# Patient Record
Sex: FEMALE | Race: WHITE | HISPANIC OR LATINO | Employment: PART TIME | URBAN - METROPOLITAN AREA
[De-identification: names, ages, dates, MRNs, and addresses within clinical notes are randomized per-mention and may not be internally consistent; named-entity substitution may affect disease eponyms.]

---

## 2017-01-13 ENCOUNTER — TRANSCRIBE ORDERS (OUTPATIENT)
Dept: ADMINISTRATIVE | Facility: HOSPITAL | Age: 62
End: 2017-01-13

## 2017-01-13 DIAGNOSIS — R52 PAIN: ICD-10-CM

## 2017-01-13 DIAGNOSIS — R60.9 EDEMA, UNSPECIFIED TYPE: Primary | ICD-10-CM

## 2017-01-19 ENCOUNTER — HOSPITAL ENCOUNTER (OUTPATIENT)
Dept: CARDIOLOGY | Facility: HOSPITAL | Age: 62
Discharge: HOME | End: 2017-01-19
Attending: INTERNAL MEDICINE | Admitting: INTERNAL MEDICINE

## 2017-01-19 DIAGNOSIS — R60.9 EDEMA, UNSPECIFIED TYPE: ICD-10-CM

## 2017-01-19 DIAGNOSIS — R52 PAIN: ICD-10-CM

## 2017-01-19 LAB
BH CV LOW VAS RIGHT VARICOSITY BK VESSEL: 1
BH CV LOWER VASCULAR LEFT COMMON FEMORAL AUGMENT: NORMAL
BH CV LOWER VASCULAR LEFT COMMON FEMORAL COMPETENT: NORMAL
BH CV LOWER VASCULAR LEFT COMMON FEMORAL COMPRESS: NORMAL
BH CV LOWER VASCULAR LEFT COMMON FEMORAL PHASIC: NORMAL
BH CV LOWER VASCULAR LEFT COMMON FEMORAL SPONT: NORMAL
BH CV LOWER VASCULAR RIGHT COMMON FEMORAL AUGMENT: NORMAL
BH CV LOWER VASCULAR RIGHT COMMON FEMORAL COMPETENT: NORMAL
BH CV LOWER VASCULAR RIGHT COMMON FEMORAL COMPRESS: NORMAL
BH CV LOWER VASCULAR RIGHT COMMON FEMORAL PHASIC: NORMAL
BH CV LOWER VASCULAR RIGHT COMMON FEMORAL SPONT: NORMAL
BH CV LOWER VASCULAR RIGHT DISTAL FEMORAL COMPRESS: NORMAL
BH CV LOWER VASCULAR RIGHT GASTRONEMIUS COMPRESS: NORMAL
BH CV LOWER VASCULAR RIGHT GREATER SAPH AK COMPRESS: NORMAL
BH CV LOWER VASCULAR RIGHT GREATER SAPH BK COMPRESS: NORMAL
BH CV LOWER VASCULAR RIGHT LESSER SAPH COMPRESS: NORMAL
BH CV LOWER VASCULAR RIGHT MID FEMORAL AUGMENT: NORMAL
BH CV LOWER VASCULAR RIGHT MID FEMORAL COMPETENT: NORMAL
BH CV LOWER VASCULAR RIGHT MID FEMORAL COMPRESS: NORMAL
BH CV LOWER VASCULAR RIGHT MID FEMORAL PHASIC: NORMAL
BH CV LOWER VASCULAR RIGHT MID FEMORAL SPONT: NORMAL
BH CV LOWER VASCULAR RIGHT PERONEAL COMPRESS: NORMAL
BH CV LOWER VASCULAR RIGHT POPLITEAL AUGMENT: NORMAL
BH CV LOWER VASCULAR RIGHT POPLITEAL COMPETENT: NORMAL
BH CV LOWER VASCULAR RIGHT POPLITEAL COMPRESS: NORMAL
BH CV LOWER VASCULAR RIGHT POPLITEAL PHASIC: NORMAL
BH CV LOWER VASCULAR RIGHT POPLITEAL SPONT: NORMAL
BH CV LOWER VASCULAR RIGHT POSTERIOR TIBIAL COMPRESS: NORMAL
BH CV LOWER VASCULAR RIGHT PROXIMAL FEMORAL COMPRESS: NORMAL
BH CV LOWER VASCULAR RIGHT SAPHENOFEMORAL JUNCTION AUGMENT: NORMAL
BH CV LOWER VASCULAR RIGHT SAPHENOFEMORAL JUNCTION COMPETENT: NORMAL
BH CV LOWER VASCULAR RIGHT SAPHENOFEMORAL JUNCTION COMPRESS: NORMAL
BH CV LOWER VASCULAR RIGHT SAPHENOFEMORAL JUNCTION PHASIC: NORMAL
BH CV LOWER VASCULAR RIGHT SAPHENOFEMORAL JUNCTION SPONT: NORMAL
BH CV LOWER VASCULAR RIGHT VARICOSITY BK COMPRESS: NORMAL
BH CV LOWER VASCULAR RIGHT VARICOSITY BK THROMBUS: NORMAL

## 2017-01-19 PROCEDURE — 93971 EXTREMITY STUDY: CPT

## 2017-02-08 ENCOUNTER — OFFICE VISIT (OUTPATIENT)
Dept: CARDIOLOGY | Facility: CLINIC | Age: 62
End: 2017-02-08

## 2017-02-08 VITALS
WEIGHT: 166.8 LBS | HEIGHT: 66 IN | HEART RATE: 67 BPM | SYSTOLIC BLOOD PRESSURE: 132 MMHG | DIASTOLIC BLOOD PRESSURE: 80 MMHG | BODY MASS INDEX: 26.81 KG/M2

## 2017-02-08 DIAGNOSIS — G47.33 OSA ON CPAP: ICD-10-CM

## 2017-02-08 DIAGNOSIS — Z99.89 OSA ON CPAP: ICD-10-CM

## 2017-02-08 DIAGNOSIS — E78.5 HYPERLIPIDEMIA, UNSPECIFIED HYPERLIPIDEMIA TYPE: Primary | ICD-10-CM

## 2017-02-08 DIAGNOSIS — I10 ESSENTIAL HYPERTENSION: ICD-10-CM

## 2017-02-08 PROCEDURE — 93000 ELECTROCARDIOGRAM COMPLETE: CPT | Performed by: INTERNAL MEDICINE

## 2017-02-08 PROCEDURE — 99214 OFFICE O/P EST MOD 30 MIN: CPT | Performed by: INTERNAL MEDICINE

## 2017-02-08 RX ORDER — VALSARTAN 160 MG/1
TABLET ORAL
COMMUNITY
Start: 2017-01-19 | End: 2017-02-13 | Stop reason: ALTCHOICE

## 2017-02-08 NOTE — PROGRESS NOTES
Date of Office Visit: 2017  Encounter Provider: Susan Paula MD  Place of Service: [facility] CARDIOLOGY  Patient Name: Vesta Sutton  :1955      Patient ID:  Vesta Sutton is a 61 y.o. female is here for  followup for HTN.         History of Present Illness    The patient has a history of having intermittent chest pain for which she has never had a  myocardial infarction. She has had echocardiography done. The last one was in  and  it was normal. She had carotid ultrasound done at the same time showed no stenosis but  did show mild intimal thickening. She had an abnormal stress study done in  showing  an apical ischemia but had a cardiac catheterization showing very mild left anterior  descending artery stenosis. She was admitted in  with chest pain. Her stress study  there showed no ischemia.       She was seen in the chest pain unit in 2012, and she ruled out for myocardial  infarction. Her stress study at that time was normal as well.       She has a history of retinal detachment of the left eye. She has a lot of anxiety and  stress. She is treated for hypertension and hyperlipidemia.       She also has some lumbar degenerative disc disease and so does not exercise regularly and  has had some sciatic pain with that as well.       She has a history of palpitations in the past but has had a normal Holter recording.       Her echocardiogram on 09/10/2009 was normal.         She had 2D echocardiogram with Doppler which was done 10/2014. It was normal. Ejection  fraction 66%. She also had a carotid duplex done 10/2014 which was also normal.     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   The rest of the right leg looked okay. It looks like it was just only done on her right leg.      She did see Milly Vessels in 2016.  At  that time her blood pressure was stable.  She was having chest pain because of the Zoloft.  She cut the Zoloft down to ¼ of a tablet, and started feeling better.   She was advised to stop the Zoloft.   She did end up stopping the Zoloft.  Her last echocardiogram was done 2014, which showed ejection fraction 66%, and normal segmental wall motion.  No significant valvular disease.        At this time she is a little agitated, but she is not exercising and she does not sleep well.   She is taking her medications as directed, and her blood pressure is well controlled.   She says that she feels poorly if her heart rate gets down into the 60s, but I do not think her medication is doing this.   She has had no dizziness or syncope.  She has minimal lower extremity edema.   Overall, I think she is doing pretty well.   She remains on Crestor at this time for her hyperlipidemia, and she has seen Dr. Morocho, who checks her blood work.          Past Medical History   Diagnosis Date   • Anemia    • Anxiety and depression    • Appendiceal carcinoid tumor      history of    • Arthritis    • Asthma    • Breast nodule    • Chest pain    • Coronary artery disease    • Diabetes mellitus      a1c only at 5, diet controlled right now   • Environmental allergies    • Fibromuscular hyperplasia of artery      in the carotid artery with no stenosis   • GERD (gastroesophageal reflux disease)    • Heart disease    • Hyperlipidemia    • Hypertension    • Lumbar pain    • LEATHA (obstructive sleep apnea)    • Palpitations    • RBBB (right bundle branch block)    • Retinal detachment      left  //  when blood pressure gets high she notices flashing lights in her vision   • Sleep apnea      wears machine   • Vertigo          Past Surgical History   Procedure Laterality Date   •  section     • Endoscopy and colonoscopy N/A 2014     EGD with biopsy and colonoscopy-Dr. Damien Aleman   • Endoscopy and colonoscopy N/A 2008     EGD  with biopsy and colonoscopy-Dr. Damien Aleman   • Appendectomy  2001   • Ureterocele repair     • Endoscopy N/A 9/26/2016     Procedure: ESOPHAGOGASTRODUODENOSCOPYwith biopsy (cold);  Surgeon: Elida Guzman MD;  Location: [facility] ENDOSCOPY;  Service:        Current Outpatient Prescriptions on File Prior to Visit   Medication Sig Dispense Refill   • albuterol (PROAIR RESPICLICK) 108 (90 BASE) MCG/ACT inhaler Inhale 2 puffs every 4 (four) hours as needed for wheezing. 1 inhaler 0   • ALPRAZolam (XANAX) 0.5 MG tablet Take 0.5 mg by mouth 2 (Two) Times a Day.     • Cholecalciferol 4000 UNITS capsule Take 4,000 Units by mouth daily.     • clonazePAM (KlonoPIN) 1 MG tablet Take 1 mg by mouth at night as needed for seizures.     • cycloSPORINE (RESTASIS) 0.05 % ophthalmic emulsion Administer 1 drop to both eyes 2 (two) times a day.     • famotidine (PEPCID) 10 MG tablet Take 10 mg by mouth as needed.     • Omega-3 Fatty Acids (OMEGA 3 PO) Take 1 tablet by mouth daily.     • rosuvastatin (CRESTOR) 5 MG tablet Take 5 mg by mouth daily.     • traZODone (DESYREL) 100 MG tablet Take 150 mg by mouth every night.     • vitamin B-12 (CYANOCOBALAMIN) 2500 MCG sublingual tablet tablet Place 2,500 mcg under the tongue daily.     • [DISCONTINUED] Diclofenac Sodium 1 % cream Place 1 application on the skin.     • [DISCONTINUED] olmesartan (BENICAR) 40 MG tablet Take 0.5 tablets by mouth Daily. 15 tablet 11   • [DISCONTINUED] sulfamethoxazole-trimethoprim (BACTRIM DS,SEPTRA DS) 800-160 MG per tablet Take 1 tablet by mouth 2 (Two) Times a Day. 10 tablet 0     No current facility-administered medications on file prior to visit.        Social History     Social History   • Marital status:      Spouse name: N/A   • Number of children: N/A   • Years of education: N/A     Occupational History   • Not on file.     Social History Main Topics   • Smoking status: Never Smoker   • Smokeless tobacco: Never Used      Comment: CAFFINE USE  "  • Alcohol use Yes      Comment: occasional use   • Drug use: No   • Sexual activity: Defer     Other Topics Concern   • Not on file     Social History Narrative           Review of Systems   Constitution: Positive for malaise/fatigue.   HENT: Positive for headaches. Negative for congestion.    Eyes: Negative for vision loss in left eye and vision loss in right eye.   Respiratory: Negative.  Negative for cough, hemoptysis, shortness of breath, sleep disturbances due to breathing, snoring, sputum production and wheezing.    Endocrine: Negative.    Hematologic/Lymphatic: Negative.    Skin: Negative for poor wound healing and rash.   Musculoskeletal: Positive for myalgias. Negative for falls, gout and muscle cramps.   Gastrointestinal: Positive for abdominal pain. Negative for diarrhea, dysphagia, hematemesis, melena, nausea and vomiting.   Neurological: Negative for excessive daytime sleepiness, dizziness, light-headedness, loss of balance, seizures and vertigo.   Psychiatric/Behavioral: Negative for depression and substance abuse. The patient is nervous/anxious.        Procedures    ECG 12 Lead  Date/Time: 2/8/2017 10:30 AM  Performed by: MILLICENT FITCH  Authorized by: MILLICENT FITCH   Comparison: compared with previous ECG   Similar to previous ECG  Rhythm: sinus rhythm  Conduction: right bundle branch block and LAFB  Clinical impression: abnormal ECG               Objective:      Vitals:    02/08/17 1021   BP: 132/80   BP Location: Right arm   Patient Position: Sitting   Pulse: 67   Weight: 166 lb 12.8 oz (75.7 kg)   Height: 66\" (167.6 cm)     Body mass index is 26.92 kg/(m^2).    Physical Exam   Constitutional: She is oriented to person, place, and time. She appears well-developed and well-nourished. No distress.   HENT:   Head: Normocephalic and atraumatic.   Eyes: Conjunctivae are normal. No scleral icterus.   Neck: Neck supple. No JVD present. Carotid bruit is not present. No thyromegaly present. "   Cardiovascular: Normal rate, regular rhythm, S1 normal, S2 normal, normal heart sounds and intact distal pulses.   No extrasystoles are present. PMI is not displaced.  Exam reveals no gallop.    No murmur heard.  Pulses:       Carotid pulses are 2+ on the right side, and 2+ on the left side.       Radial pulses are 2+ on the right side, and 2+ on the left side.        Dorsalis pedis pulses are 2+ on the right side, and 2+ on the left side.        Posterior tibial pulses are 2+ on the right side, and 2+ on the left side.   Pulmonary/Chest: Effort normal and breath sounds normal. No respiratory distress. She has no wheezes. She has no rhonchi. She has no rales. She exhibits no tenderness.   Abdominal: Soft. Bowel sounds are normal. She exhibits no distension, no abdominal bruit and no mass. There is no tenderness.   Musculoskeletal: She exhibits no edema or deformity.   Lymphadenopathy:     She has no cervical adenopathy.   Neurological: She is alert and oriented to person, place, and time. No cranial nerve deficit.   Skin: Skin is warm and dry. No rash noted. She is not diaphoretic. No cyanosis. No pallor. Nails show no clubbing.   Psychiatric: She has a normal mood and affect. Judgment normal.   Vitals reviewed.      Lab Review:       Assessment:      Diagnosis Plan   1. Hyperlipidemia, unspecified hyperlipidemia type     2. Essential hypertension     3. LEATHA on CPAP +7       1. Hypertension under good control. I told her to watch her salt and stay off Arthrotec.  2. Chronically abnormal electrocardiogram showing a left anterior fascicular block and  right bundle branch block.   3. Mild left anterior descending stenosis treated medically. Nonischemic stress study  done on 03/2012.   4. Palpitations stable and made worse with the Arthrotec. Stay away from Arthrotec.   5. Salt intake. I advised her to stay away from salt.   6. Hyperlipidemia on Crestor.   7. Obstructive sleep. Use CPAP.   8. History of appendiceal  carcinoma.   9. History of asthma, stable.   10. Anxiety.   11. Gastroesophageal reflux disease.   12. Lumbar degenerative disc disease with sciatic pain.      Plan:       See back in 1 year with Milly borrego. Advised exercise to improve sleep and energy.

## 2017-02-13 ENCOUNTER — TELEPHONE (OUTPATIENT)
Dept: CARDIOLOGY | Facility: CLINIC | Age: 62
End: 2017-02-13

## 2017-02-13 RX ORDER — OLMESARTAN MEDOXOMIL 40 MG/1
40 TABLET ORAL DAILY
COMMUNITY
End: 2017-02-13 | Stop reason: SDUPTHER

## 2017-02-13 RX ORDER — OLMESARTAN MEDOXOMIL 40 MG/1
20 TABLET ORAL DAILY
Qty: 30 TABLET | Refills: 3 | Status: SHIPPED | OUTPATIENT
Start: 2017-02-13 | End: 2017-10-26 | Stop reason: SDUPTHER

## 2017-02-13 NOTE — TELEPHONE ENCOUNTER
Pt states that she has attempted to take the Valsartan but does not feel well on this medication and would like to go back to using Benicar 40 mg, Pt states that she takes this once daily. In previous notes it appears she takes a half tablet once daily. Please advise if you want her to return to this medication, she states that she runs out of this tomorrow.

## 2017-05-01 ENCOUNTER — APPOINTMENT (OUTPATIENT)
Dept: SLEEP MEDICINE | Facility: HOSPITAL | Age: 62
End: 2017-05-01

## 2017-05-01 ENCOUNTER — APPOINTMENT (OUTPATIENT)
Dept: WOMENS IMAGING | Facility: HOSPITAL | Age: 62
End: 2017-05-01

## 2017-05-01 PROCEDURE — 76641 ULTRASOUND BREAST COMPLETE: CPT | Performed by: RADIOLOGY

## 2017-05-01 PROCEDURE — 77062 BREAST TOMOSYNTHESIS BI: CPT | Performed by: RADIOLOGY

## 2017-05-01 PROCEDURE — 77066 DX MAMMO INCL CAD BI: CPT | Performed by: RADIOLOGY

## 2017-05-01 PROCEDURE — MDREVSPNEW: Performed by: RADIOLOGY

## 2017-05-01 PROCEDURE — G0204 DX MAMMO INCL CAD BI: HCPCS | Performed by: RADIOLOGY

## 2017-05-01 PROCEDURE — G0279 TOMOSYNTHESIS, MAMMO: HCPCS | Performed by: RADIOLOGY

## 2017-05-04 ENCOUNTER — OFFICE VISIT (OUTPATIENT)
Dept: SURGERY | Facility: CLINIC | Age: 62
End: 2017-05-04

## 2017-05-04 VITALS — HEIGHT: 67 IN | OXYGEN SATURATION: 97 % | WEIGHT: 167.2 LBS | HEART RATE: 73 BPM | BODY MASS INDEX: 26.24 KG/M2

## 2017-05-04 DIAGNOSIS — R92.8 ABNORMAL ULTRASOUND OF BREAST: Primary | ICD-10-CM

## 2017-05-04 PROCEDURE — 99214 OFFICE O/P EST MOD 30 MIN: CPT | Performed by: SURGERY

## 2017-05-04 RX ORDER — MULTIVITAMIN WITH IRON
2 TABLET ORAL DAILY
COMMUNITY
End: 2020-09-21

## 2017-05-04 RX ORDER — CARBOXYMETHYLCELLULOSE SODIUM 5 MG/ML
1 SOLUTION/ DROPS OPHTHALMIC 3 TIMES DAILY PRN
COMMUNITY
End: 2018-05-10

## 2017-05-08 ENCOUNTER — HOSPITAL ENCOUNTER (OUTPATIENT)
Dept: CARDIOLOGY | Facility: HOSPITAL | Age: 62
Discharge: HOME | End: 2017-05-08
Admitting: INTERNAL MEDICINE

## 2017-05-08 ENCOUNTER — TELEPHONE (OUTPATIENT)
Dept: CARDIOLOGY | Facility: CLINIC | Age: 62
End: 2017-05-08

## 2017-05-08 ENCOUNTER — HOSPITAL ENCOUNTER (OUTPATIENT)
Dept: CARDIOLOGY | Facility: HOSPITAL | Age: 62
Discharge: HOME | End: 2017-05-08

## 2017-05-08 VITALS
SYSTOLIC BLOOD PRESSURE: 120 MMHG | HEART RATE: 68 BPM | RESPIRATION RATE: 16 BRPM | DIASTOLIC BLOOD PRESSURE: 74 MMHG | OXYGEN SATURATION: 98 %

## 2017-05-08 VITALS
HEIGHT: 67 IN | WEIGHT: 157 LBS | HEART RATE: 69 BPM | SYSTOLIC BLOOD PRESSURE: 128 MMHG | DIASTOLIC BLOOD PRESSURE: 78 MMHG | OXYGEN SATURATION: 97 % | BODY MASS INDEX: 24.64 KG/M2

## 2017-05-08 DIAGNOSIS — R94.31 ABNORMAL EKG: ICD-10-CM

## 2017-05-08 DIAGNOSIS — R07.9 CHEST PAIN, UNSPECIFIED TYPE: Primary | ICD-10-CM

## 2017-05-08 DIAGNOSIS — R00.2 PALPITATIONS: ICD-10-CM

## 2017-05-08 DIAGNOSIS — R07.2 PRECORDIAL PAIN: ICD-10-CM

## 2017-05-08 DIAGNOSIS — I10 ESSENTIAL HYPERTENSION: ICD-10-CM

## 2017-05-08 DIAGNOSIS — I45.2 RIGHT BUNDLE BRANCH BLOCK (RBBB) WITH LEFT ANTERIOR FASCICULAR BLOCK: ICD-10-CM

## 2017-05-08 LAB
ALBUMIN SERPL-MCNC: 4.1 G/DL (ref 3.5–5.2)
ALBUMIN/GLOB SERPL: 1.3 G/DL
ALP SERPL-CCNC: 56 U/L (ref 39–117)
ALT SERPL W P-5'-P-CCNC: 20 U/L (ref 1–33)
ANION GAP SERPL CALCULATED.3IONS-SCNC: 13.7 MMOL/L
AST SERPL-CCNC: 15 U/L (ref 1–32)
BASOPHILS # BLD AUTO: 0.06 10*3/MM3 (ref 0–0.2)
BASOPHILS NFR BLD AUTO: 0.9 % (ref 0–1.5)
BILIRUB SERPL-MCNC: 0.3 MG/DL (ref 0.1–1.2)
BNP BLD-MCNC: 17.8 PG/ML
BUN BLD-MCNC: 11 MG/DL (ref 6–20)
BUN/CREAT SERPL: 14.3 (ref 7–25)
CALCIUM SPEC-SCNC: 9.6 MG/DL (ref 8.6–10.5)
CHLORIDE SERPL-SCNC: 99 MMOL/L (ref 98–107)
CK MB BLD-MCNC: 1 NG/ML
CO2 SERPL-SCNC: 25.3 MMOL/L (ref 22–29)
CREAT BLD-MCNC: 0.77 MG/DL (ref 0.57–1)
DEPRECATED RDW RBC AUTO: 45.1 FL (ref 37–54)
EOSINOPHIL # BLD AUTO: 0.05 10*3/MM3 (ref 0–0.7)
EOSINOPHIL NFR BLD AUTO: 0.8 % (ref 0.3–6.2)
ERYTHROCYTE [DISTWIDTH] IN BLOOD BY AUTOMATED COUNT: 12.8 % (ref 11.7–13)
GFR SERPL CREATININE-BSD FRML MDRD: 77 ML/MIN/1.73
GLOBULIN UR ELPH-MCNC: 3.2 GM/DL
GLUCOSE BLD-MCNC: 106 MG/DL (ref 65–99)
HCT VFR BLD AUTO: 38.2 % (ref 35.6–45.5)
HGB BLD-MCNC: 12.4 G/DL (ref 11.9–15.5)
IMM GRANULOCYTES # BLD: 0 10*3/MM3 (ref 0–0.03)
IMM GRANULOCYTES NFR BLD: 0 % (ref 0–0.5)
LYMPHOCYTES # BLD AUTO: 1.18 10*3/MM3 (ref 0.9–4.8)
LYMPHOCYTES NFR BLD AUTO: 18.5 % (ref 19.6–45.3)
MCH RBC QN AUTO: 31.5 PG (ref 26.9–32)
MCHC RBC AUTO-ENTMCNC: 32.5 G/DL (ref 32.4–36.3)
MCV RBC AUTO: 97 FL (ref 80.5–98.2)
MONOCYTES # BLD AUTO: 0.28 10*3/MM3 (ref 0.2–1.2)
MONOCYTES NFR BLD AUTO: 4.4 % (ref 5–12)
NEUTROPHILS # BLD AUTO: 4.8 10*3/MM3 (ref 1.9–8.1)
NEUTROPHILS NFR BLD AUTO: 75.4 % (ref 42.7–76)
NT-PROBNP SERPL-MCNC: 43 PG/ML (ref 0–900)
PLATELET # BLD AUTO: 222 10*3/MM3 (ref 140–500)
PMV BLD AUTO: 10.1 FL (ref 6–12)
POC MYOGLOBIN: 51.7 NG/ML
POTASSIUM BLD-SCNC: 4 MMOL/L (ref 3.5–5.2)
PROT SERPL-MCNC: 7.3 G/DL (ref 6–8.5)
RBC # BLD AUTO: 3.94 10*6/MM3 (ref 3.9–5.2)
SODIUM BLD-SCNC: 138 MMOL/L (ref 136–145)
TROPONIN I SERPL-MCNC: 0.05 NG/ML (ref 0–0.6)
WBC NRBC COR # BLD: 6.37 10*3/MM3 (ref 4.5–10.7)

## 2017-05-08 PROCEDURE — 82553 CREATINE MB FRACTION: CPT

## 2017-05-08 PROCEDURE — 85025 COMPLETE CBC W/AUTO DIFF WBC: CPT | Performed by: NURSE PRACTITIONER

## 2017-05-08 PROCEDURE — 83880 ASSAY OF NATRIURETIC PEPTIDE: CPT

## 2017-05-08 PROCEDURE — 93320 DOPPLER ECHO COMPLETE: CPT

## 2017-05-08 PROCEDURE — 93325 DOPPLER ECHO COLOR FLOW MAPG: CPT | Performed by: INTERNAL MEDICINE

## 2017-05-08 PROCEDURE — 93017 CV STRESS TEST TRACING ONLY: CPT

## 2017-05-08 PROCEDURE — 93350 STRESS TTE ONLY: CPT | Performed by: INTERNAL MEDICINE

## 2017-05-08 PROCEDURE — 83880 ASSAY OF NATRIURETIC PEPTIDE: CPT | Performed by: NURSE PRACTITIONER

## 2017-05-08 PROCEDURE — 93325 DOPPLER ECHO COLOR FLOW MAPG: CPT

## 2017-05-08 PROCEDURE — 93016 CV STRESS TEST SUPVJ ONLY: CPT | Performed by: INTERNAL MEDICINE

## 2017-05-08 PROCEDURE — 93352 ADMIN ECG CONTRAST AGENT: CPT | Performed by: INTERNAL MEDICINE

## 2017-05-08 PROCEDURE — 93010 ELECTROCARDIOGRAM REPORT: CPT | Performed by: NURSE PRACTITIONER

## 2017-05-08 PROCEDURE — 84484 ASSAY OF TROPONIN QUANT: CPT

## 2017-05-08 PROCEDURE — 99215 OFFICE O/P EST HI 40 MIN: CPT | Performed by: NURSE PRACTITIONER

## 2017-05-08 PROCEDURE — 94760 N-INVAS EAR/PLS OXIMETRY 1: CPT

## 2017-05-08 PROCEDURE — 80053 COMPREHEN METABOLIC PANEL: CPT | Performed by: NURSE PRACTITIONER

## 2017-05-08 PROCEDURE — 83874 ASSAY OF MYOGLOBIN: CPT

## 2017-05-08 PROCEDURE — 25010000002 PERFLUTREN (DEFINITY) 8.476 MG IN SODIUM CHLORIDE 10 ML INJECTION: Performed by: NURSE PRACTITIONER

## 2017-05-08 PROCEDURE — 93320 DOPPLER ECHO COMPLETE: CPT | Performed by: INTERNAL MEDICINE

## 2017-05-08 PROCEDURE — 93005 ELECTROCARDIOGRAM TRACING: CPT | Performed by: NURSE PRACTITIONER

## 2017-05-08 PROCEDURE — C8928 TTE W OR W/O FOL W/CON,STRES: HCPCS

## 2017-05-08 PROCEDURE — 93005 ELECTROCARDIOGRAM TRACING: CPT

## 2017-05-08 PROCEDURE — 36415 COLL VENOUS BLD VENIPUNCTURE: CPT

## 2017-05-08 PROCEDURE — 93018 CV STRESS TEST I&R ONLY: CPT | Performed by: INTERNAL MEDICINE

## 2017-05-08 RX ORDER — SODIUM CHLORIDE 0.9 % (FLUSH) 0.9 %
10 SYRINGE (ML) INJECTION AS NEEDED
Status: DISCONTINUED | OUTPATIENT
Start: 2017-05-08 | End: 2019-07-29

## 2017-05-08 RX ORDER — NITROGLYCERIN 0.4 MG/1
0.4 TABLET SUBLINGUAL
Status: DISCONTINUED | OUTPATIENT
Start: 2017-05-08 | End: 2018-05-21 | Stop reason: HOSPADM

## 2017-05-08 RX ADMIN — PERFLUTREN 3 ML: 6.52 INJECTION, SUSPENSION INTRAVENOUS at 16:51

## 2017-05-09 LAB
BH CV ECHO MEAS - ACS: 1.9 CM
BH CV ECHO MEAS - AO MAX PG: 5.6 MMHG
BH CV ECHO MEAS - AO ROOT AREA (BSA CORRECTED): 1.7
BH CV ECHO MEAS - AO ROOT AREA: 7.8 CM^2
BH CV ECHO MEAS - AO ROOT DIAM: 3.2 CM
BH CV ECHO MEAS - AO V2 MAX: 118.1 CM/SEC
BH CV ECHO MEAS - BSA(HAYCOCK): 1.8 M^2
BH CV ECHO MEAS - BSA: 1.8 M^2
BH CV ECHO MEAS - BZI_BMI: 24.6 KILOGRAMS/M^2
BH CV ECHO MEAS - BZI_METRIC_HEIGHT: 170.2 CM
BH CV ECHO MEAS - BZI_METRIC_WEIGHT: 71.2 KG
BH CV ECHO MEAS - CONTRAST EF 4CH: 82.1 ML/M^2
BH CV ECHO MEAS - EDV(MOD-SP4): 84 ML
BH CV ECHO MEAS - EDV(TEICH): 87.4 ML
BH CV ECHO MEAS - EF(CUBED): 76.1 %
BH CV ECHO MEAS - EF(MOD-SP4): 82.1 %
BH CV ECHO MEAS - EF(TEICH): 68.4 %
BH CV ECHO MEAS - ESV(MOD-SP4): 15 ML
BH CV ECHO MEAS - ESV(TEICH): 27.7 ML
BH CV ECHO MEAS - FS: 38 %
BH CV ECHO MEAS - IVS/LVPW: 0.92
BH CV ECHO MEAS - IVSD: 0.9 CM
BH CV ECHO MEAS - LAT PEAK E' VEL: 6 CM/SEC
BH CV ECHO MEAS - LV DIASTOLIC VOL/BSA (35-75): 46 ML/M^2
BH CV ECHO MEAS - LV MASS(C)D: 134.6 GRAMS
BH CV ECHO MEAS - LV MASS(C)DI: 73.8 GRAMS/M^2
BH CV ECHO MEAS - LV SYSTOLIC VOL/BSA (12-30): 8.2 ML/M^2
BH CV ECHO MEAS - LVIDD: 4.4 CM
BH CV ECHO MEAS - LVIDS: 2.7 CM
BH CV ECHO MEAS - LVLD AP4: 7.6 CM
BH CV ECHO MEAS - LVLS AP4: 5.5 CM
BH CV ECHO MEAS - LVPWD: 0.98 CM
BH CV ECHO MEAS - MED PEAK E' VEL: 7 CM/SEC
BH CV ECHO MEAS - MV A DUR: 0.1 SEC
BH CV ECHO MEAS - MV A MAX VEL: 55.8 CM/SEC
BH CV ECHO MEAS - MV DEC SLOPE: 326.5 CM/SEC^2
BH CV ECHO MEAS - MV DEC TIME: 0.18 SEC
BH CV ECHO MEAS - MV E MAX VEL: 68.4 CM/SEC
BH CV ECHO MEAS - MV E/A: 1.2
BH CV ECHO MEAS - MV P1/2T MAX VEL: 67.9 CM/SEC
BH CV ECHO MEAS - MV P1/2T: 60.9 MSEC
BH CV ECHO MEAS - MVA P1/2T LCG: 3.2 CM^2
BH CV ECHO MEAS - MVA(P1/2T): 3.6 CM^2
BH CV ECHO MEAS - PULM A REVS DUR: 0.09 SEC
BH CV ECHO MEAS - PULM A REVS VEL: 26.7 CM/SEC
BH CV ECHO MEAS - PULM DIAS VEL: 32.5 CM/SEC
BH CV ECHO MEAS - PULM S/D: 1.2
BH CV ECHO MEAS - PULM SYS VEL: 39.3 CM/SEC
BH CV ECHO MEAS - SI(CUBED): 35.4 ML/M^2
BH CV ECHO MEAS - SI(MOD-SP4): 37.8 ML/M^2
BH CV ECHO MEAS - SI(TEICH): 32.7 ML/M^2
BH CV ECHO MEAS - SV(CUBED): 64.6 ML
BH CV ECHO MEAS - SV(MOD-SP4): 69 ML
BH CV ECHO MEAS - SV(TEICH): 59.7 ML
BH CV ECHO MEAS - TAPSE (>1.6): 2.4 CM2
BH CV STRESS BP STAGE 1: NORMAL
BH CV STRESS BP STAGE 2: NORMAL
BH CV STRESS DURATION MIN STAGE 1: 3
BH CV STRESS DURATION MIN STAGE 2: 3
BH CV STRESS DURATION MIN STAGE 3: 1
BH CV STRESS DURATION SEC STAGE 1: 0
BH CV STRESS DURATION SEC STAGE 2: 0
BH CV STRESS DURATION SEC STAGE 3: 0
BH CV STRESS ECHO POST STRESS EJECTION FRACTION EF: 82 %
BH CV STRESS GRADE STAGE 1: 10
BH CV STRESS GRADE STAGE 2: 12
BH CV STRESS GRADE STAGE 3: 14
BH CV STRESS HR STAGE 1: 103
BH CV STRESS HR STAGE 2: 132
BH CV STRESS HR STAGE 3: 149
BH CV STRESS METS STAGE 1: 5
BH CV STRESS METS STAGE 2: 7.5
BH CV STRESS METS STAGE 3: 10
BH CV STRESS PROTOCOL 1: NORMAL
BH CV STRESS SPEED STAGE 1: 1.7
BH CV STRESS SPEED STAGE 2: 2.5
BH CV STRESS SPEED STAGE 3: 3.4
BH CV STRESS STAGE 1: 1
BH CV STRESS STAGE 2: 2
BH CV STRESS STAGE 3: 3
BH CV XLRA - RV BASE: 2.6 CM
BH CV XLRA - TDI S': 11 CM/SEC
E/E' RATIO: 6.5
LEFT ATRIUM VOLUME INDEX: 10 ML/M2
LV EF 2D ECHO EST: 60 %
MAXIMAL PREDICTED HEART RATE: 161 BPM
PERCENT MAX PREDICTED HR: 92.55 %
STRESS BASELINE BP: NORMAL MMHG
STRESS BASELINE HR: 69 BPM
STRESS O2 SAT REST: 99 %
STRESS PERCENT HR: 109 %
STRESS POST ESTIMATED WORKLOAD: 8 METS
STRESS POST EXERCISE DUR MIN: 7 MIN
STRESS POST EXERCISE DUR SEC: 0 SEC
STRESS POST PEAK BP: NORMAL MMHG
STRESS POST PEAK HR: 149 BPM
STRESS TARGET HR: 137 BPM

## 2017-05-25 ENCOUNTER — APPOINTMENT (OUTPATIENT)
Dept: WOMENS IMAGING | Facility: HOSPITAL | Age: 62
End: 2017-05-25

## 2017-05-25 PROCEDURE — 76641 ULTRASOUND BREAST COMPLETE: CPT

## 2017-05-25 PROCEDURE — 76942 ECHO GUIDE FOR BIOPSY: CPT | Performed by: RADIOLOGY

## 2017-05-25 PROCEDURE — 19000 PUNCTURE ASPIR CYST BREAST: CPT | Performed by: RADIOLOGY

## 2017-05-25 PROCEDURE — 19083 BX BREAST 1ST LESION US IMAG: CPT | Performed by: RADIOLOGY

## 2017-05-25 PROCEDURE — G0206 DX MAMMO INCL CAD UNI: HCPCS

## 2017-05-25 PROCEDURE — G0279 TOMOSYNTHESIS, MAMMO: HCPCS

## 2017-06-05 ENCOUNTER — HOSPITAL ENCOUNTER (OUTPATIENT)
Dept: SLEEP MEDICINE | Facility: HOSPITAL | Age: 62
Discharge: HOME | End: 2017-06-05
Admitting: INTERNAL MEDICINE

## 2017-06-05 PROCEDURE — G0463 HOSPITAL OUTPT CLINIC VISIT: HCPCS

## 2017-06-06 NOTE — PROGRESS NOTES
DATE OF VISIT: 06/05/2017    PRIMARY CARE PHYSICIAN: Benjamín Morocho MD    REFERRING PHYSICIAN: Benjamín Morocho MD    REASON FOR CONSULTATION: History of obstructive sleep apnea and recent difficulty and waking up several times at night.     HISTORY OF PRESENT ILLNESS: This patient is a 61-year-old female who has a history of obstructive sleep apnea, on CPAP. She was last seen by Dr. Batista on 11/03/2016 and is new to our practice. The patient also has a history of psychophysiologic insomnia and is instructed to use trazodone 150 mg at night and denies any side effects. Patient is also on Xanax 0.5 mg b.i.d. for anxiety and has a script for clonazepam but has not filled since 02/2017. She reports she has occasional nights when she has to use Benadryl in order to sleep. She is not on any antidepressant because of side effects that she has had in the past. She denies any complaints of the CPAP, except that she needs a new machine since hers is making weird noises. She currently has anxiety and is suppose to see a psychiatrist soon. She does complain of excessive daytime sleepiness when she does not use her machine, but otherwise, she feels better when she is using her machine. [place] Sleepiness Scale is 1. She uses a full facemask with a chinstrap and reports that it fits well. She has excessive dry mouth in the morning. Her equipment supplier is [employer]. The patient denies any history of seizure disorder or recent head trauma. She denies any parasomnias, cataplexy, sleep paralysis, or other symptoms to suggest narcolepsy.     REVIEW OF SYSTEMS: Positive for nasal congestion, acid reflux, anxiety, and depression. A 12 system review is conducted and was otherwise negative.     PAST MEDICAL HISTORY:  1. Anemia.   2. Anxiety.   3. Depression.   4. Appendiceal carcinoid tumor.   5. Arthritis.   6. Asthma.   7. Breast nodule.   8. Coronary artery disease.   9. Diabetes.   10. Fibromuscular hyperplasia of carotid artery  with no stenosis.   11. GERD.   12. Hyperlipidemia.   13. Hypertension.   14. Lumbar back pain.   15. Obstructive sleep apnea, on CPAP.   16. Palpitations.   17. Right bundle-branch block.   18. Retinal detachment on the left.   19. Vertigo.     PAST SURGICAL HISTORY:  1. Appendectomy.   2. .  3. Endoscopy and colonoscopy with biopsy that showed mild gastritis.  4. Uterocele repair.     HOME MEDICATIONS:  1. Albuterol 108 mcg /hydrochlorothiazide inhaler, inhaled 2 puffs q.4. h. p.r.n. wheezing.   2. Alprazolam 0.5 mg b.i.d.  3. Refresh plus eyedrops, 1 drop to both eyes t.i.d. p.r.n.  4. Vitamin D3, take 5000 units daily.   5. Klonopin 1 mg tablet at night p.r.n. The patient reports that she has not taken and has not filled since 2017.  6. Famotidine 10 mg daily p.r.n.  7. Flector 1.3% patch on skin p.r.n.  8. Magnesium 250 mg, take 2 tablets p.o. daily.   9. Olmesartan 40 mg, takes 1/2 tab daily.   10. Omega-3 fatty acids 1 tablet daily.   11. Rosuvastatin 5 mg daily.  12. Trazodone 150 mg at night.   13. Vitamin B12 at 2500 mcg sublingual tablets daily.  14. Chromium picolinate 200 mg daily.   15. Vitamin E 400 international units.  16. Nitroglycerin 0.4 mg every 5 minutes p.r.n. for chest pain.     ALLERGIES:   1. BETA BLOCKER (wheezing).  2. SULFA ANTIBIOTICS (hives).    SOCIAL HISTORY: Patient is . She denies any history of smoking or drug use. She does occasionally drink alcohol. She reports that she drinks 1 cup of caffeine per day.     FAMILY HISTORY: Positive for diabetes, heart disease, heart failure, and colon polyps.     PHYSICAL EXAMINATION: Done and documented per sleep disorder physical examination sheet.   VITAL SIGNS: Height 66 inches, weight 169 pounds, BMI 27, blood pressure 123/82, heart rate 70.   GENERAL: Awake, alert, and oriented. Normal mood and normal affect.   HEENT: Normal conjunctivae. PERRLA. Moist mucous membranes. Septum midline. Normal size tongue with enlarged  uvula and redundant lateral pharyngeal tissue. Normal dentition. Mallampati III airway.   NECK: Supple. Trachea midline. Normal thyroid. No lymphadenopathy.   LUNGS: Nonlabored. Clear to auscultation bilaterally. No wheezing.   HEART: Regular rate and rhythm. No murmurs.   EXTREMITIES: 1+ bilateral lower extremity edema. No clubbing or cyanosis. Normal gait.     DIAGNOSTIC DATA: Compliance download for the past 30 days shows 100% compliance with average use of 6 hours and 53 minutes with a residual AHI of 7.0 with a central apnea index of 0.2, obstructive apnea index 3.8, hypoxemia index of 3.0 and RERA index of 1.9. The patient is on a CPAP at a set pressure of 7 cmH2O. Average time for large air leak per day is 1 hour and 7 minutes.     ASSESSMENT:  1. Obstructive sleep apnea with mild residual apnea on CPAP of 7 cmH2O. There is concern about causing more of an air leak if CPAP pressure is increased.   2. Anxiety and depression with plans to talk to a psychiatrist soon.   3. Psychophysiologic insomnia.     RECOMMENDATIONS:  1. Will order the patient a new CPAP to replace her old one and set it up auto CPAP of 7-10 cmH2O. The patient was educated about proper sleep hygiene measures including having a consistent bedtime and wake time.   2. Will refill trazodone prescription.   3. Encouraged to use Xanax for anxiety per PCP instructions.   4. Will start a trial of 10 mg of amitriptyline.   5. Patient will follow up in 2 months for further recommendations.     DESI Stover, dictating for Dimitrios Quiñonez MD.          DESI Stover  WF:blanka  D:   06/05/2017 15:42:34  T:   06/05/2017 22:51:30  Job ID:   [id number]  Document ID:   [id number]  cc:

## 2017-08-07 ENCOUNTER — HOSPITAL ENCOUNTER (OUTPATIENT)
Dept: SLEEP MEDICINE | Facility: HOSPITAL | Age: 62
Discharge: HOME | End: 2017-08-07
Admitting: INTERNAL MEDICINE

## 2017-08-07 PROCEDURE — G0463 HOSPITAL OUTPT CLINIC VISIT: HCPCS

## 2017-08-08 NOTE — PROGRESS NOTES
Sleep Disorder Follow Up    Patient Name: Vesta Sutton  Age/Sex: 61 y.o. female  : 1955  MRN: [id number]    Date of Encounter Visit: 17  Referring Provider: Benjamín Morocho MD  Place of Service: [facility]  Patient Care Team:  Benjamín Morocho MD as PCP - General (Internal Medicine)  Danelle Carnes MD as Consulting Physician (Obstetrics and Gynecology)  Damien Aleman MD as Surgeon (General Surgery)    PROBLEM LIST:  1. Obstructive sleep apnea  2. Psychophysiological insomnia  3. Anxiety and depression    History of Present Illness:  Vesta Sutton is a 61 y.o. female who was last seen in the office on 17 and has a history of obstructive sleep apnea on CPAP at 7 cmH20. At last visit she needed a new machine and was ordered as auto CPAP 7-10 cmH20 and is here for follow up.   Patient is on CPAP and uses it every night with no complaints from the mask or the pressure. She uses a full face mask an reports that it fits well with no air leak.   Patient sleeps better and has a deeper sleep with the CPAP and feels more energy during the day time.  She is on medication for insomnia with Trazadone 150 mg and amitriptyline 10 mg was added recently. She is also on alprazolam 0.5 mg BID for anxiety.   ESS is 0.   Compliance download was reviewed and documented below  Weight is stable since last visit   Other comorbidities include anxiety and depression  Patient denies any changes in past medical surgical social or family history.     Review of Systems:   Positive for nasal congestion, fever, chills, acid reflux, anxiety and depression  A ten-system review was conducted and was otherwise negative.   Please refer to the follow up sleep questionnaire page one for details.    Past Medical History:  Past medical, surgical, social, and family history, except as mentioned above, was unchanged from the last visit.     Past Medical History:   Diagnosis Date   • Anemia    • Anxiety  and depression    • Appendiceal carcinoid tumor    • Arthritis    • Asthma    • Breast nodule    • Chest pain    • Coronary artery disease    • Diabetes mellitus     a1c only at 5, diet controlled right now   • Environmental allergies    • Fibromuscular hyperplasia of artery     in the carotid artery with no stenosis   • GERD (gastroesophageal reflux disease)    • Heart disease    • History of anesthesia complications    • Hyperlipidemia    • Hypertension    • Lumbar pain    • LEATHA (obstructive sleep apnea)    • Palpitations    • RBBB (right bundle branch block)    • Retinal detachment     left  //  when blood pressure gets high she notices flashing lights in her vision   • Sleep apnea     wears machine   • Vertigo        Past Surgical History:   Procedure Laterality Date   • APPENDECTOMY     •  SECTION     • ENDOSCOPY N/A 2016    Procedure: ESOPHAGOGASTRODUODENOSCOPYwith biopsy (cold);  Surgeon: Elida Guzman MD;  Location: [facility] ENDOSCOPY;  Service:    • ENDOSCOPY AND COLONOSCOPY N/A 2014    EGD with biopsy and colonoscopy, Mild gastritis, Normal colon, Repeat Colonoscopy in 5 years, Dr. Damien Aleman   • ENDOSCOPY AND COLONOSCOPY N/A 2008    EGD with biopsy and colonoscopy-Dr. Damien Aleman   • URETEROCELE REPAIR         Home Medications:     Current Outpatient Prescriptions:   •  albuterol (PROAIR RESPICLICK) 108 (90 BASE) MCG/ACT inhaler, Inhale 2 puffs every 4 (four) hours as needed for wheezing., Disp: 1 inhaler, Rfl: 0  •  ALPRAZolam (XANAX) 0.5 MG tablet, Take 0.5 mg by mouth 2 (Two) Times a Day., Disp: , Rfl:   •  carboxymethylcellulose (REFRESH PLUS) 0.5 % solution, Administer 1 drop to both eyes 3 (Three) Times a Day As Needed for Dry Eyes., Disp: , Rfl:   •  Cholecalciferol (VITAMIN D3) 5000 UNITS capsule capsule, Take 5,000 Units by mouth Daily., Disp: , Rfl:   •  clonazePAM (KlonoPIN) 1 MG tablet, Take 1 mg by mouth at night as needed for seizures., Disp: , Rfl:   •   diclofenac (VOLTAREN) 1 % gel gel, Apply 4 g topically 4 (Four) Times a Day As Needed., Disp: , Rfl:   •  famotidine (PEPCID) 10 MG tablet, Take 10 mg by mouth as needed., Disp: , Rfl:   •  FLECTOR 1.3 % patch patch, Place 1 patch on the skin As Needed., Disp: , Rfl:   •  Magnesium 250 MG tablet, Take 2 tablets by mouth Daily., Disp: , Rfl:   •  olmesartan (BENICAR) 40 MG tablet, Take 0.5 tablets by mouth Daily., Disp: 30 tablet, Rfl: 3  •  Omega-3 Fatty Acids (OMEGA 3 PO), Take 1 tablet by mouth daily., Disp: , Rfl:   •  rosuvastatin (CRESTOR) 5 MG tablet, Take 5 mg by mouth daily., Disp: , Rfl:   •  traZODone (DESYREL) 100 MG tablet, Take 150 mg by mouth every night., Disp: , Rfl:   •  vitamin B-12 (CYANOCOBALAMIN) 2500 MCG sublingual tablet tablet, Place 2,500 mcg under the tongue daily., Disp: , Rfl:     Current Facility-Administered Medications:   •  nitroglycerin (NITROSTAT) SL tablet 0.4 mg, 0.4 mg, Sublingual, Q5 Min PRN, Lisa Dumont, APRN  •  sodium chloride 0.9 % flush 10 mL, 10 mL, Intravenous, PRN, Lisaluciana Dumont, APRN    Allergies:  Allergies   Allergen Reactions   • Beta Adrenergic Blockers Other (See Comments)     Asthma   • Sulfa Antibiotics Hives       Past Social History:  Social History     Social History   • Marital status:      Spouse name: N/A   • Number of children: N/A   • Years of education: N/A     Social History Main Topics   • Smoking status: Never Smoker   • Smokeless tobacco: Never Used      Comment: CAFFINE USE   • Alcohol use Yes      Comment: occasional use   • Drug use: No   • Sexual activity: Defer     Other Topics Concern   • Not on file     Social History Narrative       Past Family History:  Family History   Problem Relation Age of Onset   • Heart failure Father    • Heart disease Father    • Diabetes Sister    • Heart disease Mother    • Heart disease Brother    • Colon polyps Brother          Objective:   Done and documented on sleep disorders center physical  examination sheet   VS: Ht: 66 inches, Wt: 169 lbs, BMI 27, \86, HR 76    Physical Exam:   General: AAOx3. Normal mood and affect.   HEENT:  Conjunctiva normal.  PERRLA.  Moist mucous membranes.  Septum midline Mallampati 3 airways. Normal tongue and uvula. Normal tonsils.  Neck supple trachea midline.  Normal thyroid.  LUNGS: Non-labored breathing. CTAB.  No wheezing  HEART: regular rate and rhythm. No murmur. Normal s1/s2  EXTREMITIES: no edema. No cyanosis or clubbing. Normal gait    Diagnostic Data:  Compliance download showed 96.7% compliance with 6 hours and 33 minutes per night. Residual AHI of 7.2 with central apnea index of 0.6, obstructive apnea index of 3.8 , hypopnea index of 2.8 and RERA index of 0.7 on auto CPAP 7-10 cmH20 with 17 minutes and 33 seconds of average air leak. 90% of the time at or below 9.6 cmH20    Assessment and Plan:   1. Obstructive sleep apnea  2. Psychophysiological insomnia  3. Anxiety and depression    Recommendations:     Continue auto CPAP and change pressure to 8-12 cmH20  Continue Trazodone and increase Amitriptyline to 25 mg nightly  Continue PRN xanax and try to minimize use     Adherence to the PAP therapy is a key factor in successful treatment of LEATHA and the patient was encouraged to contact us in case of problem with the CPAP or the mask that can limit the tolerance of the compliance with the therapy.    Follow up in 2 months.     As dictated per DESI Farley  [facility]   08/07/17  10:45 PM    EMR Dragon/Transcription disclaimer:   Much of this encounter note is an electronic transcription/translation of spoken language to printed text. The electronic translation of spoken language may permit erroneous, or at times, nonsensical words or phrases to be inadvertently transcribed; Although I have reviewed the note for such errors, some may still exist.

## 2017-08-09 ENCOUNTER — TELEPHONE (OUTPATIENT)
Dept: SLEEP MEDICINE | Facility: HOSPITAL | Age: 62
End: 2017-08-09

## 2017-08-09 NOTE — TELEPHONE ENCOUNTER
Pt called [facility] and stated she's having issue getting her msk to fit since her start pressure was increased to 8cmh2o from 7cmh2o.    Pt requesting pressure to be decreased to 7cmh2o.

## 2017-08-14 ENCOUNTER — TELEPHONE (OUTPATIENT)
Dept: SLEEP MEDICINE | Facility: HOSPITAL | Age: 62
End: 2017-08-14

## 2017-08-14 NOTE — TELEPHONE ENCOUNTER
Pt to bring data card in for pressure change today or Wednesday.  Lower min pressure to 7cmh2o pre Tiffany

## 2017-08-14 NOTE — TELEPHONE ENCOUNTER
Ok to change pt's pap start pressure permitted by orion.  Unable to change device settings via modem.  Pt will need to be called to bring her card to the [facility]dc for pressure change.

## 2017-08-14 NOTE — TELEPHONE ENCOUNTER
Pt to return call to let sdc know if she will bring data card to us or naps for pressure change.  If naps, sdc will need to have order signed and faxed

## 2017-09-14 ENCOUNTER — TELEPHONE (OUTPATIENT)
Dept: CARDIOLOGY | Facility: CLINIC | Age: 62
End: 2017-09-14

## 2017-09-14 NOTE — TELEPHONE ENCOUNTER
Pt called and states that she is been having near syncope episode, fatigue , palpitations.  if her HR goes to 69 she felt all that symptoms.Pt wants to know if she can come in see you. Called the pt back for more info but no answer l/ m for pt to call back.....please advise    Pt called back,    BP reading last night 143/85 HR:69     138/84  HR: 69  BP reading this morning  145/79 HR :75    Med list  benicar 40 mg daily    Thanks  Bita CARTWRIGHT

## 2017-09-18 ENCOUNTER — OFFICE VISIT (OUTPATIENT)
Dept: CARDIOLOGY | Facility: CLINIC | Age: 62
End: 2017-09-18

## 2017-09-18 VITALS
BODY MASS INDEX: 26.53 KG/M2 | HEIGHT: 67 IN | DIASTOLIC BLOOD PRESSURE: 78 MMHG | HEART RATE: 76 BPM | SYSTOLIC BLOOD PRESSURE: 128 MMHG | WEIGHT: 169 LBS

## 2017-09-18 DIAGNOSIS — R00.2 PALPITATIONS: Primary | ICD-10-CM

## 2017-09-18 DIAGNOSIS — I10 ESSENTIAL HYPERTENSION: ICD-10-CM

## 2017-09-18 PROCEDURE — 99213 OFFICE O/P EST LOW 20 MIN: CPT | Performed by: NURSE PRACTITIONER

## 2017-09-18 PROCEDURE — 93000 ELECTROCARDIOGRAM COMPLETE: CPT | Performed by: NURSE PRACTITIONER

## 2017-09-18 RX ORDER — AMLODIPINE BESYLATE 2.5 MG/1
2.5 TABLET ORAL DAILY
Qty: 30 TABLET | Refills: 11 | Status: SHIPPED | OUTPATIENT
Start: 2017-09-18 | End: 2017-10-26 | Stop reason: ALTCHOICE

## 2017-09-18 RX ORDER — THYROID 60 MG
TABLET ORAL
COMMUNITY
Start: 2017-09-13 | End: 2019-11-18

## 2017-09-18 NOTE — PROGRESS NOTES
"Date of Office Visit: 2017  Encounter Provider: DESI Banks  Place of Service: [facility] CARDIOLOGY  Patient Name: Vesta Sutton  :1955    Chief Complaint   Patient presents with   • Fatigue   :     HPI: Vesta Sutton is a 61 y.o. female comes in today for complaints of syncope.   She has a history of hypertension and hyperlipidemia.    She has a history of having intermittent chest pain for which she has never had a myocardial infarction.Abnormal stress test in  with cardiac cath showing very mild LAD stenosis.  She was seen in the chest pain unit in 2012, and she ruled out for myocardial infarction. Her stress study at that time was normal as well.     2016, she came in with chest pain.  She was also having some palpitations after taking Zoloft.    May/2017, she was having intermittent chest pain.  She was set up for a stress echocardiogram which was normal.      On , she called in complaining of near syncopal episode, fatigue, palpitations.      Today, she comes in for follow-up and reports that recently her primary care physician increased her Benicar to 40 mg.  She's been checking her blood pressure in the evenings.  Sometimes she just feels like she is \"draggy\" in the evenings.  It is especially worse when her heart rate is below 70.  She says when her heart rate is at 69 it makes her feel bad.  She said a few episodes of dizziness and palpitations.  When she gets anxious, she has palpitations.  She denies edema, orthopnea or PND.  Past Medical History:   Diagnosis Date   • Anemia    • Anxiety and depression    • Appendiceal carcinoid tumor    • Arthritis    • Asthma    • Breast nodule    • Chest pain    • Coronary artery disease    • Diabetes mellitus     a1c only at 5, diet controlled right now   • Environmental allergies    • Fibromuscular hyperplasia of artery     in the carotid artery with no stenosis   • GERD " (gastroesophageal reflux disease)    • Heart disease    • History of anesthesia complications    • Hyperlipidemia    • Hypertension    • Lumbar pain    • LEATHA (obstructive sleep apnea)    • Palpitations    • RBBB (right bundle branch block)    • Retinal detachment     left  //  when blood pressure gets high she notices flashing lights in her vision   • Sleep apnea     wears machine   • Vertigo        Past Surgical History:   Procedure Laterality Date   • APPENDECTOMY     •  SECTION     • ENDOSCOPY N/A 2016    Procedure: ESOPHAGOGASTRODUODENOSCOPYwith biopsy (cold);  Surgeon: Elida Guzman MD;  Location: [facility] ENDOSCOPY;  Service:    • ENDOSCOPY AND COLONOSCOPY N/A 2014    EGD with biopsy and colonoscopy, Mild gastritis, Normal colon, Repeat Colonoscopy in 5 years, Dr. Damien Aleman   • ENDOSCOPY AND COLONOSCOPY N/A 2008    EGD with biopsy and colonoscopy-Dr. Damien Aleman   • URETEROCELE REPAIR             Review of Systems   Constitution: Positive for malaise/fatigue.   HENT: Positive for headaches. Negative for congestion.    Eyes: Negative for vision loss in left eye and vision loss in right eye.   Respiratory: Negative.  Negative for cough, hemoptysis, shortness of breath, sleep disturbances due to breathing, snoring, sputum production and wheezing.    Endocrine: Negative.    Hematologic/Lymphatic: Negative.    Skin: Negative for poor wound healing and rash.   Musculoskeletal: Positive for myalgias. Negative for falls, gout and muscle cramps.   Gastrointestinal: Positive for abdominal pain. Negative for diarrhea, dysphagia, hematemesis, melena, nausea and vomiting.   Neurological: Negative for excessive daytime sleepiness, dizziness, light-headedness, loss of balance, seizures and vertigo.   Psychiatric/Behavioral: Negative for depression and substance abuse. The patient is nervous/anxious.      All other systems reviewed and are negative    Allergies   Allergen Reactions   •  "Beta Adrenergic Blockers Other (See Comments)     Asthma   • Sulfa Antibiotics Hives       All aspects of family and social history reviewed.          Objective:     Vitals:    09/18/17 1306   BP: 128/78   BP Location: Left arm   Pulse: 76   Weight: 169 lb (76.7 kg)   Height: 67\" (170.2 cm)     Body mass index is 26.47 kg/(m^2).    PHYSICAL EXAM:  Physical Exam   Constitutional: She is oriented to person, place, and time. She appears well-developed and well-nourished. No distress.   HENT:   Head: Normocephalic and atraumatic.   Eyes: Conjunctivae are normal. No scleral icterus.   Neck: Neck supple. No JVD present. Carotid bruit is not present. No thyromegaly present.   Cardiovascular: Normal rate, regular rhythm, S1 normal, S2 normal, normal heart sounds and intact distal pulses.   No extrasystoles are present. PMI is not displaced.  Exam reveals no gallop.    No murmur heard.  Pulses:       Carotid pulses are 2+ on the right side, and 2+ on the left side.       Radial pulses are 2+ on the right side, and 2+ on the left side.        Dorsalis pedis pulses are 2+ on the right side, and 2+ on the left side.        Posterior tibial pulses are 2+ on the right side, and 2+ on the left side.   Pulmonary/Chest: Effort normal and breath sounds normal. No respiratory distress. She has no wheezes. She has no rhonchi. She has no rales. She exhibits no tenderness.   Abdominal: Soft. Bowel sounds are normal. She exhibits no distension, no abdominal bruit and no mass. There is no tenderness.   Musculoskeletal: She exhibits no edema or deformity.   Lymphadenopathy:     She has no cervical adenopathy.   Neurological: She is alert and oriented to person, place, and time. No cranial nerve deficit.   Skin: Skin is warm and dry. No rash noted. She is not diaphoretic. No cyanosis. No pallor. Nails show no clubbing.   Psychiatric: She has a normal mood and affect. Judgment normal.   Vitals reviewed.        ECG 12 Lead  Date/Time: " 9/18/2017 1:46 PM  Performed by: KESHAV LIZARRAGA  Authorized by: KESHAV LIZARRAGA   Comparison: compared with previous ECG from 2/8/2017  Similar to previous ECG  Rhythm: sinus rhythm  Rate: normal  BPM: 76  Conduction: right bundle branch block and LAFB  ST Segments: ST segments normal  T Waves: T waves normal  QRS axis: normal  Clinical impression: abnormal ECG  Comments: Indication: fatigue, dizziness                Assessment:       Diagnosis Plan   1. Palpitations  Holter / Event ZIO Patch   2. Essential hypertension          Orders Placed This Encounter   Procedures   • Holter / Event ZIO Patch     Standing Status:   Future     Number of Occurrences:   1     Standing Expiration Date:   9/18/2018     Order Specific Question:   Reason for exam?     Answer:   Palpitations   • ECG 12 Lead     This order was created via procedure documentation       Current Outpatient Prescriptions   Medication Sig Dispense Refill   • albuterol (PROAIR RESPICLICK) 108 (90 BASE) MCG/ACT inhaler Inhale 2 puffs every 4 (four) hours as needed for wheezing. 1 inhaler 0   • ALPRAZolam (XANAX) 0.5 MG tablet Take 0.5 mg by mouth 2 (Two) Times a Day.     • carboxymethylcellulose (REFRESH PLUS) 0.5 % solution Administer 1 drop to both eyes 3 (Three) Times a Day As Needed for Dry Eyes.     • Cholecalciferol (VITAMIN D3) 5000 UNITS capsule capsule Take 5,000 Units by mouth Daily.     • clonazePAM (KlonoPIN) 1 MG tablet Take 1 mg by mouth at night as needed for seizures.     • diclofenac (VOLTAREN) 1 % gel gel Apply 4 g topically 4 (Four) Times a Day As Needed.     • famotidine (PEPCID) 10 MG tablet Take 10 mg by mouth as needed.     • FLECTOR 1.3 % patch patch Place 1 patch on the skin As Needed.     • Magnesium 250 MG tablet Take 2 tablets by mouth Daily.     • olmesartan (BENICAR) 40 MG tablet Take 0.5 tablets by mouth Daily. 30 tablet 3   • Omega-3 Fatty Acids (OMEGA 3 PO) Take 1 tablet by mouth daily.     • rosuvastatin (CRESTOR) 5 MG  tablet Take 5 mg by mouth daily.     • traZODone (DESYREL) 100 MG tablet Take 150 mg by mouth every night.     • amLODIPine (NORVASC) 2.5 MG tablet Take 1 tablet by mouth Daily. 30 tablet 11     Current Facility-Administered Medications   Medication Dose Route Frequency Provider Last Rate Last Dose   • nitroglycerin (NITROSTAT) SL tablet 0.4 mg  0.4 mg Sublingual Q5 Min PRN DESI Crenshaw       • sodium chloride 0.9 % flush 10 mL  10 mL Intravenous PRN DESI Crenshaw                Plan:       1. Palpitations-patient having episodes is where she feels her heart rate is slow and then other times she will have palpitations.  I've advised her that her heart rate below 70 is not anything to be concerned about his long as she is not having syncope.  We will place a Zio patch for evaluation.  I think some of this is related to anxiety. She has also recently been started on armour thyroid.     2. HTN-has issues with hypertension.  Says she feels worse since Benicar increased to 40 mg daily.  Will decrease back to 20 mg daily and start 2.5 mg of amlodipine.    Follow up in office in one month    As always, it has been a pleasure to participate in this patient's care.      Sincerely,      DESI Banks

## 2017-09-25 ENCOUNTER — TELEPHONE (OUTPATIENT)
Dept: CARDIOLOGY | Facility: CLINIC | Age: 62
End: 2017-09-25

## 2017-09-25 NOTE — TELEPHONE ENCOUNTER
Thank you.  I will discuss with Dr. Paula tomorrow. She was previously on max dosing of Diovan and this was not treating her blood pressure. Benicar should not be causing her heart rate to drop and I cannot do anything with her heart rate just being below 70.  She has a Zio patch on to ensure she is not truly bradycardic.

## 2017-09-25 NOTE — TELEPHONE ENCOUNTER
Pt called stating she stopped taking the Amlodipine 2.5mg QD due to fatigue, palp and SOA.  She states she took it this past Friday and Saturday but discontinued it on Sunday.  She is feeling better today.  BP today 137/83,  Friday evening 140/100.  Pt states she is still taking the Benicar 20mg QD.  Pt is still wearing the Zio patch.    Please advise/jlf    # [phone]

## 2017-09-25 NOTE — TELEPHONE ENCOUNTER
09/25/17  2:26 PM  Vesta Sutton  1955    Home Phone [phone]   Mobile [phone]     I called Ms. Sutton and instructed her to continue to monitor BP and to call if >140/90. She states she is worred that the benicar is making her HR drop too low. She states she feels exhausted with HR below 70. She states that she previously took diovan and tolerated it better. I explained that benicar and diovan are the same class of medication. She states she is aware of this.     For now, she will continue the 20mg benicar daily.    Bita MUNIZ RN

## 2017-10-02 ENCOUNTER — TELEPHONE (OUTPATIENT)
Dept: CARDIOLOGY | Facility: HOSPITAL | Age: 62
End: 2017-10-02

## 2017-10-02 ENCOUNTER — HOSPITAL ENCOUNTER (OUTPATIENT)
Dept: SLEEP MEDICINE | Facility: HOSPITAL | Age: 62
Discharge: HOME | End: 2017-10-02
Admitting: INTERNAL MEDICINE

## 2017-10-02 PROCEDURE — G0463 HOSPITAL OUTPT CLINIC VISIT: HCPCS

## 2017-10-03 NOTE — PROGRESS NOTES
Sleep Disorder Follow Up    Patient Name: Vesta Sutton  Age/Sex: 61 y.o. female  : 1955  MRN: [id number]    Date of Encounter Visit: 10/03/17  Referring Provider: Dimitrios Quiñonez MD  Place of Service: [facility] SLEEP [facility]  Patient Care Team:  Benjamín Morocho MD as PCP - General (Internal Medicine)  Danelle Carnes MD as Consulting Physician (Obstetrics and Gynecology)  Damien Aleman MD as Surgeon (General Surgery)    PROBLEM LIST:  1. Insomnia likely psychophysiological   2. obstructive sleep apnea on CPAP  3. Obesity  4. Anxiety    History of Present Illness:  Vesta Sutton is a 61 y.o. female who was last seen in the office on 17 for evaluation of obstructive sleep apnea.  She was previously seen by Dr. Batista in 2016 and has a history of psychophysiological insomnia and was taking trazodone 150 mg at night without any side effects.  Patient also has significant anxiety and is on Xanax 0.5 mg twice a day.  She reports that occasionally she has used an anterolateral to help her sleep.  She is not on any antidepressant because of the side effects that she has had in the past.  She had a new machine ordered on 17 at a pressure of 7-10 cm water.  On her follow-up on 17 her pressure was adjusted to 8-12 cm H2O due to a residual AHI of 7.2.  However, she did not tolerate the increased pressure with her new mask and requests that it be changed back to a starting pressure of 7.   Since last visit she complains of dry mouth, but does not use a chin strap.  She was given a prescription for amitriptyline 10 mg, but it did not cause sustaining sleep and she was waking up between 2 and 4 AM. She did try 25 mg with only partial success. She ended up going back to her prior regimen of trazodone 150 mg and Xanax.    She denied any napping during the day and goes to bed between 10 PM and midnight and usually wakes up around 6 AM, but on the weekend she doesn't wake up until 9  AM.  Patient is on CPAP and uses it every night with no complaints from the mask or the pressure.  Patient uses a fullface mask and reports that it fits well. Patient denies any air leak or excessive dry mouth.   Patient's equipment supplier is [employer].  Patient sleeps better and has a deeper sleep with the CPAP and feels more energy during the day time.  Current CPAP setting 7-12 cm H20.  [place] Sleepiness Scale (ESS) is 1.  Compliance download was reviewed and documented below.  Weight is stable since last visit.  Other comorbidities include anxiety, depression, insomnia and overweight.  She does report having a cornea surgery due to dry eyes since her last visit.    Review of Systems:   Positive for acid reflux/heartburn, nasal congestion, abdominal bloating and anxiety/depression.  A ten-system review was conducted and was otherwise negative.   Please refer to the follow up sleep questionnaire page one for details.    Past Medical History:  Past medical, surgical, social, and family history, except as mentioned above, was unchanged from the last visit.     Past Medical History:   Diagnosis Date   • Anemia    • Anxiety and depression    • Appendiceal carcinoid tumor    • Arthritis    • Asthma    • Breast nodule    • Chest pain    • Coronary artery disease    • Diabetes mellitus     a1c only at 5, diet controlled right now   • Environmental allergies    • Fibromuscular hyperplasia of artery     in the carotid artery with no stenosis   • GERD (gastroesophageal reflux disease)    • Heart disease    • History of anesthesia complications    • Hyperlipidemia    • Hypertension    • Lumbar pain    • LEATHA (obstructive sleep apnea)    • Palpitations    • RBBB (right bundle branch block)    • Retinal detachment     left  //  when blood pressure gets high she notices flashing lights in her vision   • Sleep apnea     wears machine   • Vertigo        Past Surgical History:   Procedure Laterality Date   • APPENDECTOMY  2001   •   SECTION     • ENDOSCOPY N/A 2016    Procedure: ESOPHAGOGASTRODUODENOSCOPYwith biopsy (cold);  Surgeon: Elida Guzman MD;  Location: [facility] ENDOSCOPY;  Service:    • ENDOSCOPY AND COLONOSCOPY N/A 2014    EGD with biopsy and colonoscopy, Mild gastritis, Normal colon, Repeat Colonoscopy in 5 years, Dr. Damien Aleman   • ENDOSCOPY AND COLONOSCOPY N/A 2008    EGD with biopsy and colonoscopy-Dr. Damien Aleman   • URETEROCELE REPAIR         Home Medications:     Current Outpatient Prescriptions:   •  albuterol (PROAIR RESPICLICK) 108 (90 BASE) MCG/ACT inhaler, Inhale 2 puffs every 4 (four) hours as needed for wheezing., Disp: 1 inhaler, Rfl: 0  •  ALPRAZolam (XANAX) 0.5 MG tablet, Take 0.5 mg by mouth 2 (Two) Times a Day., Disp: , Rfl:   •  amLODIPine (NORVASC) 2.5 MG tablet, Take 1 tablet by mouth Daily., Disp: 30 tablet, Rfl: 11  •  ARMOUR THYROID 60 MG tablet, Take once daily, Disp: , Rfl:   •  carboxymethylcellulose (REFRESH PLUS) 0.5 % solution, Administer 1 drop to both eyes 3 (Three) Times a Day As Needed for Dry Eyes., Disp: , Rfl:   •  Cholecalciferol (VITAMIN D3) 5000 UNITS capsule capsule, Take 5,000 Units by mouth Daily., Disp: , Rfl:   •  clonazePAM (KlonoPIN) 1 MG tablet, Take 1 mg by mouth at night as needed for seizures., Disp: , Rfl:   •  diclofenac (VOLTAREN) 1 % gel gel, Apply 4 g topically 4 (Four) Times a Day As Needed., Disp: , Rfl:   •  famotidine (PEPCID) 10 MG tablet, Take 10 mg by mouth as needed., Disp: , Rfl:   •  FLECTOR 1.3 % patch patch, Place 1 patch on the skin As Needed., Disp: , Rfl:   •  Magnesium 250 MG tablet, Take 2 tablets by mouth Daily., Disp: , Rfl:   •  olmesartan (BENICAR) 40 MG tablet, Take 0.5 tablets by mouth Daily., Disp: 30 tablet, Rfl: 3  •  Omega-3 Fatty Acids (OMEGA 3 PO), Take 1 tablet by mouth daily., Disp: , Rfl:   •  rosuvastatin (CRESTOR) 5 MG tablet, Take 5 mg by mouth daily., Disp: , Rfl:   •  traZODone (DESYREL) 100 MG tablet, Take  150 mg by mouth every night., Disp: , Rfl:     Current Facility-Administered Medications:   •  nitroglycerin (NITROSTAT) SL tablet 0.4 mg, 0.4 mg, Sublingual, Q5 Min PRN, DESI Crenshaw  •  sodium chloride 0.9 % flush 10 mL, 10 mL, Intravenous, PRN, DESI Crenshaw    She reports that she is currently taking prednisolone eyedrops due to recent eye surgery  She is TAKING over-the-counter Zyrtec    Allergies:  Allergies   Allergen Reactions   • Beta Adrenergic Blockers Other (See Comments)     Asthma   • Sulfa Antibiotics Hives       Past Social History:  Social History     Social History   • Marital status:      Spouse name: N/A   • Number of children: N/A   • Years of education: N/A     Social History Main Topics   • Smoking status: Never Smoker   • Smokeless tobacco: Never Used      Comment: CAFFINE USE   • Alcohol use Yes      Comment: occasional use   • Drug use: No   • Sexual activity: Defer     Other Topics Concern   • Not on file     Social History Narrative     She also reports drinking one cup of caffeine per day.    Past Family History:  Family History   Problem Relation Age of Onset   • Heart failure Father    • Heart disease Father    • Diabetes Sister    • Heart disease Mother    • Heart disease Brother    • Colon polyps Brother          Objective:   Done and documented on sleep disorders center physical examination sheet   VS: Ht: 66 inches, Wt: 170 lbs, BMI 27, /68, HR 68    Physical Exam:   General: AAOx3. Normal mood and affect.   HEENT:  Moist mucous membranes.  Septum midline. Mallampati 3 airways. Normal tongue and uvula. Normal tonsils.  LUNGS: Non-labored breathing. CTAB.  No wheezing  HEART: regular rate and rhythm. No murmur. Normal s1/s2  EXTREMITIES: No edema. No cyanosis or clubbing. Normal gait    Diagnostic Data:  NPSG on 4/4/2008 at [facility] sleep [place] at a weight of 166 pounds showed mild to moderate obstructive sleep apnea with overall AHI of 7.5, but  increase to 15.1 in the supine position and 55.3% of time snoring.  Patient did have sleep related hypoxemia with 6% of total sleep time in the hypoxemic range.    CPAP titration study on 4/21/2008.  Patient was titrated from 4 up to 8 cm of water with optimal control at 7 cm water with residual AHI 0.2.    She was ordered 6/5/17 and she was placed on auto CPAP 7-10 cm H2O.    On 8/8/17 pressure was increased to 8-12 cm H2O due to residual AHI of 7.1 with mostly obstructive apnea.    Pressure was decreased on 8/12/17 to 7-12 cm H2O due to   difficulty tolerating higher pressure even with new mask.      Compliance download from the last 30 days showed 86.7% compliance with 6 ours and 14 minutes per night. Residual AHI of 9.1 (CA 0.6, OA 2.9, HI 5.6 and RERA 0.8) on CPAP at 7-12 cm H20 with mean pressure of 7.8 cm H2O 90% of time at or below 9.2 cm H2O with 1 hour 49 minutes and 10 seconds of average air leak.     Assessment and Plan:     1. Insomnia (psychophysiological)  2. Mild-to-moderate obstructive sleep apnea on CPAP and intolerant to high pressures  3. Obesity  4. Anxiety    Recommendations:     At this point patient is spending too much time in bed and was recommended to decrease time in bed to 6 hours.  She was also educated on proper sleep hygiene measures and instructed to follow these directions.    Since she did get some benefit with the amitriptyline will resume at 25 mg nightly.    Will work on sleep hygiene measures and improve the large air leak before considering any other adjustments.  Patient was intolerable to higher pressures, but continues to have an elevated residual AHI, which may be due to large air leak at this time.    Follow up in 2 months or sooner if has any issues.     DESI Stover dictating for Dr. Dimitrios Quiñonez  [facility]   10/03/17  6:52 PM    EMR Dragon/Transcription disclaimer:   Much of this encounter note is an electronic transcription/translation of  spoken language to printed text. The electronic translation of spoken language may permit erroneous, or at times, nonsensical words or phrases to be inadvertently transcribed; Although I have reviewed the note for such errors, some may still exist.

## 2017-10-10 ENCOUNTER — TELEPHONE (OUTPATIENT)
Dept: CARDIOLOGY | Facility: CLINIC | Age: 62
End: 2017-10-10

## 2017-10-12 ENCOUNTER — TELEPHONE (OUTPATIENT)
Dept: CARDIOLOGY | Facility: CLINIC | Age: 62
End: 2017-10-12

## 2017-10-26 ENCOUNTER — OFFICE VISIT (OUTPATIENT)
Dept: CARDIOLOGY | Facility: CLINIC | Age: 62
End: 2017-10-26

## 2017-10-26 VITALS
BODY MASS INDEX: 26.84 KG/M2 | SYSTOLIC BLOOD PRESSURE: 136 MMHG | RESPIRATION RATE: 18 BRPM | DIASTOLIC BLOOD PRESSURE: 88 MMHG | WEIGHT: 171 LBS | HEIGHT: 67 IN | HEART RATE: 64 BPM

## 2017-10-26 DIAGNOSIS — R00.2 PALPITATIONS: ICD-10-CM

## 2017-10-26 DIAGNOSIS — E78.5 HYPERLIPIDEMIA, UNSPECIFIED HYPERLIPIDEMIA TYPE: ICD-10-CM

## 2017-10-26 DIAGNOSIS — I10 ESSENTIAL HYPERTENSION: Primary | ICD-10-CM

## 2017-10-26 PROCEDURE — 93000 ELECTROCARDIOGRAM COMPLETE: CPT | Performed by: INTERNAL MEDICINE

## 2017-10-26 PROCEDURE — 99214 OFFICE O/P EST MOD 30 MIN: CPT | Performed by: INTERNAL MEDICINE

## 2017-10-26 RX ORDER — TRAZODONE HYDROCHLORIDE 150 MG/1
150 TABLET ORAL NIGHTLY
COMMUNITY
End: 2022-02-28

## 2017-10-26 RX ORDER — OLMESARTAN MEDOXOMIL 40 MG/1
20 TABLET ORAL 2 TIMES DAILY
Qty: 30 TABLET | Refills: 3 | Status: SHIPPED | OUTPATIENT
Start: 2017-10-26 | End: 2023-01-19

## 2017-10-26 NOTE — PROGRESS NOTES
Date of Office Visit: 10/26/2017  Encounter Provider: Susan Paula MD  Place of Service: [facility] CARDIOLOGY  Patient Name: Vesta Sutton  :1955      Patient ID:  Vesta Sutton is a 62 y.o. female is here for  followup for hypertension and palpitations.         History of Present Illness    The patient has a history of having intermittent chest pain for which she has never had a  myocardial infarction. She has had echocardiography done. The last one was in  and  it was normal. She had carotid ultrasound done at the same time showed no stenosis but  did show mild intimal thickening. She had an abnormal stress study done in  showing  an apical ischemia but had a cardiac catheterization showing very mild left anterior  descending artery stenosis. She was admitted in  with chest pain. Her stress study  there showed no ischemia.       She was seen in the chest pain unit in 2012, and she ruled out for myocardial  infarction. Her stress study at that time was normal as well.       She has a history of retinal detachment of the left eye. She has a lot of anxiety and  stress. She is treated for hypertension and hyperlipidemia.       She also has some lumbar degenerative disc disease and so does not exercise regularly and  has had some sciatic pain with that as well.       She has a history of palpitations in the past but has had a normal Holter recording.       Her echocardiogram on 09/10/2009 was normal.          She had 2D echocardiogram with Doppler which was done 10/2014. It was normal. Ejection  fraction 66%. She also had a carotid duplex done 10/2014 which was also normal.      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   The rest of the right leg looked okay. It looks like it was just only done on her right leg.       She did see  Milly Morrison in 11/2016.  At that time her blood pressure was stable.  She was having chest pain because of the Zoloft.  She cut the Zoloft down to ¼ of a tablet, and started feeling better.   She was advised to stop the Zoloft.   She did end up stopping the Zoloft.  Her last echocardiogram was done 03/2014, which showed ejection fraction 66%, and normal segmental wall motion.  No significant valvular disease.       She remains on Crestor at this time for her hyperlipidemia, and she has seen Dr. Morocho, who checks her blood work.         Milly morrison for dizziness which they felt was related to Benicar.  It was cut in half.  Now she seen her blood pressures in the morning running 150s her 70s.  She takes her Benicar 20 mg in the morning.  She does have sleep apnea.  She has been told that her sleep apnea is not well-controlled even with her mask because she requires a higher pressure which she can tolerate.  I think this is probably with striking her blood pressure up when she gets up in the morning.  She's had no further tachycardia, palpitations or syncope.  She has no chest pain or pressure.       Past Medical History:   Diagnosis Date   • Anemia    • Anxiety and depression    • Appendiceal carcinoid tumor    • Arthritis    • Asthma    • Breast nodule    • Chest pain    • Coronary artery disease    • Diabetes mellitus     a1c only at 5, diet controlled right now   • Dizziness    • Environmental allergies    • Fatigue     in the evening   • Fibromuscular hyperplasia of artery     in the carotid artery with no stenosis   • GERD (gastroesophageal reflux disease)    • Headache    • Heart disease    • History of anesthesia complications    • Hyperlipidemia    • Hypertension    • Lumbar pain    • Malaise and fatigue    • Myalgia    • LEATHA (obstructive sleep apnea)    • Palpitations    • RBBB (right bundle branch block)    • Retinal detachment     left  //  when blood pressure gets high she notices flashing lights in her  vision   • Sleep apnea     wears machine   • Vertigo          Past Surgical History:   Procedure Laterality Date   • APPENDECTOMY     •  SECTION     • CORNEA LACERATION REPAIR     • ENDOSCOPY N/A 2016    Procedure: ESOPHAGOGASTRODUODENOSCOPYwith biopsy (cold);  Surgeon: Elida Guzman MD;  Location: [facility] ENDOSCOPY;  Service:    • ENDOSCOPY AND COLONOSCOPY N/A 2014    EGD with biopsy and colonoscopy, Mild gastritis, Normal colon, Repeat Colonoscopy in 5 years, Dr. Damien Aleman   • ENDOSCOPY AND COLONOSCOPY N/A 2008    EGD with biopsy and colonoscopy-Dr. Damein Aleman   • URETEROCELE REPAIR         Current Outpatient Prescriptions on File Prior to Visit   Medication Sig Dispense Refill   • albuterol (PROAIR RESPICLICK) 108 (90 BASE) MCG/ACT inhaler Inhale 2 puffs every 4 (four) hours as needed for wheezing. 1 inhaler 0   • ALPRAZolam (XANAX) 0.5 MG tablet Take 0.5 mg by mouth 2 (Two) Times a Day.     • ARMOUR THYROID 60 MG tablet Take once daily     • carboxymethylcellulose (REFRESH PLUS) 0.5 % solution Administer 1 drop to both eyes 3 (Three) Times a Day As Needed for Dry Eyes.     • Cholecalciferol (VITAMIN D3) 5000 UNITS capsule capsule Take 5,000 Units by mouth Daily.     • diclofenac (VOLTAREN) 1 % gel gel Apply 4 g topically 4 (Four) Times a Day As Needed.     • famotidine (PEPCID) 10 MG tablet Take 10 mg by mouth as needed.     • FLECTOR 1.3 % patch patch Place 1 patch on the skin As Needed.     • Magnesium 250 MG tablet Take 2 tablets by mouth Daily.     • olmesartan (BENICAR) 40 MG tablet Take 0.5 tablets by mouth Daily. 30 tablet 3   • Omega-3 Fatty Acids (OMEGA 3 PO) Take 1 tablet by mouth daily.     • rosuvastatin (CRESTOR) 5 MG tablet Take 5 mg by mouth daily.     • [DISCONTINUED] amLODIPine (NORVASC) 2.5 MG tablet Take 1 tablet by mouth Daily. 30 tablet 11   • [DISCONTINUED] clonazePAM (KlonoPIN) 1 MG tablet Take 1 mg by mouth at night as needed for seizures.     •  [DISCONTINUED] traZODone (DESYREL) 100 MG tablet Take 150 mg by mouth every night.       Current Facility-Administered Medications on File Prior to Visit   Medication Dose Route Frequency Provider Last Rate Last Dose   • nitroglycerin (NITROSTAT) SL tablet 0.4 mg  0.4 mg Sublingual Q5 Min PRN DESI Crenshaw       • sodium chloride 0.9 % flush 10 mL  10 mL Intravenous PRN DESI Crenshaw           Social History     Social History   • Marital status:      Spouse name: N/A   • Number of children: N/A   • Years of education: N/A     Occupational History   • Not on file.     Social History Main Topics   • Smoking status: Never Smoker   • Smokeless tobacco: Never Used      Comment: CAFFINE USE   • Alcohol use Yes      Comment: occasional use   • Drug use: No   • Sexual activity: Defer     Other Topics Concern   • Not on file     Social History Narrative           Review of Systems   Constitution: Negative.   HENT: Negative for congestion.    Eyes: Negative for vision loss in left eye and vision loss in right eye.   Respiratory: Negative.  Negative for cough, hemoptysis, shortness of breath, sleep disturbances due to breathing, snoring, sputum production and wheezing.    Endocrine: Negative.    Hematologic/Lymphatic: Negative.    Skin: Negative for poor wound healing and rash.   Musculoskeletal: Negative for falls, gout, muscle cramps and myalgias.   Gastrointestinal: Negative for abdominal pain, diarrhea, dysphagia, hematemesis, melena, nausea and vomiting.   Neurological: Negative for excessive daytime sleepiness, dizziness, headaches, light-headedness, loss of balance, seizures and vertigo.   Psychiatric/Behavioral: Negative for depression and substance abuse. The patient is not nervous/anxious.        Procedures    ECG 12 Lead  Date/Time: 10/26/2017 2:05 PM  Performed by: MILLICENT FITCH  Authorized by: MILLICENT FITCH   Comparison: compared with previous ECG   Similar to previous  "ECG  Rhythm: sinus rhythm  Conduction: right bundle branch block and LAFB  Clinical impression: abnormal ECG               Objective:      Vitals:    10/26/17 1314   BP: 136/88   Pulse: 64   Resp: 18   Weight: 171 lb (77.6 kg)   Height: 67\" (170.2 cm)     Body mass index is 26.78 kg/(m^2).    Physical Exam   Constitutional: She is oriented to person, place, and time. She appears well-developed and well-nourished. No distress.   HENT:   Head: Normocephalic and atraumatic.   Eyes: Conjunctivae are normal. No scleral icterus.   Neck: Neck supple. No JVD present. Carotid bruit is not present. No thyromegaly present.   Cardiovascular: Normal rate, regular rhythm, S1 normal, S2 normal, normal heart sounds and intact distal pulses.   No extrasystoles are present. PMI is not displaced.  Exam reveals no gallop.    No murmur heard.  Pulses:       Carotid pulses are 2+ on the right side, and 2+ on the left side.       Radial pulses are 2+ on the right side, and 2+ on the left side.        Dorsalis pedis pulses are 2+ on the right side, and 2+ on the left side.        Posterior tibial pulses are 2+ on the right side, and 2+ on the left side.   Pulmonary/Chest: Effort normal and breath sounds normal. No respiratory distress. She has no wheezes. She has no rhonchi. She has no rales. She exhibits no tenderness.   Abdominal: Soft. Bowel sounds are normal. She exhibits no distension, no abdominal bruit and no mass. There is no tenderness.   Musculoskeletal: She exhibits no edema or deformity.   Lymphadenopathy:     She has no cervical adenopathy.   Neurological: She is alert and oriented to person, place, and time. No cranial nerve deficit.   Skin: Skin is warm and dry. No rash noted. She is not diaphoretic. No cyanosis. No pallor. Nails show no clubbing.   Psychiatric: She has a normal mood and affect. Judgment normal.   Vitals reviewed.      Lab Review:       Assessment:      Diagnosis Plan   1. Essential hypertension     2. " Palpitations     3. Hyperlipidemia, unspecified hyperlipidemia type       1. Hypertension, BP high in am.   2. Chronically abnormal electrocardiogram showing a left anterior fascicular block and  right bundle branch block.   3. Mild left anterior descending stenosis treated medically. Nonischemic stress study  done on 03/2012.   4. Palpitations stable and made worse with the Arthrotec. Stay away from Arthrotec.   5. Salt intake. I advised her to stay away from salt.   6. Hyperlipidemia on Crestor.   7. Obstructive sleep. Use CPAP.   8. History of appendiceal carcinoma.   9. History of asthma, stable.   10. Anxiety.   11. Gastroesophageal reflux disease.   12. Lumbar degenerative disc disease with sciatic pain.      Plan:       See howard in 4 weeks.  Take benicar 20mg BID. BP is high in am.

## 2017-12-06 ENCOUNTER — APPOINTMENT (OUTPATIENT)
Dept: WOMENS IMAGING | Facility: HOSPITAL | Age: 62
End: 2017-12-06

## 2017-12-06 ENCOUNTER — TRANSCRIBE ORDERS (OUTPATIENT)
Dept: ADMINISTRATIVE | Facility: HOSPITAL | Age: 62
End: 2017-12-06

## 2017-12-06 DIAGNOSIS — R10.11 RUQ PAIN: Primary | ICD-10-CM

## 2017-12-06 PROCEDURE — 76641 ULTRASOUND BREAST COMPLETE: CPT | Performed by: RADIOLOGY

## 2017-12-10 ENCOUNTER — HOSPITAL ENCOUNTER (OUTPATIENT)
Dept: ULTRASOUND IMAGING | Facility: HOSPITAL | Age: 62
Discharge: HOME | End: 2017-12-10
Attending: INTERNAL MEDICINE | Admitting: INTERNAL MEDICINE

## 2017-12-10 DIAGNOSIS — R10.11 RUQ PAIN: ICD-10-CM

## 2017-12-10 PROCEDURE — 76705 ECHO EXAM OF ABDOMEN: CPT

## 2017-12-14 ENCOUNTER — OFFICE VISIT (OUTPATIENT)
Dept: CARDIOLOGY | Facility: CLINIC | Age: 62
End: 2017-12-14

## 2017-12-14 VITALS
DIASTOLIC BLOOD PRESSURE: 94 MMHG | BODY MASS INDEX: 26.68 KG/M2 | HEIGHT: 67 IN | WEIGHT: 170 LBS | HEART RATE: 91 BPM | SYSTOLIC BLOOD PRESSURE: 132 MMHG

## 2017-12-14 DIAGNOSIS — I10 ESSENTIAL HYPERTENSION: Primary | ICD-10-CM

## 2017-12-14 DIAGNOSIS — R00.2 PALPITATIONS: ICD-10-CM

## 2017-12-14 PROCEDURE — 99213 OFFICE O/P EST LOW 20 MIN: CPT | Performed by: NURSE PRACTITIONER

## 2017-12-14 PROCEDURE — 93000 ELECTROCARDIOGRAM COMPLETE: CPT | Performed by: NURSE PRACTITIONER

## 2017-12-14 NOTE — PROGRESS NOTES
Date of Office Visit: 2017  Encounter Provider: DESI Banks  Place of Service: [facility] CARDIOLOGY  Patient Name: Vesta Sutton  :1955    Chief Complaint   Patient presents with   • Hypertension   :     HPI: Vesta Sutton is a 62 y.o. female comes in today for follow up for her hypertension.     She has a history of hypertension and hyperlipidemia.     She has a history of having intermittent chest pain for which she has never had a myocardial infarction.Abnormal stress test in  with cardiac cath showing very mild LAD stenosis.  She was seen in the chest pain unit in 2012, and she ruled out for myocardial infarction. Her stress study at that time was normal as well.      2016, she came in with chest pain.  She was also having some palpitations after taking Zoloft.     May/2017, she was having intermittent chest pain.  She was set up for a stress echocardiogram which was normal.       On , she called in complaining of near syncopal episode, fatigue, palpitations.      Last office visit, she was having dizziness.  Her blood pressure was running 150s over 70s and the morning.  She thought her Benicar was causing dizziness.  Dr. Paula recommended taken Benicar 20 mg twice a day.    She was sick and had bronchitis. Had to take albuterol and steroid so bp was up. Now bp is better 130/80. She seems to be tolerating the benicar. Denies  shortness of breath, edema, dizziness, chest pain, fatigue, orthopnea or PND. Still has a few palpitations      Past Medical History:   Diagnosis Date   • Anemia    • Anxiety and depression    • Appendiceal carcinoid tumor    • Arthritis    • Asthma    • Breast nodule    • Chest pain    • Coronary artery disease    • Diabetes mellitus     a1c only at 5, diet controlled right now   • Dizziness    • Environmental allergies    • Fatigue     in the evening   • Fibromuscular hyperplasia of artery     in  the carotid artery with no stenosis   • GERD (gastroesophageal reflux disease)    • Headache    • Heart disease    • History of anesthesia complications    • Hyperlipidemia    • Hypertension    • Lumbar pain    • Malaise and fatigue    • Myalgia    • LEATHA (obstructive sleep apnea)    • Palpitations    • RBBB (right bundle branch block)    • Retinal detachment     left  //  when blood pressure gets high she notices flashing lights in her vision   • Sleep apnea     wears machine   • Vertigo        Past Surgical History:   Procedure Laterality Date   • APPENDECTOMY     •  SECTION     • CORNEA LACERATION REPAIR     • ENDOSCOPY N/A 2016    Procedure: ESOPHAGOGASTRODUODENOSCOPYwith biopsy (cold);  Surgeon: Elida Guzman MD;  Location: [facility] ENDOSCOPY;  Service:    • ENDOSCOPY AND COLONOSCOPY N/A 2014    EGD with biopsy and colonoscopy, Mild gastritis, Normal colon, Repeat Colonoscopy in 5 years, Dr. Damien Aleman   • ENDOSCOPY AND COLONOSCOPY N/A 2008    EGD with biopsy and colonoscopy-Dr. Damien Aleman   • URETEROCELE REPAIR             Review of Systems   Constitution: Negative.   HENT: Negative for congestion.    Eyes: Negative for vision loss in left eye and vision loss in right eye.   Respiratory: Negative.  Negative for cough, hemoptysis, shortness of breath, sleep disturbances due to breathing, snoring, sputum production and wheezing.    Endocrine: Negative.    Hematologic/Lymphatic: Negative.    Skin: Negative for poor wound healing and rash.   Musculoskeletal: Negative for falls, gout, muscle cramps and myalgias.   Gastrointestinal: Negative for abdominal pain, diarrhea, dysphagia, hematemesis, melena, nausea and vomiting.   Neurological: Negative for excessive daytime sleepiness, dizziness, headaches, light-headedness, loss of balance, seizures and vertigo.   Psychiatric/Behavioral: Negative for depression and substance abuse. The patient is not nervous/anxious.      All other  "systems reviewed and are negative    Allergies   Allergen Reactions   • Beta Adrenergic Blockers Other (See Comments)     Asthma   • Sulfa Antibiotics Hives       All aspects of family and social history reviewed.          Objective:     Vitals:    12/14/17 1415   BP: 132/94   BP Location: Left arm   Pulse: 91   Weight: 77.1 kg (170 lb)   Height: 170.2 cm (67\")     Body mass index is 26.63 kg/(m^2).    PHYSICAL EXAM:  Physical Exam   Constitutional: She is oriented to person, place, and time. She appears well-developed and well-nourished. No distress.   HENT:   Head: Normocephalic and atraumatic.   Eyes: Conjunctivae are normal. No scleral icterus.   Neck: Neck supple. No JVD present. Carotid bruit is not present. No thyromegaly present.   Cardiovascular: Normal rate, regular rhythm, S1 normal, S2 normal, normal heart sounds and intact distal pulses.   No extrasystoles are present. PMI is not displaced.  Exam reveals no gallop.    No murmur heard.  Pulses:       Carotid pulses are 2+ on the right side, and 2+ on the left side.       Radial pulses are 2+ on the right side, and 2+ on the left side.        Dorsalis pedis pulses are 2+ on the right side, and 2+ on the left side.        Posterior tibial pulses are 2+ on the right side, and 2+ on the left side.   Pulmonary/Chest: Effort normal and breath sounds normal. No respiratory distress. She has no wheezes. She has no rhonchi. She has no rales. She exhibits no tenderness.   Abdominal: Soft. Bowel sounds are normal. She exhibits no distension, no abdominal bruit and no mass. There is no tenderness.   Musculoskeletal: She exhibits no edema or deformity.   Lymphadenopathy:     She has no cervical adenopathy.   Neurological: She is alert and oriented to person, place, and time. No cranial nerve deficit.   Skin: Skin is warm and dry. No rash noted. She is not diaphoretic. No cyanosis. No pallor. Nails show no clubbing.   Psychiatric: She has a normal mood and affect. " Judgment normal.   Vitals reviewed.        ECG 12 Lead  Date/Time: 12/14/2017 2:46 PM  Performed by: KESHAV LIZARRAGA  Authorized by: KESHAV LIZARRAGA   Comparison: compared with previous ECG from 10/26/2017  Similar to previous ECG  Rhythm: sinus rhythm  BPM: 91  Conduction: right bundle branch block and LAFB  Clinical impression: abnormal ECG  Comments: Indication: htn                Assessment:       Diagnosis Plan   1. Essential hypertension     2. Palpitations          Orders Placed This Encounter   Procedures   • ECG 12 Lead     This order was created via procedure documentation       Current Outpatient Prescriptions   Medication Sig Dispense Refill   • albuterol (PROAIR RESPICLICK) 108 (90 BASE) MCG/ACT inhaler Inhale 2 puffs every 4 (four) hours as needed for wheezing. 1 inhaler 0   • ALPRAZolam (XANAX) 0.5 MG tablet Take 0.5 mg by mouth 2 (Two) Times a Day.     • ARMOUR THYROID 60 MG tablet Take once daily     • carboxymethylcellulose (REFRESH PLUS) 0.5 % solution Administer 1 drop to both eyes 3 (Three) Times a Day As Needed for Dry Eyes.     • Cholecalciferol (VITAMIN D3) 5000 UNITS capsule capsule Take 5,000 Units by mouth Daily.     • diclofenac (VOLTAREN) 1 % gel gel Apply 4 g topically 4 (Four) Times a Day As Needed.     • famotidine (PEPCID) 10 MG tablet Take 10 mg by mouth as needed.     • FLECTOR 1.3 % patch patch Place 1 patch on the skin As Needed.     • Magnesium 250 MG tablet Take 2 tablets by mouth Daily.     • olmesartan (BENICAR) 40 MG tablet Take 0.5 tablets by mouth 2 (Two) Times a Day. 30 tablet 3   • Omega-3 Fatty Acids (OMEGA 3 PO) Take 1 tablet by mouth daily.     • rosuvastatin (CRESTOR) 5 MG tablet Take 5 mg by mouth daily.     • traZODone (DESYREL) 150 MG tablet Take 150 mg by mouth Every Night.       Current Facility-Administered Medications   Medication Dose Route Frequency Provider Last Rate Last Dose   • nitroglycerin (NITROSTAT) SL tablet 0.4 mg  0.4 mg Sublingual Q5 Min PRN  DESI Crenshaw       • sodium chloride 0.9 % flush 10 mL  10 mL Intravenous PRN DESI Crenshaw                Plan:       HTN-improved on benicar bid. She is not experiencing dizziness like she was. continue benicar bid    Palpitations-still has when stressed. Does not feel well when her heart rate is below 80. No beta blocker at this time    Follow up in office as scheduled in February    As always, it has been a pleasure to participate in this patient's care.      Sincerely,      DESI Banks

## 2018-04-05 ENCOUNTER — OFFICE VISIT (OUTPATIENT)
Dept: SLEEP MEDICINE | Facility: HOSPITAL | Age: 63
End: 2018-04-05
Attending: INTERNAL MEDICINE

## 2018-04-05 DIAGNOSIS — G47.33 OSA ON CPAP: ICD-10-CM

## 2018-04-05 DIAGNOSIS — F51.04 PSYCHOPHYSIOLOGICAL INSOMNIA: Primary | ICD-10-CM

## 2018-04-05 DIAGNOSIS — Z99.89 OSA ON CPAP: ICD-10-CM

## 2018-04-05 PROCEDURE — 99213 OFFICE O/P EST LOW 20 MIN: CPT | Performed by: INTERNAL MEDICINE

## 2018-04-05 PROCEDURE — G0463 HOSPITAL OUTPT CLINIC VISIT: HCPCS

## 2018-04-05 NOTE — PROGRESS NOTES
Follow Up [facility] Disorders Center Note       Patient Care Team:  Benjamín Morocho MD as PCP - General (Internal Medicine)  Danelle Carnes MD as Consulting Physician (Obstetrics and Gynecology)  Damien Aleman MD as Surgeon (General Surgery)  Jass Batista MD as Consulting Physician (Sleep Medicine)    Chief Complaint:  LEATHA     Interval History:   The patient was last seen in the sleep Center note October 2017.  I last saw the patient in November 2016.  The patient states she is improved but she still wakes up several times during the nighttime.  She goes to bed between 10 PM and midnight and awakens between 6 and 10 AM.  She wakes up 2-3 times during the nighttime.  Sometimes, her mask bothers her.  [place] Sleepiness Scale is normal at 2.    Review of Systems:  Recorded on the Sleep Questionnaire.  Unremarkable except for nasal congestion and postnasal drip, STANLEY, and anxiety and depression.    Social History:  She will have 2 caffeinated beverages a day.  Social History     Social History   • Marital status:      Social History Main Topics   • Smoking status: Never Smoker   • Smokeless tobacco: Never Used      Comment: CAFFINE USE   • Alcohol use Yes      Comment: occasional use   • Drug use: No   • Sexual activity: Defer     Other Topics Concern   • Not on file       Allergies:  Beta adrenergic blockers and Sulfa antibiotics     Medication Review:  Her list was reviewed.      Vital Signs:  Height 66 inches and weight 175 and she is overweight with a body mass index of 28-29    Physical Exam:    Constitutional:  Well developed white female and appears in no apparent distress.  Awake & oriented times 3.  Normal mood with normal recent and remote memory and normal judgement.  Eyes:  Conjunctivae normal.  Oropharynx:  moist mucous membranes without exudate and a large tongue and normal uvula and patent posterior pharyngeal opening.      Results Review:  DME is [employer] and she uses a fullface mask.   Downloads between October 7, 2017 and April 4, 2018 compliances 96%.  Average usage is 6 hours and 32 minutes.  Average AHI is mildly abnormal at 7.4 and she does have a large leak of 1 hour and 58 minutes.  Average AutoCPAP pressure is 9.3 and her auto CPAP is 7-12.       Impression:   Obstructive sleep apnea nearly adequately treated with auto titrating CPAP.  However, she does have a large leak.  She is compliant and demonstrates adequate usage.  No complaints of hypersomnolence.  She does describe insomnia which is most likely psychophysiologic.      Plan:  Good sleep hygiene measures should be maintained.  Some weight loss would be beneficial in this patient who is overweight by BMI.  The patient is benefiting from the treatment being provided.     The patient will continue auto CPAP.  The sleep disorder staff did show her a new small Darline View fullface type mask.  Orders will be sent to her DME.    The patient will call for any problems and will follow up in 4 months.      Jass Batista MD  04/05/18  11:41 AM

## 2018-05-10 ENCOUNTER — OFFICE VISIT (OUTPATIENT)
Dept: GASTROENTEROLOGY | Facility: CLINIC | Age: 63
End: 2018-05-10

## 2018-05-10 VITALS
DIASTOLIC BLOOD PRESSURE: 70 MMHG | BODY MASS INDEX: 27.97 KG/M2 | WEIGHT: 174 LBS | HEIGHT: 66 IN | SYSTOLIC BLOOD PRESSURE: 110 MMHG | TEMPERATURE: 98.5 F

## 2018-05-10 DIAGNOSIS — R10.11 RUQ ABDOMINAL PAIN: Primary | ICD-10-CM

## 2018-05-10 DIAGNOSIS — R11.2 NAUSEA AND VOMITING, INTRACTABILITY OF VOMITING NOT SPECIFIED, UNSPECIFIED VOMITING TYPE: ICD-10-CM

## 2018-05-10 PROCEDURE — 99204 OFFICE O/P NEW MOD 45 MIN: CPT | Performed by: INTERNAL MEDICINE

## 2018-05-10 RX ORDER — CETIRIZINE HYDROCHLORIDE 5 MG/1
10 TABLET ORAL DAILY PRN
COMMUNITY
End: 2022-02-28 | Stop reason: ALTCHOICE

## 2018-05-10 RX ORDER — OMEPRAZOLE 20 MG/1
20 CAPSULE, DELAYED RELEASE ORAL DAILY
Qty: 30 CAPSULE | Refills: 11 | Status: SHIPPED | OUTPATIENT
Start: 2018-05-10 | End: 2020-03-19

## 2018-05-10 RX ORDER — CYCLOBENZAPRINE HCL 10 MG
TABLET ORAL NIGHTLY
COMMUNITY
End: 2018-06-14

## 2018-05-10 NOTE — PROGRESS NOTES
Chief Complaint   Patient presents with   • Abdominal Pain   • Vomiting     next day after eating (several times), regurgitation   • Nausea     Vesta Sutton is a 62 y.o. female who presents with a History of postprandial abdominal pain and nausea and vomiting   HPI     Patient 62-year-old female with history of arthritis, GERD, anxiety as well as obstructive sleep apnea presenting with right-sided abdominal pain with nausea vomiting.  Patient ports episode on April 28 having had a large dinner the night before most steak and salad woke up in the morning with right upper quadrant pain with nausea and vomiting of food she is eaten the night before.  Patient had a milder but similar episode in December at which time she underwent right upper quadrant ultrasound which showed normal gallbladder with no stones.  Patient reports no fever may be some chills but no Reiger is noted.  Patient only slowly improved over the following several days now feeling better.  Patient still has ongoing reflux taking when necessary Pepcid.  Patient does report that this when necessary actually means her reflux wakes her up at 4 in the morning and she'll then take a Pepcid but wake up with an acidic taste in her mouth and the morning.  Patient has used omeprazole and the past but is not been consistent with that.  Patient now for further recommendations.    Past Medical History:   Diagnosis Date   • Anemia    • Anxiety and depression    • Appendiceal carcinoid tumor    • Arthritis    • Asthma    • Breast nodule    • Chest pain    • Coronary artery disease    • Diabetes mellitus     a1c only at 5, diet controlled right now   • Dizziness    • Environmental allergies    • Fatigue     in the evening   • Fibromuscular hyperplasia of artery     in the carotid artery with no stenosis   • GERD (gastroesophageal reflux disease)    • Headache    • Heart disease    • History of anesthesia complications    • Hyperlipidemia    • Hypertension    •  Lumbar pain    • Malaise and fatigue    • Myalgia    • LEATHA (obstructive sleep apnea)    • Palpitations    • RBBB (right bundle branch block)    • Retinal detachment     left  //  when blood pressure gets high she notices flashing lights in her vision   • Sleep apnea     wears machine   • Vertigo        Current Outpatient Prescriptions:   •  albuterol (PROAIR RESPICLICK) 108 (90 BASE) MCG/ACT inhaler, Inhale 2 puffs every 4 (four) hours as needed for wheezing., Disp: 1 inhaler, Rfl: 0  •  ALPRAZolam (XANAX) 0.5 MG tablet, Take 0.5 mg by mouth 2 (Two) Times a Day., Disp: , Rfl:   •  ARMOUR THYROID 60 MG tablet, Take once daily, Disp: , Rfl:   •  cetirizine (zyrTEC) 5 MG tablet, Take 5 mg by mouth., Disp: , Rfl:   •  Cholecalciferol (VITAMIN D3) 5000 UNITS capsule capsule, Take 5,000 Units by mouth Daily., Disp: , Rfl:   •  cyclobenzaprine (FLEXERIL) 10 MG tablet, Take  by mouth Every Night., Disp: , Rfl:   •  famotidine (PEPCID) 10 MG tablet, Take 10 mg by mouth as needed., Disp: , Rfl:   •  Magnesium 250 MG tablet, Take 2 tablets by mouth Daily., Disp: , Rfl:   •  olmesartan (BENICAR) 40 MG tablet, Take 0.5 tablets by mouth 2 (Two) Times a Day., Disp: 30 tablet, Rfl: 3  •  Omega-3 Fatty Acids (OMEGA 3 PO), Take 1 tablet by mouth daily., Disp: , Rfl:   •  rosuvastatin (CRESTOR) 5 MG tablet, Take 5 mg by mouth daily., Disp: , Rfl:   •  traZODone (DESYREL) 150 MG tablet, Take 150 mg by mouth Every Night., Disp: , Rfl:   •  diclofenac (VOLTAREN) 1 % gel gel, Apply 4 g topically 4 (Four) Times a Day As Needed., Disp: , Rfl:   •  FLECTOR 1.3 % patch patch, Place 1 patch on the skin As Needed., Disp: , Rfl:   •  omeprazole (priLOSEC) 20 MG capsule, Take 1 capsule by mouth Daily., Disp: 30 capsule, Rfl: 11    Current Facility-Administered Medications:   •  nitroglycerin (NITROSTAT) SL tablet 0.4 mg, 0.4 mg, Sublingual, Q5 Min PRN, DESI Crenshaw  •  sodium chloride 0.9 % flush 10 mL, 10 mL, Intravenous, PRN, Lisa LEIGH  DESI Dumont  Allergies   Allergen Reactions   • Beta Adrenergic Blockers Other (See Comments)     Asthma   • Sulfa Antibiotics Hives     Social History     Social History   • Marital status:      Spouse name: N/A   • Number of children: N/A   • Years of education: N/A     Occupational History   • Not on file.     Social History Main Topics   • Smoking status: Never Smoker   • Smokeless tobacco: Never Used      Comment: CAFFINE USE   • Alcohol use Yes      Comment: occasional use   • Drug use: No   • Sexual activity: Defer     Other Topics Concern   • Not on file     Social History Narrative   • No narrative on file     Family History   Problem Relation Age of Onset   • Heart failure Father    • Heart disease Father    • Diabetes Sister    • Heart disease Mother    • Heart disease Brother    • Colon polyps Brother      Review of Systems   Constitutional: Negative.    HENT: Negative.    Eyes: Negative.    Respiratory: Negative.    Cardiovascular: Negative.    Gastrointestinal: Positive for abdominal pain, nausea and vomiting. Negative for abdominal distention, blood in stool, constipation, diarrhea and rectal pain.   Endocrine: Negative.    Musculoskeletal: Negative.    Skin: Negative.    Allergic/Immunologic: Negative.    Hematological: Negative.      Vitals:    05/10/18 1435   BP: 110/70   Temp: 98.5 °F (36.9 °C)     Physical Exam   Constitutional: She is oriented to person, place, and time. She appears well-developed and well-nourished.   HENT:   Head: Normocephalic and atraumatic.   Eyes: Pupils are equal, round, and reactive to light. No scleral icterus.   Cardiovascular: Normal rate, regular rhythm and normal heart sounds.  Exam reveals no gallop and no friction rub.    No murmur heard.  Pulmonary/Chest: Effort normal and breath sounds normal. She has no wheezes. She has no rales.   Abdominal: Soft. Bowel sounds are normal. She exhibits no shifting dullness, no distension, no pulsatile liver, no fluid  wave, no abdominal bruit, no ascites, no pulsatile midline mass and no mass. There is no hepatosplenomegaly. There is no tenderness. There is no rigidity and no guarding. No hernia.   Musculoskeletal: Normal range of motion. She exhibits no edema.   Neurological: She is alert and oriented to person, place, and time. No cranial nerve deficit.   Skin: Skin is warm and dry. No rash noted.   Psychiatric: She has a normal mood and affect. Her behavior is normal. Thought content normal.   Nursing note and vitals reviewed.    Diagnoses and all orders for this visit:    RUQ abdominal pain  -     Case Request; Standing  -     Case Request    Nausea and vomiting, intractability of vomiting not specified, unspecified vomiting type  -     Case Request; Standing  -     Case Request    Other orders  -     cyclobenzaprine (FLEXERIL) 10 MG tablet; Take  by mouth Every Night.  -     cetirizine (zyrTEC) 5 MG tablet; Take 5 mg by mouth.  -     Implement Anesthesia orders day of procedure.; Standing  -     Obtain informed consent; Standing  -     omeprazole (priLOSEC) 20 MG capsule; Take 1 capsule by mouth Daily.    Patient 62-year-old female with history of anxiety GERD, hyperlipidemia, osteoarthritis, diabetes, obstructive sleep apnea presenting with episode of right-sided abdominal pain with nausea vomiting.  Patient was similar episode nausea vomiting in December when she underwent ultrasound of the gallbladder.  Ultrasound was negative for stones normal looking gallbladder.  Patient presents with episode having had a steak dinner the night before on the 28th developed nausea and vomiting on awakening.  Patient reports is had similar episodes nothing so severe however.  Patient denies any fevers felt somewhat chilled but no right greater's noted.  Patient's symptoms weren't limited was short-lived but bright upper quadrant discomfort persisted for several more days.  Patient with ongoing reflux and heartburn taking when necessary  Pepcid.  Patient also now wakes up in [place] the night with reflux episodes to take her Pepcid.  Patient had been on omeprazole in the past but is off that for now.

## 2018-05-21 ENCOUNTER — ANESTHESIA EVENT (OUTPATIENT)
Dept: GASTROENTEROLOGY | Facility: HOSPITAL | Age: 63
End: 2018-05-21

## 2018-05-21 ENCOUNTER — ANESTHESIA (OUTPATIENT)
Dept: GASTROENTEROLOGY | Facility: HOSPITAL | Age: 63
End: 2018-05-21

## 2018-05-21 ENCOUNTER — HOSPITAL ENCOUNTER (OUTPATIENT)
Facility: HOSPITAL | Age: 63
Setting detail: HOSPITAL OUTPATIENT SURGERY
Discharge: HOME | End: 2018-05-21
Attending: INTERNAL MEDICINE | Admitting: INTERNAL MEDICINE

## 2018-05-21 ENCOUNTER — TELEPHONE (OUTPATIENT)
Dept: GASTROENTEROLOGY | Facility: CLINIC | Age: 63
End: 2018-05-21

## 2018-05-21 VITALS
HEIGHT: 67 IN | OXYGEN SATURATION: 97 % | HEART RATE: 60 BPM | WEIGHT: 176.31 LBS | BODY MASS INDEX: 27.67 KG/M2 | RESPIRATION RATE: 14 BRPM | TEMPERATURE: 98.3 F | DIASTOLIC BLOOD PRESSURE: 70 MMHG | SYSTOLIC BLOOD PRESSURE: 113 MMHG

## 2018-05-21 DIAGNOSIS — R10.11 RUQ ABDOMINAL PAIN: ICD-10-CM

## 2018-05-21 DIAGNOSIS — R07.9 CHEST PAIN, UNSPECIFIED TYPE: ICD-10-CM

## 2018-05-21 DIAGNOSIS — R11.2 NAUSEA AND VOMITING, INTRACTABILITY OF VOMITING NOT SPECIFIED, UNSPECIFIED VOMITING TYPE: ICD-10-CM

## 2018-05-21 PROCEDURE — 25010000002 PROPOFOL 10 MG/ML EMULSION: Performed by: ANESTHESIOLOGY

## 2018-05-21 PROCEDURE — 25010000002 PROPOFOL 1000 MG/ML EMULSION: Performed by: ANESTHESIOLOGY

## 2018-05-21 PROCEDURE — 88312 SPECIAL STAINS GROUP 1: CPT | Performed by: INTERNAL MEDICINE

## 2018-05-21 PROCEDURE — 88305 TISSUE EXAM BY PATHOLOGIST: CPT | Performed by: INTERNAL MEDICINE

## 2018-05-21 PROCEDURE — 43239 EGD BIOPSY SINGLE/MULTIPLE: CPT | Performed by: INTERNAL MEDICINE

## 2018-05-21 RX ORDER — SODIUM CHLORIDE, SODIUM LACTATE, POTASSIUM CHLORIDE, CALCIUM CHLORIDE 600; 310; 30; 20 MG/100ML; MG/100ML; MG/100ML; MG/100ML
1000 INJECTION, SOLUTION INTRAVENOUS CONTINUOUS
Status: DISCONTINUED | OUTPATIENT
Start: 2018-05-21 | End: 2018-05-21 | Stop reason: HOSPADM

## 2018-05-21 RX ORDER — SODIUM CHLORIDE 0.9 % (FLUSH) 0.9 %
3 SYRINGE (ML) INJECTION AS NEEDED
Status: DISCONTINUED | OUTPATIENT
Start: 2018-05-21 | End: 2018-05-21 | Stop reason: HOSPADM

## 2018-05-21 RX ORDER — ONDANSETRON 2 MG/ML
4 INJECTION INTRAMUSCULAR; INTRAVENOUS ONCE AS NEEDED
Status: DISCONTINUED | OUTPATIENT
Start: 2018-05-21 | End: 2018-05-21 | Stop reason: HOSPADM

## 2018-05-21 RX ORDER — HYDRALAZINE HYDROCHLORIDE 20 MG/ML
5 INJECTION INTRAMUSCULAR; INTRAVENOUS
Status: DISCONTINUED | OUTPATIENT
Start: 2018-05-21 | End: 2018-05-21 | Stop reason: HOSPADM

## 2018-05-21 RX ORDER — EPHEDRINE SULFATE 50 MG/ML
5 INJECTION, SOLUTION INTRAVENOUS ONCE AS NEEDED
Status: DISCONTINUED | OUTPATIENT
Start: 2018-05-21 | End: 2018-05-21 | Stop reason: HOSPADM

## 2018-05-21 RX ORDER — FENTANYL CITRATE 50 UG/ML
25 INJECTION, SOLUTION INTRAMUSCULAR; INTRAVENOUS
Status: DISCONTINUED | OUTPATIENT
Start: 2018-05-21 | End: 2018-05-21 | Stop reason: HOSPADM

## 2018-05-21 RX ORDER — DIPHENHYDRAMINE HYDROCHLORIDE 50 MG/ML
6.25 INJECTION INTRAMUSCULAR; INTRAVENOUS
Status: DISCONTINUED | OUTPATIENT
Start: 2018-05-21 | End: 2018-05-21 | Stop reason: HOSPADM

## 2018-05-21 RX ORDER — NALOXONE HCL 0.4 MG/ML
0.4 VIAL (ML) INJECTION AS NEEDED
Status: DISCONTINUED | OUTPATIENT
Start: 2018-05-21 | End: 2018-05-21 | Stop reason: HOSPADM

## 2018-05-21 RX ORDER — PROPOFOL 10 MG/ML
VIAL (ML) INTRAVENOUS AS NEEDED
Status: DISCONTINUED | OUTPATIENT
Start: 2018-05-21 | End: 2018-05-21 | Stop reason: SURG

## 2018-05-21 RX ADMIN — PROPOFOL 200 MCG/KG/MIN: 10 INJECTION, EMULSION INTRAVENOUS at 09:47

## 2018-05-21 RX ADMIN — SODIUM CHLORIDE, POTASSIUM CHLORIDE, SODIUM LACTATE AND CALCIUM CHLORIDE 1000 ML: 600; 310; 30; 20 INJECTION, SOLUTION INTRAVENOUS at 09:22

## 2018-05-21 RX ADMIN — PROPOFOL 150 MG: 10 INJECTION, EMULSION INTRAVENOUS at 09:46

## 2018-05-21 NOTE — TELEPHONE ENCOUNTER
----- Message from Karen Angulo sent at 5/21/2018  2:14 PM EDT -----  Regarding: PT CALLED   Contact: [phone]  PT CALLED TO SPEAK WITH A NURSE REGARDING HER SCOPE SHE HAD DONE THIS AM

## 2018-05-21 NOTE — BRIEF OP NOTE
ESOPHAGOGASTRODUODENOSCOPY  Progress Note    Vesta Sutton  5/21/2018    Pre-op Diagnosis:   RUQ abdominal pain [R10.11]  Nausea and vomiting, intractability of vomiting not specified, unspecified vomiting type [R11.2]       Post-Op Diagnosis Codes:     * RUQ abdominal pain [R10.11]     * Nausea and vomiting, intractability of vomiting not specified, unspecified vomiting type [R11.2]     * Gastritis [K29.70]    Procedure/CPT® Codes:      Procedure(s):  ESOPHAGOGASTRODUODENOSCOPY with biopsy    Surgeon(s):  Devyn Stahl MD    Anesthesia: Monitor Anesthesia Care    Staff:   Endo Technician: Jillian Andrew  Endo Nurse: Shirley Sow RN    Estimated Blood Loss: minimal    Urine Voided: * No values recorded between 5/21/2018  9:42 AM and 5/21/2018  9:55 AM *    Specimens:                ID Type Source Tests Collected by Time   A :  Tissue Small Intestine, Duodenum TISSUE PATHOLOGY EXAM Devyn Stalh MD 5/21/2018 0952   B : antrum Tissue Stomach TISSUE PATHOLOGY EXAM Devyn Stahl MD 5/21/2018 0953         Drains:      Findings: Mild gastritis noted biopsies of the duodenum taken for celiac antrum for H. pylori, will send gastric emptying scan.    Complications: None      Devyn Stahl MD     Date: 5/21/2018  Time: 9:56 AM

## 2018-05-21 NOTE — ANESTHESIA POSTPROCEDURE EVALUATION
"Patient: Vesta Sutton    Procedure Summary     Date:  05/21/18 Room / Location:   [facility]    Anesthesia Start:  0943 Anesthesia Stop:  1000    Procedure:  ESOPHAGOGASTRODUODENOSCOPY with biopsy (N/A Esophagus) Diagnosis:       RUQ abdominal pain      Nausea and vomiting, intractability of vomiting not specified, unspecified vomiting type      Gastritis      (RUQ abdominal pain [R10.11])      (Nausea and vomiting, intractability of vomiting not specified, unspecified vomiting type [R11.2])    Surgeon:  Devyn Stahl MD Provider:  Pascual Crocker MD    Anesthesia Type:  MAC ASA Status:  3          Anesthesia Type: MAC  Last vitals  BP   96/55 (05/21/18 1000)   Temp   36.9 °C (98.4 °F) (05/21/18 0854)   Pulse   60 (05/21/18 1000)   Resp   14 (05/21/18 1000)     SpO2   95 % (05/21/18 1000)     Post Anesthesia Care and Evaluation    Patient location during evaluation: bedside  Patient participation: complete - patient participated  Level of consciousness: awake  Pain management: adequate  Airway patency: patent  Anesthetic complications: No anesthetic complications    Cardiovascular status: acceptable  Respiratory status: acceptable  Hydration status: acceptable    Comments: *BP 96/55 (Patient Position: Lying)   Pulse 60   Temp 36.9 °C (98.4 °F) (Oral)   Resp 14   Ht 168.9 cm (66.5\")   Wt 80 kg (176 lb 5 oz)   SpO2 95%   BMI 28.03 kg/m²         "

## 2018-05-21 NOTE — TELEPHONE ENCOUNTER
Called pt back. Pt states she had a EGD with Dr Stahl this AM. She states the RN mentioned that MD wanted to have a gastric emptying scan. Advised that the order ai already in her chart and she may call [phone] to schedule the scan. Pt asked if she should make an appt with Dr Stahl to follow up after that. Advised we can make an appt for a few weeks from now if she'd like to review scope and GES results. Pt verb understanding. Appt made for 6/14 at 3:30 PM. Pt states she left after the EGD and stopped for breakfast: toast, eggs and sausage. She states she has been having issues with heartburn and abdominal pain after she eats, especially after she east something greasy and today was no exception. She took Zantac and feels slightly better. She wanted to know if there was anything else she should do. Advised to avoid greasy foods the rest of the day and schedule the GES for ASAP. Advised if she has not taken her omeprazole yet to take that as well. Pt verb understanding.

## 2018-05-21 NOTE — ANESTHESIA PREPROCEDURE EVALUATION
Anesthesia Evaluation     no history of anesthetic complications:               Airway   Mallampati: II  no difficulty expected  Dental - normal exam     Pulmonary - normal exam   (+) asthma, sleep apnea,   (-) COPD, not a smoker    PE comment: nonlabored  Cardiovascular - normal exam    Rhythm: regular  Rate: normal    (+) hypertension, CAD, dysrhythmias (R BBB), hyperlipidemia,   (-) valvular problems/murmurs, past MI, angina      Neuro/Psych  (+) headaches, dizziness/light headedness, psychiatric history Anxiety and Depression,     (-) seizures, TIA, CVA  GI/Hepatic/Renal/Endo    (+)  GERD,  diabetes mellitus (diet controlled) type 2,   (-) liver disease, hypothyroidism, hyperthyroidism    Musculoskeletal     (+) arthralgias, myalgias,   Abdominal    Substance History      OB/GYN          Other   (+) blood dyscrasia (Anemia), arthritis   history of cancer (Appendiceal Carcinoid)      Other Comment: H/o retinal detachment                  Anesthesia Plan    ASA 3     MAC     Anesthetic plan and risks discussed with patient.

## 2018-05-22 LAB
CYTO UR: NORMAL
LAB AP CASE REPORT: NORMAL
Lab: NORMAL
PATH REPORT.FINAL DX SPEC: NORMAL
PATH REPORT.GROSS SPEC: NORMAL

## 2018-05-31 ENCOUNTER — HOSPITAL ENCOUNTER (OUTPATIENT)
Dept: NUCLEAR MEDICINE | Facility: HOSPITAL | Age: 63
Discharge: HOME | End: 2018-05-31
Attending: INTERNAL MEDICINE

## 2018-05-31 ENCOUNTER — TELEPHONE (OUTPATIENT)
Dept: GASTROENTEROLOGY | Facility: CLINIC | Age: 63
End: 2018-05-31

## 2018-05-31 DIAGNOSIS — R11.2 NAUSEA AND VOMITING, INTRACTABILITY OF VOMITING NOT SPECIFIED, UNSPECIFIED VOMITING TYPE: ICD-10-CM

## 2018-05-31 DIAGNOSIS — R10.11 RUQ ABDOMINAL PAIN: ICD-10-CM

## 2018-05-31 PROCEDURE — A9541 TC99M SULFUR COLLOID: HCPCS | Performed by: INTERNAL MEDICINE

## 2018-05-31 PROCEDURE — 0 TECHNETIUM SULFUR COLLOID: Performed by: INTERNAL MEDICINE

## 2018-05-31 PROCEDURE — 78264 GASTRIC EMPTYING IMG STUDY: CPT

## 2018-05-31 RX ADMIN — TECHNETIUM TC 99M SULFUR COLLOID 1 DOSE: KIT at 07:00

## 2018-05-31 NOTE — TELEPHONE ENCOUNTER
----- Message from Karen Angulo sent at 5/31/2018 11:04 AM EDT -----  Regarding: PT CALLED FOR HER UGI RESULTS   Contact: [phone]  ..

## 2018-06-14 ENCOUNTER — OFFICE VISIT (OUTPATIENT)
Dept: GASTROENTEROLOGY | Facility: CLINIC | Age: 63
End: 2018-06-14

## 2018-06-14 VITALS
SYSTOLIC BLOOD PRESSURE: 116 MMHG | WEIGHT: 173.8 LBS | TEMPERATURE: 98.3 F | BODY MASS INDEX: 27.28 KG/M2 | HEIGHT: 67 IN | DIASTOLIC BLOOD PRESSURE: 72 MMHG

## 2018-06-14 DIAGNOSIS — K29.50 CHRONIC GASTRITIS WITHOUT BLEEDING, UNSPECIFIED GASTRITIS TYPE: Primary | ICD-10-CM

## 2018-06-14 PROCEDURE — 99213 OFFICE O/P EST LOW 20 MIN: CPT | Performed by: INTERNAL MEDICINE

## 2018-06-14 NOTE — PROGRESS NOTES
Chief Complaint   Patient presents with   • Follow-up     post scopes   • Abdominal Pain     RLQ pain       Vesta Sutton is a  62 y.o. female here for a follow up visit for Right upper quadrant pain.    HPI    Patient 62-year-old female with history of anxiety, asthma, hyperlipidemia and hypertension found with acute and chronic gastritis.  Patient with right upper quadrant pain particularly with fatty foods noted was complete resolution of her symptoms on omeprazole.  Patient self DC'd the omeprazole several weeks ago but noticed his symptoms started to return.  Patient reports no fever or chills no jaundice noted.  Patient now for further recommendations.    Past Medical History:   Diagnosis Date   • Anemia    • Anxiety and depression    • Appendiceal carcinoid tumor    • Arthritis    • Asthma    • Breast nodule    • Chest pain    • Coronary artery disease    • Diabetes mellitus     a1c only at 5, diet controlled right now   • Dizziness    • Elevated cholesterol    • Environmental allergies    • Fatigue     in the evening   • Fibromuscular hyperplasia of artery     in the carotid artery with no stenosis   • GERD (gastroesophageal reflux disease)    • Headache    • Heart disease    • History of anesthesia complications 2017    lidocaine caused reaction during dental procedure per patient   • Hyperlipidemia    • Hypertension    • Lumbar pain    • Malaise and fatigue    • Myalgia    • LEATHA (obstructive sleep apnea)    • Palpitations    • RBBB (right bundle branch block)    • Retinal detachment     left  //  when blood pressure gets high she notices flashing lights in her vision   • Sleep apnea     wears machine   • Vertigo          Current Outpatient Prescriptions:   •  ALPRAZolam (XANAX) 0.5 MG tablet, Take 0.5 mg by mouth 2 (Two) Times a Day., Disp: , Rfl:   •  ARMOUR THYROID 60 MG tablet, Take once daily, Disp: , Rfl:   •  BIOTIN PO, Take  by mouth., Disp: , Rfl:   •  cetirizine (zyrTEC) 5 MG tablet, Take 5 mg  by mouth., Disp: , Rfl:   •  Cholecalciferol (VITAMIN D3) 5000 UNITS capsule capsule, Take 5,000 Units by mouth Daily., Disp: , Rfl:   •  famotidine (PEPCID) 10 MG tablet, Take 10 mg by mouth as needed., Disp: , Rfl:   •  Magnesium 250 MG tablet, Take 2 tablets by mouth Daily., Disp: , Rfl:   •  Multiple Vitamins-Minerals (MULTIVITAMIN ADULT PO), Take 1 tablet by mouth Daily., Disp: , Rfl:   •  olmesartan (BENICAR) 40 MG tablet, Take 0.5 tablets by mouth 2 (Two) Times a Day. (Patient taking differently: Take 20 mg by mouth Daily.), Disp: 30 tablet, Rfl: 3  •  Omega-3 Fatty Acids (OMEGA 3 PO), Take 1 tablet by mouth daily., Disp: , Rfl:   •  omeprazole (priLOSEC) 20 MG capsule, Take 1 capsule by mouth Daily., Disp: 30 capsule, Rfl: 11  •  rosuvastatin (CRESTOR) 5 MG tablet, Take 5 mg by mouth daily., Disp: , Rfl:   •  traZODone (DESYREL) 150 MG tablet, Take 150 mg by mouth Every Night., Disp: , Rfl:     Current Facility-Administered Medications:   •  sodium chloride 0.9 % flush 10 mL, 10 mL, Intravenous, PRN, DESI Crenshaw    Allergies   Allergen Reactions   • Beta Adrenergic Blockers Other (See Comments)     Asthma   • Sulfa Antibiotics Hives       Social History     Social History   • Marital status:      Spouse name: N/A   • Number of children: N/A   • Years of education: N/A     Occupational History   • Not on file.     Social History Main Topics   • Smoking status: Never Smoker   • Smokeless tobacco: Never Used      Comment: CAFFINE USE   • Alcohol use Yes      Comment: occasional use   • Drug use: No   • Sexual activity: Defer     Other Topics Concern   • Not on file     Social History Narrative   • No narrative on file       Family History   Problem Relation Age of Onset   • Heart failure Father    • Heart disease Father    • Diabetes Sister    • Heart disease Mother    • Heart disease Brother    • Colon polyps Brother        Review of Systems   Constitutional: Negative.    Respiratory:  Negative.    Cardiovascular: Negative.    Gastrointestinal: Positive for abdominal pain. Negative for abdominal distention, blood in stool, constipation, diarrhea, nausea and vomiting.   Musculoskeletal: Negative.    Skin: Negative.    Hematological: Negative.        Vitals:    06/14/18 1544   BP: 116/72   Temp: 98.3 °F (36.8 °C)       Physical Exam   Constitutional: She is oriented to person, place, and time. She appears well-developed and well-nourished.   HENT:   Head: Normocephalic and atraumatic.   Eyes: Pupils are equal, round, and reactive to light. No scleral icterus.   Abdominal: Soft. Bowel sounds are normal. She exhibits no distension and no mass. There is no tenderness. No hernia.   Neurological: She is alert and oriented to person, place, and time.   Skin: Skin is warm and dry.   Psychiatric: She has a normal mood and affect. Her behavior is normal.   Vitals reviewed.      Admission on 05/21/2018, Discharged on 05/21/2018   Component Date Value Ref Range Status   • Case Report 05/21/2018    Final                    Value:Surgical Pathology Report                         Case: [id number]                                  Authorizing Provider:  Devyn Stahl MD     Collected:           05/21/2018 09:52 AM          Ordering Location:     [facility]  Received:            05/21/2018 11:51 AM                                 [facility]                                                                  Pathologist:           Major Martinez MD                                                                           Specimens:   1) - Small Intestine, Duodenum                                                                      2) - Stomach, antrum                                                                      • Final Diagnosis 05/21/2018    Final                    Value:This result contains rich text  formatting which cannot be displayed here.   • Gross Description 05/21/2018    Final                    Value:This result contains rich text formatting which cannot be displayed here.   • Microscopic Description 05/21/2018    Final                    Value:This result contains rich text formatting which cannot be displayed here.       Vesta was seen today for follow-up and abdominal pain.    Diagnoses and all orders for this visit:    Chronic gastritis without bleeding, unspecified gastritis type      Patient 62-year-old female status post EGD for right upper quadrant pain.  Patient noted with diffuse chronic gastritis.  Biopsies were H. pylori negative.  Patient reports did well on omeprazole but stopped her medication 2 weeks ago and after 2 weeks started to have recurrent symptoms.  Patient concerned about the biliary tree but expect that if the biliary tree was involved this would not have resolved taking the omeprazole.  All symptoms seem consistent with the chronic gastritis.  At this point would recommend patient restart the omeprazole and maintain on daily therapy and follow-up for symptom recurrence.

## 2018-07-25 ENCOUNTER — OFFICE VISIT (OUTPATIENT)
Dept: CARDIOLOGY | Facility: CLINIC | Age: 63
End: 2018-07-25

## 2018-07-25 VITALS
SYSTOLIC BLOOD PRESSURE: 130 MMHG | DIASTOLIC BLOOD PRESSURE: 80 MMHG | BODY MASS INDEX: 28.48 KG/M2 | HEIGHT: 66 IN | HEART RATE: 70 BPM | WEIGHT: 177.2 LBS

## 2018-07-25 DIAGNOSIS — I10 ESSENTIAL HYPERTENSION: Primary | ICD-10-CM

## 2018-07-25 DIAGNOSIS — G47.33 OSA ON CPAP: ICD-10-CM

## 2018-07-25 DIAGNOSIS — E78.5 HYPERLIPIDEMIA, UNSPECIFIED HYPERLIPIDEMIA TYPE: ICD-10-CM

## 2018-07-25 DIAGNOSIS — R00.2 PALPITATIONS: ICD-10-CM

## 2018-07-25 DIAGNOSIS — Z99.89 OSA ON CPAP: ICD-10-CM

## 2018-07-25 PROCEDURE — 93000 ELECTROCARDIOGRAM COMPLETE: CPT | Performed by: INTERNAL MEDICINE

## 2018-07-25 PROCEDURE — 99214 OFFICE O/P EST MOD 30 MIN: CPT | Performed by: INTERNAL MEDICINE

## 2018-07-25 RX ORDER — ROSUVASTATIN CALCIUM 5 MG/1
5 TABLET, COATED ORAL DAILY
Qty: 90 TABLET | Refills: 3 | COMMUNITY
Start: 2018-07-25 | End: 2018-07-25 | Stop reason: SDUPTHER

## 2018-07-25 RX ORDER — ROSUVASTATIN CALCIUM 5 MG/1
5 TABLET, COATED ORAL DAILY
Qty: 90 TABLET | Refills: 1 | Status: SHIPPED | OUTPATIENT
Start: 2018-07-25 | End: 2020-07-30 | Stop reason: DRUGHIGH

## 2018-07-25 NOTE — PROGRESS NOTES
Date of Office Visit: 2018  Encounter Provider: Susan Paula MD  Place of Service: [facility]  Patient Name: Vesta Sutton  :1955      Patient ID:  Vesta Sutton is a 62 y.o. female is here for  followup for HTN.         History of Present Illness    The patient has a history of having intermittent chest pain for which she has never had a  myocardial infarction. She has had echocardiography done. The last one was in  and  it was normal. She had carotid ultrasound done at the same time showed no stenosis but  did show mild intimal thickening. She had an abnormal stress study done in  showing  an apical ischemia but had a cardiac catheterization showing very mild left anterior  descending artery stenosis. She was admitted in  with chest pain. Her stress study  there showed no ischemia.         She has a history of retinal detachment of the left eye. She has a lot of anxiety and  stress. She is treated for hypertension and hyperlipidemia.       She also has some lumbar degenerative disc disease and so does not exercise regularly and  has had some sciatic pain with that as well.       She has a history of palpitations in the past but has had a normal Holter recording.       Her echocardiogram on 09/10/2009 was normal.          She had 2D echocardiogram with Doppler which was done 10/2014. It was normal. Ejection  fraction 66%. She also had a carotid duplex done 10/2014 which was also normal.      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   The rest of the right leg looked okay. It looks like it was just only done on her right leg.      She was having chest pain because of the Zoloft.  She did end up stopping the Zoloft.  Her last echocardiogram was done 2014, which showed ejection fraction 66%, and normal segmental  wall motion.  No significant valvular disease.        She remains on Crestor at this time for her hyperlipidemia, and she has seen Dr. Morocho, who checks her blood work.       She is on Benicar half a tablet twice a day.  She is tolerating it well.  She has palpitations in the evening when she is particularly tired.  She does feel stressed a lot.  She's had no dizziness or syncope.  She has no chest pain or pressure.  She is working part-time as a pharmacist.  She is not exercising as much as she would like to.  He doesn't drink very much water.       Labs done 2018 shows total cholesterol 196, , HDL 77.    Past Medical History:   Diagnosis Date   • Anemia    • Anxiety and depression    • Appendiceal carcinoid tumor    • Arthritis    • Asthma    • Breast nodule    • Chest pain    • Coronary artery disease    • Diabetes mellitus (CMS/HCC)     a1c only at 5, diet controlled right now   • Dizziness    • Elevated cholesterol    • Environmental allergies    • Fatigue     in the evening   • Fibromuscular hyperplasia of artery (CMS/HCC)     in the carotid artery with no stenosis   • GERD (gastroesophageal reflux disease)    • Headache    • Heart disease    • History of anesthesia complications 2017    lidocaine caused reaction during dental procedure per patient   • Hyperlipidemia    • Hypertension    • Lumbar pain    • Malaise and fatigue    • Myalgia    • LEATHA (obstructive sleep apnea)    • Palpitations    • RBBB (right bundle branch block)    • Retinal detachment     left  //  when blood pressure gets high she notices flashing lights in her vision   • Sleep apnea     wears machine   • Vertigo          Past Surgical History:   Procedure Laterality Date   • APPENDECTOMY     •  SECTION     • CORNEA LACERATION REPAIR     • ENDOSCOPY N/A 2016    Z-line regular, 40 cm from the incisors, gastritis, erythematous duodenopathy   • ENDOSCOPY N/A 2018    chronic gastritis   • ENDOSCOPY AND  COLONOSCOPY N/A 09/03/2014    EGD with biopsy and colonoscopy, Mild gastritis, Normal colon, Repeat Colonoscopy in 5 years, Dr. Damien Aleman   • ENDOSCOPY AND COLONOSCOPY N/A 07/24/2008    EGD with biopsy and colonoscopy-Dr. Damien Aleman   • URETEROCELE REPAIR         Current Outpatient Prescriptions on File Prior to Visit   Medication Sig Dispense Refill   • ALPRAZolam (XANAX) 0.5 MG tablet Take 0.5 mg by mouth 2 (Two) Times a Day.     • ARMOUR THYROID 60 MG tablet Take once daily     • BIOTIN PO Take  by mouth.     • cetirizine (zyrTEC) 5 MG tablet Take 5 mg by mouth.     • Cholecalciferol (VITAMIN D3) 5000 UNITS capsule capsule Take 5,000 Units by mouth Daily.     • famotidine (PEPCID) 10 MG tablet Take 10 mg by mouth as needed.     • Magnesium 250 MG tablet Take 2 tablets by mouth Daily.     • Multiple Vitamins-Minerals (MULTIVITAMIN ADULT PO) Take 1 tablet by mouth Daily.     • olmesartan (BENICAR) 40 MG tablet Take 0.5 tablets by mouth 2 (Two) Times a Day. (Patient taking differently: Take 20 mg by mouth 2 (Two) Times a Day.) 30 tablet 3   • Omega-3 Fatty Acids (OMEGA 3 PO) Take 1 tablet by mouth daily.     • omeprazole (priLOSEC) 20 MG capsule Take 1 capsule by mouth Daily. 30 capsule 11   • rosuvastatin (CRESTOR) 5 MG tablet Take 5 mg by mouth daily.     • traZODone (DESYREL) 150 MG tablet Take 150 mg by mouth Every Night.       Current Facility-Administered Medications on File Prior to Visit   Medication Dose Route Frequency Provider Last Rate Last Dose   • sodium chloride 0.9 % flush 10 mL  10 mL Intravenous PRN DESI Crenshaw           Social History     Social History   • Marital status:      Spouse name: N/A   • Number of children: N/A   • Years of education: N/A     Occupational History   • Not on file.     Social History Main Topics   • Smoking status: Never Smoker   • Smokeless tobacco: Never Used      Comment: CAFFINE USE   • Alcohol use Yes      Comment: occasional use   • Drug  "use: No   • Sexual activity: Defer     Other Topics Concern   • Not on file     Social History Narrative   • No narrative on file           Review of Systems   Constitution: Negative.   HENT: Negative for congestion.    Eyes: Negative for vision loss in left eye and vision loss in right eye.   Respiratory: Negative.  Negative for cough, hemoptysis, shortness of breath, sleep disturbances due to breathing, snoring, sputum production and wheezing.    Endocrine: Negative.    Hematologic/Lymphatic: Negative.    Skin: Negative for poor wound healing and rash.   Musculoskeletal: Positive for joint pain. Negative for falls, gout, muscle cramps and myalgias.   Gastrointestinal: Positive for abdominal pain. Negative for diarrhea, dysphagia, hematemesis, melena, nausea and vomiting.   Neurological: Negative for excessive daytime sleepiness, dizziness, headaches, light-headedness, loss of balance, seizures and vertigo.   Psychiatric/Behavioral: Negative for depression and substance abuse. The patient is not nervous/anxious.        Procedures    ECG 12 Lead  Date/Time: 7/25/2018 10:18 AM  Performed by: MILLICENT FITCH  Authorized by: MILLICENT FITCH   Comparison: compared with previous ECG   Similar to previous ECG  Rhythm: sinus rhythm  Ectopy: atrial premature contractions  Conduction: right bundle branch block and LAFB  Clinical impression: abnormal ECG                Objective:      Vitals:    07/25/18 1008   BP: 130/80   BP Location: Right arm   Patient Position: Sitting   Pulse: 70   Weight: 80.4 kg (177 lb 3.2 oz)   Height: 167.6 cm (66\")     Body mass index is 28.6 kg/m².    Physical Exam   Constitutional: She is oriented to person, place, and time. She appears well-developed and well-nourished. No distress.   HENT:   Head: Normocephalic and atraumatic.   Eyes: Conjunctivae are normal. No scleral icterus.   Neck: Neck supple. No JVD present. Carotid bruit is not present. No thyromegaly present. "   Cardiovascular: Normal rate, regular rhythm, S1 normal, S2 normal, normal heart sounds and intact distal pulses.   No extrasystoles are present. PMI is not displaced.  Exam reveals no gallop.    No murmur heard.  Pulses:       Carotid pulses are 2+ on the right side, and 2+ on the left side.       Radial pulses are 2+ on the right side, and 2+ on the left side.        Dorsalis pedis pulses are 2+ on the right side, and 2+ on the left side.        Posterior tibial pulses are 2+ on the right side, and 2+ on the left side.   Pulmonary/Chest: Effort normal and breath sounds normal. No respiratory distress. She has no wheezes. She has no rhonchi. She has no rales. She exhibits no tenderness.   Abdominal: Soft. Bowel sounds are normal. She exhibits no distension, no abdominal bruit and no mass. There is no tenderness.   Musculoskeletal: She exhibits no edema or deformity.   Lymphadenopathy:     She has no cervical adenopathy.   Neurological: She is alert and oriented to person, place, and time. No cranial nerve deficit.   Skin: Skin is warm and dry. No rash noted. She is not diaphoretic. No cyanosis. No pallor. Nails show no clubbing.   Psychiatric: She has a normal mood and affect. Judgment normal.   Vitals reviewed.      Lab Review:       Assessment:      Diagnosis Plan   1. Essential hypertension     2. Hyperlipidemia, unspecified hyperlipidemia type     3. Palpitations     4. LEATHA on auto CPAP       1. Hypertension, BP stable.   2. Chronically abnormal electrocardiogram showing a left anterior fascicular block and  right bundle branch block.   3. Mild left anterior descending stenosis treated medically. Nonischemic stress study  done on 03/2012.   4. Palpitations stable and made worse with the Arthrotec. Stay away from Arthrotec.   5. Salt intake. I advised her to stay away from salt.   6. Hyperlipidemia on Crestor. Well controlled.   7. Obstructive sleep. Use CPAP.   8. History of appendiceal carcinoma.   9. History  of asthma, stable.   10. Anxiety.   11. Gastroesophageal reflux disease.   12. Lumbar degenerative disc disease with sciatic pain.      Plan:       No changes, see troy in 6 months.  Needs to drink more water.

## 2018-08-02 ENCOUNTER — OFFICE VISIT (OUTPATIENT)
Dept: SLEEP MEDICINE | Facility: HOSPITAL | Age: 63
End: 2018-08-02
Attending: INTERNAL MEDICINE

## 2018-08-02 DIAGNOSIS — G47.21 CIRCADIAN RHYTHM SLEEP DISORDER, DELAYED SLEEP PHASE TYPE: ICD-10-CM

## 2018-08-02 DIAGNOSIS — G47.33 OSA ON CPAP: Primary | ICD-10-CM

## 2018-08-02 DIAGNOSIS — Z99.89 OSA ON CPAP: Primary | ICD-10-CM

## 2018-08-02 DIAGNOSIS — F51.04 PSYCHOPHYSIOLOGICAL INSOMNIA: ICD-10-CM

## 2018-08-02 PROCEDURE — G0463 HOSPITAL OUTPT CLINIC VISIT: HCPCS

## 2018-08-02 PROCEDURE — 99213 OFFICE O/P EST LOW 20 MIN: CPT | Performed by: INTERNAL MEDICINE

## 2018-08-02 NOTE — PROGRESS NOTES
[facility] Note     Chief Complaint:  LEATHA     Primary Care Physician: Benjamín Morocho MD    Interval History:   The patient was last seen by me in April.  Her interface was changed.  She likes the new mask but is giving her some problems recently.  She goes to bed between 10 PM and midnight and awakens between 6 and 9:30 AM.  [place] Sleepiness Scale is normal at 2.    Review of Systems:  Recorded on the Sleep Questionnaire.  Unremarkable except for nasal congestion, STANLEY, and anxiety and depression    Social History:  1-3 caffeinated beverages  Social History     Social History   • Marital status:      Social History Main Topics   • Smoking status: Never Smoker   • Smokeless tobacco: Never Used      Comment: CAFFINE USE   • Alcohol use Yes      Comment: occasional use   • Drug use: No   • Sexual activity: Defer     Other Topics Concern   • Not on file       Allergies:  Beta adrenergic blockers and Sulfa antibiotics     Medication Review:  Reviewed.      Vital Signs:  Height 66 inches, weight 167 pounds and BMI overweight at 27.    Physical Exam:    Constitutional:  Well developed white female and appears in no apparent distress.  Awake & oriented times 3.  Normal mood with normal recent and remote memory and normal judgement.  Eyes:  Conjunctivae normal.  Oropharynx:  moist mucous membranes without exudate and a large tongue and normal uvula and patent posterior pharyngeal opening      Results Review:  DME is [employer] and she uses a fullface mask.  Downloads between February 3 and August 1, 2018 compliances 96%.  Average usage is 6 hours and 35 minutes.  Average AHI is mildly abnormal at 9 with a leak of 40 minutes.  Average AutoCPAP pressure is 10.5 and her auto CPAP is 7-12.       Impression:   Obstructive sleep apnea nearly adequately treated with auto titrating CPAP.  She does have a leak.  She is compliant and demonstrates adequate usage.  No complaints of  hypersomnolence.  She also complains of probable psychophysiologic insomnia that is exacerbated by circadian rhythm sleep disorder, delayed sleep phase type.      Plan:  Good sleep hygiene measures should be maintained.  Some weight loss would be beneficial in this patient who is overweight by BMI.  The patient is benefiting from the treatment being provided.     The patient should continue auto CPAP.  Sleep hygiene measures reviewed.  Bedtime and wake time should be approximately the same.    She does complain about dry mouth and Biotene reviewed with her.    The patient will call for any problems and will follow up in 9 months.      Jass Batista MD  Sleep Medicine  08/02/18  10:42 AM

## 2018-08-04 PROBLEM — G47.21 CIRCADIAN RHYTHM SLEEP DISORDER, DELAYED SLEEP PHASE TYPE: Status: ACTIVE | Noted: 2018-08-04

## 2018-08-09 ENCOUNTER — APPOINTMENT (OUTPATIENT)
Dept: WOMENS IMAGING | Facility: HOSPITAL | Age: 63
End: 2018-08-09

## 2018-08-09 PROCEDURE — 76641 ULTRASOUND BREAST COMPLETE: CPT | Performed by: RADIOLOGY

## 2018-08-09 PROCEDURE — 77066 DX MAMMO INCL CAD BI: CPT | Performed by: RADIOLOGY

## 2019-04-25 ENCOUNTER — TRANSCRIBE ORDERS (OUTPATIENT)
Dept: ADMINISTRATIVE | Facility: HOSPITAL | Age: 64
End: 2019-04-25

## 2019-04-25 ENCOUNTER — TRANSCRIBE ORDERS (OUTPATIENT)
Dept: PHYSICAL THERAPY | Facility: HOSPITAL | Age: 64
End: 2019-04-25

## 2019-04-25 DIAGNOSIS — E04.1 THYROID NODULE: Primary | ICD-10-CM

## 2019-04-25 DIAGNOSIS — M54.16 LUMBAR RADICULOPATHY: Primary | ICD-10-CM

## 2019-04-27 ENCOUNTER — HOSPITAL ENCOUNTER (OUTPATIENT)
Dept: ULTRASOUND IMAGING | Facility: HOSPITAL | Age: 64
Discharge: HOME | End: 2019-04-27
Admitting: INTERNAL MEDICINE

## 2019-04-27 DIAGNOSIS — E04.1 THYROID NODULE: ICD-10-CM

## 2019-04-27 PROCEDURE — 76536 US EXAM OF HEAD AND NECK: CPT

## 2019-05-06 ENCOUNTER — HOSPITAL ENCOUNTER (OUTPATIENT)
Dept: PHYSICAL THERAPY | Facility: HOSPITAL | Age: 64
Setting detail: THERAPIES SERIES
Discharge: HOME | End: 2019-05-06

## 2019-05-06 DIAGNOSIS — M54.41 CHRONIC BILATERAL LOW BACK PAIN WITH BILATERAL SCIATICA: Primary | ICD-10-CM

## 2019-05-06 DIAGNOSIS — M54.42 CHRONIC BILATERAL LOW BACK PAIN WITH BILATERAL SCIATICA: Primary | ICD-10-CM

## 2019-05-06 DIAGNOSIS — M54.16 LUMBAR RADICULOPATHY: ICD-10-CM

## 2019-05-06 DIAGNOSIS — G89.29 CHRONIC BILATERAL LOW BACK PAIN WITH BILATERAL SCIATICA: Primary | ICD-10-CM

## 2019-05-06 PROCEDURE — 97162 PT EVAL MOD COMPLEX 30 MIN: CPT | Performed by: PHYSICAL THERAPIST

## 2019-05-06 PROCEDURE — 97110 THERAPEUTIC EXERCISES: CPT | Performed by: PHYSICAL THERAPIST

## 2019-05-06 NOTE — THERAPY EVALUATION
Outpatient Physical Therapy Ortho Initial Evaluation  [facility]     Patient Name: Vesta Sutton  : 1955  MRN: [id number]  Today's Date: 2019      Visit Date: 2019    Patient Active Problem List   Diagnosis   • Hyperlipidemia   • Hypertension   • LEATHA on auto CPAP   • Insomnia due to medical condition - LEATHA   • Psychophysiological insomnia   • Precordial pain   • Palpitations   • RUQ abdominal pain   • Nausea and vomiting   • Circadian rhythm sleep disorder, delayed sleep phase type        Past Medical History:   Diagnosis Date   • Anemia    • Anxiety and depression    • Appendiceal carcinoid tumor    • Arthritis    • Asthma    • Breast nodule    • Chest pain    • Coronary artery disease    • Diabetes mellitus (CMS/HCC)     a1c only at 5, diet controlled right now   • Dizziness    • Elevated cholesterol    • Environmental allergies    • Fatigue     in the evening   • Fibromuscular hyperplasia of artery (CMS/HCC)     in the carotid artery with no stenosis   • GERD (gastroesophageal reflux disease)    • Headache    • Heart disease    • History of anesthesia complications 2017    lidocaine caused reaction during dental procedure per patient   • Hyperlipidemia    • Hypertension    • Lumbar pain    • Malaise and fatigue    • Myalgia    • LEATHA (obstructive sleep apnea)    • Palpitations    • RBBB (right bundle branch block)    • Retinal detachment     left  //  when blood pressure gets high she notices flashing lights in her vision   • Sleep apnea     wears machine   • Vertigo         Past Surgical History:   Procedure Laterality Date   • APPENDECTOMY     •  SECTION     • CORNEA LACERATION REPAIR     • ENDOSCOPY N/A 2016    Z-line regular, 40 cm from the incisors, gastritis, erythematous duodenopathy   • ENDOSCOPY N/A 2018    chronic gastritis   • ENDOSCOPY AND COLONOSCOPY N/A 2014    EGD with biopsy and colonoscopy, Mild gastritis, Normal colon, Repeat Colonoscopy in  5 years, Dr. Damien Aleman   • ENDOSCOPY AND COLONOSCOPY N/A 07/24/2008    EGD with biopsy and colonoscopy-Dr. Damien Aleman   • URETEROCELE REPAIR         Visit Dx:     ICD-10-CM ICD-9-CM   1. Chronic bilateral low back pain with bilateral sciatica M54.42 724.2    M54.41 724.3    G89.29 338.29   2. Lumbar radiculopathy M54.16 724.4         Patient History     Row Name 05/06/19 1500             History    Chief Complaint  Difficulty with daily activities;Pain;Tightness;Numbness  -      Date Current Problem(s) Began  08/06/18  -      Brief Description of Current Complaint  Pt reports that she started with sciatic nerve issues with her first child 38 years ago and with all 4 pregnancies. Then about 2007 or 10 and had an epidural at that time. She didn't do well with it (had difficulty breathing).  Eventually it got better. She had another episode in Aug 2018, she was driving more since her daughter moved in with her. Did Chiro for a bit and it got better, but pain never left (last saw in Nov). 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  driving, carrying grandchild (9 mo old), stress, sitting too long  -      What Performance Factors Make the Current Problem(s) BETTER?  diclofenac gel, naproxen (prn), heat pad  -      Is your sleep disturbed?  Yes sometimes  -         Fall Risk Assessment    Any falls in the past year:  Yes  -      Number of falls reported in the last 12 months  1  -      Factors that contributed to the  fall:  Slippery surface slipped in bathoom  -         Services    Prior Rehab/Home Health Experiences  Yes  -      When was the prior experience with Rehab/Home Health  in 2008 after a foot fracture  -         Daily Activities    Primary Language  Russian fluent in English  -      How does patient learn best?  Reading  -      Teaching needs identified  Home Exercise Program;Management of Condition  -      Patient is concerned about/has problems with  Difficulty with self care (i.e. bathing, dressing, toileting:;Performing home management (household chores, shopping, care of dependents);Repetitive movements of the hand, arm, shoulder  -      Does patient have problems with the following?  Depression;Anxiety  -      Barriers to learning  None  -      Pt Participated in POC and Goals  Yes  -         Safety    Are you being hurt, hit, or frightened by anyone at home or in your life?  No  -LH      Are you being neglected by a caregiver  No  -        User Key  (r) = Recorded By, (t) = Taken By, (c) = Cosigned By    Initials Name Provider Type     Yojana Cardoso, PT Physical Therapist          PT Ortho     Row Name 05/06/19 1500       Posture/Observations    Iliac crests  Right:;Elevated  -    Posture/Observations Comments  LLD in standing, but equal in supine, guarding and hip hiking of the R QL  -       Myotomal Screen- Lower Quarter Clearing    Hip flexion (L2)  4 (Good)  -    Knee extension (L3)  4+ (Good +)  -    Ankle DF (L4)  4+ (Good +)  -    Great toe extension (L5)  4 (Good)  -    Ankle PF (S1)  4 (Good)  -LH    Knee flexion (S2)  4 (Good)  -       Lumbar ROM Screen- Lower Quarter Clearing    Lumbar Flexion  Impaired  -    Lumbar Extension  Impaired  -    Lumbar Lateral Flexion  Impaired  -    Lumbar Rotation  Impaired  -LH    Lumbar Quadrant   Normal no inc pain  -LH       Lumbosacral Accessory Motions    Lumbosacral Accessory Motions Tested?  -- no pain with PA or  rotational springing  -LH       Lumbar/SI Special Tests    Standing Flexion Test (SI Dysfunction)  Negative  -LH    Stork Test (SI Dysfunction)  Negative  -LH    SLR (Neural Tension)  Negative  -LH    SI Compression Test (SI Dysfunction)  Negative  -LH    SI Distraction Test (SI Dysfunction)  Negative  -LH    HARMAN (hip vs. SI Dysfunction)  Right:;Positive  -LH    Sacral Spring Test (SI Dysfunction)  Right:;Positive  -LH       Lumbosacral Palpation    SI  Right:;Tender  -LH    Piriformis  Right:;Tender;Guarded/taut  -LH    Gluteus Nash  Right:;Tender;Guarded/taut  -LH    Quadratus Lumborum  Right:;Guarded/taut  -LH       General ROM    GENERAL ROM COMMENTS  pain rising from flexion, R lateral flexion on R side and buttock  -LH       MMT (Manual Muscle Testing)    General MMT Comments  core strength 4-/5, 1-2 finger diastasis recti  -LH       MMT Right Lower Ext    Rt Hip ABduction MMT, Gross Movement  (4-/5) good minus  -LH    Rt Hip External (Lateral) Rotation MMT, Gross Movement  (4-/5) good minus  -LH       MMT Left Lower Ext    Lt Hip ABduction MMT, Gross Movement  (4/5) good  -LH    Lt Hip External (Lateral) Rotation MMT, Gross Movement  (4/5) good  -LH       Sensation    Sensation WNL?  WNL  -LH       Lower Extremity Flexibility    Hamstrings  Bilateral:;Mildly limited;Moderately limited  -LH    Hip External Rotators  Bilateral:;Mildly limited  -LH    Hip Internal Rotators  Bilateral:;Mildly limited  -LH       Gait/Stairs Assessment/Training    Comment (Gait/Stairs)  mild antalgia with glute med shift over the R LE  -LH      User Key  (r) = Recorded By, (t) = Taken By, (c) = Cosigned By    Initials Name Provider Type     Yojana Cardoso, PT Physical Therapist                      Therapy Education  Education Details: given red Wolf book  Given: HEP, Symptoms/condition management, Pain management, Posture/body mechanics  Program: New  How Provided: Verbal, Demonstration, Written  Provided to:  Patient  Level of Understanding: Teach back education performed, Verbalized     PT OP Goals     Row Name 05/06/19 1600          PT Short Term Goals    STG 1  Patient will be independent with education for symptom management, joint protection and strategies to minimize stress on affected tissues  -     STG 1 Progress  New  -     STG 2  Pt to improve pain complaints to no more than 3/10 with ADLs  -     STG 2 Progress  [facility]        Long Term Goals    LTG 1  Pt to improve pain complaints to no more than 1/10 with ADLs  -     LTG 1 Progress  New  -     LTG 2  Pt to improve trunk and LE strength by 1/2 to 1 MMT grade  -     LTG 2 Progress  New  [facility]     LTG 3  Pt to improve Oswestry Back index score from 14% to 8% for overall functional improvement  -     LTG 3 Progress  New  -     LTG 4  Pt to be independent with HEP for ROM, flexibility and core strength  -     LTG 4 Progress  New  -LH        Time Calculation    PT Goal Re-Cert Due Date  08/06/19  -       User Key  (r) = Recorded By, (t) = Taken By, (c) = Cosigned By    Initials Name Provider Type     Yojana Cardoso, PT Physical Therapist          PT Assessment/Plan     Row Name 05/06/19 1600          PT Assessment    Functional Limitations  Limitation in home management;Limitations in community activities;Limitations in functional capacity and performance;Performance in leisure activities;Performance in self-care ADL  -     Impairments  Impaired flexibility;Joint mobility;Muscle strength;Pain;Poor body mechanics;Range of motion;Posture  -     Assessment Comments  Vesta Sutton is a 63 y.o. year-old female referred to physical therapy for back pain. She presents with a evolving clinical presentation.  She has no comorbidities and personal factors of her job (standing, some lifting) and helping care for her grandchild that may affect her progress in the plan of care. She stands shifted with R hip elevated, decreased lumbar AROM and pain  with rising from flexion and on the R with R lateral flexion. She has decreased core and R>L LE strength, decreased flexibility of the hip rotators and HS, and pain over the R SI with springing. She has tightness and tenderness over the R piriformis and gluteals. Pt would benefit from therapy to help improve her ability to sleep, drive, and perform her job duties as well as caring for her grandchild with less pain.  -     Please refer to paper survey for additional self-reported information  Yes  -LH     Rehab Potential  Good  -     Patient/caregiver participated in establishment of treatment plan and goals  Yes  -     Patient would benefit from skilled therapy intervention  Yes  -LH        PT Plan    PT Frequency  2x/week  -     Predicted Duration of Therapy Intervention (Therapy Eval)  4-6 weeks  -     Planned CPT's?  PT EVAL MOD COMPLELITY: 46845;PT THER PROC EA 15 MIN: 91408;PT MANUAL THERAPY EA 15 MIN: 58149;PT ELECTRICAL STIM UNATTEND: ;PT ULTRASOUND EA 15 MIN: 59844;PT HOT/COLD PACK WC NONMCARE: 77884  -     Physical Therapy Interventions (Optional Details)  dry needling;home exercise program;joint mobilization;lumbar stabilization;manual therapy techniques;modalities;patient/family education;ROM (Range of Motion);strengthening;stretching  -     PT Plan Comments  plan to see pt 2x week for skilled therapy  -       User Key  (r) = Recorded By, (t) = Taken By, (c) = Cosigned By    Initials Name Provider Type     Yojana Cardoso, PT Physical Therapist            Exercises     Row Name 05/06/19 1600             Total Minutes    27644 - PT Therapeutic Exercise Minutes  12  -         Exercise 1    Exercise Name 1  PPT  -      Sets 1  1  -      Reps 1  10  -      Time 1  5 sec  -         Exercise 2    Exercise Name 2  hip add iso with PPT  -      Sets 2  1  -      Reps 2  10  -      Time 2  5 sec  -         Exercise 3    Exercise Name 3  SKTC and piriformis stretching  -       Sets 3  1  -      Reps 3  3  -      Time 3  20 sec  -         Exercise 4    Exercise Name 4  discussion of body mechanics  -        User Key  (r) = Recorded By, (t) = Taken By, (c) = Cosigned By    Initials Name Provider Type     Yojana Cardoso, PT Physical Therapist                                  Time Calculation:     Start Time: 1530  Stop Time: 1618  Time Calculation (min): 48 min  Total Timed Code Minutes- PT: 45 minute(s)     Therapy Charges for Today     Code Description Service Date Service Provider Modifiers Qty    32689433749  PT THER PROC EA 15 MIN 5/6/2019 Yojana Cardoso, PT GP 1    [id number]  PT EVAL MOD COMPLEXITY 2 5/6/2019 Yojana Cardoso, PT GP 1                    Yojana Cardoso, PT  5/6/2019

## 2019-05-08 ENCOUNTER — HOSPITAL ENCOUNTER (OUTPATIENT)
Dept: MAMMOGRAPHY | Facility: HOSPITAL | Age: 64
Discharge: HOME | End: 2019-05-08
Admitting: SPECIALIST

## 2019-05-08 ENCOUNTER — HOSPITAL ENCOUNTER (OUTPATIENT)
Dept: ULTRASOUND IMAGING | Facility: HOSPITAL | Age: 64
End: 2019-05-08

## 2019-05-08 DIAGNOSIS — N63.20 LEFT BREAST MASS: ICD-10-CM

## 2019-05-08 PROCEDURE — 77065 DX MAMMO INCL CAD UNI: CPT

## 2019-05-09 ENCOUNTER — OFFICE VISIT (OUTPATIENT)
Dept: SLEEP MEDICINE | Facility: HOSPITAL | Age: 64
End: 2019-05-09
Attending: INTERNAL MEDICINE

## 2019-05-09 VITALS — WEIGHT: 174 LBS | HEIGHT: 67 IN | BODY MASS INDEX: 27.31 KG/M2

## 2019-05-09 DIAGNOSIS — G47.33 OSA ON CPAP: ICD-10-CM

## 2019-05-09 DIAGNOSIS — Z99.89 OSA ON CPAP: ICD-10-CM

## 2019-05-09 DIAGNOSIS — G47.21 CIRCADIAN RHYTHM SLEEP DISORDER, DELAYED SLEEP PHASE TYPE: Primary | ICD-10-CM

## 2019-05-09 DIAGNOSIS — F51.04 PSYCHOPHYSIOLOGICAL INSOMNIA: ICD-10-CM

## 2019-05-09 PROCEDURE — G0463 HOSPITAL OUTPT CLINIC VISIT: HCPCS

## 2019-05-09 PROCEDURE — 99213 OFFICE O/P EST LOW 20 MIN: CPT | Performed by: INTERNAL MEDICINE

## 2019-05-09 NOTE — PROGRESS NOTES
Pt's pressures changed in clinic to 8-15cm H2O APAP via SD care returned to pt.. Tech showed pt [employer] FFM / Air Fit N30.  Pt stated she didn't like because she can hear herself breathing in the masks.  Tech will ask for mask fit at [facility] as well so pt can see other masks available to her. jesus

## 2019-05-09 NOTE — PROGRESS NOTES
"Follow Up Sleep Disorders Center Note     Chief Complaint:  LEATHA     Primary Care Physician: Benjamín Morocho MD    Interval History:   I last saw the patient in August.  She states she finds herself snoring with the mask.  She also states the mask is uncomfortable and wakes her up.  She will go to bed between 10 PM and midnight and when working wakes up at 6 AM and when not working will wake up between 9 and 9:30 AM.  She is having some back discomfort.  She denies hypersomnolence and her [place] Sleepiness Scale is normal at 0    The patient is taking Xanax 0.5 mg several hours prior to sleep onset.  She takes trazodone 150 mg at bedtime.  And recently she has been adding Benadryl to try to fall asleep.    Review of Systems:    A complete review of systems was done and all were negative with the exception of nasal congestion, STANLEY, and anxiety and depression.    Social History:    Social History     Socioeconomic History   • Marital status:      Spouse name: Not on file   • Number of children: Not on file   • Years of education: Not on file   • Highest education level: Not on file   Tobacco Use   • Smoking status: Never Smoker   • Smokeless tobacco: Never Used   • Tobacco comment: CAFFINE USE   Substance and Sexual Activity   • Alcohol use: Yes     Comment: occasional use   • Drug use: No   • Sexual activity: Defer       Allergies:  Beta adrenergic blockers and Sulfa antibiotics     Medication Review:  Reviewed.      Vital Signs:    Vitals:    05/09/19 1127   Weight: 78.9 kg (174 lb)   Height: 168.9 cm (66.5\")     Body mass index is 27.66 kg/m².    Physical Exam:    Constitutional:  Well developed 63 y.o. female that appears in no apparent distress.  Awake & oriented times 3.  Normal mood with normal recent and remote memory and normal judgement.  Eyes:  Conjunctivae normal.  Oropharynx:  moist mucous membranes without exudate and a large tongue and normal uvula and patent posterior pharyngeal " opening     Results Review:  DME is [employer] and she uses a fullface mask.  Downloads between 2/8 and 5/8/2019 compliance 99%.  Average usage is 8 hours and 16 minutes.  Average AHI is mildly abnormal at 8.8 without a significant leak.  Average AutoCPAP pressure is 10.9 and her auto CPAP is 7-12.       Impression:   Obstructive sleep apnea nearly adequately treated with auto CPAP with good compliance and usage and no complaints of hypersomnolence.  She also complains of probable psychophysiologic insomnia that is exacerbated by circadian rhythm sleep disorder, delayed sleep phase type    Plan:  Good sleep hygiene measures should be maintained.  Some weight loss would be beneficial in this patient who is overweight by BMI.  The patient is benefiting from the treatment being provided.     The patient will continue auto CPAP.  A new order will be sent to her DME so that different interfaces could be evaluated.    Due to her elevated AHI, CPAP pressures will be changed to 8-15.      We also discussed circadian rhythm sleep disorder, delayed sleep phase type.  Knowing that she has to work tomorrow, if she does not go to bed earlier all the time, this will always be a problem.  I discussed using melatonin, 5- 20 mg approximately 4 hours prior to desired bedtime.  However, she needs to have a more narrowed bedtime than 10 PM and midnight.    The patient will call for any problems and will follow up in 6-8 months.      Jass Batista MD  Sleep Medicine  05/09/19  12:11 PM

## 2019-05-13 ENCOUNTER — APPOINTMENT (OUTPATIENT)
Dept: PHYSICAL THERAPY | Facility: HOSPITAL | Age: 64
End: 2019-05-13

## 2019-05-14 ENCOUNTER — TRANSCRIBE ORDERS (OUTPATIENT)
Dept: ADMINISTRATIVE | Facility: HOSPITAL | Age: 64
End: 2019-05-14

## 2019-05-14 DIAGNOSIS — E04.1 THYROID NODULE: Primary | ICD-10-CM

## 2019-05-14 RX ORDER — CYCLOBENZAPRINE HCL 10 MG
TABLET ORAL AS NEEDED
Refills: 2 | COMMUNITY
Start: 2019-04-25 | End: 2019-05-29 | Stop reason: SDUPTHER

## 2019-05-14 RX ORDER — OLMESARTAN MEDOXOMIL 20 MG/1
TABLET ORAL
COMMUNITY
Start: 2019-04-12 | End: 2019-05-14

## 2019-05-20 ENCOUNTER — HOSPITAL ENCOUNTER (OUTPATIENT)
Dept: ULTRASOUND IMAGING | Facility: HOSPITAL | Age: 64
Discharge: HOME | End: 2019-05-20

## 2019-05-29 ENCOUNTER — HOSPITAL ENCOUNTER (OUTPATIENT)
Dept: ULTRASOUND IMAGING | Facility: HOSPITAL | Age: 64
Discharge: HOME | End: 2019-05-29
Admitting: RADIOLOGY

## 2019-05-29 VITALS
HEART RATE: 56 BPM | BODY MASS INDEX: 28.61 KG/M2 | TEMPERATURE: 98 F | WEIGHT: 178 LBS | HEIGHT: 66 IN | DIASTOLIC BLOOD PRESSURE: 79 MMHG | RESPIRATION RATE: 16 BRPM | SYSTOLIC BLOOD PRESSURE: 149 MMHG | OXYGEN SATURATION: 100 %

## 2019-05-29 DIAGNOSIS — E04.1 THYROID NODULE: ICD-10-CM

## 2019-05-29 LAB
INR PPP: 1 (ref 0.8–1.2)
PROTHROMBIN TIME: 11.8 SECONDS (ref 12.8–15.2)

## 2019-05-29 PROCEDURE — 88305 TISSUE EXAM BY PATHOLOGIST: CPT | Performed by: INTERNAL MEDICINE

## 2019-05-29 PROCEDURE — 85610 PROTHROMBIN TIME: CPT

## 2019-05-29 PROCEDURE — 88173 CYTOPATH EVAL FNA REPORT: CPT | Performed by: INTERNAL MEDICINE

## 2019-05-29 RX ORDER — LIDOCAINE HYDROCHLORIDE 10 MG/ML
10 INJECTION, SOLUTION INFILTRATION; PERINEURAL ONCE
Status: COMPLETED | OUTPATIENT
Start: 2019-05-29 | End: 2019-05-29

## 2019-05-29 RX ORDER — CYCLOBENZAPRINE HCL 10 MG
TABLET ORAL
COMMUNITY
End: 2020-12-05

## 2019-05-29 RX ADMIN — LIDOCAINE HYDROCHLORIDE 2 ML: 10 INJECTION, SOLUTION INFILTRATION; PERINEURAL at 08:45

## 2019-05-30 ENCOUNTER — TELEPHONE (OUTPATIENT)
Dept: INTERVENTIONAL RADIOLOGY/VASCULAR | Facility: HOSPITAL | Age: 64
End: 2019-05-30

## 2019-05-30 LAB
CYTO UR: NORMAL
LAB AP CASE REPORT: NORMAL
LAB AP DIAGNOSIS COMMENT: NORMAL
LAB AP NON-GYN SPECIMEN ADEQUACY: NORMAL
PATH REPORT.FINAL DX SPEC: NORMAL
PATH REPORT.GROSS SPEC: NORMAL

## 2019-06-14 ENCOUNTER — DOCUMENTATION (OUTPATIENT)
Dept: PHYSICAL THERAPY | Facility: HOSPITAL | Age: 64
End: 2019-06-14

## 2019-06-14 DIAGNOSIS — M54.16 LUMBAR RADICULOPATHY: ICD-10-CM

## 2019-06-14 DIAGNOSIS — G89.29 CHRONIC BILATERAL LOW BACK PAIN WITH BILATERAL SCIATICA: Primary | ICD-10-CM

## 2019-06-14 DIAGNOSIS — M54.41 CHRONIC BILATERAL LOW BACK PAIN WITH BILATERAL SCIATICA: Primary | ICD-10-CM

## 2019-06-14 DIAGNOSIS — M54.42 CHRONIC BILATERAL LOW BACK PAIN WITH BILATERAL SCIATICA: Primary | ICD-10-CM

## 2019-06-14 NOTE — THERAPY DISCHARGE NOTE
Outpatient Physical Therapy Discharge Summary         Patient Name: Vesta Sutton  : 1955  MRN: [id number]    Today's Date: 2019    Visit Dx:    ICD-10-CM ICD-9-CM   1. Chronic bilateral low back pain with bilateral sciatica M54.42 724.2    M54.41 724.3    G89.29 338.29   2. Lumbar radiculopathy M54.16 724.4       PT OP Goals     Row Name 19 0800          PT Short Term Goals    STG 1  Patient will be independent with education for symptom management, joint protection and strategies to minimize stress on affected tissues  -     STG 1 Progress  Not Met  -     STG 2  Pt to improve pain complaints to no more than 3/10 with ADLs  -     STG 2 Progress  Not Met  -        Long Term Goals    LTG 1  Pt to improve pain complaints to no more than 1/10 with ADLs  -     LTG 1 Progress  Not Met  -     LTG 2  Pt to improve trunk and LE strength by 1/2 to 1 MMT grade  -     LTG 2 Progress  Not Met  -     LTG 3  Pt to improve Oswestry Back index score from 14% to 8% for overall functional improvement  -     LTG 3 Progress  Not Met  -     LTG 4  Pt to be independent with HEP for ROM, flexibility and core strength  -     LTG 4 Progress  Not Met  -       User Key  (r) = Recorded By, (t) = Taken By, (c) = Cosigned By    Initials Name Provider Type     Yojana Cardoso, PT Physical Therapist          OP PT Discharge Summary  Date of Discharge: 19  Reason for Discharge: Patient/Caregiver request  Outcomes Achieved: Refer to plan of care for updates on goals achieved  Discharge Destination: Unknown  Discharge Instructions/Additional Comments: Pt was treated for eval only. She was issued a HEP at that time for core stabilization and flexibility. Pt called to cancel her remaining appt.       Time Calculation:                    Yojana Cardoso PT  2019

## 2019-07-29 ENCOUNTER — OFFICE VISIT (OUTPATIENT)
Dept: CARDIOLOGY | Facility: CLINIC | Age: 64
End: 2019-07-29

## 2019-07-29 VITALS
OXYGEN SATURATION: 98 % | SYSTOLIC BLOOD PRESSURE: 118 MMHG | HEART RATE: 75 BPM | DIASTOLIC BLOOD PRESSURE: 82 MMHG | WEIGHT: 176.8 LBS | HEIGHT: 65 IN | BODY MASS INDEX: 29.46 KG/M2

## 2019-07-29 DIAGNOSIS — R60.0 LOWER EXTREMITY EDEMA: ICD-10-CM

## 2019-07-29 DIAGNOSIS — E78.5 HYPERLIPIDEMIA, UNSPECIFIED HYPERLIPIDEMIA TYPE: ICD-10-CM

## 2019-07-29 DIAGNOSIS — I45.2 RBBB (RIGHT BUNDLE BRANCH BLOCK WITH LEFT ANTERIOR FASCICULAR BLOCK): ICD-10-CM

## 2019-07-29 DIAGNOSIS — Z99.89 OSA ON CPAP: ICD-10-CM

## 2019-07-29 DIAGNOSIS — G47.33 OSA ON CPAP: ICD-10-CM

## 2019-07-29 DIAGNOSIS — I10 ESSENTIAL HYPERTENSION: Primary | ICD-10-CM

## 2019-07-29 DIAGNOSIS — R94.31 ABNORMAL EKG: ICD-10-CM

## 2019-07-29 PROBLEM — M54.16 LUMBAR RADICULOPATHY: Status: ACTIVE | Noted: 2019-04-24

## 2019-07-29 PROBLEM — E04.1 THYROID NODULE: Status: ACTIVE | Noted: 2019-04-24

## 2019-07-29 PROBLEM — R00.2 PALPITATIONS: Status: RESOLVED | Noted: 2017-09-18 | Resolved: 2019-07-29

## 2019-07-29 PROBLEM — E03.9 HYPOTHYROIDISM: Status: ACTIVE | Noted: 2018-12-13

## 2019-07-29 PROBLEM — R11.2 NAUSEA AND VOMITING: Status: RESOLVED | Noted: 2018-05-10 | Resolved: 2019-07-29

## 2019-07-29 PROBLEM — F32.9 MAJOR DEPRESSIVE DISORDER: Status: ACTIVE | Noted: 2018-12-13

## 2019-07-29 PROBLEM — R10.11 RUQ ABDOMINAL PAIN: Status: RESOLVED | Noted: 2018-05-10 | Resolved: 2019-07-29

## 2019-07-29 PROBLEM — F41.1 GENERALIZED ANXIETY DISORDER: Status: ACTIVE | Noted: 2018-12-13

## 2019-07-29 PROCEDURE — 93000 ELECTROCARDIOGRAM COMPLETE: CPT | Performed by: NURSE PRACTITIONER

## 2019-07-29 PROCEDURE — 99214 OFFICE O/P EST MOD 30 MIN: CPT | Performed by: NURSE PRACTITIONER

## 2019-07-29 NOTE — PROGRESS NOTES
"  Date of Office Visit: 2019  Encounter Provider: DESI Newby  Place of Service: [facility] CARDIOLOGY  Patient Name: Vesta Sutton  :1955      Chief Complaint   Patient presents with   • Follow-up   :     Dear Dr. Morocho,     HPI: Vesta Sutton is a pleasant 63 y.o. female who presents today for cardiac follow up. She is a new patient to me and her previous records have been reviewed.      She has a known history of hypertension, hyperlipidemia, abnormal EKG (right bundle branch block & left anterior fascicular block), and nonobstructive coronary artery disease.  In , she had a cardiac catheterization which showed very mild left anterior descending artery stenosis. Her last stress echocardiogram was completed 2017 with the following results: EF 82%, and normal stress portion.  She is an established patient of Dr. Susan Paula and was last seen in the office in 2018.    She presents today for annual cardiac follow-up.  Her blood pressure machine today was double checked and was 132/79 according to her machine and we got 122/78.  At home her blood pressure is averaging about 145/85.  She is concerned about the elevated blood pressure readings.  She is tried amlodipine and hydrochlorothiazide in the past and was intolerant.  She reports occasional bilateral lower extremity swelling if she is on her feet all day.  She had an episode of dizziness this morning which is resolved.  On occasion she will feel a \"strange or weird\" heartbeat.  She denies chest pain, shortness of breath, palpitations, or syncope.    Past Medical History:   Diagnosis Date   • Anemia    • Anxiety and depression    • Appendiceal carcinoid tumor    • Arthritis    • Asthma    • Breast nodule    • Chest pain    • Coronary artery disease    • Diabetes mellitus (CMS/HCC)     a1c only at 5, diet controlled right now   • Disease of thyroid gland    • Dizziness    • Elevated " cholesterol    • Environmental allergies    • Fatigue     in the evening   • Fibromuscular hyperplasia of artery (CMS/HCC)     in the carotid artery with no stenosis   • GERD (gastroesophageal reflux disease)    • Headache    • Heart disease    • History of anesthesia complications 2017    lidocaine caused reaction during dental procedure per patient   • Hyperlipidemia    • Hypertension    • Lumbar pain    • Malaise and fatigue    • Myalgia    • LEATHA (obstructive sleep apnea)    • Palpitations    • RBBB (right bundle branch block)    • Retinal detachment     left  //  when blood pressure gets high she notices flashing lights in her vision   • Sleep apnea     wears machine   • Vertigo        Past Surgical History:   Procedure Laterality Date   • APPENDECTOMY     •  SECTION     • CORNEA LACERATION REPAIR     • ENDOSCOPY N/A 2016    Z-line regular, 40 cm from the incisors, gastritis, erythematous duodenopathy   • ENDOSCOPY N/A 2018    Normal exophagus, Erythematous mucosa in the antrum, Normal examined duodenum-Dr. Devyn Stahl   • ENDOSCOPY AND COLONOSCOPY N/A 2014    EGD with biopsy and colonoscopy, Mild gastritis, Normal colon, Repeat Colonoscopy in 5 years, Dr. Damien Aleman   • ENDOSCOPY AND COLONOSCOPY N/A 2008    EGD with biopsy and colonoscopy-Dr. Damien Aleman   • URETEROCELE REPAIR     • US GUIDED FINE NEEDLE ASPIRATION  2019       Social History     Socioeconomic History   • Marital status:      Spouse name: Not on file   • Number of children: Not on file   • Years of education: Not on file   • Highest education level: Not on file   Tobacco Use   • Smoking status: Never Smoker   • Smokeless tobacco: Never Used   • Tobacco comment: CAFFINE USE   Substance and Sexual Activity   • Alcohol use: Yes     Comment: occasional use   • Drug use: No   • Sexual activity: Defer       Family History   Problem Relation Age of Onset   • Heart failure Father    • Heart  disease Father    • Diabetes Sister    • Heart disease Mother    • Heart disease Brother    • Colon polyps Brother        The following portion of the patient's history were reviewed and updated as appropriate: past medical history, past surgical history, past social history, past family history, allergies, current medications, and problem list.    Review of Systems   Constitution: Positive for weight loss (9 pounds). Negative for chills, diaphoresis, fever, malaise/fatigue, night sweats and weight gain.   HENT: Negative for hearing loss, nosebleeds, sore throat and tinnitus.    Eyes: Positive for pain. Negative for blurred vision, double vision and visual disturbance.   Cardiovascular: Positive for leg swelling. Negative for chest pain, claudication, cyanosis, dyspnea on exertion, irregular heartbeat, near-syncope, orthopnea, palpitations, paroxysmal nocturnal dyspnea and syncope.   Respiratory: Positive for snoring. Negative for cough, hemoptysis, shortness of breath and wheezing.    Endocrine: Positive for cold intolerance. Negative for heat intolerance and polyuria.   Hematologic/Lymphatic: Negative for bleeding problem. Does not bruise/bleed easily.   Skin: Negative for color change, dry skin, flushing and itching.   Musculoskeletal: Positive for joint pain. Negative for falls, joint swelling, muscle cramps, muscle weakness and myalgias.   Gastrointestinal: Positive for abdominal pain. Negative for constipation, heartburn, melena, nausea and vomiting.   Genitourinary: Negative for dysuria and hematuria.   Neurological: Positive for numbness and paresthesias. Negative for excessive daytime sleepiness, dizziness, light-headedness, loss of balance, seizures and vertigo.   Psychiatric/Behavioral: Positive for depression. Negative for altered mental status, memory loss and substance abuse. The patient is nervous/anxious. The patient does not have insomnia.    Allergic/Immunologic: Negative for environmental  allergies.       Allergies   Allergen Reactions   • Beta Adrenergic Blockers Other (See Comments)     Asthma   • Other Dizziness     Septocaine   • Sulfa Antibiotics Hives   • Amlodipine Other (See Comments)     Hypotension    • Hctz [Hydrochlorothiazide] Other (See Comments)     dehydration, stone in salivary gland, hypokalemia         Current Outpatient Medications:   •  ALPRAZolam (XANAX) 0.5 MG tablet, Take 0.5 mg by mouth 3 (Three) Times a Day., Disp: , Rfl:   •  ARMOUR THYROID 60 MG tablet, Take once daily, Disp: , Rfl:   •  BIOTIN PO, Take  by mouth Daily., Disp: , Rfl:   •  cetirizine (zyrTEC) 5 MG tablet, Take 10 mg by mouth Daily As Needed., Disp: , Rfl:   •  Cholecalciferol (VITAMIN D3) 5000 UNITS capsule capsule, Take 5,000 Units by mouth Daily., Disp: , Rfl:   •  cyclobenzaprine (FLEXERIL) 10 MG tablet, cyclobenzaprine 10 mg tablet, Disp: , Rfl:   •  famotidine (PEPCID) 10 MG tablet, Take 10 mg by mouth as needed., Disp: , Rfl:   •  Magnesium 250 MG tablet, Take 2 tablets by mouth Daily., Disp: , Rfl:   •  Multiple Vitamins-Minerals (MULTIVITAMIN ADULT PO), Take 1 tablet by mouth Daily., Disp: , Rfl:   •  olmesartan (BENICAR) 40 MG tablet, Take 0.5 tablets by mouth 2 (Two) Times a Day. (Patient taking differently: Take 20 mg by mouth 2 (Two) Times a Day.), Disp: 30 tablet, Rfl: 3  •  Omega-3 Fatty Acids (OMEGA 3 PO), Take 1 tablet by mouth daily., Disp: , Rfl:   •  omeprazole (priLOSEC) 20 MG capsule, Take 1 capsule by mouth Daily., Disp: 30 capsule, Rfl: 11  •  rosuvastatin (CRESTOR) 5 MG tablet, Take 1 tablet by mouth Daily., Disp: 90 tablet, Rfl: 1  •  traZODone (DESYREL) 150 MG tablet, Take 150 mg by mouth Every Night., Disp: , Rfl:   No current facility-administered medications for this visit.         Objective:     Vitals:    07/29/19 1332 07/29/19 1359   BP: 122/78 118/82   Patient Position:  Lying   Cuff Size:  Adult   Pulse: 75    SpO2: 98%    Weight: 80.2 kg (176 lb 12.8 oz)    Height: 165.1  "cm (65\")      Body mass index is 29.42 kg/m².    PHYSICAL EXAM:    Vitals Reviewed.   General Appearance: No acute distress, well developed and well nourished.   Eyes: Conjunctiva and lids: No erythema, swelling, or discharge. Sclera non-icteric.   HENT: Atraumatic, normocephalic. External eyes, ears, and nose normal. No hearing loss noted. Mucous membranes normal. Lips not cyanotic. Neck supple with no tenderness.  Respiratory: No signs of respiratory distress. Respiration rhythm and depth normal.   Clear to auscultation. No rales, crackles, rhonchi, or wheezing auscultated.   Cardiovascular:  Jugular Venous Pressure: Normal  Heart Rate and Rhythm: Normal, Heart Sounds: Normal S1 and S2. No S3 or S4 noted.  Murmurs: No murmurs noted. No rubs, thrills, or gallops.   Arterial Pulses: Carotid pulses normal. No carotid bruit noted.  Lower Extremities: Bilateral trace lower extremity edema noted.  Gastrointestinal:  Abdomen soft, non-distended, non-tender. Normal bowel sounds. No hepatomegaly.   Musculoskeletal: Normal movement of extremities  Skin and Nails: General appearance normal. No pallor, cyanosis, diaphoresis. Skin temperature normal. No clubbing of fingernails.   Psychiatric: Patient alert and oriented to person, place, and time. Speech and behavior appropriate. Normal mood and affect.       ECG 12 Lead  Date/Time: 7/29/2019 1:42 PM  Performed by: Lisa Dumont APRN  Authorized by: Lisa Dumont APRN   Comparison: compared with previous ECG from 7/25/2018  Comparison to previous ECG: Normal sinus rhythm, atrial premature contraction, RBBB and left anterior fascicular block  Rhythm: sinus rhythm  Rate: normal  BPM: 68  Conduction: right bundle branch block and left anterior fascicular block  QRS axis: normal  Other findings: non-specific ST-T wave changes    Clinical impression: abnormal EKG              Assessment:       Diagnosis Plan   1. Essential hypertension     2. Lower extremity edema     3. " Abnormal EKG     4. RBBB (right bundle branch block with left anterior fascicular block)     5. Hyperlipidemia, unspecified hyperlipidemia type     6. LEATHA on auto CPAP            Plan:       1.  Hypertension: Blood pressure is well controlled today.  At home is been averaging about 145/85.  I suggested adding chlorthalidone since she has had elevated blood pressures and lower extremity edema.  At this time she wants to hold off and continue to monitor her blood pressure.  I recommended a low-sodium diet and call me with an update in 1 month.    2.  Lower Extremity Edema: I recommended compression stockings, elevation of legs, and low-sodium diet.    3/4.  Abnormal EKG: She has a history of a right bundle branch block and left anterior fascicular block, unchanged.    5.  Hyperlipidemia: She remains on rosuvastatin.    6.  Obstructive Sleep Apnea: Compliant with CPAP machine.    7.  I have asked her to call me in 1 month with an update about her blood pressures.  I have scheduled her for follow-up with Dr. Susan Paula in 1 year, unless otherwise needed sooner.    As always, it has been a pleasure to participate in your patient's care. Thank you.       Sincerely,         DESI Cyr        **Dragon Disclaimer:**  Much of this encounter note is an electronic transcription/translation of spoken language to printed text. The electronic translation of spoken language may permit erroneous, or at times, nonsensical words or phrases to be inadvertently transcribed. Although I have reviewed the note for such errors, some may still exist.

## 2019-08-20 ENCOUNTER — PREP FOR SURGERY (OUTPATIENT)
Dept: OTHER | Facility: HOSPITAL | Age: 64
End: 2019-08-20

## 2019-08-20 DIAGNOSIS — Z12.11 SCREEN FOR COLON CANCER: ICD-10-CM

## 2019-08-20 DIAGNOSIS — D3A.020 CARCINOID, OF APPENDIX: Primary | ICD-10-CM

## 2019-08-22 ENCOUNTER — OFFICE VISIT (OUTPATIENT)
Dept: ENDOCRINOLOGY | Age: 64
End: 2019-08-22

## 2019-08-22 VITALS
SYSTOLIC BLOOD PRESSURE: 110 MMHG | HEART RATE: 61 BPM | WEIGHT: 176 LBS | BODY MASS INDEX: 28.28 KG/M2 | DIASTOLIC BLOOD PRESSURE: 72 MMHG | HEIGHT: 66 IN

## 2019-08-22 DIAGNOSIS — R53.82 CHRONIC FATIGUE: ICD-10-CM

## 2019-08-22 DIAGNOSIS — F41.1 GENERALIZED ANXIETY DISORDER: ICD-10-CM

## 2019-08-22 DIAGNOSIS — E03.9 HYPOTHYROIDISM, UNSPECIFIED TYPE: Primary | ICD-10-CM

## 2019-08-22 DIAGNOSIS — E04.1 THYROID NODULE: ICD-10-CM

## 2019-08-22 PROCEDURE — 99205 OFFICE O/P NEW HI 60 MIN: CPT | Performed by: INTERNAL MEDICINE

## 2019-08-23 LAB
T3 SERPL-MCNC: 131 NG/DL (ref 80–200)
T4 FREE SERPL-MCNC: 1.11 NG/DL (ref 0.93–1.7)
THYROPEROXIDASE AB SERPL-ACNC: 11 IU/ML (ref 0–34)
TSH SERPL DL<=0.005 MIU/L-ACNC: 0.27 MIU/ML (ref 0.27–4.2)

## 2019-08-24 RX ORDER — LEVOTHYROXINE SODIUM 88 UG/1
88 TABLET ORAL DAILY
Qty: 30 TABLET | Refills: 11 | Status: SHIPPED | OUTPATIENT
Start: 2019-08-24 | End: 2020-03-19 | Stop reason: SDUPTHER

## 2019-08-27 ENCOUNTER — TELEPHONE (OUTPATIENT)
Dept: SLEEP MEDICINE | Facility: HOSPITAL | Age: 64
End: 2019-08-27

## 2019-08-27 NOTE — TELEPHONE ENCOUNTER
Pt had questions about pressures being higher. Tech checked chart and spoke to pt about pressures that were changed back in May. Pt had concerns with recent mask change at [facility]. Contacted [facility] and spoke to therapist about another mask fitting & pts concerns. Pt was called back and advised to call [facility] to set up a an appointment.

## 2019-08-28 PROBLEM — D3A.020 CARCINOID, OF APPENDIX: Status: ACTIVE | Noted: 2019-08-28

## 2019-08-28 PROBLEM — Z12.11 SCREEN FOR COLON CANCER: Status: ACTIVE | Noted: 2019-08-28

## 2019-09-05 ENCOUNTER — TRANSCRIBE ORDERS (OUTPATIENT)
Dept: ADMINISTRATIVE | Facility: HOSPITAL | Age: 64
End: 2019-09-05

## 2019-09-05 DIAGNOSIS — Z12.31 SCREENING MAMMOGRAM, ENCOUNTER FOR: Primary | ICD-10-CM

## 2019-09-05 DIAGNOSIS — Z78.0 MENOPAUSE: Primary | ICD-10-CM

## 2019-09-09 ENCOUNTER — TRANSCRIBE ORDERS (OUTPATIENT)
Dept: ADMINISTRATIVE | Facility: HOSPITAL | Age: 64
End: 2019-09-09

## 2019-09-12 ENCOUNTER — TELEPHONE (OUTPATIENT)
Dept: ENDOCRINOLOGY | Age: 64
End: 2019-09-12

## 2019-09-15 PROBLEM — R53.82 CHRONIC FATIGUE: Status: ACTIVE | Noted: 2019-09-15

## 2019-09-23 ENCOUNTER — HOSPITAL ENCOUNTER (OUTPATIENT)
Dept: BONE DENSITY | Facility: HOSPITAL | Age: 64
Discharge: HOME | End: 2019-09-23
Admitting: SPECIALIST

## 2019-09-23 ENCOUNTER — HOSPITAL ENCOUNTER (OUTPATIENT)
Dept: MAMMOGRAPHY | Facility: HOSPITAL | Age: 64
Discharge: HOME | End: 2019-09-23

## 2019-09-23 DIAGNOSIS — Z12.31 SCREENING MAMMOGRAM, ENCOUNTER FOR: ICD-10-CM

## 2019-09-23 DIAGNOSIS — Z78.0 MENOPAUSE: ICD-10-CM

## 2019-09-23 PROCEDURE — 77067 SCR MAMMO BI INCL CAD: CPT

## 2019-09-23 PROCEDURE — 77080 DXA BONE DENSITY AXIAL: CPT

## 2019-09-23 PROCEDURE — 77063 BREAST TOMOSYNTHESIS BI: CPT

## 2019-10-02 NOTE — TELEPHONE ENCOUNTER
Spoke with patient and she is feeling well with the thyroid medication. She has upcoming appointment and labs.

## 2019-10-24 ENCOUNTER — OFFICE VISIT (OUTPATIENT)
Dept: SLEEP MEDICINE | Facility: HOSPITAL | Age: 64
End: 2019-10-24

## 2019-10-24 VITALS
HEART RATE: 74 BPM | SYSTOLIC BLOOD PRESSURE: 137 MMHG | OXYGEN SATURATION: 97 % | HEIGHT: 66 IN | WEIGHT: 178 LBS | BODY MASS INDEX: 28.61 KG/M2 | DIASTOLIC BLOOD PRESSURE: 71 MMHG

## 2019-10-24 DIAGNOSIS — Z99.89 OSA ON CPAP: Primary | ICD-10-CM

## 2019-10-24 DIAGNOSIS — F51.04 PSYCHOPHYSIOLOGICAL INSOMNIA: ICD-10-CM

## 2019-10-24 DIAGNOSIS — G47.21 CIRCADIAN RHYTHM SLEEP DISORDER, DELAYED SLEEP PHASE TYPE: ICD-10-CM

## 2019-10-24 DIAGNOSIS — G47.33 OSA ON CPAP: Primary | ICD-10-CM

## 2019-10-24 PROCEDURE — 99213 OFFICE O/P EST LOW 20 MIN: CPT | Performed by: INTERNAL MEDICINE

## 2019-10-24 PROCEDURE — G0463 HOSPITAL OUTPT CLINIC VISIT: HCPCS

## 2019-10-28 ENCOUNTER — TELEPHONE (OUTPATIENT)
Dept: SURGERY | Facility: CLINIC | Age: 64
End: 2019-10-28

## 2019-10-28 NOTE — TELEPHONE ENCOUNTER
** SURG GINGER HAS SPOKEN WITH PT - SCHED FOR AN APPT WITH RG SPAULDING FOR SYMPTOMS.    Pt having c-scope on 11/20/19.  She is also experiencing reflux and some difficulty swallowing pills.      Wondering if you would want to do an EGD the same day?

## 2019-10-29 ENCOUNTER — PREP FOR SURGERY (OUTPATIENT)
Dept: OTHER | Facility: HOSPITAL | Age: 64
End: 2019-10-29

## 2019-11-07 ENCOUNTER — OFFICE VISIT (OUTPATIENT)
Dept: SURGERY | Facility: CLINIC | Age: 64
End: 2019-11-07

## 2019-11-07 VITALS — HEIGHT: 66 IN | WEIGHT: 179.2 LBS | BODY MASS INDEX: 28.8 KG/M2 | HEART RATE: 69 BPM | OXYGEN SATURATION: 99 %

## 2019-11-07 DIAGNOSIS — K21.9 GASTROESOPHAGEAL REFLUX DISEASE, ESOPHAGITIS PRESENCE NOT SPECIFIED: ICD-10-CM

## 2019-11-07 DIAGNOSIS — R10.13 EPIGASTRIC PAIN: ICD-10-CM

## 2019-11-07 DIAGNOSIS — Z12.11 SCREEN FOR COLON CANCER: Primary | ICD-10-CM

## 2019-11-07 PROCEDURE — 99214 OFFICE O/P EST MOD 30 MIN: CPT | Performed by: PHYSICIAN ASSISTANT

## 2019-11-07 NOTE — PROGRESS NOTES
"CC:    Epigastric abdominal pain/GERD, need for screening colonoscopy    HPI:    64-year-old lady presenting to the office today with a long-standing history of acid reflux as well as epigastric abdominal pain presents most commonly morning.  She has a history of gastritis has been on Prilosec for some time to treat this.  Recently the epigastric pain has become more significant and more frequent in occurrence.  She also states that she has a similar pain when eating greasy meals.  Previously she has had a ultrasound of her liver and gallbladder both of which were negative.  She denies having had a HIDA scan in the past.    PMH:    LEATHA on CPAP  Carcinoid tumor of the appendix  Hypothyroidism  Hypertension  Right bundle branch block  Hyperlipidemia  Generalized anxiety disorder  Coronary artery disease  GERD    PSH:     Appendectomy  Ureterocele repair    EGD colonoscopy    FMH:  No colorectal cancer in her family history    ALLERGIES:   Beta-blockers  Sulfa antibiotics  Amlodipine  Hydrochlorothiazide  Septocaine    MEDICATIONS:  Reviewed and reconciled in Epic.    ROS:    All other systems reviewed and negative other than presenting complaints.    PE:  Vitals: HR 69 O2 99% weight 179 height 66\" BMI 28.9  Constitutional: Well-nourished, well-developed female in no acute distress  HEENT: Normocephalic, atraumatic, normal conjunctiva, sclera anicteric  Pulmonary: Clear to auscultation bilaterally, normal respiratory effort, no wheezes, rales or rhonchi noted  Cardiac: Regular rate and rhythm, no murmurs, gallops or rubs noted  Abdomen: Normal bowel sounds, nontender, nondistended, no masses palpable  Skin: Warm, dry, intact, no rashes noted  Musculoskeletal: Normal gait, no joint asymmetries noted  Psych: Alert and oriented x3, normal affect, normal judgment    CLINICAL SUMMARY (A/P):    This is a 64-year-old lady presenting with a long-standing history of GERD and more recent history of epigastric abdominal " pain.  She is scheduled for her routine screening colonoscopy in with her symptoms of GERD and epigastric abdominal pain I recommended that we proceed with an EGD at the same time.  I discussed the risks and rationale for both with risks including but not limited to bleeding, infection, bowel perforation and the need for additional procedures.  Any additional recommendations will be discussed after the results of been reviewed.  I have indicated that if her EGD returns normal and she is continuing to have symptoms that may be prudent to proceed with a HIDA scan to further investigate her symptoms especially with some of this pain being reproduced during fattier meals.  All questions were answered and she was willing to proceed with all recommendations at this time.      Pascual Chirinos PA-C

## 2019-11-11 ENCOUNTER — LAB (OUTPATIENT)
Dept: ENDOCRINOLOGY | Age: 64
End: 2019-11-11

## 2019-11-11 DIAGNOSIS — E03.9 HYPOTHYROIDISM, UNSPECIFIED TYPE: ICD-10-CM

## 2019-11-11 DIAGNOSIS — E03.9 HYPOTHYROIDISM, UNSPECIFIED TYPE: Primary | ICD-10-CM

## 2019-11-11 DIAGNOSIS — E04.1 THYROID NODULE: ICD-10-CM

## 2019-11-12 LAB
ALBUMIN SERPL-MCNC: 4.6 G/DL (ref 3.5–5.2)
ALBUMIN/GLOB SERPL: 1.7 G/DL
ALP SERPL-CCNC: 60 U/L (ref 39–117)
ALT SERPL-CCNC: 18 U/L (ref 1–33)
AST SERPL-CCNC: 11 U/L (ref 1–32)
BILIRUB SERPL-MCNC: 0.3 MG/DL (ref 0.2–1.2)
BUN SERPL-MCNC: 11 MG/DL (ref 8–23)
BUN/CREAT SERPL: 13.8 (ref 7–25)
CALCIUM SERPL-MCNC: 9.5 MG/DL (ref 8.6–10.5)
CHLORIDE SERPL-SCNC: 101 MMOL/L (ref 98–107)
CO2 SERPL-SCNC: 27.5 MMOL/L (ref 22–29)
CREAT SERPL-MCNC: 0.8 MG/DL (ref 0.57–1)
GLOBULIN SER CALC-MCNC: 2.7 GM/DL
GLUCOSE SERPL-MCNC: 93 MG/DL (ref 65–99)
POTASSIUM SERPL-SCNC: 4.4 MMOL/L (ref 3.5–5.2)
PROT SERPL-MCNC: 7.3 G/DL (ref 6–8.5)
SODIUM SERPL-SCNC: 141 MMOL/L (ref 136–145)
T3 SERPL-MCNC: 85.9 NG/DL (ref 80–200)
T4 FREE SERPL-MCNC: 1.64 NG/DL (ref 0.93–1.7)
THYROPEROXIDASE AB SERPL-ACNC: 14 IU/ML (ref 0–34)
TSH SERPL DL<=0.005 MIU/L-ACNC: 0.59 UIU/ML (ref 0.27–4.2)

## 2019-11-18 ENCOUNTER — OFFICE VISIT (OUTPATIENT)
Dept: ENDOCRINOLOGY | Age: 64
End: 2019-11-18

## 2019-11-18 VITALS
HEART RATE: 77 BPM | DIASTOLIC BLOOD PRESSURE: 82 MMHG | WEIGHT: 178 LBS | BODY MASS INDEX: 27.94 KG/M2 | SYSTOLIC BLOOD PRESSURE: 130 MMHG | HEIGHT: 67 IN

## 2019-11-18 DIAGNOSIS — F41.1 GENERALIZED ANXIETY DISORDER: ICD-10-CM

## 2019-11-18 DIAGNOSIS — R53.82 CHRONIC FATIGUE: ICD-10-CM

## 2019-11-18 DIAGNOSIS — E03.9 HYPOTHYROIDISM, UNSPECIFIED TYPE: Primary | ICD-10-CM

## 2019-11-18 DIAGNOSIS — E04.1 THYROID NODULE: ICD-10-CM

## 2019-11-18 PROCEDURE — 99214 OFFICE O/P EST MOD 30 MIN: CPT | Performed by: INTERNAL MEDICINE

## 2019-11-18 RX ORDER — ALBUTEROL SULFATE 90 UG/1
AEROSOL, METERED RESPIRATORY (INHALATION)
COMMUNITY
Start: 2019-10-03 | End: 2020-09-21

## 2019-11-18 NOTE — PROGRESS NOTES
64 y.o.    Patient Care Team:  Benjamín Morocho MD as PCP - General (Internal Medicine)  Danelle Carnes MD as Consulting Physician (Obstetrics and Gynecology)  Damien Aleman MD as Surgeon (General Surgery)    Chief Complaint:      F/U HYPOTHYROIDISM.  HERE TO DISCUSS LAB RESULTS.  Subjective     HPI   Patient is a 64-year-old female with a history of hypothyroidism and thyroid nodule came for follow-up    Thyroid nodule  Patient's last ultrasound was April 2019.  She had FNA of 2.9 cm nodule in the left lobe  It was benign [place] class II  Hypothyroidism  Patient was switched from [place] Thyroid to levothyroxine 88 mcg in late August 2019  She has been on this dose for the past 1/2 months  She reports compliance with the medication  She does report to constipation dry skin and slightly worsening anxiety and depression  None of the symptoms are new however  Cold intolerance is also not new  Fatigue  Chronic fatigue is a symptom for a long time  Abnormal weight gain  Patient gained 22 pounds in the past 4 months but is concerned      Interval History    Patient is a 63-year-old female with a history of thyroid nodule came for consultation     Patient reported that she was diagnosed with thyroid nodules approximately 10 years ago.  She apparently saw the ear nose and throat specialist at that time  She had been experiencing fatigue and exhaustion and dry skin and hair loss she was started on [place] Thyroid in July 2017.  At that time her labs were normal but her fatigue apparently improved so she continued the medication  She continued to feel the cold intolerance despite the medication     Subsequently had a gynecologist evaluated her neck and suggested thyroid ultrasound  She was noted to have a nodule  She started experiencing anxiety and nervousness and insomnia and depression since then.  She reported that her younger sister has thyroid cancer and received radioactive iodine therapy as well  Patient had  thyroid ultrasound in April 2019 and had a fine-needle aspiration biopsy of the largest nodule in May 2019  The nodule size was 2.7 cm and the report was benign  TSH was 0.6 and free T4 was 1.1 in April 2019     Patient denies any significant weight loss or weight gain recently.  She is currently taking [place] Thyroid 60 mg daily  The following portions of the patient's history were reviewed and updated as appropriate: allergies, current medications, past family history, past medical history, past social history, past surgical history and problem list.  Sleep lab    Past Medical History:   Diagnosis Date   • Anemia    • Anxiety and depression    • Appendiceal carcinoid tumor    • Arthritis    • Asthma    • Breast nodule    • Chest pain    • Coronary artery disease    • Diabetes mellitus (CMS/HCC)     a1c only at 5, diet controlled right now   • Disease of thyroid gland    • Dizziness    • Elevated cholesterol    • Environmental allergies    • Fatigue     in the evening   • Fibromuscular hyperplasia of artery (CMS/HCC)     in the carotid artery with no stenosis   • GERD (gastroesophageal reflux disease)    • Headache    • Heart disease    • History of anesthesia complications 2017    lidocaine caused reaction during dental procedure per patient   • Hyperlipidemia    • Hypertension    • Lumbar pain    • Malaise and fatigue    • Myalgia    • LEATHA (obstructive sleep apnea)    • Palpitations    • RBBB (right bundle branch block)    • Retinal detachment     left  //  when blood pressure gets high she notices flashing lights in her vision   • Sleep apnea     wears machine   • Vertigo      Family History   Problem Relation Age of Onset   • Heart failure Father    • Heart disease Father    • Diabetes Sister    • Heart disease Mother    • Heart disease Brother    • Colon polyps Brother      Social History     Socioeconomic History   • Marital status:      Spouse name: Not on file   • Number of children: Not on file   •  Years of education: Not on file   • Highest education level: Not on file   Tobacco Use   • Smoking status: Never Smoker   • Smokeless tobacco: Never Used   • Tobacco comment: CAFFINE USE   Substance and Sexual Activity   • Alcohol use: Yes     Comment: occasional use   • Drug use: No   • Sexual activity: Defer     Allergies   Allergen Reactions   • Beta Adrenergic Blockers Other (See Comments)     Asthma  Asthma   • Other Dizziness and Other (See Comments)     Septocaine  Septocaine   • Sulfa Antibiotics Hives   • Amlodipine Other (See Comments)     Hypotension    • Hctz [Hydrochlorothiazide] Other (See Comments)     dehydration, stone in salivary gland, hypokalemia   • Sulfamethoxazole-Trimethoprim Unknown (See Comments)       Current Outpatient Medications:   •  ALPRAZolam (XANAX) 0.5 MG tablet, Take 0.5 mg by mouth 3 (Three) Times a Day., Disp: , Rfl:   •  cetirizine (zyrTEC) 5 MG tablet, Take 10 mg by mouth Daily As Needed., Disp: , Rfl:   •  Cholecalciferol (VITAMIN D3) 5000 UNITS capsule capsule, Take 5,000 Units by mouth Daily., Disp: , Rfl:   •  cyclobenzaprine (FLEXERIL) 10 MG tablet, cyclobenzaprine 10 mg tablet, Disp: , Rfl:   •  famotidine (PEPCID) 10 MG tablet, Take 10 mg by mouth as needed., Disp: , Rfl:   •  levothyroxine (SYNTHROID) 88 MCG tablet, Take 1 tablet by mouth Daily., Disp: 30 tablet, Rfl: 11  •  Magnesium 250 MG tablet, Take 2 tablets by mouth Daily., Disp: , Rfl:   •  olmesartan (BENICAR) 40 MG tablet, Take 0.5 tablets by mouth 2 (Two) Times a Day. (Patient taking differently: Take 20 mg by mouth 2 (Two) Times a Day.), Disp: 30 tablet, Rfl: 3  •  Omega-3 Fatty Acids (OMEGA 3 PO), Take 1 tablet by mouth daily., Disp: , Rfl:   •  omeprazole (priLOSEC) 20 MG capsule, Take 1 capsule by mouth Daily., Disp: 30 capsule, Rfl: 11  •  rosuvastatin (CRESTOR) 5 MG tablet, Take 1 tablet by mouth Daily., Disp: 90 tablet, Rfl: 1  •  traZODone (DESYREL) 150 MG tablet, Take 150 mg by mouth Every Night.,  "Disp: , Rfl:   •  albuterol sulfate  (90 Base) MCG/ACT inhaler, , Disp: , Rfl:         Review of Systems   Constitutional: Positive for fatigue. Negative for chills and fever.   Cardiovascular: Positive for chest pain and palpitations.   Gastrointestinal: Positive for constipation. Negative for abdominal pain, diarrhea, nausea and vomiting.   Endocrine: Positive for cold intolerance. Negative for heat intolerance.   All other systems reviewed and are negative.      Objective       Vitals:    11/18/19 1049   BP: 130/82   Pulse: 77   Weight: 80.7 kg (178 lb)   Height: 168.9 cm (66.5\")     Body mass index is 28.3 kg/m².      Physical Exam   Constitutional: She is oriented to person, place, and time.   Eyes: EOM are normal. Pupils are equal, round, and reactive to light.   No circumorbital puffiness   Neck: Normal range of motion. Neck supple. Thyromegaly present.   Cardiovascular: Normal rate, regular rhythm, normal heart sounds and intact distal pulses.   Pulmonary/Chest: Effort normal and breath sounds normal.   Abdominal: Soft. Bowel sounds are normal.   Musculoskeletal: Normal range of motion. She exhibits no edema.   Neurological: She is alert and oriented to person, place, and time.   Skin: Skin is warm and dry.   Psychiatric: She has a normal mood and affect. Her behavior is normal.   Nursing note and vitals reviewed.    Results Review:     I reviewed the patient's new clinical results.    Medical records reviewed  Summary:  Sleep lab evaluation reviewed  Patient is recommended CPAP machine  I believe that WILL make a difference to her fatigue and weight gain as well        Lab on 11/11/2019   Component Date Value Ref Range Status   • T3, Total 11/11/2019 85.9  80.0 - 200.0 ng/dl Final   • Thyroid Peroxidase Antibody 11/11/2019 14  0 - 34 IU/mL Final   • TSH 11/11/2019 0.594  0.270 - 4.200 uIU/mL Final   • Free T4 11/11/2019 1.64  0.93 - 1.70 ng/dL Final   • Glucose 11/11/2019 93  65 - 99 mg/dL Final   • " BUN 11/11/2019 11  8 - 23 mg/dL Final   • Creatinine 11/11/2019 0.80  0.57 - 1.00 mg/dL Final   • eGFR Non  Am 11/11/2019 73  >60 mL/min/1.73 Final   • eGFR African Am 11/11/2019 88  >60 mL/min/1.73 Final   • BUN/Creatinine Ratio 11/11/2019 13.8  7.0 - 25.0 Final   • Sodium 11/11/2019 141  136 - 145 mmol/L Final   • Potassium 11/11/2019 4.4  3.5 - 5.2 mmol/L Final   • Chloride 11/11/2019 101  98 - 107 mmol/L Final   • Total CO2 11/11/2019 27.5  22.0 - 29.0 mmol/L Final   • Calcium 11/11/2019 9.5  8.6 - 10.5 mg/dL Final   • Total Protein 11/11/2019 7.3  6.0 - 8.5 g/dL Final   • Albumin 11/11/2019 4.60  3.50 - 5.20 g/dL Final   • Globulin 11/11/2019 2.7  gm/dL Final   • A/G Ratio 11/11/2019 1.7  g/dL Final   • Total Bilirubin 11/11/2019 0.3  0.2 - 1.2 mg/dL Final   • Alkaline Phosphatase 11/11/2019 60  39 - 117 U/L Final   • AST (SGOT) 11/11/2019 11  1 - 32 U/L Final   • ALT (SGPT) 11/11/2019 18  1 - 33 U/L Final     No results found for: HGBA1C  Lab Results   Component Value Date    CREATININE 0.80 11/11/2019     Imaging Results (Most Recent)     None                Assessment and Plan:    Vesta was seen today for hypothyroidism.    Diagnoses and all orders for this visit:    Hypothyroidism, unspecified type  -     T4, Free; Standing  -     TSH; Standing    Chronic fatigue    Thyroid nodule    Generalized anxiety disorder      Patient tolerated the switch from [place] Thyroid  She is currently taking levothyroxine 88 mcg daily  She reports symptoms of constipation, dry skin, depression, anxiety some other more chronic symptoms  She denies any tremors or palpitations    Thyroid FNA report reviewed again with the patient  Benign [place] category 2 nodule  2.9 cm nodule in the left lobe very slight change since 2012      Lab reports reviewed  TSH 0.5 and free T4 is 1.6  I believe labs are in the stable range    Patient is still very concerned  I will check labs every 2 months and then adjust medication if  "needed  Patient is comfortable with this plan  She will return to follow-up in 4 months    Scar Stallworth MD. FACE    11/18/19      EMR Dragon / transcription disclaimer:     \"Dictated utilizing Dragon dictation\".         "

## 2019-11-20 ENCOUNTER — HOSPITAL ENCOUNTER (OUTPATIENT)
Facility: HOSPITAL | Age: 64
Setting detail: HOSPITAL OUTPATIENT SURGERY
Discharge: HOME | End: 2019-11-20
Attending: SURGERY | Admitting: SURGERY

## 2019-11-20 ENCOUNTER — ANESTHESIA EVENT (OUTPATIENT)
Dept: GASTROENTEROLOGY | Facility: HOSPITAL | Age: 64
End: 2019-11-20

## 2019-11-20 ENCOUNTER — ANESTHESIA (OUTPATIENT)
Dept: GASTROENTEROLOGY | Facility: HOSPITAL | Age: 64
End: 2019-11-20

## 2019-11-20 VITALS
RESPIRATION RATE: 16 BRPM | WEIGHT: 175.31 LBS | DIASTOLIC BLOOD PRESSURE: 75 MMHG | SYSTOLIC BLOOD PRESSURE: 109 MMHG | HEART RATE: 54 BPM | TEMPERATURE: 97.7 F | OXYGEN SATURATION: 97 % | BODY MASS INDEX: 27.87 KG/M2

## 2019-11-20 DIAGNOSIS — Z12.11 SCREEN FOR COLON CANCER: ICD-10-CM

## 2019-11-20 DIAGNOSIS — D3A.020 CARCINOID, OF APPENDIX: ICD-10-CM

## 2019-11-20 PROCEDURE — 88305 TISSUE EXAM BY PATHOLOGIST: CPT | Performed by: SURGERY

## 2019-11-20 PROCEDURE — 43239 EGD BIOPSY SINGLE/MULTIPLE: CPT | Performed by: SURGERY

## 2019-11-20 PROCEDURE — 25010000002 PROPOFOL 10 MG/ML EMULSION: Performed by: ANESTHESIOLOGY

## 2019-11-20 PROCEDURE — 45378 DIAGNOSTIC COLONOSCOPY: CPT | Performed by: SURGERY

## 2019-11-20 RX ORDER — PROPOFOL 10 MG/ML
VIAL (ML) INTRAVENOUS CONTINUOUS PRN
Status: DISCONTINUED | OUTPATIENT
Start: 2019-11-20 | End: 2019-11-20 | Stop reason: SURG

## 2019-11-20 RX ORDER — PROPOFOL 10 MG/ML
VIAL (ML) INTRAVENOUS AS NEEDED
Status: DISCONTINUED | OUTPATIENT
Start: 2019-11-20 | End: 2019-11-20 | Stop reason: SURG

## 2019-11-20 RX ORDER — SODIUM CHLORIDE, SODIUM LACTATE, POTASSIUM CHLORIDE, CALCIUM CHLORIDE 600; 310; 30; 20 MG/100ML; MG/100ML; MG/100ML; MG/100ML
1000 INJECTION, SOLUTION INTRAVENOUS CONTINUOUS
Status: DISCONTINUED | OUTPATIENT
Start: 2019-11-20 | End: 2019-11-20 | Stop reason: HOSPADM

## 2019-11-20 RX ADMIN — SODIUM CHLORIDE, POTASSIUM CHLORIDE, SODIUM LACTATE AND CALCIUM CHLORIDE 1000 ML: 600; 310; 30; 20 INJECTION, SOLUTION INTRAVENOUS at 09:08

## 2019-11-20 RX ADMIN — PROPOFOL 125 MG: 10 INJECTION, EMULSION INTRAVENOUS at 09:30

## 2019-11-20 RX ADMIN — PROPOFOL 140 MCG/KG/MIN: 10 INJECTION, EMULSION INTRAVENOUS at 09:30

## 2019-11-20 NOTE — ANESTHESIA POSTPROCEDURE EVALUATION
Patient: Vesta Sutton    Procedure Summary     Date:  11/20/19 Room / Location:   [facility]    Anesthesia Start:  0926 Anesthesia Stop:  0951    Procedures:       COLONOSCOPY to CECUM (N/A )      ESOPHAGOGASTRODUODENOSCOPY with BX (N/A Esophagus) Diagnosis:       Carcinoid, of appendix      Screen for colon cancer      (Carcinoid, of appendix [D3A.020])      (Screen for colon cancer [Z12.11])    Surgeon:  Damien Aleman MD Provider:  Goldy Brantley MD    Anesthesia Type:  MAC ASA Status:  3          Anesthesia Type: MAC  Last vitals  BP   101/60 (11/20/19 0955)   Temp   36.5 °C (97.7 °F) (11/20/19 0856)   Pulse   54 (11/20/19 0955)   Resp   16 (11/20/19 0955)     SpO2   100 % (11/20/19 0955)     Post Anesthesia Care and Evaluation    Patient location during evaluation: bedside  Patient participation: complete - patient participated  Level of consciousness: awake and alert  Pain score: 0  Pain management: adequate  Airway patency: patent  Anesthetic complications: No anesthetic complications  PONV Status: none  Cardiovascular status: acceptable  Respiratory status: acceptable  Hydration status: acceptable  Post Neuraxial Block status: Motor and sensory function returned to baseline

## 2019-11-20 NOTE — ANESTHESIA PREPROCEDURE EVALUATION
Anesthesia Evaluation     Patient summary reviewed   NPO Solid Status: > 8 hours  NPO Liquid Status: > 8 hours           Airway   Mallampati: III  TM distance: >3 FB  Neck ROM: full  No difficulty expected  Dental - normal exam     Pulmonary     breath sounds clear to auscultation  Cardiovascular   Exercise tolerance: good (4-7 METS)    Rhythm: regular  Rate: normal        Neuro/Psych  GI/Hepatic/Renal/Endo      Musculoskeletal     Abdominal    Substance History      OB/GYN          Other                        Anesthesia Plan    ASA 3     MAC     intravenous induction     Anesthetic plan, all risks, benefits, and alternatives have been provided, discussed and informed consent has been obtained with: patient and child.

## 2019-11-21 LAB
CYTO UR: NORMAL
LAB AP CASE REPORT: NORMAL
PATH REPORT.FINAL DX SPEC: NORMAL
PATH REPORT.GROSS SPEC: NORMAL

## 2020-01-10 ENCOUNTER — RESULTS ENCOUNTER (OUTPATIENT)
Dept: ENDOCRINOLOGY | Age: 65
End: 2020-01-10

## 2020-01-10 DIAGNOSIS — E03.9 HYPOTHYROIDISM, UNSPECIFIED TYPE: ICD-10-CM

## 2020-01-20 ENCOUNTER — LAB (OUTPATIENT)
Dept: ENDOCRINOLOGY | Age: 65
End: 2020-01-20

## 2020-01-20 DIAGNOSIS — E03.9 HYPOTHYROIDISM, UNSPECIFIED TYPE: ICD-10-CM

## 2020-01-21 LAB
T4 FREE SERPL-MCNC: 1.69 NG/DL (ref 0.82–1.77)
TSH SERPL DL<=0.005 MIU/L-ACNC: 0.55 UIU/ML (ref 0.45–4.5)

## 2020-02-18 ENCOUNTER — DOCUMENTATION (OUTPATIENT)
Dept: PHYSICAL THERAPY | Facility: CLINIC | Age: 65
End: 2020-02-18

## 2020-02-18 DIAGNOSIS — M54.42 CHRONIC BILATERAL LOW BACK PAIN WITH BILATERAL SCIATICA: Primary | ICD-10-CM

## 2020-02-18 DIAGNOSIS — M54.16 LUMBAR RADICULOPATHY: ICD-10-CM

## 2020-02-18 DIAGNOSIS — M54.41 CHRONIC BILATERAL LOW BACK PAIN WITH BILATERAL SCIATICA: Primary | ICD-10-CM

## 2020-02-18 DIAGNOSIS — G89.29 CHRONIC BILATERAL LOW BACK PAIN WITH BILATERAL SCIATICA: Primary | ICD-10-CM

## 2020-03-06 LAB
T4 FREE SERPL-MCNC: 1.64 NG/DL (ref 0.93–1.7)
TSH SERPL DL<=0.005 MIU/L-ACNC: 0.29 UIU/ML (ref 0.27–4.2)

## 2020-03-19 ENCOUNTER — OFFICE VISIT (OUTPATIENT)
Dept: ENDOCRINOLOGY | Age: 65
End: 2020-03-19

## 2020-03-19 VITALS
WEIGHT: 177.2 LBS | SYSTOLIC BLOOD PRESSURE: 132 MMHG | TEMPERATURE: 98.4 F | OXYGEN SATURATION: 98 % | RESPIRATION RATE: 20 BRPM | BODY MASS INDEX: 28.48 KG/M2 | DIASTOLIC BLOOD PRESSURE: 60 MMHG | HEIGHT: 66 IN | HEART RATE: 87 BPM

## 2020-03-19 DIAGNOSIS — E55.9 VITAMIN D DEFICIENCY: ICD-10-CM

## 2020-03-19 DIAGNOSIS — R53.82 CHRONIC FATIGUE: ICD-10-CM

## 2020-03-19 DIAGNOSIS — E04.1 THYROID NODULE: ICD-10-CM

## 2020-03-19 DIAGNOSIS — E03.9 HYPOTHYROIDISM, UNSPECIFIED TYPE: Primary | ICD-10-CM

## 2020-03-19 PROCEDURE — 99214 OFFICE O/P EST MOD 30 MIN: CPT | Performed by: INTERNAL MEDICINE

## 2020-03-19 RX ORDER — LEVOTHYROXINE SODIUM 0.07 MG/1
75 TABLET ORAL DAILY
Qty: 30 TABLET | Refills: 11 | Status: SHIPPED | OUTPATIENT
Start: 2020-03-19 | End: 2020-07-20

## 2020-03-19 NOTE — PROGRESS NOTES
64 y.o.    Patient Care Team:  Benjamín Morocho MD as PCP - General (Internal Medicine)  Danelle Carnes MD as Consulting Physician (Obstetrics and Gynecology)  Damien Aleman MD as Surgeon (General Surgery)    Chief Complaint:      F/U HYPOTHYROIDISM.  HERE TO DISCUSS LAB RESULTS.     Subjective     HPI   Patient is a 64-year-old female with a history of hypothyroidism and thyroid nodule came for follow-up  Hypothyroidism  Patient is currently on levothyroxine 88 mcg daily  She reports compliance  Denies side effects  Thyroid nodule  Patient's ultrasound was March 2019  She had fine-needle aspiration biopsy of the 2.9 cm nodule and it was [place] class II  Patient has briefly tried [place] Thyroid also in the past    Chronic fatigue  Patient reports this symptom for a very long time  Patient also has anxiety neurosis and is on medication  Abnormal weight gain  Patient currently weighs 177 pounds          Interval History      The following portions of the patient's history were reviewed and updated as appropriate: allergies, current medications, past family history, past medical history, past social history, past surgical history and problem list.    Past Medical History:   Diagnosis Date   • Anemia    • Anxiety and depression    • Appendiceal carcinoid tumor    • Arthritis    • Asthma    • Breast nodule    • Chest pain    • Coronary artery disease    • Diabetes mellitus (CMS/HCC)     a1c only at 5, diet controlled right now   • Disease of thyroid gland    • Dizziness    • Elevated cholesterol    • Environmental allergies    • Fatigue     in the evening   • Fibromuscular hyperplasia of artery (CMS/HCC)     in the carotid artery with no stenosis   • GERD (gastroesophageal reflux disease)    • Headache    • Heart disease    • History of anesthesia complications 2017    lidocaine caused reaction during dental procedure per patient   • Hyperlipidemia    • Hypertension    • Lumbar pain    • Malaise and fatigue     • Myalgia    • LEATHA (obstructive sleep apnea)    • Palpitations    • RBBB (right bundle branch block)    • Retinal detachment     left  //  when blood pressure gets high she notices flashing lights in her vision   • Sleep apnea     wears machine   • Vertigo      Family History   Problem Relation Age of Onset   • Heart failure Father    • Heart disease Father    • Diabetes Sister    • Heart disease Mother    • Heart disease Brother    • Colon polyps Brother      Social History     Socioeconomic History   • Marital status:      Spouse name: Not on file   • Number of children: Not on file   • Years of education: Not on file   • Highest education level: Not on file   Tobacco Use   • Smoking status: Never Smoker   • Smokeless tobacco: Never Used   • Tobacco comment: CAFFINE USE   Substance and Sexual Activity   • Alcohol use: Yes     Comment: occasional use   • Drug use: No   • Sexual activity: Defer     Allergies   Allergen Reactions   • Beta Adrenergic Blockers Other (See Comments)     Asthma  Asthma   • Other Dizziness and Other (See Comments)     Septocaine  Septocaine   • Sulfa Antibiotics Hives   • Amlodipine Other (See Comments)     Hypotension    • Hctz [Hydrochlorothiazide] Other (See Comments)     dehydration, stone in salivary gland, hypokalemia   • Sulfamethoxazole-Trimethoprim Unknown (See Comments)       Current Outpatient Medications:   •  albuterol sulfate  (90 Base) MCG/ACT inhaler, , Disp: , Rfl:   •  ALPRAZolam (XANAX) 0.5 MG tablet, Take 0.5 mg by mouth 3 (Three) Times a Day., Disp: , Rfl:   •  cetirizine (zyrTEC) 5 MG tablet, Take 10 mg by mouth Daily As Needed., Disp: , Rfl:   •  Cholecalciferol (VITAMIN D3) 5000 UNITS capsule capsule, Take 5,000 Units by mouth Daily., Disp: , Rfl:   •  cyclobenzaprine (FLEXERIL) 10 MG tablet, cyclobenzaprine 10 mg tablet, Disp: , Rfl:   •  famotidine (PEPCID) 10 MG tablet, Take 10 mg by mouth as needed., Disp: , Rfl:   •  levothyroxine (SYNTHROID,  "LEVOTHROID) 75 MCG tablet, Take 1 tablet by mouth Daily., Disp: 30 tablet, Rfl: 11  •  Magnesium 250 MG tablet, Take 2 tablets by mouth Daily., Disp: , Rfl:   •  olmesartan (BENICAR) 40 MG tablet, Take 0.5 tablets by mouth 2 (Two) Times a Day. (Patient taking differently: Take 20 mg by mouth 2 (Two) Times a Day.), Disp: 30 tablet, Rfl: 3  •  Omega-3 Fatty Acids (OMEGA 3 PO), Take 1 tablet by mouth daily., Disp: , Rfl:   •  rosuvastatin (CRESTOR) 5 MG tablet, Take 1 tablet by mouth Daily., Disp: 90 tablet, Rfl: 1  •  traZODone (DESYREL) 150 MG tablet, Take 150 mg by mouth Every Night., Disp: , Rfl:         Review of Systems   Constitutional: Positive for chills. Negative for fatigue.   HENT: Negative.    Eyes: Negative.    Respiratory: Negative.    Cardiovascular: Negative.  Negative for chest pain and palpitations.   Gastrointestinal: Negative.  Negative for abdominal pain, constipation, diarrhea, nausea and vomiting.   Endocrine: Positive for cold intolerance and heat intolerance.   Genitourinary: Negative.    Musculoskeletal: Positive for myalgias.   Skin: Negative.    Allergic/Immunologic: Negative.    Neurological: Negative.    Hematological: Negative.    Psychiatric/Behavioral: Negative.    All other systems reviewed and are negative.      Objective       Vitals:    03/19/20 1057   BP: 132/60   Pulse: 87   Resp: 20   Temp: 98.4 °F (36.9 °C)   TempSrc: Oral   SpO2: 98%   Weight: 80.4 kg (177 lb 3.2 oz)   Height: 167.6 cm (66\")     Body mass index is 28.6 kg/m².      Physical Exam   Constitutional: She is oriented to person, place, and time.   Eyes: Pupils are equal, round, and reactive to light. EOM are normal.   No circumorbital puffiness   Neck: Normal range of motion. Neck supple. Thyromegaly present.   Cardiovascular: Normal rate, regular rhythm, normal heart sounds and intact distal pulses.   Pulmonary/Chest: Effort normal and breath sounds normal.   Abdominal: Soft. Bowel sounds are normal. " "  Musculoskeletal: Normal range of motion. She exhibits no edema.   Neurological: She is alert and oriented to person, place, and time.   Skin: Skin is warm and dry.   Psychiatric: She has a normal mood and affect. Her behavior is normal.   Nursing note and vitals reviewed.    Results Review:     I reviewed the patient's new clinical results.    Medical records reviewed  Summary:      Orders Only on 03/05/2020   Component Date Value Ref Range Status   • Free T4 03/05/2020 1.64  0.93 - 1.70 ng/dL Final    Results may be falsely increased if patient taking Biotin.   • TSH 03/05/2020 0.294  0.270 - 4.200 uIU/mL Final     No results found for: HGBA1C  Lab Results   Component Value Date    CREATININE 0.80 11/11/2019     Imaging Results (Most Recent)     None                Assessment and Plan:    Vesta was seen today for hypothyroidism.    Diagnoses and all orders for this visit:    Hypothyroidism, unspecified type    Vitamin D deficiency  -     Vitamin D 25 Hydroxy    Chronic fatigue    Thyroid nodule    Other orders  -     levothyroxine (SYNTHROID, LEVOTHROID) 75 MCG tablet; Take 1 tablet by mouth Daily.    Patient is reporting symptoms of hair loss, brittle nails, cold sensitivity, constipation, weight gain, chronic fatigue, muscle aches and forgetfulness and anxiety and depression    Lab reports reviewed  TSH was 0.2 and free T4 1.6  I recommend decreasing the dose 75 mcg levothyroxine  Prescription sent to pharmacy  Patient wants to get vitamin D level checked today  She will return to follow-up in 6 months      Scar Stallworth MD. FACE    03/19/20      EMR Dragon / transcription disclaimer:     \"Dictated utilizing Dragon dictation\".         "

## 2020-03-20 LAB — 25(OH)D3+25(OH)D2 SERPL-MCNC: 57.9 NG/ML (ref 30–100)

## 2020-04-16 ENCOUNTER — TELEPHONE (OUTPATIENT)
Dept: ENDOCRINOLOGY | Age: 65
End: 2020-04-16

## 2020-04-16 NOTE — TELEPHONE ENCOUNTER
Pt very anxious and having memory issues and states she has low pulse rate states she feels bad states she would like to have this lab re-drawn prior to next visit

## 2020-04-16 NOTE — TELEPHONE ENCOUNTER
Pt calling on feeling bad due to med decrease of thyroid from 88mcg to 75 states she doesn't feel well please advise

## 2020-04-16 NOTE — TELEPHONE ENCOUNTER
Her TSH level indicates that she is receiving too much of medication  It may take a few weeks to get used to the new dose  Her TSH goal is 1-3 and currently it is 0.2

## 2020-04-20 NOTE — TELEPHONE ENCOUNTER
lmtcb for lab appt and meds will not be changed until repeat tsh t4 free are completed to see what thyroid levels are

## 2020-05-01 ENCOUNTER — RESULTS ENCOUNTER (OUTPATIENT)
Dept: ENDOCRINOLOGY | Age: 65
End: 2020-05-01

## 2020-05-01 DIAGNOSIS — E03.9 HYPOTHYROIDISM, UNSPECIFIED TYPE: ICD-10-CM

## 2020-05-21 ENCOUNTER — APPOINTMENT (OUTPATIENT)
Dept: SLEEP MEDICINE | Facility: HOSPITAL | Age: 65
End: 2020-05-21

## 2020-05-22 LAB
T4 FREE SERPL-MCNC: 1.47 NG/DL (ref 0.93–1.7)
TSH SERPL DL<=0.005 MIU/L-ACNC: 0.83 UIU/ML (ref 0.27–4.2)

## 2020-06-04 ENCOUNTER — OFFICE VISIT (OUTPATIENT)
Dept: SLEEP MEDICINE | Facility: HOSPITAL | Age: 65
End: 2020-06-04

## 2020-06-04 VITALS — OXYGEN SATURATION: 96 % | BODY MASS INDEX: 28.77 KG/M2 | HEIGHT: 66 IN | WEIGHT: 179 LBS | HEART RATE: 86 BPM

## 2020-06-04 DIAGNOSIS — Z99.89 OSA ON CPAP: ICD-10-CM

## 2020-06-04 DIAGNOSIS — G47.33 OSA ON CPAP: ICD-10-CM

## 2020-06-04 DIAGNOSIS — G47.21 CIRCADIAN RHYTHM SLEEP DISORDER, DELAYED SLEEP PHASE TYPE: Primary | ICD-10-CM

## 2020-06-04 DIAGNOSIS — F51.04 PSYCHOPHYSIOLOGICAL INSOMNIA: ICD-10-CM

## 2020-06-04 PROCEDURE — G0463 HOSPITAL OUTPT CLINIC VISIT: HCPCS

## 2020-06-04 PROCEDURE — 99213 OFFICE O/P EST LOW 20 MIN: CPT | Performed by: INTERNAL MEDICINE

## 2020-06-04 NOTE — PROGRESS NOTES
"Follow Up [facility] Note     Chief Complaint:  LEATHA     Primary Care Physician: Benjamín Morocho MD    Interval History:   I last saw the patient in October 2019.  She states she is unchanged.  She still wakes up and the mask is not comfortable.  She still has problems with sleep onset insomnia and she still has a buried bedtime and wake time.  The patient goes to bed between 10 PM and midnight and awakens between 6 and 10 AM.  [place] Sleepiness Scale is normal at 0 and she denies sleepiness.  However, when she cannot go to sleep earlier on workdays she states her work is difficult    Review of Systems:    A complete review of systems was done and all were negative with the exception of nasal congestion, STANLEY, and anxiety and depression    Social History:    Social History     Socioeconomic History   • Marital status:      Spouse name: Not on file   • Number of children: Not on file   • Years of education: Not on file   • Highest education level: Not on file   Tobacco Use   • Smoking status: Never Smoker   • Smokeless tobacco: Never Used   • Tobacco comment: CAFFINE USE   Substance and Sexual Activity   • Alcohol use: Yes     Comment: occasional use   • Drug use: No   • Sexual activity: Defer       Allergies:  Beta adrenergic blockers; Other; Sulfa antibiotics; Amlodipine; Hctz [hydrochlorothiazide]; and Sulfamethoxazole-trimethoprim     Medication Review: Her list was reviewed.      Vital Signs:    Vitals:    06/04/20 1127   Pulse: 86   SpO2: 96%   Weight: 81.2 kg (179 lb)   Height: 167.6 cm (66\")     Body mass index is 28.89 kg/m².    Physical Exam:    Constitutional:  Well developed 64 y.o. female that appears in no apparent distress.  Awake & oriented times 3.  Normal mood with normal recent and remote memory and normal judgement.  Eyes:  Conjunctivae normal.  Oropharynx: Previously, moist mucous membranes without exudate and a large tongue and normal uvula and patent posterior pharyngeal " opening, patient is wearing a facemask.     Results Review:  DME is [facility] and she uses a fullface mask with chinstrap.  Downloads between 5/5 and 6/3/2020 compliance 100%.  Average usage is 8 hours and 12 minutes.  Average AHI is normal without leak.  Average AutoCPAP pressure is 12 and her auto CPAP is 8-15.       Impression:   Obstructive sleep apnea adequately treated with auto CPAP with good compliance and usage and no complaints of hypersomnolence.  Psychophysiologic insomnia exacerbated by circadian rhythm sleep disorder, delayed sleep phase type    Plan:  Good sleep hygiene measures should be maintained.  Some weight loss would be beneficial in this patient who is overweight by BMI.  The patient is benefiting from the treatment being provided.     The patient will continue auto CPAP.  The sleep center staff did review her mask use and fit.  A new prescription will be sent to her DME    The patient takes trazodone 150 mg nightly.  This dosing can make her groggy in the morning.  If she wakes up during her sleep, she will then take a Xanax.    The patient will call for any problems and will follow up in 8-9 months.      Jass Batista MD  Sleep Medicine  06/04/20  11:35

## 2020-07-17 NOTE — PROGRESS NOTES
64 y.o.    Patient Care Team:  Benjamín Morocho MD as PCP - General (Internal Medicine)  Danelle Carnes MD as Consulting Physician (Obstetrics and Gynecology)  Damien Aleman MD as Surgeon (General Surgery)    Chief Complaint:  PT FOR FUP TELEMEDICINE VISIT HYPOTHYROID DZ     Subjective   Visit type: Video visit  Total time spent:  Consent:  You have chosen to receive care through a telehealth visit.  Do you consent to use a video/audio connection for your medical care today? Yes  HPI         Interval History      The following portions of the patient's history were reviewed and updated as appropriate: allergies, current medications, past family history, past medical history, past social history, past surgical history and problem list.    Past Medical History:   Diagnosis Date   • Anemia    • Anxiety and depression    • Appendiceal carcinoid tumor    • Arthritis    • Asthma    • Breast nodule    • Chest pain    • Coronary artery disease    • Diabetes mellitus (CMS/HCC)     a1c only at 5, diet controlled right now   • Disease of thyroid gland    • Dizziness    • Elevated cholesterol    • Environmental allergies    • Fatigue     in the evening   • Fibromuscular hyperplasia of artery (CMS/HCC)     in the carotid artery with no stenosis   • GERD (gastroesophageal reflux disease)    • Headache    • Heart disease    • History of anesthesia complications 2017    lidocaine caused reaction during dental procedure per patient   • Hyperlipidemia    • Hypertension    • Lumbar pain    • Malaise and fatigue    • Myalgia    • LEATHA (obstructive sleep apnea)    • Palpitations    • RBBB (right bundle branch block)    • Retinal detachment     left  //  when blood pressure gets high she notices flashing lights in her vision   • Sleep apnea     wears machine   • Vertigo      Family History   Problem Relation Age of Onset   • Heart failure Father    • Heart disease Father    • Diabetes Sister    • Heart disease Mother    • Heart  disease Brother    • Colon polyps Brother      Social History     Socioeconomic History   • Marital status:      Spouse name: Not on file   • Number of children: Not on file   • Years of education: Not on file   • Highest education level: Not on file   Tobacco Use   • Smoking status: Never Smoker   • Smokeless tobacco: Never Used   • Tobacco comment: CAFFINE USE   Substance and Sexual Activity   • Alcohol use: Yes     Comment: occasional use   • Drug use: No   • Sexual activity: Defer     Allergies   Allergen Reactions   • Beta Adrenergic Blockers Other (See Comments)     Asthma  Asthma   • Other Dizziness and Other (See Comments)     Septocaine  Septocaine   • Sulfa Antibiotics Hives   • Amlodipine Other (See Comments)     Hypotension    • Hctz [Hydrochlorothiazide] Other (See Comments)     dehydration, stone in salivary gland, hypokalemia   • Sulfamethoxazole-Trimethoprim Unknown (See Comments)       Current Outpatient Medications:   •  albuterol sulfate  (90 Base) MCG/ACT inhaler, , Disp: , Rfl:   •  ALPRAZolam (XANAX) 0.5 MG tablet, Take 0.5 mg by mouth 3 (Three) Times a Day., Disp: , Rfl:   •  cetirizine (zyrTEC) 5 MG tablet, Take 10 mg by mouth Daily As Needed., Disp: , Rfl:   •  Cholecalciferol (VITAMIN D3) 5000 UNITS capsule capsule, Take 5,000 Units by mouth Daily., Disp: , Rfl:   •  cyclobenzaprine (FLEXERIL) 10 MG tablet, cyclobenzaprine 10 mg tablet, Disp: , Rfl:   •  cycloSPORINE, PF, (Cequa) 0.09 % solution, Cequa 0.09 % eye drops in a dropperette, Disp: , Rfl:   •  famotidine (PEPCID) 10 MG tablet, Take 10 mg by mouth as needed., Disp: , Rfl:   •  fluvoxaMINE (LUVOX) 25 MG tablet, TAKE 1 TABLET BY MOUTH AT BEDTIME FOR 1 WEEK THEN 2 TABS AT BEDTIME FOR 1 WEEK THEN 3 TABLETS FOR 1 WEEK THEN 4 TABLETS AT NIGHT., Disp: , Rfl:   •  Magnesium 250 MG tablet, Take 2 tablets by mouth Daily., Disp: , Rfl:   •  olmesartan (BENICAR) 40 MG tablet, Take 0.5 tablets by mouth 2 (Two) Times a Day.  (Patient taking differently: Take 20 mg by mouth 2 (Two) Times a Day.), Disp: 30 tablet, Rfl: 3  •  Omega-3 Fatty Acids (OMEGA 3 PO), Take 1 tablet by mouth daily., Disp: , Rfl:   •  rosuvastatin (CRESTOR) 5 MG tablet, Take 1 tablet by mouth Daily., Disp: 90 tablet, Rfl: 1  •  traZODone (DESYREL) 150 MG tablet, Take 150 mg by mouth Every Night., Disp: , Rfl:   •  EUTHYROX 75 MCG tablet, Take 1 tablet by mouth Daily., Disp: 30 tablet, Rfl: 6  •  omeprazole (priLOSEC) 20 MG capsule, TAKE 1 CAPSULE BY MOUTH ONCE DAILY FOR 30 DAYS, Disp: , Rfl:         Review of Systems   Constitutional: Positive for fatigue and unexpected weight change.   HENT: Negative.    Eyes: Negative.    Respiratory: Negative.    Cardiovascular: Negative.    Gastrointestinal: Negative.    Endocrine: Negative.    Genitourinary: Negative.    Musculoskeletal: Negative.    Skin: Negative.    Allergic/Immunologic: Negative.    Neurological: Negative.    Hematological: Negative.    Psychiatric/Behavioral: Negative.    All other systems reviewed and are negative.      Objective       There were no vitals filed for this visit.  There is no height or weight on file to calculate BMI.  Vital signs and physical examination not documented due to video visit    Physical Exam  Results Review:     I reviewed the patient's new clinical results.    Medical records reviewed  Summary:      Admission on 07/16/2020, Discharged on 07/16/2020   Component Date Value Ref Range Status   • SARS-CoV-2, FILI 07/16/2020 Not Detected  Not Detected Final    This test was developed and its performance characteristics determined  by [facility]. This test has not been FDA cleared or  approved. This test has been authorized by FDA under an Emergency Use  Authorization (EUA). This test is only authorized for the duration of  time the declaration that circumstances exist justifying the  authorization of the emergency use of in vitro diagnostic tests for  detection of  "SARS-CoV-2 virus and/or diagnosis of COVID-19 infection  under section 564(b)(1) of the Act, 21 U.S.C. 360bbb-3(b)(1), unless  the authorization is terminated or revoked sooner.  When diagnostic testing is negative, the possibility of a false  negative result should be considered in the context of a patient's  recent exposures and the presence of clinical signs and symptoms  consistent with COVID-19. An individual without symptoms of COVID-19  and who is not shedding SARS-CoV-2 virus would expect to have a  negative (not detected) result in this assay.   • COVID [facility] PRIORITY 07/16/2020 Comment   Final    Received     No results found for: HGBA1C  Lab Results   Component Value Date    CREATININE 0.80 11/11/2019     Imaging Results (Most Recent)     None                Assessment and Plan:    Vesta was seen today for hypothyroidism.    Diagnoses and all orders for this visit:    Hypothyroidism, unspecified type  -     T4, Free; Future  -     TSH; Future    Chronic fatigue    Thyroid nodule    Generalized anxiety disorder    Vitamin D deficiency    Other orders  -     EUTHYROX 75 MCG tablet; Take 1 tablet by mouth Daily.    Patient is tolerating the dose reduction in the past 2 months  She is currently taking 75 mcg  Her pharmacy apparently suggested Euthyrox as a formulary brand-name option  Patient reports that she is feeling much better since starting this medication in June 2020  Hair loss has come down and her nails are getting stronger    Patient wants to continue the current brand-name  I sent prescriptions to pharmacy  Patient's TSH is 0.8 and free T4 is 1.4  Advised the patient to continue the current regimen  She will return to follow-up in 4 months with lab tests    Scar Stallworth MD. FACE    07/20/20      EMR Dragon / transcription disclaimer:     \"Dictated utilizing Dragon dictation\".         "

## 2020-07-18 ENCOUNTER — TELEPHONE (OUTPATIENT)
Dept: URGENT CARE | Facility: CLINIC | Age: 65
End: 2020-07-18

## 2020-07-19 ENCOUNTER — TELEPHONE (OUTPATIENT)
Dept: CARDIOLOGY | Facility: CLINIC | Age: 65
End: 2020-07-19

## 2020-07-19 NOTE — TELEPHONE ENCOUNTER
"Patient called today reporting chest pain that started last night after she took her first dose of a new antidepressant fluvoxamine. She has tried zantac, tums and pepto bismol. Nothing has made it better. It kind of comes and goes, which she refers to as \"pulsating\". It is not worse with exertion, and she denies shortness of air, diaphoresis, nausea, vomiting, radiation or pressure. She said that it feels like an ache on top of her left breast. It is a 3-4/10. She has not felt well since Wednesday had a headache and runny nose was tested for COVID and was negative. Her Bp is 140/90 where is usually runs. She is on olmesartan 40 mg BID. Discussed with Sandi, and told her that it is not possible for us to do a full work up for chest pain over the phone. She could come in if she wanted to, if not she could take come tylenol and see if it helps. If is doesn't or or pain gets worse she should come in to the ER. She verbalized understanding.   "

## 2020-07-20 ENCOUNTER — TELEMEDICINE (OUTPATIENT)
Dept: ENDOCRINOLOGY | Age: 65
End: 2020-07-20

## 2020-07-20 DIAGNOSIS — R53.82 CHRONIC FATIGUE: ICD-10-CM

## 2020-07-20 DIAGNOSIS — E55.9 VITAMIN D DEFICIENCY: ICD-10-CM

## 2020-07-20 DIAGNOSIS — E04.1 THYROID NODULE: ICD-10-CM

## 2020-07-20 DIAGNOSIS — F41.1 GENERALIZED ANXIETY DISORDER: ICD-10-CM

## 2020-07-20 DIAGNOSIS — E03.9 HYPOTHYROIDISM, UNSPECIFIED TYPE: Primary | ICD-10-CM

## 2020-07-20 PROCEDURE — 99214 OFFICE O/P EST MOD 30 MIN: CPT | Performed by: INTERNAL MEDICINE

## 2020-07-20 RX ORDER — FLUVOXAMINE MALEATE 25 MG
TABLET ORAL
COMMUNITY
Start: 2020-07-18 | End: 2020-09-21

## 2020-07-20 RX ORDER — LEVOTHYROXINE SODIUM 75 UG/1
75 TABLET ORAL DAILY
Qty: 30 TABLET | Refills: 6 | Status: SHIPPED | OUTPATIENT
Start: 2020-07-20 | End: 2021-07-27

## 2020-07-20 RX ORDER — OMEPRAZOLE 20 MG/1
CAPSULE, DELAYED RELEASE ORAL
COMMUNITY
Start: 2020-06-25 | End: 2021-05-20 | Stop reason: SINTOL

## 2020-07-20 NOTE — TELEPHONE ENCOUNTER
Call and make sure she is ok - and keep appt with me - I think in august. If still chest pain - either to [facility] or move appt up to [facility] day for me.

## 2020-07-20 NOTE — TELEPHONE ENCOUNTER
I spoke with the patient. She states that the pain was relieved by bedtime last night after a second dose of antacid.  She feels fine today and doesn't feel like she needs to move her appointment up.  Her appointment is 8/27/20.    Thank you,  Dilcia Jones RN  [facility]  Triage

## 2020-07-21 ENCOUNTER — TELEPHONE (OUTPATIENT)
Dept: ENDOCRINOLOGY | Age: 65
End: 2020-07-21

## 2020-07-23 NOTE — TELEPHONE ENCOUNTER
Pt left a voicemail today asking to be seen before 8/27/20. She states she isn't feeling well. I attempted to contact pt, but left a voicemail asking pt to call back.   Thanks,   Alcon

## 2020-07-25 ENCOUNTER — RESULTS ENCOUNTER (OUTPATIENT)
Dept: ENDOCRINOLOGY | Age: 65
End: 2020-07-25

## 2020-07-25 DIAGNOSIS — E03.9 HYPOTHYROIDISM, UNSPECIFIED TYPE: ICD-10-CM

## 2020-07-30 ENCOUNTER — OFFICE VISIT (OUTPATIENT)
Dept: CARDIOLOGY | Facility: CLINIC | Age: 65
End: 2020-07-30

## 2020-07-30 VITALS
HEART RATE: 65 BPM | DIASTOLIC BLOOD PRESSURE: 82 MMHG | SYSTOLIC BLOOD PRESSURE: 108 MMHG | BODY MASS INDEX: 29.41 KG/M2 | WEIGHT: 183 LBS | HEIGHT: 66 IN

## 2020-07-30 DIAGNOSIS — R00.2 PALPITATIONS: ICD-10-CM

## 2020-07-30 DIAGNOSIS — E78.5 HYPERLIPIDEMIA, UNSPECIFIED HYPERLIPIDEMIA TYPE: ICD-10-CM

## 2020-07-30 DIAGNOSIS — I45.2 RBBB (RIGHT BUNDLE BRANCH BLOCK WITH LEFT ANTERIOR FASCICULAR BLOCK): ICD-10-CM

## 2020-07-30 DIAGNOSIS — G47.33 OSA ON CPAP: ICD-10-CM

## 2020-07-30 DIAGNOSIS — I10 ESSENTIAL HYPERTENSION: Primary | ICD-10-CM

## 2020-07-30 DIAGNOSIS — Z99.89 OSA ON CPAP: ICD-10-CM

## 2020-07-30 PROCEDURE — 99214 OFFICE O/P EST MOD 30 MIN: CPT | Performed by: INTERNAL MEDICINE

## 2020-07-30 PROCEDURE — 93000 ELECTROCARDIOGRAM COMPLETE: CPT | Performed by: INTERNAL MEDICINE

## 2020-07-30 RX ORDER — ROSUVASTATIN CALCIUM 10 MG/1
1 TABLET, COATED ORAL DAILY
COMMUNITY
End: 2023-01-19

## 2020-07-30 NOTE — PROGRESS NOTES
Date of Office Visit: 2020  Encounter Provider: Susan Paula MD  Place of Service: [facility] CARDIOLOGY  Patient Name: Vesta Sutton  :1955      Patient ID:  Vesta Sutton is a 64 y.o. female is here for  followup for hypertension.  She is having palpitations.        History of Present Illness    The patient has a history of having intermittent chest pain for which she has never had a  myocardial infarction. She has had echocardiography done. The last one was in  and  it was normal. She had carotid ultrasound done at the same time showed no stenosis but  did show mild intimal thickening. She had an abnormal stress study done in  showing  an apical ischemia but had a cardiac catheterization showing very mild left anterior  descending artery stenosis. She was admitted in  with chest pain. Her stress study  there showed no ischemia.          She has a history of retinal detachment of the left eye. She has a lot of anxiety and  stress. She is treated for hypertension and hyperlipidemia.       She also has some lumbar degenerative disc disease and so does not exercise regularly and  has had some sciatic pain with that as well.       She has a history of palpitations in the past but has had a normal Holter recording.       She had 2D echocardiogram with Doppler which was done 10/2014. It was normal. Ejection  fraction 66%. She also had a carotid duplex done 10/2014 which was also normal.      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   The rest of the right leg looked okay. It looks like it was just only done on her right leg.       She was having chest pain because of the Zoloft.  She did end up stopping the Zoloft.   She is had significant GERD with venlafaxine.     She remains on Crestor at this time for her hyperlipidemia.  She  had a normal stress echocardiogram done 2017.    Labs in 2019 show normal thyroid studies, normal CMP.  She had negative COVID-19 testing done 2020.  She had labs done with Dr. Morocho 2020 showing normal TSH, normal free T4, normal CMP and CBC, hemoglobin A1c 5.5, normal vitamin D, total cholesterol 162, HDL 72, LDL 82, VLDL 8, triglycerides 39.    Past Medical History:   Diagnosis Date   • Anemia    • Anxiety and depression    • Appendiceal carcinoid tumor    • Arthritis    • Asthma    • Breast nodule    • Chest pain    • Coronary artery disease    • Diabetes mellitus (CMS/HCC)     a1c only at 5, diet controlled right now   • Disease of thyroid gland    • Dizziness    • Elevated cholesterol    • Environmental allergies    • Fatigue     in the evening   • Fibromuscular hyperplasia of artery (CMS/HCC)     in the carotid artery with no stenosis   • GERD (gastroesophageal reflux disease)    • Headache    • Heart disease    • History of anesthesia complications 2017    lidocaine caused reaction during dental procedure per patient   • Hyperlipidemia    • Hypertension    • Lumbar pain    • Malaise and fatigue    • Myalgia    • LEATHA (obstructive sleep apnea)    • Palpitations    • RBBB (right bundle branch block)    • Retinal detachment     left  //  when blood pressure gets high she notices flashing lights in her vision   • Sleep apnea     wears machine   • Vertigo          Past Surgical History:   Procedure Laterality Date   • APPENDECTOMY     • BREAST BIOPSY Left      or 2018   • BREAST CYST ASPIRATION Left      or 2018   •  SECTION     • COLONOSCOPY N/A 2019    Procedure: COLONOSCOPY to CECUM;  Surgeon: Damien Aleman MD;  Location: [facility] ENDOSCOPY;  Service: General   • CORNEA LACERATION REPAIR     • ENDOSCOPY N/A 2016    Z-line regular, 40 cm from the incisors, gastritis, erythematous duodenopathy   • ENDOSCOPY N/A 2018    Normal exophagus, Erythematous mucosa  in the antrum, Normal examined duodenum-Dr. Devyn Stahl   • ENDOSCOPY N/A 11/20/2019    Procedure: ESOPHAGOGASTRODUODENOSCOPY with BX;  Surgeon: Damien Aleman MD;  Location: [facility] ENDOSCOPY;  Service: General   • ENDOSCOPY AND COLONOSCOPY N/A 09/03/2014    EGD with biopsy and colonoscopy, Mild gastritis, Normal colon, Repeat Colonoscopy in 5 years, Dr. Damien Aleman   • ENDOSCOPY AND COLONOSCOPY N/A 07/24/2008    EGD with biopsy and colonoscopy-Dr. Damien Aleman   • URETEROCELE REPAIR     • US GUIDED FINE NEEDLE ASPIRATION  5/29/2019       Current Outpatient Medications on File Prior to Visit   Medication Sig Dispense Refill   • albuterol sulfate  (90 Base) MCG/ACT inhaler      • ALPRAZolam (XANAX) 0.5 MG tablet Take 0.5 mg by mouth 3 (Three) Times a Day.     • cetirizine (zyrTEC) 5 MG tablet Take 10 mg by mouth Daily As Needed.     • Cholecalciferol (VITAMIN D3) 5000 UNITS capsule capsule Take 5,000 Units by mouth Daily.     • cyclobenzaprine (FLEXERIL) 10 MG tablet cyclobenzaprine 10 mg tablet     • cycloSPORINE, PF, (Cequa) 0.09 % solution Cequa 0.09 % eye drops in a dropperette     • EUTHYROX 75 MCG tablet Take 1 tablet by mouth Daily. 30 tablet 6   • famotidine (PEPCID) 10 MG tablet Take 10 mg by mouth as needed.     • fluvoxaMINE (LUVOX) 25 MG tablet TAKE 1 TABLET BY MOUTH AT BEDTIME FOR 1 WEEK THEN 2 TABS AT BEDTIME FOR 1 WEEK THEN 3 TABLETS FOR 1 WEEK THEN 4 TABLETS AT NIGHT.     • Magnesium 250 MG tablet Take 2 tablets by mouth Daily.     • olmesartan (BENICAR) 40 MG tablet Take 0.5 tablets by mouth 2 (Two) Times a Day. (Patient taking differently: Take 20 mg by mouth 2 (Two) Times a Day.) 30 tablet 3   • Omega-3 Fatty Acids (OMEGA 3 PO) Take 1 tablet by mouth daily.     • omeprazole (priLOSEC) 20 MG capsule TAKE 1 CAPSULE BY MOUTH ONCE DAILY FOR 30 DAYS     • traZODone (DESYREL) 150 MG tablet Take 150 mg by mouth Every Night.     • [DISCONTINUED] rosuvastatin (CRESTOR) 5 MG tablet Take  1 tablet by mouth Daily. 90 tablet 1   • rosuvastatin (CRESTOR) 10 MG tablet Take 1 tablet by mouth Daily.       No current facility-administered medications on file prior to visit.        Social History     Socioeconomic History   • Marital status:      Spouse name: Not on file   • Number of children: Not on file   • Years of education: Not on file   • Highest education level: Not on file   Tobacco Use   • Smoking status: Never Smoker   • Smokeless tobacco: Never Used   • Tobacco comment: CAFFINE USE   Substance and Sexual Activity   • Alcohol use: Yes     Comment: occasional use   • Drug use: No   • Sexual activity: Defer           Review of Systems   Constitution: Negative.   HENT: Negative for congestion.    Eyes: Negative for vision loss in left eye and vision loss in right eye.   Respiratory: Negative.  Negative for cough, hemoptysis, shortness of breath, sleep disturbances due to breathing, snoring, sputum production and wheezing.    Endocrine: Negative.    Hematologic/Lymphatic: Negative.    Skin: Negative for poor wound healing and rash.   Musculoskeletal: Negative for falls, gout, muscle cramps and myalgias.   Gastrointestinal: Negative for abdominal pain, diarrhea, dysphagia, hematemesis, melena, nausea and vomiting.   Neurological: Negative for excessive daytime sleepiness, dizziness, headaches, light-headedness, loss of balance, seizures and vertigo.   Psychiatric/Behavioral: Negative for depression and substance abuse. The patient is not nervous/anxious.        Procedures    ECG 12 Lead  Date/Time: 7/30/2020 8:16 AM  Performed by: Susan Paula MD  Authorized by: Susan Paula MD   Comparison: compared with previous ECG   Similar to previous ECG  Rhythm: sinus rhythm  Conduction: right bundle branch block and left anterior fascicular block    Clinical impression: abnormal EKG                Objective:      Vitals:    07/30/20 0808   BP: 108/82   BP Location: Left arm   Patient  "Position: Sitting   Pulse: 65   Weight: 83 kg (183 lb)   Height: 167.6 cm (65.98\")     Body mass index is 29.55 kg/m².    Physical Exam   Constitutional: She is oriented to person, place, and time. She appears well-developed and well-nourished. No distress.   HENT:   Head: Normocephalic and atraumatic.   Eyes: Conjunctivae are normal. No scleral icterus.   Neck: Neck supple. No JVD present. Carotid bruit is not present. No thyromegaly present.   Cardiovascular: Normal rate, regular rhythm, S1 normal, S2 normal and intact distal pulses.  No extrasystoles are present. PMI is not displaced. Exam reveals no gallop.   No murmur heard.  Pulses:       Carotid pulses are 2+ on the right side, and 2+ on the left side.       Radial pulses are 2+ on the right side, and 2+ on the left side.        Dorsalis pedis pulses are 2+ on the right side, and 2+ on the left side.        Posterior tibial pulses are 2+ on the right side, and 2+ on the left side.   Pulmonary/Chest: Effort normal and breath sounds normal. No respiratory distress. She has no wheezes. She has no rhonchi. She has no rales. She exhibits no tenderness.   Abdominal: Soft. Bowel sounds are normal. She exhibits no distension, no abdominal bruit and no mass. There is no tenderness.   Musculoskeletal: She exhibits no edema or deformity.   Lymphadenopathy:     She has no cervical adenopathy.   Neurological: She is alert and oriented to person, place, and time. No cranial nerve deficit.   Skin: Skin is warm and dry. No rash noted. She is not diaphoretic. No cyanosis. No pallor. Nails show no clubbing.   Psychiatric: She has a normal mood and affect. Judgment normal.   Vitals reviewed.      Lab Review:       Assessment:      Diagnosis Plan   1. Essential hypertension  Holter Monitor - 72 Hour Up To 21 Days   2. Hyperlipidemia, unspecified hyperlipidemia type     3. RBBB (right bundle branch block with left anterior fascicular block)  Holter Monitor - 72 Hour Up To 21 Days "   4. LEATHA on auto CPAP     5. Palpitations  Holter Monitor - 72 Hour Up To 21 Days     1. Hypertension, BP stable.   2. Chronically abnormal electrocardiogram showing a left anterior fascicular block and  right bundle branch block.   3. Mild left anterior descending stenosis treated medically. Nonischemic stress study  done on 03/2012 and 2017.  4. Palpitations stable and made worse with the Arthrotec.  Having palpitations again, set up ZIO Patch.  5. Salt intake. I advised her to stay away from salt.   6. Hyperlipidemia on Crestor. Well controlled.   7. Obstructive sleep. Use CPAP.   8. History of appendiceal carcinoma.   9. History of asthma, stable.   10. Anxiety.   11. Gastroesophageal reflux disease.   12. Lumbar degenerative disc disease with sciatic pain.           Plan:       See Lisa in 1 year, set testing.  No medication changes today but told her to remain on Crestor 5 mg daily.

## 2020-08-05 DIAGNOSIS — R00.2 PALPITATIONS: Primary | ICD-10-CM

## 2020-08-13 ENCOUNTER — OFFICE VISIT (OUTPATIENT)
Dept: GASTROENTEROLOGY | Facility: CLINIC | Age: 65
End: 2020-08-13

## 2020-08-13 VITALS — HEIGHT: 66 IN | TEMPERATURE: 98.8 F | WEIGHT: 182.8 LBS | BODY MASS INDEX: 29.38 KG/M2

## 2020-08-13 DIAGNOSIS — D3A.020 CARCINOID TUMOR OF APPENDIX, UNSPECIFIED WHETHER MALIGNANT: Primary | ICD-10-CM

## 2020-08-13 DIAGNOSIS — R10.10 PAIN OF UPPER ABDOMEN: ICD-10-CM

## 2020-08-13 PROCEDURE — 99213 OFFICE O/P EST LOW 20 MIN: CPT | Performed by: INTERNAL MEDICINE

## 2020-08-13 RX ORDER — SUCRALFATE 1 G/1
1 TABLET ORAL 4 TIMES DAILY
Qty: 120 TABLET | Refills: 5 | Status: SHIPPED | OUTPATIENT
Start: 2020-08-13 | End: 2021-11-12 | Stop reason: SDUPTHER

## 2020-08-13 NOTE — PROGRESS NOTES
Chief Complaint   Patient presents with   • Follow-up   • Heartburn   • Abdominal Pain       Vesta Sutton is a  64 y.o. female here for a follow up visit for reflux and abdominal pain.    HPI     Patient 64-year-old female with history of coronary artery disease, hypertension hyperlipidemia with distant history of appendiceal carcinoid with the above complaints.  Patient does do better when she takes her omeprazole but has been intermittently noncompliant taking herself off when she feels better and surprised when her symptoms recur.  Patient also complaining of abdominal discomfort and distention here for further recommendations.  Patient underwent EGD and colonoscopy 9 months ago with Dr. Aleman which were unremarkable.    Past Medical History:   Diagnosis Date   • Anemia    • Anxiety and depression    • Appendiceal carcinoid tumor    • Arthritis    • Asthma    • Breast nodule    • Chest pain    • Coronary artery disease    • Diabetes mellitus (CMS/HCC)     a1c only at 5, diet controlled right now   • Disease of thyroid gland    • Dizziness    • Elevated cholesterol    • Environmental allergies    • Fatigue     in the evening   • Fibromuscular hyperplasia of artery (CMS/HCC)     in the carotid artery with no stenosis   • GERD (gastroesophageal reflux disease)    • Headache    • Heart disease    • History of anesthesia complications 2017    lidocaine caused reaction during dental procedure per patient   • Hyperlipidemia    • Hypertension    • Lumbar pain    • Malaise and fatigue    • Myalgia    • LEATHA (obstructive sleep apnea)    • Palpitations    • RBBB (right bundle branch block)    • Retinal detachment     left  //  when blood pressure gets high she notices flashing lights in her vision   • Sleep apnea     wears machine   • Vertigo          Current Outpatient Medications:   •  albuterol sulfate  (90 Base) MCG/ACT inhaler, , Disp: , Rfl:   •  ALPRAZolam (XANAX) 0.5 MG tablet, Take 0.5 mg by mouth 3  (Three) Times a Day., Disp: , Rfl:   •  cetirizine (zyrTEC) 5 MG tablet, Take 10 mg by mouth Daily As Needed., Disp: , Rfl:   •  Cholecalciferol (VITAMIN D3) 5000 UNITS capsule capsule, Take 5,000 Units by mouth Daily., Disp: , Rfl:   •  cyclobenzaprine (FLEXERIL) 10 MG tablet, cyclobenzaprine 10 mg tablet, Disp: , Rfl:   •  cycloSPORINE, PF, (Cequa) 0.09 % solution, Cequa 0.09 % eye drops in a dropperette, Disp: , Rfl:   •  EUTHYROX 75 MCG tablet, Take 1 tablet by mouth Daily., Disp: 30 tablet, Rfl: 6  •  famotidine (PEPCID) 10 MG tablet, Take 10 mg by mouth as needed., Disp: , Rfl:   •  fluvoxaMINE (LUVOX) 25 MG tablet, TAKE 1 TABLET BY MOUTH AT BEDTIME FOR 1 WEEK THEN 2 TABS AT BEDTIME FOR 1 WEEK THEN 3 TABLETS FOR 1 WEEK THEN 4 TABLETS AT NIGHT., Disp: , Rfl:   •  Magnesium 250 MG tablet, Take 2 tablets by mouth Daily., Disp: , Rfl:   •  olmesartan (BENICAR) 40 MG tablet, Take 0.5 tablets by mouth 2 (Two) Times a Day. (Patient taking differently: Take 20 mg by mouth 2 (Two) Times a Day.), Disp: 30 tablet, Rfl: 3  •  Omega-3 Fatty Acids (OMEGA 3 PO), Take 1 tablet by mouth daily., Disp: , Rfl:   •  omeprazole (priLOSEC) 20 MG capsule, TAKE 1 CAPSULE BY MOUTH ONCE DAILY FOR 30 DAYS, Disp: , Rfl:   •  rosuvastatin (CRESTOR) 10 MG tablet, Take 1 tablet by mouth Daily., Disp: , Rfl:   •  traZODone (DESYREL) 150 MG tablet, Take 150 mg by mouth Every Night., Disp: , Rfl:   •  sucralfate (CARAFATE) 1 g tablet, Take 1 tablet by mouth 4 (Four) Times a Day. Dissolve in 10 cc water, Disp: 120 tablet, Rfl: 5    Allergies   Allergen Reactions   • Beta Adrenergic Blockers Other (See Comments)     Asthma  Asthma   • Other Dizziness and Other (See Comments)     Septocaine  Septocaine   • Sulfa Antibiotics Hives   • Amlodipine Other (See Comments)     Hypotension    • Hctz [Hydrochlorothiazide] Other (See Comments)     dehydration, stone in salivary gland, hypokalemia   • Sulfamethoxazole-Trimethoprim Unknown (See Comments)        Social History     Socioeconomic History   • Marital status:      Spouse name: Not on file   • Number of children: Not on file   • Years of education: Not on file   • Highest education level: Not on file   Tobacco Use   • Smoking status: Never Smoker   • Smokeless tobacco: Never Used   • Tobacco comment: CAFFINE USE   Substance and Sexual Activity   • Alcohol use: Yes     Comment: occasional use   • Drug use: No   • Sexual activity: Defer       Family History   Problem Relation Age of Onset   • Heart failure Father    • Heart disease Father    • Diabetes Sister    • Heart disease Mother    • Heart disease Brother    • Colon polyps Brother        Review of Systems   Constitutional: Negative.    Respiratory: Negative.    Cardiovascular: Negative.    Gastrointestinal: Positive for abdominal distention and abdominal pain. Negative for anal bleeding, blood in stool, constipation, diarrhea, nausea, rectal pain and vomiting.   Musculoskeletal: Negative.    Skin: Negative.    Hematological: Negative.        Vitals:    08/13/20 1439   Temp: 98.8 °F (37.1 °C)       Physical Exam   Constitutional: She is oriented to person, place, and time. She appears well-developed and well-nourished.   HENT:   Head: Normocephalic and atraumatic.   Eyes: Pupils are equal, round, and reactive to light. No scleral icterus.   Cardiovascular: Normal rate and regular rhythm. Exam reveals no gallop and no friction rub.   No murmur heard.  Pulmonary/Chest: Breath sounds normal. No respiratory distress.   Abdominal: Soft. Bowel sounds are normal. She exhibits no distension and no mass. There is no tenderness. No hernia.   Neurological: She is alert and oriented to person, place, and time.   Skin: Skin is warm and dry. No rash noted.   Psychiatric: She has a normal mood and affect. Her behavior is normal.   Vitals reviewed.      Admission on 07/16/2020, Discharged on 07/16/2020   Component Date Value Ref Range Status   • SARS-CoV-2, FILI  07/16/2020 Not Detected  Not Detected Final   • COVID [facility] PRIORITY 07/16/2020 Comment   Final       Vesta was seen today for follow-up, heartburn and abdominal pain.    Diagnoses and all orders for this visit:    Carcinoid tumor of appendix, unspecified whether malignant  -     CT Abdomen Pelvis With Contrast; Future    Pain of upper abdomen  -     CT Abdomen Pelvis With Contrast; Future    Other orders  -     sucralfate (CARAFATE) 1 g tablet; Take 1 tablet by mouth 4 (Four) Times a Day. Dissolve in 10 cc water      Patient 64-year-old female with history of appendiceal carcinoid presenting with ongoing symptoms of reflux and abdominal discomfort in the upper abdomen.  Patient has had multiple endoscopies for evaluation with a small sliding hiatal hernia and hemorrhoids.  Patient last scoped about 9 months ago here complaining of upper abdominal discomfort and bloating with distention not sure if it is significant or just obesity.  Patient does have a feeling of persistent distention and fullness that was able to eat.  Patient again reports reflux discomfort particularly after event where she had ibuprofen and started fluoxetine which has since resolved.  Patient seems to continually be reluctant to take her omeprazole.  For now will recommend restarting the omeprazole daily use Carafate suspension as directed to patient if her symptoms acutely worsen for topical healing.  Will arrange CT of the abdomen pelvis particularly in view of the history of carcinoid to confirm no other lesions and follow clinically.

## 2020-08-27 ENCOUNTER — HOSPITAL ENCOUNTER (OUTPATIENT)
Dept: CT IMAGING | Facility: HOSPITAL | Age: 65
Discharge: HOME | End: 2020-08-27
Admitting: INTERNAL MEDICINE

## 2020-08-27 DIAGNOSIS — D3A.020 CARCINOID TUMOR OF APPENDIX, UNSPECIFIED WHETHER MALIGNANT: ICD-10-CM

## 2020-08-27 DIAGNOSIS — R10.10 PAIN OF UPPER ABDOMEN: ICD-10-CM

## 2020-08-27 LAB — CREAT BLDA-MCNC: 0.8 MG/DL (ref 0.6–1.3)

## 2020-08-27 PROCEDURE — 25010000002 IOPAMIDOL 61 % SOLUTION: Performed by: INTERNAL MEDICINE

## 2020-08-27 PROCEDURE — 0 DIATRIZOATE MEGLUMINE & SODIUM PER 1 ML: Performed by: INTERNAL MEDICINE

## 2020-08-27 PROCEDURE — 74177 CT ABD & PELVIS W/CONTRAST: CPT

## 2020-08-27 PROCEDURE — 82565 ASSAY OF CREATININE: CPT

## 2020-08-27 RX ADMIN — DIATRIZOATE MEGLUMINE AND DIATRIZOATE SODIUM 30 ML: 660; 100 LIQUID ORAL; RECTAL at 16:00

## 2020-08-27 RX ADMIN — IOPAMIDOL 85 ML: 612 INJECTION, SOLUTION INTRAVENOUS at 17:18

## 2020-09-04 ENCOUNTER — TELEPHONE (OUTPATIENT)
Dept: GASTROENTEROLOGY | Facility: CLINIC | Age: 65
End: 2020-09-04

## 2020-09-04 NOTE — TELEPHONE ENCOUNTER
----- Message from Lia Fong sent at 9/3/2020  3:47 PM EDT -----  Contact: [phone]  PT HAD A CT LAST WEEK AND WANTS TO KNOW THE RESULTS

## 2020-09-07 ENCOUNTER — RESULTS ENCOUNTER (OUTPATIENT)
Dept: ENDOCRINOLOGY | Age: 65
End: 2020-09-07

## 2020-09-07 DIAGNOSIS — E03.9 HYPOTHYROIDISM, UNSPECIFIED TYPE: ICD-10-CM

## 2020-09-08 ENCOUNTER — TELEPHONE (OUTPATIENT)
Dept: CARDIOLOGY | Facility: CLINIC | Age: 65
End: 2020-09-08

## 2020-09-08 NOTE — TELEPHONE ENCOUNTER
Notified pt of results and recommendations. She verbalized understanding and thanked Dr. Paula.    Jillian Triplett RN  Triage [facility]

## 2020-09-09 ENCOUNTER — TELEPHONE (OUTPATIENT)
Dept: GASTROENTEROLOGY | Facility: CLINIC | Age: 65
End: 2020-09-09

## 2020-09-09 DIAGNOSIS — R93.2 ABNORMAL CT SCAN, LIVER: ICD-10-CM

## 2020-09-09 DIAGNOSIS — K76.9 LIVER LESION: Primary | ICD-10-CM

## 2020-09-09 NOTE — TELEPHONE ENCOUNTER
CT with gastritis, need to take omeprzole as directed.  Small lesion in liver seen, may be fatty deposit but recommend liver MRI with and without contrast.

## 2020-09-09 NOTE — TELEPHONE ENCOUNTER
----- Message from Magro Lagos sent at 9/8/2020  4:15 PM EDT -----  Regarding: ct results  Contact: [phone]  Pt is calling for more in depth results of CT scan and also having more serve stomach pain.

## 2020-09-10 NOTE — TELEPHONE ENCOUNTER
Called pt back. Advised of the note from Dr Stahl. She verb understanding and is in agreement with the plan.

## 2020-09-10 NOTE — TELEPHONE ENCOUNTER
Nahum Brand RegSched Rep  P [facility] [address]   Phone Number: [phone]             Pt is calling back.

## 2020-09-23 ENCOUNTER — TELEPHONE (OUTPATIENT)
Dept: GASTROENTEROLOGY | Facility: CLINIC | Age: 65
End: 2020-09-23

## 2020-09-23 NOTE — TELEPHONE ENCOUNTER
----- Message from Margo Mustafa sent at 9/22/2020  2:31 PM EDT -----  Regarding: nausea  Contact: [phone]  Pt says she has been vomiting and has pain on the right side.

## 2020-09-24 ENCOUNTER — TRANSCRIBE ORDERS (OUTPATIENT)
Dept: ADMINISTRATIVE | Facility: HOSPITAL | Age: 65
End: 2020-09-24

## 2020-09-24 DIAGNOSIS — N64.4 BREAST PAIN, LEFT: Primary | ICD-10-CM

## 2020-09-24 NOTE — TELEPHONE ENCOUNTER
"Returned patient's phone call. She states she has been vomiting daily since Friday and in her right lower quadrant, she feels as if there is a \"ball sitting in the area\".   She states it has been undigested food.   Review medication list with the patient. She states she has not been taking her Sucralfate or her Omeprazole daily. Nor has she taken Odansetron for her occasional nausea. She states her bowels are moving everyday with no change in color or frequency. Denies any temperature.   Patient is scheduled for a MRI on 10/08/2020.  Advised patient she needs to start taking her Sucralfate 4 times a day and to dissolve it in 1 TBSP of water and her Omeprazole daily. Advised to call [facility] to see if she can schedule her CT scan sooner.   Advised if she has worsening of symptoms to go to the ER and if not to call back next Tuesday with a health update.     "

## 2020-10-08 ENCOUNTER — HOSPITAL ENCOUNTER (OUTPATIENT)
Dept: ULTRASOUND IMAGING | Facility: HOSPITAL | Age: 65
Discharge: HOME | End: 2020-10-08

## 2020-10-08 ENCOUNTER — HOSPITAL ENCOUNTER (OUTPATIENT)
Dept: MAMMOGRAPHY | Facility: HOSPITAL | Age: 65
Discharge: HOME | End: 2020-10-08

## 2020-10-08 ENCOUNTER — HOSPITAL ENCOUNTER (OUTPATIENT)
Dept: MRI IMAGING | Facility: HOSPITAL | Age: 65
Discharge: HOME | End: 2020-10-08

## 2020-10-08 DIAGNOSIS — N64.4 BREAST PAIN, LEFT: ICD-10-CM

## 2020-10-08 DIAGNOSIS — K76.9 LIVER LESION: ICD-10-CM

## 2020-10-08 DIAGNOSIS — R93.2 ABNORMAL CT SCAN, LIVER: ICD-10-CM

## 2020-10-08 PROCEDURE — 74183 MRI ABD W/O CNTR FLWD CNTR: CPT

## 2020-10-08 PROCEDURE — 82565 ASSAY OF CREATININE: CPT

## 2020-10-08 PROCEDURE — G0279 TOMOSYNTHESIS, MAMMO: HCPCS

## 2020-10-08 PROCEDURE — 0 GADOBENATE DIMEGLUMINE 529 MG/ML SOLUTION: Performed by: INTERNAL MEDICINE

## 2020-10-08 PROCEDURE — A9577 INJ MULTIHANCE: HCPCS | Performed by: INTERNAL MEDICINE

## 2020-10-08 PROCEDURE — 76642 ULTRASOUND BREAST LIMITED: CPT

## 2020-10-08 PROCEDURE — 77066 DX MAMMO INCL CAD BI: CPT

## 2020-10-08 RX ADMIN — GADOBENATE DIMEGLUMINE 17 ML: 529 INJECTION, SOLUTION INTRAVENOUS at 10:15

## 2020-10-09 LAB — CREAT BLDA-MCNC: 0.8 MG/DL (ref 0.6–1.3)

## 2020-10-30 ENCOUNTER — TELEPHONE (OUTPATIENT)
Dept: GASTROENTEROLOGY | Facility: CLINIC | Age: 65
End: 2020-10-30

## 2021-02-04 ENCOUNTER — APPOINTMENT (OUTPATIENT)
Dept: SLEEP MEDICINE | Facility: HOSPITAL | Age: 66
End: 2021-02-04

## 2021-02-18 ENCOUNTER — APPOINTMENT (OUTPATIENT)
Dept: SLEEP MEDICINE | Facility: HOSPITAL | Age: 66
End: 2021-02-18

## 2021-03-16 ENCOUNTER — BULK ORDERING (OUTPATIENT)
Dept: CASE MANAGEMENT | Facility: OTHER | Age: 66
End: 2021-03-16

## 2021-03-16 DIAGNOSIS — Z23 IMMUNIZATION DUE: ICD-10-CM

## 2021-04-08 ENCOUNTER — OFFICE VISIT (OUTPATIENT)
Dept: ENDOCRINOLOGY | Age: 66
End: 2021-04-08

## 2021-04-08 VITALS
HEART RATE: 76 BPM | WEIGHT: 181 LBS | OXYGEN SATURATION: 98 % | HEIGHT: 66 IN | DIASTOLIC BLOOD PRESSURE: 80 MMHG | SYSTOLIC BLOOD PRESSURE: 118 MMHG | BODY MASS INDEX: 29.09 KG/M2

## 2021-04-08 DIAGNOSIS — E78.5 HYPERLIPIDEMIA, UNSPECIFIED HYPERLIPIDEMIA TYPE: ICD-10-CM

## 2021-04-08 DIAGNOSIS — E55.9 VITAMIN D DEFICIENCY: ICD-10-CM

## 2021-04-08 DIAGNOSIS — E66.3 OVERWEIGHT (BMI 25.0-29.9): ICD-10-CM

## 2021-04-08 DIAGNOSIS — E03.9 HYPOTHYROIDISM, UNSPECIFIED TYPE: Primary | ICD-10-CM

## 2021-04-08 DIAGNOSIS — E04.1 SOLITARY THYROID NODULE: ICD-10-CM

## 2021-04-08 PROCEDURE — 99214 OFFICE O/P EST MOD 30 MIN: CPT | Performed by: INTERNAL MEDICINE

## 2021-04-08 RX ORDER — MIRTAZAPINE 30 MG/1
TABLET, FILM COATED ORAL
COMMUNITY
End: 2021-04-08

## 2021-04-08 RX ORDER — DOCUSATE SODIUM 100 MG/1
CAPSULE, LIQUID FILLED ORAL
COMMUNITY
End: 2021-12-29

## 2021-04-08 RX ORDER — DICYCLOMINE HYDROCHLORIDE 10 MG/1
CAPSULE ORAL
COMMUNITY
End: 2021-04-08

## 2021-04-08 RX ORDER — UBIDECARENONE 75 MG
50 CAPSULE ORAL DAILY
COMMUNITY

## 2021-04-08 RX ORDER — VILAZODONE HYDROCHLORIDE 20 MG/1
10 TABLET ORAL
COMMUNITY
End: 2021-06-10 | Stop reason: ALTCHOICE

## 2021-04-08 RX ORDER — ALPRAZOLAM 0.5 MG/1
1 TABLET, EXTENDED RELEASE ORAL NIGHTLY
COMMUNITY
End: 2022-01-17 | Stop reason: SDUPTHER

## 2021-04-08 RX ORDER — LIFITEGRAST 50 MG/ML
SOLUTION/ DROPS OPHTHALMIC
COMMUNITY
End: 2021-04-08

## 2021-04-08 NOTE — ASSESSMENT & PLAN NOTE
Goal TSH <2.5  Last TSH/FT4 :   TSH   Date Value Ref Range Status   05/21/2020 0.828 0.270 - 4.200 uIU/mL Final     Comment:     Results may be falsely decreased if patient taking Biotin     Free T4   Date Value Ref Range Status   05/21/2020 1.47 0.93 - 1.70 ng/dL Final     Comment:     Results may be falsely increased if patient taking Biotin.   .  Last Ft3: No results found for: T3FREE.  Last TPO:   Thyroid Peroxidase Antibody   Date Value Ref Range Status   11/11/2019 14 0 - 34 IU/mL Final   TSH at goal  We will check thyroid function test today  Patient is currently taking Euthyrox 75 mcg 1 tab p.o. daily  Patient notes fatigue which could be secondary to underlying exacerbation of depression in the setting of the loss of her grandson  However we will evaluate thyroid function test to make sure thyroid labs are in appropriate ranges

## 2021-04-08 NOTE — PROGRESS NOTES
"Chief Complaint  Hypertension, Hyperlipidemia, Thyroid Problem, Hypothyroidism, and Vitamin D Deficiency    Subjective     {Problem List  Visit Diagnosis   Encounters  Notes  Medications  Labs  Result Review Imaging  Media :23}     Vesta Sutton presents to [facility] END[facility]CR[facility]OLOG[facility]  Hypothyroidism  This is Herman notes that she has had fatigue as well as as well as hair loss.  She notes brain fog, dry skin, and changes in her nails. She notes that she is having problems with word finding and is becoming more forgetful. She also notes increased anxiety. She notes that she is taking a new anti-depressant and is not sure if this could be affecting her. She started it 2 months ago. She feels feels like she has palpitations with it. She notes that she has fatigue. She does not sleep well and is on medication. She typically is in the bed 8-9 hours but was frequently in the night. She notes that she typically has to take Xanax several times a day. She on Vybriid.       Thyroid nodule  Last thyroid ultrasound was completed in 2019. She had biopsy of a 2.7 cm thyroid nodule that was benign. She denies any acute changes in her neck currently.       Objective   Vital Signs:   /80 (BP Location: Right arm, Patient Position: Sitting, Cuff Size: Large Adult)   Pulse 76   Ht 167.6 cm (65.98\")   Wt 82.1 kg (181 lb)   SpO2 98%   BMI 29.23 kg/m²     Physical Exam  Vitals reviewed.   Constitutional:       Appearance: Normal appearance.   HENT:      Nose:      Comments: Mask in place  Eyes:      Extraocular Movements: Extraocular movements intact.      Conjunctiva/sclera: Conjunctivae normal.      Pupils: Pupils are equal, round, and reactive to light.   Neck:      Thyroid: No thyroid mass, thyromegaly or thyroid tenderness.      Vascular: No carotid bruit.   Cardiovascular:      Rate and Rhythm: Normal rate and regular rhythm.      Pulses: Normal pulses.      Heart sounds: Normal heart sounds. "   Pulmonary:      Effort: Pulmonary effort is normal.      Breath sounds: Normal breath sounds.   Abdominal:      General: Abdomen is flat. Bowel sounds are normal.      Palpations: Abdomen is soft.   Lymphadenopathy:      Cervical: No cervical adenopathy.   Skin:     General: Skin is warm and dry.   Neurological:      General: No focal deficit present.      Mental Status: She is alert and oriented to person, place, and time.   Psychiatric:         Mood and Affect: Mood normal.         Behavior: Behavior normal.        Result Review :   The following data was reviewed by: Kenneth Torres MD on 04/08/2021:  CMP    CMP 8/27/20 10/8/20   Creatinine 0.80 0.80      Comments are available for some flowsheets but are not being displayed.                 TSH    TSH 5/21/20   TSH 0.828      Comments are available for some flowsheets but are not being displayed.               Renal Profile    Renal Profile 8/27/20 10/8/20   Creatinine 0.80 0.80      Comments are available for some flowsheets but are not being displayed.                   Covid Tests    Common Labsle 12/5/20   COVID19 Not Detected      Comments are available for some flowsheets but are not being displayed.             THYROID SONOGRAM     HISTORY: 63-year-old female with a history of thyroid nodules.     FINDINGS: Sonographic evaluation of the thyroid was performed.  Comparison is made to thyroid sonogram dated 02/28/2012. The right lobe  of thyroid measures 5.0 x 1.6 x 1.8 cm and the left lobe measures 4.7 x  2.0 x 2.1 cm. The isthmus measures 0.3 cm in thickness. There are  multiple nodules seen throughout the thyroid. The largest nodule in the  right lobe of thyroid measures 1.2 cm in greatest dimension compared to  1.5 cm previously. The nodule in the right lobe has a heterogenous,  hypoechoic solid appearance. The largest nodule in left lobe of thyroid  measures 2.7 cm in greatest dimension compared to 1.9 cm previously. The  left lobe dominant nodule  has a heterogenous solid appearance.     IMPRESSION:  Multinodular thyroid as described with the largest nodule  showing some interval enlargement when compared to the prior examination  from 2012. The dominant nodule measures 2.7 cm in greatest dimension.  Correlation with ultrasound-guided fine-needle aspiration of the  dominant nodule in the left lobe should be considered           Assessment and Plan    Diagnoses and all orders for this visit:    1. Hypothyroidism, unspecified type (Primary)  Assessment & Plan:  Goal TSH <2.5  Last TSH/FT4 :   TSH   Date Value Ref Range Status   05/21/2020 0.828 0.270 - 4.200 uIU/mL Final     Comment:     Results may be falsely decreased if patient taking Biotin     Free T4   Date Value Ref Range Status   05/21/2020 1.47 0.93 - 1.70 ng/dL Final     Comment:     Results may be falsely increased if patient taking Biotin.   .  Last Ft3: No results found for: T3FREE.  Last TPO:   Thyroid Peroxidase Antibody   Date Value Ref Range Status   11/11/2019 14 0 - 34 IU/mL Final   TSH at goal  We will check thyroid function test today  Patient is currently taking Euthyrox 75 mcg 1 tab p.o. daily  Patient notes fatigue which could be secondary to underlying exacerbation of depression in the setting of the loss of her grandson  However we will evaluate thyroid function test to make sure thyroid labs are in appropriate ranges    Orders:  -     TSH  -     T4, Free  -     T3  -     Comprehensive Metabolic Panel  -     eGFR-Glomerular Filtration  -     Hemoglobin A1c  -     Vitamin B12 & Folate  -     Vitamin D 25 Hydroxy  -     US Thyroid; Future    2. Hyperlipidemia, unspecified hyperlipidemia type    3. Vitamin D deficiency  Assessment & Plan:  Vitamin D level today  Patient currently takes cholecalciferol 5000 international units 1 capsule daily    Orders:  -     TSH  -     T4, Free  -     T3  -     Comprehensive Metabolic Panel  -     eGFR-Glomerular Filtration  -     Hemoglobin A1c  -      Vitamin B12 & Folate  -     Vitamin D 25 Hydroxy    4. Overweight (BMI 25.0-29.9)  -     TSH  -     T4, Free  -     T3  -     Comprehensive Metabolic Panel  -     eGFR-Glomerular Filtration  -     Hemoglobin A1c  -     Vitamin B12 & Folate  -     Vitamin D 25 Hydroxy    5. Solitary thyroid nodule  Assessment & Plan:  Patient had a thyroid ultrasound was completed in 2019  Thyroid ultrasound showed a 2.9 cm thyroid nodule  Patient is status post biopsy of said thyroid nodule which was found to be benign  We will repeat thyroid ultrasound this year    Orders:  -     US Thyroid; Future    Follow Up   Return in about 3 months (around 7/8/2021).  Patient was given instructions and counseling regarding her condition or for health maintenance advice. Please see specific information pulled into the AVS if appropriate.

## 2021-04-08 NOTE — ASSESSMENT & PLAN NOTE
Patient had a thyroid ultrasound was completed in 2019  Thyroid ultrasound showed a 2.7 cm thyroid nodule  Patient is status post biopsy of said thyroid nodule which was found to be benign  We will repeat thyroid ultrasound this year

## 2021-04-09 LAB
25(OH)D3+25(OH)D2 SERPL-MCNC: 45.1 NG/ML (ref 30–100)
ALBUMIN SERPL-MCNC: 4.4 G/DL (ref 3.8–4.8)
ALBUMIN/GLOB SERPL: 1.8 {RATIO} (ref 1.2–2.2)
ALP SERPL-CCNC: 64 IU/L (ref 39–117)
ALT SERPL-CCNC: 20 IU/L (ref 0–32)
AST SERPL-CCNC: 19 IU/L (ref 0–40)
BILIRUB SERPL-MCNC: 0.3 MG/DL (ref 0–1.2)
BUN SERPL-MCNC: 11 MG/DL (ref 8–27)
BUN/CREAT SERPL: 13 (ref 12–28)
CALCIUM SERPL-MCNC: 9.4 MG/DL (ref 8.7–10.3)
CHLORIDE SERPL-SCNC: 99 MMOL/L (ref 96–106)
CO2 SERPL-SCNC: 26 MMOL/L (ref 20–29)
CREAT SERPL-MCNC: 0.88 MG/DL (ref 0.57–1)
FOLATE SERPL-MCNC: 16.7 NG/ML
GLOBULIN SER CALC-MCNC: 2.5 G/DL (ref 1.5–4.5)
GLUCOSE SERPL-MCNC: 81 MG/DL (ref 65–99)
HBA1C MFR BLD: 5.5 % (ref 4.8–5.6)
POTASSIUM SERPL-SCNC: 4.1 MMOL/L (ref 3.5–5.2)
PROT SERPL-MCNC: 6.9 G/DL (ref 6–8.5)
SODIUM SERPL-SCNC: 139 MMOL/L (ref 134–144)
T3 SERPL-MCNC: 81 NG/DL (ref 71–180)
T4 FREE SERPL-MCNC: 1.43 NG/DL (ref 0.82–1.77)
TSH SERPL DL<=0.005 MIU/L-ACNC: 0.55 UIU/ML (ref 0.45–4.5)
VIT B12 SERPL-MCNC: 982 PG/ML (ref 232–1245)

## 2021-04-10 NOTE — PROGRESS NOTES
Your thyroid function tests are normal rangeYour kidney and liver function is normal    Your hemoglobin A1c is normal and is not consistent with prediabetes or diabetes      Your vitamin B 12 and vitamin D levels are in normal range    We will follow up on your thyroid ultrasound when is completed    You may want to consult with the doctor who prescribes her Viibryd to see if there are any other options if you are not tolerating this medication.

## 2021-04-15 ENCOUNTER — TELEPHONE (OUTPATIENT)
Dept: ENDOCRINOLOGY | Age: 66
End: 2021-04-15

## 2021-04-15 NOTE — TELEPHONE ENCOUNTER
Pt was seen last week by fatemeh and an ultrasound was brought up in the discussion    Is she still to have   She has not heard anything     Please call pt at

## 2021-04-22 ENCOUNTER — HOSPITAL ENCOUNTER (OUTPATIENT)
Dept: ULTRASOUND IMAGING | Facility: HOSPITAL | Age: 66
Discharge: HOME | End: 2021-04-22
Admitting: INTERNAL MEDICINE

## 2021-04-22 DIAGNOSIS — E03.9 HYPOTHYROIDISM, UNSPECIFIED TYPE: ICD-10-CM

## 2021-04-22 DIAGNOSIS — E04.1 SOLITARY THYROID NODULE: ICD-10-CM

## 2021-04-22 PROCEDURE — 76536 US EXAM OF HEAD AND NECK: CPT

## 2021-04-23 NOTE — PROGRESS NOTES
There has been no significant change in your thyroid nodules since 2019 according to your ultrasound.    I recommend repeat thyroid ultrasound in 1 year. Z Plasty Text: The lesion was extirpated to the level of the fat with a #15 scalpel blade.  Given the location of the defect, shape of the defect and the proximity to free margins a Z-plasty was deemed most appropriate for repair.  Using a sterile surgical marker, the appropriate transposition arms of the Z-plasty were drawn incorporating the defect and placing the expected incisions within the relaxed skin tension lines where possible.    The area thus outlined was incised deep to adipose tissue with a #15 scalpel blade.  The skin margins were undermined to an appropriate distance in all directions utilizing iris scissors.  The opposing transposition arms were then transposed into place in opposite direction and anchored with interrupted buried subcutaneous sutures.

## 2021-04-29 ENCOUNTER — OFFICE VISIT (OUTPATIENT)
Dept: SLEEP MEDICINE | Facility: HOSPITAL | Age: 66
End: 2021-04-29

## 2021-04-29 VITALS — HEIGHT: 66 IN | WEIGHT: 182 LBS | BODY MASS INDEX: 29.25 KG/M2

## 2021-04-29 DIAGNOSIS — G47.21 CIRCADIAN RHYTHM SLEEP DISORDER, DELAYED SLEEP PHASE TYPE: ICD-10-CM

## 2021-04-29 DIAGNOSIS — G47.33 OSA ON CPAP: Primary | ICD-10-CM

## 2021-04-29 DIAGNOSIS — Z99.89 OSA ON CPAP: Primary | ICD-10-CM

## 2021-04-29 DIAGNOSIS — F51.04 PSYCHOPHYSIOLOGICAL INSOMNIA: ICD-10-CM

## 2021-04-29 PROCEDURE — 99213 OFFICE O/P EST LOW 20 MIN: CPT | Performed by: INTERNAL MEDICINE

## 2021-05-06 ENCOUNTER — TELEPHONE (OUTPATIENT)
Dept: SLEEP MEDICINE | Facility: HOSPITAL | Age: 66
End: 2021-05-06

## 2021-05-20 ENCOUNTER — OFFICE VISIT (OUTPATIENT)
Dept: GASTROENTEROLOGY | Facility: CLINIC | Age: 66
End: 2021-05-20

## 2021-05-20 VITALS — WEIGHT: 176.8 LBS | TEMPERATURE: 98.6 F | HEIGHT: 66 IN | BODY MASS INDEX: 28.42 KG/M2

## 2021-05-20 DIAGNOSIS — K29.70 GASTRITIS WITHOUT BLEEDING, UNSPECIFIED CHRONICITY, UNSPECIFIED GASTRITIS TYPE: ICD-10-CM

## 2021-05-20 DIAGNOSIS — K21.9 GASTROESOPHAGEAL REFLUX DISEASE, UNSPECIFIED WHETHER ESOPHAGITIS PRESENT: Primary | ICD-10-CM

## 2021-05-20 LAB
ALBUMIN SERPL-MCNC: 4.4 G/DL (ref 3.5–5.2)
ALBUMIN/GLOB SERPL: 2.1 G/DL
ALP SERPL-CCNC: 54 U/L (ref 39–117)
ALT SERPL-CCNC: 18 U/L (ref 1–33)
AST SERPL-CCNC: 10 U/L (ref 1–32)
BILIRUB SERPL-MCNC: 0.3 MG/DL (ref 0–1.2)
BUN SERPL-MCNC: 13 MG/DL (ref 8–23)
BUN/CREAT SERPL: 17.1 (ref 7–25)
CALCIUM SERPL-MCNC: 9.7 MG/DL (ref 8.6–10.5)
CHLORIDE SERPL-SCNC: 100 MMOL/L (ref 98–107)
CO2 SERPL-SCNC: 28 MMOL/L (ref 22–29)
CREAT SERPL-MCNC: 0.76 MG/DL (ref 0.57–1)
ERYTHROCYTE [DISTWIDTH] IN BLOOD BY AUTOMATED COUNT: 12.7 % (ref 12.3–15.4)
GLOBULIN SER CALC-MCNC: 2.1 GM/DL
GLUCOSE SERPL-MCNC: 81 MG/DL (ref 65–99)
HCT VFR BLD AUTO: 37.7 % (ref 34–46.6)
HGB BLD-MCNC: 12.8 G/DL (ref 12–15.9)
IRON SATN MFR SERPL: 31 % (ref 20–50)
IRON SERPL-MCNC: 103 MCG/DL (ref 37–145)
MCH RBC QN AUTO: 32.2 PG (ref 26.6–33)
MCHC RBC AUTO-ENTMCNC: 34 G/DL (ref 31.5–35.7)
MCV RBC AUTO: 95 FL (ref 79–97)
PLATELET # BLD AUTO: 220 10*3/MM3 (ref 140–450)
POTASSIUM SERPL-SCNC: 3.9 MMOL/L (ref 3.5–5.2)
PROT SERPL-MCNC: 6.5 G/DL (ref 6–8.5)
RBC # BLD AUTO: 3.97 10*6/MM3 (ref 3.77–5.28)
SODIUM SERPL-SCNC: 138 MMOL/L (ref 136–145)
TIBC SERPL-MCNC: 330 MCG/DL
UIBC SERPL-MCNC: 227 MCG/DL (ref 112–346)
WBC # BLD AUTO: 6.23 10*3/MM3 (ref 3.4–10.8)

## 2021-05-20 PROCEDURE — 99214 OFFICE O/P EST MOD 30 MIN: CPT | Performed by: INTERNAL MEDICINE

## 2021-05-20 RX ORDER — PANTOPRAZOLE SODIUM 40 MG/1
40 TABLET, DELAYED RELEASE ORAL DAILY
Qty: 90 TABLET | Refills: 3 | Status: SHIPPED | OUTPATIENT
Start: 2021-05-20 | End: 2021-11-12 | Stop reason: SDUPTHER

## 2021-05-20 NOTE — PROGRESS NOTES
Chief Complaint   Patient presents with   • Abdominal Pain   • Diarrhea       Vesta Sutton is a  65 y.o. female here for a follow up visit for abdominal pain, dark stools and GERD.    HPI     Patient 65-year-old female with history of diabetes, GERD, hypertension and hyperlipidemia presenting after an episode of abdominal pain with diarrhea dark stools and GERD exacerbation.  Symptoms began about 3 weeks ago and lasted about a week.  Patient still with early satiety and persistent fullness.  Patient reports she feels full if she eats late at night still by morning.  Patient denies any fever chills no dizziness but did have dark stools noted but no testing has been done yet.  Patient here for further recommendations.  Of note patient was off her Pepcid when symptoms began.  Patient with history of GERD with hiatal hernia but no active esophagitis seen last endoscopy 2019.    Past Medical History:   Diagnosis Date   • Anemia    • Anxiety and depression    • Appendiceal carcinoid tumor    • Arthritis    • Asthma    • Breast nodule    • Chest pain    • Coronary artery disease    • Diabetes mellitus (CMS/HCC)     a1c only at 5, diet controlled right now   • Disease of thyroid gland    • Dizziness    • Elevated cholesterol    • Environmental allergies    • Fatigue     in the evening   • Fibromuscular hyperplasia of artery (CMS/HCC)     in the carotid artery with no stenosis   • GERD (gastroesophageal reflux disease)    • Headache    • Heart disease    • History of anesthesia complications 2017    lidocaine caused reaction during dental procedure per patient   • Hyperlipidemia    • Hypertension    • Lumbar pain    • Malaise and fatigue    • Myalgia    • LEATHA (obstructive sleep apnea)    • Palpitations    • RBBB (right bundle branch block)    • Retinal detachment     left  //  when blood pressure gets high she notices flashing lights in her vision   • Sleep apnea     wears machine   • Vertigo          Current Outpatient  Medications:   •  ALPRAZolam (XANAX) 0.5 MG tablet, Take 0.5 mg by mouth 3 (Three) Times a Day., Disp: , Rfl:   •  ALPRAZolam XR (XANAX XR) 0.5 MG 24 hr tablet, Take 0.5 mg by mouth Every Morning., Disp: , Rfl:   •  cetirizine (zyrTEC) 5 MG tablet, Take 10 mg by mouth Daily As Needed., Disp: , Rfl:   •  Cholecalciferol (VITAMIN D3) 5000 UNITS capsule capsule, Take 5,000 Units by mouth Daily., Disp: , Rfl:   •  docusate sodium (Colace) 100 MG capsule, Colace 100 mg capsule  Take 1 capsule every day by oral route for 90 days., Disp: , Rfl:   •  EUTHYROX 75 MCG tablet, Take 1 tablet by mouth Daily., Disp: 30 tablet, Rfl: 6  •  famotidine (PEPCID) 10 MG tablet, Take 10 mg by mouth as needed., Disp: , Rfl:   •  olmesartan (BENICAR) 40 MG tablet, Take 0.5 tablets by mouth 2 (Two) Times a Day. (Patient taking differently: Take 20 mg by mouth 2 (Two) Times a Day.), Disp: 30 tablet, Rfl: 3  •  Omega-3 Fatty Acids (OMEGA 3 PO), Take 1 tablet by mouth daily., Disp: , Rfl:   •  rosuvastatin (CRESTOR) 10 MG tablet, Take 1 tablet by mouth Daily., Disp: , Rfl:   •  traZODone (DESYREL) 150 MG tablet, Take 150 mg by mouth Every Night., Disp: , Rfl:   •  vilazodone (Viibryd) 20 MG tablet tablet, 10 mg., Disp: , Rfl:   •  vitamin B-12 (CYANOCOBALAMIN) 100 MCG tablet, Take 50 mcg by mouth Daily., Disp: , Rfl:   •  pantoprazole (PROTONIX) 40 MG EC tablet, Take 1 tablet by mouth Daily., Disp: 90 tablet, Rfl: 3  •  sucralfate (CARAFATE) 1 g tablet, Take 1 tablet by mouth 4 (Four) Times a Day. Dissolve in 10 cc water, Disp: 120 tablet, Rfl: 5    Allergies   Allergen Reactions   • Beta Adrenergic Blockers Other (See Comments)     Asthma     • Other Other (See Comments) and Dizziness     Septocaine   • Sulfa Antibiotics Hives   • Amlodipine Other (See Comments)     Hypotension    • Hctz [Hydrochlorothiazide] Other (See Comments)     dehydration, stone in salivary gland, hypokalemia   • Sulfamethoxazole-Trimethoprim Unknown (See Comments)        Social History     Socioeconomic History   • Marital status:      Spouse name: Not on file   • Number of children: Not on file   • Years of education: Not on file   • Highest education level: Not on file   Tobacco Use   • Smoking status: Never Smoker   • Smokeless tobacco: Never Used   • Tobacco comment: CAFFINE USE   Vaping Use   • Vaping Use: Never used   Substance and Sexual Activity   • Alcohol use: Yes     Comment: occasional use   • Drug use: No   • Sexual activity: Defer       Family History   Problem Relation Age of Onset   • Heart failure Father    • Heart disease Father    • Diabetes Sister    • Heart disease Mother    • Heart disease Brother    • Colon polyps Brother        Review of Systems   Constitutional: Positive for appetite change. Negative for activity change, chills, diaphoresis, fatigue, fever and unexpected weight change.   Respiratory: Negative.    Cardiovascular: Negative.    Gastrointestinal: Positive for abdominal pain and diarrhea. Negative for abdominal distention, anal bleeding, blood in stool, constipation, nausea, rectal pain and vomiting.   Musculoskeletal: Negative.    Skin: Negative.    Hematological: Negative.        Vitals:    05/20/21 0814   Temp: 98.6 °F (37 °C)       Physical Exam  Vitals reviewed.   Constitutional:       Appearance: Normal appearance. She is well-developed.   HENT:      Head: Normocephalic and atraumatic.   Eyes:      General: No scleral icterus.     Pupils: Pupils are equal, round, and reactive to light.   Cardiovascular:      Rate and Rhythm: Normal rate and regular rhythm.      Heart sounds: Normal heart sounds. No murmur heard.   No friction rub. No gallop.    Pulmonary:      Effort: Pulmonary effort is normal. No respiratory distress.      Breath sounds: Normal breath sounds. No wheezing or rales.   Abdominal:      General: Bowel sounds are normal. There is no distension.      Palpations: Abdomen is soft. There is no mass.      Tenderness:  There is no abdominal tenderness.      Hernia: No hernia is present.   Skin:     General: Skin is warm and dry.      Coloration: Skin is not jaundiced.      Findings: No rash.   Neurological:      General: No focal deficit present.      Mental Status: She is alert and oriented to person, place, and time.      Cranial Nerves: No cranial nerve deficit.   Psychiatric:         Behavior: Behavior normal.         Thought Content: Thought content normal.         Judgment: Judgment normal.         Office Visit on 04/08/2021   Component Date Value Ref Range Status   • TSH 04/08/2021 0.552  0.450 - 4.500 uIU/mL Final   • Free T4 04/08/2021 1.43  0.82 - 1.77 ng/dL Final   • T3, Total 04/08/2021 81  71 - 180 ng/dL Final   • Glucose 04/08/2021 81  65 - 99 mg/dL Final   • BUN 04/08/2021 11  8 - 27 mg/dL Final   • Creatinine 04/08/2021 0.88  0.57 - 1.00 mg/dL Final   • eGFR Non  Am 04/08/2021 70  >59 mL/min/1.73 Final   • eGFR African Am 04/08/2021 81  >59 mL/min/1.73 Final   • BUN/Creatinine Ratio 04/08/2021 13  12 - 28 Final   • Sodium 04/08/2021 139  134 - 144 mmol/L Final   • Potassium 04/08/2021 4.1  3.5 - 5.2 mmol/L Final   • Chloride 04/08/2021 99  96 - 106 mmol/L Final   • Total CO2 04/08/2021 26  20 - 29 mmol/L Final   • Calcium 04/08/2021 9.4  8.7 - 10.3 mg/dL Final   • Total Protein 04/08/2021 6.9  6.0 - 8.5 g/dL Final   • Albumin 04/08/2021 4.4  3.8 - 4.8 g/dL Final   • Globulin 04/08/2021 2.5  1.5 - 4.5 g/dL Final   • A/G Ratio 04/08/2021 1.8  1.2 - 2.2 Final   • Total Bilirubin 04/08/2021 0.3  0.0 - 1.2 mg/dL Final   • Alkaline Phosphatase 04/08/2021 64  39 - 117 IU/L Final   • AST (SGOT) 04/08/2021 19  0 - 40 IU/L Final   • ALT (SGPT) 04/08/2021 20  0 - 32 IU/L Final   • Hemoglobin A1C 04/08/2021 5.5  4.8 - 5.6 % Final   • Vitamin B-12 04/08/2021 982  232 - 1,245 pg/mL Final   • Folate 04/08/2021 16.7  >3.0 ng/mL Final   • 25 Hydroxy, Vitamin D 04/08/2021 45.1  30.0 - 100.0 ng/mL Final       Diagnoses and all  orders for this visit:    1. Gastroesophageal reflux disease, unspecified whether esophagitis present (Primary)  -     CBC (No Diff)  -     Comprehensive Metabolic Panel  -     Iron Profile    2. Gastritis without bleeding, unspecified chronicity, unspecified gastritis type  -     CBC (No Diff)  -     Comprehensive Metabolic Panel  -     Iron Profile    Other orders  -     pantoprazole (PROTONIX) 40 MG EC tablet; Take 1 tablet by mouth Daily.  Dispense: 90 tablet; Refill: 3      Patient 65-year-old female with history of recurrent abdominal discomfort who presents with 3 weeks ago episode of abdominal pain diarrhea with increased reflux.  Patient reports taking sacral fate to aid in her symptom relief.  Symptoms lasted about a week and then improved.  Patient still with early satiety and persistent fullness.  Patient with similar symptoms in the past has undergone extensive evaluation last EGD 2019, 2018, 2016.  Patient underwent gastric emptying scan in 18 with normal results.  At this point will check labs for any signs of blood loss, patient describes dark stools.  Patient apparently was not taking her Pepcid regularly when this occurred so we will change back over to a PPI.  Patient reports dry eyes with omeprazole, will try Protonix.  We will follow-up clinically for response.

## 2021-06-03 ENCOUNTER — APPOINTMENT (OUTPATIENT)
Dept: CT IMAGING | Facility: HOSPITAL | Age: 66
End: 2021-06-03

## 2021-06-03 ENCOUNTER — HOSPITAL ENCOUNTER (EMERGENCY)
Facility: HOSPITAL | Age: 66
Discharge: HOME | End: 2021-06-03
Attending: EMERGENCY MEDICINE | Admitting: EMERGENCY MEDICINE

## 2021-06-03 ENCOUNTER — APPOINTMENT (OUTPATIENT)
Dept: GENERAL RADIOLOGY | Facility: HOSPITAL | Age: 66
End: 2021-06-03

## 2021-06-03 VITALS
TEMPERATURE: 96.7 F | BODY MASS INDEX: 27.47 KG/M2 | RESPIRATION RATE: 16 BRPM | WEIGHT: 175 LBS | HEIGHT: 67 IN | SYSTOLIC BLOOD PRESSURE: 136 MMHG | HEART RATE: 60 BPM | OXYGEN SATURATION: 98 % | DIASTOLIC BLOOD PRESSURE: 75 MMHG

## 2021-06-03 DIAGNOSIS — E78.5 HYPERLIPIDEMIA, UNSPECIFIED HYPERLIPIDEMIA TYPE: ICD-10-CM

## 2021-06-03 DIAGNOSIS — R07.89 ATYPICAL CHEST PAIN: Primary | ICD-10-CM

## 2021-06-03 DIAGNOSIS — R79.89 ELEVATED D-DIMER: ICD-10-CM

## 2021-06-03 DIAGNOSIS — I10 ESSENTIAL HYPERTENSION: ICD-10-CM

## 2021-06-03 LAB
ALBUMIN SERPL-MCNC: 4.3 G/DL (ref 3.5–5.2)
ALBUMIN/GLOB SERPL: 1.7 G/DL
ALP SERPL-CCNC: 58 U/L (ref 39–117)
ALT SERPL W P-5'-P-CCNC: 24 U/L (ref 1–33)
ANION GAP SERPL CALCULATED.3IONS-SCNC: 7.8 MMOL/L (ref 5–15)
AST SERPL-CCNC: 19 U/L (ref 1–32)
BASOPHILS # BLD AUTO: 0.07 10*3/MM3 (ref 0–0.2)
BASOPHILS NFR BLD AUTO: 1 % (ref 0–1.5)
BILIRUB SERPL-MCNC: 0.3 MG/DL (ref 0–1.2)
BUN SERPL-MCNC: 12 MG/DL (ref 8–23)
BUN/CREAT SERPL: 15.4 (ref 7–25)
CALCIUM SPEC-SCNC: 8.9 MG/DL (ref 8.6–10.5)
CHLORIDE SERPL-SCNC: 99 MMOL/L (ref 98–107)
CO2 SERPL-SCNC: 26.2 MMOL/L (ref 22–29)
CREAT SERPL-MCNC: 0.78 MG/DL (ref 0.57–1)
D DIMER PPP FEU-MCNC: 2.7 MCGFEU/ML (ref 0–0.49)
DEPRECATED RDW RBC AUTO: 42.8 FL (ref 37–54)
EOSINOPHIL # BLD AUTO: 0.08 10*3/MM3 (ref 0–0.4)
EOSINOPHIL NFR BLD AUTO: 1.2 % (ref 0.3–6.2)
ERYTHROCYTE [DISTWIDTH] IN BLOOD BY AUTOMATED COUNT: 12.7 % (ref 12.3–15.4)
GFR SERPL CREATININE-BSD FRML MDRD: 75 ML/MIN/1.73
GLOBULIN UR ELPH-MCNC: 2.5 GM/DL
GLUCOSE SERPL-MCNC: 110 MG/DL (ref 65–99)
HCT VFR BLD AUTO: 36 % (ref 34–46.6)
HGB BLD-MCNC: 12.4 G/DL (ref 12–15.9)
HOLD SPECIMEN: NORMAL
HOLD SPECIMEN: NORMAL
IMM GRANULOCYTES # BLD AUTO: 0.02 10*3/MM3 (ref 0–0.05)
IMM GRANULOCYTES NFR BLD AUTO: 0.3 % (ref 0–0.5)
LYMPHOCYTES # BLD AUTO: 1.32 10*3/MM3 (ref 0.7–3.1)
LYMPHOCYTES NFR BLD AUTO: 19 % (ref 19.6–45.3)
MCH RBC QN AUTO: 32.1 PG (ref 26.6–33)
MCHC RBC AUTO-ENTMCNC: 34.4 G/DL (ref 31.5–35.7)
MCV RBC AUTO: 93.3 FL (ref 79–97)
MONOCYTES # BLD AUTO: 0.55 10*3/MM3 (ref 0.1–0.9)
MONOCYTES NFR BLD AUTO: 7.9 % (ref 5–12)
NEUTROPHILS NFR BLD AUTO: 4.91 10*3/MM3 (ref 1.7–7)
NEUTROPHILS NFR BLD AUTO: 70.6 % (ref 42.7–76)
NRBC BLD AUTO-RTO: 0 /100 WBC (ref 0–0.2)
PLATELET # BLD AUTO: 199 10*3/MM3 (ref 140–450)
PMV BLD AUTO: 9.6 FL (ref 6–12)
POTASSIUM SERPL-SCNC: 3.7 MMOL/L (ref 3.5–5.2)
PROT SERPL-MCNC: 6.8 G/DL (ref 6–8.5)
RBC # BLD AUTO: 3.86 10*6/MM3 (ref 3.77–5.28)
SODIUM SERPL-SCNC: 133 MMOL/L (ref 136–145)
TROPONIN T SERPL-MCNC: <0.01 NG/ML (ref 0–0.03)
TROPONIN T SERPL-MCNC: <0.01 NG/ML (ref 0–0.03)
WBC # BLD AUTO: 6.95 10*3/MM3 (ref 3.4–10.8)
WHOLE BLOOD HOLD SPECIMEN: NORMAL
WHOLE BLOOD HOLD SPECIMEN: NORMAL

## 2021-06-03 PROCEDURE — 93005 ELECTROCARDIOGRAM TRACING: CPT

## 2021-06-03 PROCEDURE — 84484 ASSAY OF TROPONIN QUANT: CPT | Performed by: EMERGENCY MEDICINE

## 2021-06-03 PROCEDURE — 93005 ELECTROCARDIOGRAM TRACING: CPT | Performed by: EMERGENCY MEDICINE

## 2021-06-03 PROCEDURE — 71045 X-RAY EXAM CHEST 1 VIEW: CPT

## 2021-06-03 PROCEDURE — 85025 COMPLETE CBC W/AUTO DIFF WBC: CPT | Performed by: EMERGENCY MEDICINE

## 2021-06-03 PROCEDURE — 80053 COMPREHEN METABOLIC PANEL: CPT | Performed by: EMERGENCY MEDICINE

## 2021-06-03 PROCEDURE — 85379 FIBRIN DEGRADATION QUANT: CPT | Performed by: EMERGENCY MEDICINE

## 2021-06-03 PROCEDURE — 71275 CT ANGIOGRAPHY CHEST: CPT

## 2021-06-03 PROCEDURE — 93010 ELECTROCARDIOGRAM REPORT: CPT | Performed by: INTERNAL MEDICINE

## 2021-06-03 PROCEDURE — 99284 EMERGENCY DEPT VISIT MOD MDM: CPT

## 2021-06-03 PROCEDURE — 0 IOPAMIDOL PER 1 ML: Performed by: EMERGENCY MEDICINE

## 2021-06-03 RX ORDER — SODIUM CHLORIDE 0.9 % (FLUSH) 0.9 %
10 SYRINGE (ML) INJECTION AS NEEDED
Status: DISCONTINUED | OUTPATIENT
Start: 2021-06-03 | End: 2021-06-04 | Stop reason: HOSPADM

## 2021-06-03 RX ORDER — ASPIRIN 81 MG/1
324 TABLET, CHEWABLE ORAL ONCE
Status: COMPLETED | OUTPATIENT
Start: 2021-06-03 | End: 2021-06-03

## 2021-06-03 RX ADMIN — IOPAMIDOL 85 ML: 755 INJECTION, SOLUTION INTRAVENOUS at 22:07

## 2021-06-03 RX ADMIN — ASPIRIN 324 MG: 81 TABLET, CHEWABLE ORAL at 20:15

## 2021-06-03 NOTE — ED TRIAGE NOTES
Pt to ED via PV with steady gait to ED triage desk. Pt c/o chest pain that is substernal and that she describes at 3/10 pressure. Pt reports that the pain started last night and has been intermittent in nature. Pt reports that the pain is worse with eating. Pt denies shortness of breath but does report feeling fatigued. Pt reports she has a hx of GERD but that this pain feels different.     This RN in appropriate PPE for all patient care interactions. Pt masked and redirected for proper mask use when necessary. Hand hygiene performed before and after all patient care interactions.

## 2021-06-03 NOTE — ED NOTES
I wore full protective equipment throughout this patient encounter including a face mask, goggles, and gloves. Hand hygiene was performed before donning protective equipment and after removal when leaving the room.       Kellee Estevez RN  06/03/21 [id number]

## 2021-06-04 ENCOUNTER — TELEPHONE (OUTPATIENT)
Dept: CARDIOLOGY | Facility: CLINIC | Age: 66
End: 2021-06-04

## 2021-06-04 LAB — QT INTERVAL: 431 MS

## 2021-06-04 NOTE — TELEPHONE ENCOUNTER
Ms. Sutton called again about getting a doppler. She wants you to know that she isn't wearing a boot anymore.  Continues to deny redness or edema.  She may not be able to answer the phone after 4pm but she said we could leave a voicemail.        Thank you,  Dilcia Jones RN  [facility]  Triage

## 2021-06-04 NOTE — ED PROVIDER NOTES
" EMERGENCY DEPARTMENT ENCOUNTER    Room Number:  13/13  Date of encounter:  6/3/2021  PCP: Benjamín Morocho MD  Historian: Patient     I used full protective equipment while examining this patient.  This includes face mask, gloves and protective eyewear.  I washed my hands before entering the room and immediately upon leaving the room.  Patient was wearing a surgical mask.      HPI:  Chief Complaint: Chest pain  A complete HPI/ROS/PMH/PSH/SH/FH are unobtainable due to: None    Context: Vesta Sutton is a 65 y.o. female who presents to the ED c/o intermittent substernal chest pain since last night.  Pain is described as a dullness and heaviness.  It last for few minutes.  Nothing makes it better or worse.  Pain is not related to exertion.  Pain is currently mild.  Has had intermittent palpitations.  Reports increased fatigue.  Denies nausea, vomiting, sweating, or shortness of breath.  States that earlier today her right arm \"felt funny\" but was not weak.  Denies recent illness, cough, fever, abdominal pain, or syncope.  Patient states she started taking Trintellix 1 week ago.  She has a history of hypertension, hyperlipidemia, and CAD.      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   • Screen for colon cancer 08/28/2019   • Chronic fatigue 09/15/2019   • Vitamin D deficiency 03/19/2020   • Overweight (BMI 25.0-29.9) 04/08/2021     Resolved Ambulatory Problems     Diagnosis " Date Noted   • Insomnia due to medical condition - LEATHA 2016   • Precordial pain 2016   • Palpitations 2017   • RUQ abdominal pain 05/10/2018   • Nausea and vomiting 05/10/2018     Past Medical History:   Diagnosis Date   • Anemia    • Anxiety and depression    • Appendiceal carcinoid tumor    • Arthritis    • Asthma    • Breast nodule    • Chest pain    • Coronary artery disease    • Diabetes mellitus (CMS/HCC)    • Disease of thyroid gland    • Dizziness    • Elevated cholesterol    • Environmental allergies    • Fatigue    • Fibromuscular hyperplasia of artery (CMS/HCC)    • GERD (gastroesophageal reflux disease)    • Headache    • Heart disease    • History of anesthesia complications    • Lumbar pain    • Malaise and fatigue    • Myalgia    • LEATHA (obstructive sleep apnea)    • RBBB (right bundle branch block)    • Retinal detachment    • Sleep apnea    • Vertigo          PAST SURGICAL HISTORY  Past Surgical History:   Procedure Laterality Date   • APPENDECTOMY     • BREAST BIOPSY Left      or    • BREAST CYST ASPIRATION Left      or    • CATARACT EXTRACTION, BILATERAL     •  SECTION     • COLONOSCOPY N/A 2019    Procedure: COLONOSCOPY to CECUM;  Surgeon: Damien Aleman MD;  Location: [facility] ENDOSCOPY;  Service: General   • CORNEA LACERATION REPAIR     • ENDOSCOPY N/A 2016    Z-line regular, 40 cm from the incisors, gastritis, erythematous duodenopathy   • ENDOSCOPY N/A 2018    Normal exophagus, Erythematous mucosa in the antrum, Normal examined duodenum-Dr. Devyn Stahl   • ENDOSCOPY N/A 2019    Procedure: ESOPHAGOGASTRODUODENOSCOPY with BX;  Surgeon: Damien Aleman MD;  Location: [facility] ENDOSCOPY;  Service: General   • ENDOSCOPY AND COLONOSCOPY N/A 2014    EGD with biopsy and colonoscopy, Mild gastritis, Normal colon, Repeat Colonoscopy in 5 years, Dr. Damien Aleman   • ENDOSCOPY AND COLONOSCOPY N/A 2008    EGD with  biopsy and colonoscopy-Dr. Damien Aleman   • URETEROCELE REPAIR     • US GUIDED FINE NEEDLE ASPIRATION  5/29/2019         FAMILY HISTORY  Family History   Problem Relation Age of Onset   • Heart failure Father    • Heart disease Father    • Diabetes Sister    • Heart disease Mother    • Heart disease Brother    • Colon polyps Brother          SOCIAL HISTORY  Social History     Socioeconomic History   • Marital status:      Spouse name: Not on file   • Number of children: Not on file   • Years of education: Not on file   • Highest education level: Not on file   Tobacco Use   • Smoking status: Never Smoker   • Smokeless tobacco: Never Used   • Tobacco comment: CAFFINE USE   Vaping Use   • Vaping Use: Never used   Substance and Sexual Activity   • Alcohol use: Yes     Comment: occasional use   • Drug use: No   • Sexual activity: Defer         ALLERGIES  Beta adrenergic blockers, Other, Sulfa antibiotics, Amlodipine, Hctz [hydrochlorothiazide], and Sulfamethoxazole-trimethoprim       REVIEW OF SYSTEMS  Review of Systems      All systems have been reviewed and are negative except as as discussed in the HPI    PHYSICAL EXAM    I have reviewed the triage vital signs and nursing notes.    ED Triage Vitals [06/03/21 1939]   Temp Heart Rate Resp BP SpO2   96.7 °F (35.9 °C) 98 14 152/98 97 %      Temp src Heart Rate Source Patient Position BP Location FiO2 (%)   Tympanic Monitor Standing Right arm --       Physical Exam  GENERAL: Awake, alert, no acute distress  HENT: NCAT, nares patent, moist mucous membranes  NECK: supple  EYES: Extraocular muscles intact, no scleral icterus  CV: regular rhythm, regular rate, equal radial pulses bilaterally  RESPIRATORY: normal effort, clear to auscultation bilaterally  ABDOMEN: soft, nontender  MUSCULOSKELETAL: Mild tenderness over the sternum.  Extremities are nontender and without obvious deformity.  There is normal range of motion in all extremities.  There is no calf  tenderness or pedal edema  NEURO: Strength, sensation, and coordination are grossly intact.  Speech and mentation are unremarkable.  No facial droop.  SKIN: warm, dry, no rash  PSYCH: Normal mood and affect      LAB RESULTS  Recent Results (from the past 24 hour(s))   ECG 12 Lead    Collection Time: 06/03/21  7:46 PM   Result Value Ref Range    QT Interval 431 ms   Comprehensive Metabolic Panel    Collection Time: 06/03/21  7:55 PM    Specimen: Blood   Result Value Ref Range    Glucose 110 (H) 65 - 99 mg/dL    BUN 12 8 - 23 mg/dL    Creatinine 0.78 0.57 - 1.00 mg/dL    Sodium 133 (L) 136 - 145 mmol/L    Potassium 3.7 3.5 - 5.2 mmol/L    Chloride 99 98 - 107 mmol/L    CO2 26.2 22.0 - 29.0 mmol/L    Calcium 8.9 8.6 - 10.5 mg/dL    Total Protein 6.8 6.0 - 8.5 g/dL    Albumin 4.30 3.50 - 5.20 g/dL    ALT (SGPT) 24 1 - 33 U/L    AST (SGOT) 19 1 - 32 U/L    Alkaline Phosphatase 58 39 - 117 U/L    Total Bilirubin 0.3 0.0 - 1.2 mg/dL    eGFR Non African Amer 75 >60 mL/min/1.73    Globulin 2.5 gm/dL    A/G Ratio 1.7 g/dL    BUN/Creatinine Ratio 15.4 7.0 - 25.0    Anion Gap 7.8 5.0 - 15.0 mmol/L   Troponin    Collection Time: 06/03/21  7:55 PM    Specimen: Blood   Result Value Ref Range    Troponin T <0.010 0.000 - 0.030 ng/mL   Green Top (Gel)    Collection Time: 06/03/21  7:55 PM   Result Value Ref Range    Extra Tube Hold for add-ons.    Lavender Top    Collection Time: 06/03/21  7:55 PM   Result Value Ref Range    Extra Tube hold for add-on    Gold Top - SST    Collection Time: 06/03/21  7:55 PM   Result Value Ref Range    Extra Tube Hold for add-ons.    CBC Auto Differential    Collection Time: 06/03/21  7:55 PM    Specimen: Blood   Result Value Ref Range    WBC 6.95 3.40 - 10.80 10*3/mm3    RBC 3.86 3.77 - 5.28 10*6/mm3    Hemoglobin 12.4 12.0 - 15.9 g/dL    Hematocrit 36.0 34.0 - 46.6 %    MCV 93.3 79.0 - 97.0 fL    MCH 32.1 26.6 - 33.0 pg    MCHC 34.4 31.5 - 35.7 g/dL    RDW 12.7 12.3 - 15.4 %    RDW-SD 42.8 37.0 -  54.0 fl    MPV 9.6 6.0 - 12.0 fL    Platelets 199 140 - 450 10*3/mm3    Neutrophil % 70.6 42.7 - 76.0 %    Lymphocyte % 19.0 (L) 19.6 - 45.3 %    Monocyte % 7.9 5.0 - 12.0 %    Eosinophil % 1.2 0.3 - 6.2 %    Basophil % 1.0 0.0 - 1.5 %    Immature Grans % 0.3 0.0 - 0.5 %    Neutrophils, Absolute 4.91 1.70 - 7.00 10*3/mm3    Lymphocytes, Absolute 1.32 0.70 - 3.10 10*3/mm3    Monocytes, Absolute 0.55 0.10 - 0.90 10*3/mm3    Eosinophils, Absolute 0.08 0.00 - 0.40 10*3/mm3    Basophils, Absolute 0.07 0.00 - 0.20 10*3/mm3    Immature Grans, Absolute 0.02 0.00 - 0.05 10*3/mm3    nRBC 0.0 0.0 - 0.2 /100 WBC   Light Blue Top    Collection Time: 06/03/21  8:52 PM   Result Value Ref Range    Extra Tube hold for add-on    D-dimer, Quantitative    Collection Time: 06/03/21  8:52 PM    Specimen: Blood   Result Value Ref Range    D-Dimer, Quantitative 2.70 (H) 0.00 - 0.49 MCGFEU/mL   Troponin    Collection Time: 06/03/21  9:27 PM    Specimen: Blood   Result Value Ref Range    Troponin T <0.010 0.000 - 0.030 ng/mL       Ordered the above labs and independently reviewed the results.      RADIOLOGY  XR Chest 1 View    Result Date: 6/3/2021  XR CHEST 1 VW-  HISTORY: Female who is 65 years-old,  chest pain  TECHNIQUE: Frontal view of the chest  COMPARISON: None available  FINDINGS: Heart, mediastinum and pulmonary vasculature are unremarkable. No focal pulmonary consolidation, pleural effusion, or pneumothorax. No acute osseous process.      No evidence for acute pulmonary process. Follow-up as clinical indications persist.  This report was finalized on 6/3/2021 8:20 PM by Dr. Riky Todd M.D.      CT Angiogram Chest    Result Date: 6/3/2021  CT ANGIOGRAM OF THE CHEST  HISTORY: Chest pain and elevated d-dimer  COMPARISON: None available.  TECHNIQUE: Axial CT imaging was obtained through the thorax. IV contrast was administered. Three-D reformatted images were obtained.  FINDINGS: No acute pulmonary thromboembolus is seen. The  thoracic aorta is normal in caliber. There is no evidence of dissection. Coronary artery calcifications are noted. The thyroid gland, trachea, and esophagus appear unremarkable. There is no pleural or pericardial effusion. Mediastinal lymph nodes do not appear pathologically enlarged. There is mild bibasilar atelectasis versus scarring. There is a trace right pleural effusion. Images through the upper abdomen demonstrate a stable right adrenal nodule. This been unchanged since August 2020. Statistically, it is most likely an adenoma. No acute abnormalities are identified within the upper abdomen. No acute osseous abnormalities are seen.       1. No acute pulmonary embolus or evidence of dissection. 2. Bibasilar consolidation is favored to represent atelectasis or scarring. Trace right pleural effusion.  Radiation dose reduction techniques were utilized, including automated exposure control and exposure modulation based on body size.  This report was finalized on 6/3/2021 10:29 PM by Dr. Loulou Sampson M.D.        I ordered the above noted radiological studies. Reviewed by me and discussed with radiologist.  See dictation for official radiology interpretation.      PROCEDURES  Procedures      MEDICATIONS GIVEN IN ER    Medications   sodium chloride 0.9 % flush 10 mL (has no administration in time range)   aspirin chewable tablet 324 mg (324 mg Oral Given 6/3/21 2015)   iopamidol (ISOVUE-370) 76 % injection 100 mL (85 mL Intravenous Given 6/3/21 2207)         PROGRESS, DATA ANALYSIS, CONSULTS, AND MEDICAL DECISION MAKING    All labs have been independently reviewed by me.  All radiology studies have been reviewed by me and discussed with radiologist dictating the report.   EKG's independently viewed and interpreted by me.  I have reviewed the nurse's notes, vital signs, past medical history, and medication list.  Discussion below represents my analysis of pertinent findings related to patient's condition,  differential diagnosis, treatment plan and final disposition.      ED Course as of Jun 03 2257   Thu Jun 03, 2021 1954 Old records reviewed.  Stress echo done in May 2017 was normal.  Had a normal Holter monitor study in September 2020.  Patient last saw Dr. Paula, her cardiologist, in July 2020 for follow-up for hypertension, hyperlipidemia, palpitations, and RBBB.    [WH]   2005 EKG          EKG time: 1946  Rhythm/Rate: Sinus rhythm, rate 69  P waves and NH: Normal  QRS, axis: RBBB  ST and T waves: Nonspecific T wave changes in the anterior and inferior leads    Interpreted Contemporaneously by me at 1948, independently viewed  EKG is not changed compared to prior EKG done 7/30/2020      [WH]   2006 HEART SCORE:    History #0  (Highly suspicious 2, Moderately suspicious 1, Slightly or non-suspicious 0)    ECG #1  (Significant ST depression 2,  Nonspecific repol disturbance 1, Normal 0)    Age #1  (> or = 65 2, 46-65 1,  < or = 45 0)    Risk factors #2  (hypercholesterolemia, HTN, DM, smoking, pos fam hx, obesity)  (> or = to 3 RF 2, 1 or 2 1, No risk factors 0)    Troponin #0  (> or = 3x normal limit 2, 1-3x normal limit 1, < or = Normal limit 0)    This patient's HEART score is 4 (assuming troponin is negative)        [WH]   2040 Chest x-ray interpreted by the radiologist and independently viewed by me.  Chest x-ray is negative acute.    [WH]   2117 Will obtain a CTA of the chest.   D-Dimer, Quant(!): 2.70 [WH]   2121 Patient is resting comfortably.  Test results and plan for CTA of the chest were discussed with her.    [WH]   2200 Troponin T: <0.010 [WH]   2233 CTA of the chest interpreted by the radiologist.  Images independently viewed by me.  No PE.  No pneumonia.  Small right pleural effusion.  See full report for details.    [WH]   2243 Results discussed with the patient.  She is resting comfortably.  Vital signs are normal.  Chest pain has resolved.  Patient was advised to call Dr. Paula's office  tomorrow.    []   8264 Patient's chest pain was atypical.  Heart score was 4.  Troponin was negative x2.  EKG was unchanged.  D-dimer was elevated but CTA of the chest was negative for PE.      []      ED Course User Index  [] Brian Zambrano MD       AS OF 22:57 EDT VITALS:    BP - 136/75  HR - 60  TEMP - 96.7 °F (35.9 °C) (Tympanic)  O2 SATS - 98%      DIAGNOSIS  Final diagnoses:   Atypical chest pain   Elevated d-dimer   Essential hypertension   Hyperlipidemia, unspecified hyperlipidemia type  Palpitations         DISPOSITION  Discharge    DISCHARGE    Patient discharged in stable condition.    Reviewed implications of results, diagnosis, meds, responsibility to follow up, warning signs and symptoms of possible worsening, potential complications and reasons to return to ER, including worsening or persistent pain, shortness of breath, nausea, vomiting, sweating, dizziness, or other concern..    Patient/Family voiced understanding of above instructions.    Discussed plan for discharge, as there is no emergent indication for admission. Patient referred to primary care provider for BP management due to today's BP. Pt/family is agreeable and understands need for follow up and repeat testing.  Pt is aware that discharge does not mean that nothing is wrong but it indicates no emergency is present that requires admission and they must continue care with follow-up as given below or physician of their choice.     FOLLOW-UP  [facility] CARDIOLOGY  [address] [place] [id number] [phone]  Call in 1 day           Medication List      Changed    olmesartan 40 MG tablet  Commonly known as: BENICAR  Take 0.5 tablets by mouth 2 (Two) Times a Day.  What changed: when to take this              Dictated utilizing Dragon dictation:  Much of this encounter note is an electronic transcription/translation of spoken language to printed text. The electronic translation of spoken language may  permit erroneous, or at times, nonsensical words or phrases to be inadvertently transcribed; Although I have reviewed the note for such errors, some may still exist.     Brian Zambrano MD  06/03/21 [id number]

## 2021-06-04 NOTE — TELEPHONE ENCOUNTER
Hansel Rodriguez is calling in for advise.  She was seen in the ER last night for chest pain.  Her cardiac work up was normal, except for an elevated D-dimer.  Her chest pain has resolved, but she is concerned about the elevated D-dimer and stated she woke up this am and her leg is hurting her.    I have advised she call her PCP for further work up, to R/O DVT.    I didn't have a timely apt to offer her for her ER follow up.  When would you like her to follow up?  Thanks  Yojana Chavez RN  Triage nurse

## 2021-06-04 NOTE — TELEPHONE ENCOUNTER
Called requesting a lower extremity Doppler due to an elevated D-dimer that was found in the emergency room 6/3/2021.  She denies any swelling, heat, redness in her leg or calf.  She reports that she has some pain in the side of her leg that she has been dealing with for the past month or so on the right side.  She is seeing a podiatrist for plantar fasciitis and had to see a vascular surgeon in April for a long vein that hurts on the side of her leg.  She reports that they took a picture and wanted to do surgery.  She canceled that appointment.  We referred her to her PCP to have her D-dimer further evaluated.  She will follow-up with me on 6/10/2021.  Given that her symptoms do not sound typical for DVT and are not new, and have been worked up by vascular surgery I do not feel that is necessary to perform a Doppler at this time.  I have instructed her to go to the emergency room if the pain worsens or persists, or if she starts getting redness, heat, swelling in her leg.

## 2021-06-04 NOTE — TELEPHONE ENCOUNTER
Pt called back.  Cant get into see PCP until the 14th to fu on +d-dimer.    She wanted to know if you would order doppler studies to her legs?  Thanks  Yojana Chavez RN  Triage nurse

## 2021-06-04 NOTE — TELEPHONE ENCOUNTER
Spoke with Jennifer and she would like for this patient to be added on to her schedule  Next week.

## 2021-06-04 NOTE — TELEPHONE ENCOUNTER
Schedulers please add this pt to [facility] schedule for next week per her notes below.  This will be an ER follow up  Thanks  Yojana Chavez RN  Triage nurse

## 2021-06-04 NOTE — DISCHARGE INSTRUCTIONS
Call Dr. Paula's office tomorrow.  Return to the ED emergency department for worsening or persistent chest pain, shortness of breath, nausea, vomiting, sweating, or other concern.

## 2021-06-10 ENCOUNTER — OFFICE VISIT (OUTPATIENT)
Dept: CARDIOLOGY | Facility: CLINIC | Age: 66
End: 2021-06-10

## 2021-06-10 ENCOUNTER — TRANSCRIBE ORDERS (OUTPATIENT)
Dept: ADMINISTRATIVE | Facility: HOSPITAL | Age: 66
End: 2021-06-10

## 2021-06-10 ENCOUNTER — TELEPHONE (OUTPATIENT)
Dept: CARDIOLOGY | Facility: CLINIC | Age: 66
End: 2021-06-10

## 2021-06-10 ENCOUNTER — TELEPHONE (OUTPATIENT)
Dept: GASTROENTEROLOGY | Facility: CLINIC | Age: 66
End: 2021-06-10

## 2021-06-10 VITALS
BODY MASS INDEX: 28.09 KG/M2 | RESPIRATION RATE: 16 BRPM | HEIGHT: 67 IN | DIASTOLIC BLOOD PRESSURE: 80 MMHG | WEIGHT: 179 LBS | OXYGEN SATURATION: 98 % | SYSTOLIC BLOOD PRESSURE: 126 MMHG | HEART RATE: 69 BPM

## 2021-06-10 DIAGNOSIS — G47.33 OSA ON CPAP: ICD-10-CM

## 2021-06-10 DIAGNOSIS — R53.82 CHRONIC FATIGUE: ICD-10-CM

## 2021-06-10 DIAGNOSIS — R79.1 ABNORMAL COAGULATION PROFILE: Primary | ICD-10-CM

## 2021-06-10 DIAGNOSIS — Z99.89 OSA ON CPAP: ICD-10-CM

## 2021-06-10 DIAGNOSIS — I45.2 RBBB (RIGHT BUNDLE BRANCH BLOCK WITH LEFT ANTERIOR FASCICULAR BLOCK): ICD-10-CM

## 2021-06-10 DIAGNOSIS — E78.5 HYPERLIPIDEMIA, UNSPECIFIED HYPERLIPIDEMIA TYPE: ICD-10-CM

## 2021-06-10 DIAGNOSIS — I10 ESSENTIAL HYPERTENSION: Primary | ICD-10-CM

## 2021-06-10 DIAGNOSIS — I80.01 THROMBOPHLEBITIS OF SUPERFICIAL VEINS OF RIGHT LOWER EXTREMITY: ICD-10-CM

## 2021-06-10 PROCEDURE — 93000 ELECTROCARDIOGRAM COMPLETE: CPT | Performed by: NURSE PRACTITIONER

## 2021-06-10 PROCEDURE — 99214 OFFICE O/P EST MOD 30 MIN: CPT | Performed by: NURSE PRACTITIONER

## 2021-06-10 NOTE — PROGRESS NOTES
Date of Office Visit: 06/10/2021  Encounter Provider: DESI Darden  Place of Service: [facility] CARDIOLOGY  Patient Name: Vesta Sutton  :1955      Patient ID:  Vesta Sutton is a 65 y.o. female is here for followup for chest pain and elevated D Dimer.         History of Present Illness    She has a history of retinal eye detachment of the left eye, anxiety and stress, hypertension, hyperlipidemia, palpitations with a normal Holter recording, and lumbar degenerative disc disease with sciatic pain.    She also has a history of intermittent chest pain, but she has never had a myocardial infarction.  Her last echocardiogram in  was normal.  She had a carotid ultrasound at the same time that showed no stenosis but did show mild intimal thickening.  She had an abnormal stress study done in  showing an apical ischemia, but she had cardiac catheterization showing very mild left anterior descending artery stenosis.  She was admitted in  with chest pain and her stress study at that time showed no ischemia.    She had a 2D echo with Doppler 10/2014.  It was normal showed an ejection fraction of 66%.  She also had a carotid duplex done 10/2014 which was also normal.     She had a duplex of her right lower extremity in .  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  The rest looks okay.    She has been on Zoloft in the past which was causing chest pain so she stopped it.  She had significant GERD with Effexor.      She is on Crestor for hyperlipidemia.  Normal stress echo done in May 2017.     She had COVID-19 test 2020 that was negative.  She had labs with Dr. Morocho  2020 that revealed a normal TSH, normal T4, normal CMP and CBC.  Her hemoglobin A1c was 5.5, she had a normal vitamin D, total cholesterol 162, HDL 72, LDL 82, triglycerides 39.    She saw Dr. Paula 2020 she was having some  palpitations are made worse with after tach and was set up with a Holter monitor.  Blood pressure and cholesterol were well controlled.  She had a chronically abnormal EKG.  She was advised to stay away from salt and continue use of her CPAP.    Her Holter monitor from 9/3/2020 was normal.  Average heart rate 64, minimum heart rate 44 max heart rate 118.  Predominant rhythm was normal sinus rhythm.  Rare PACs, no evidence of atrial arrhythmias, no SVT, rare PVCs, no episodes of VT, and normal sinoatrial node conduction with no atrioventricular block.     She presented to the emergency room 6/30/2021 for intermittent substernal chest pain that started the night before.  It was described as a dullness and heaviness that lasted for a few minutes nothing made it better or worse.  It was nonexertional.  She was having intermittent palpitations and increased fatigue.  She stated that her arm felt funny but was not weak.  He had started try lytics 1 week ago.  Her EKG was stable.  It is chronically abnormal.  Her troponin was negative x2. Her CMP and CBC were both normal except for sodium of 133 on her CMP. Her chest x-ray was negative.  Her D-dimer was 2.70 she underwent CTA of the chest that was negative for PE but she did have a small right pleural effusion.  Her olmesartan was changed to 20 mg twice a day as her blood pressure was 152/98 in the emergency room.  He was sent home in stable condition and recommended to follow-up with our office.     She called the office on 6/4/2021 and reported that her chest pain had resolved. She woke up this morning and her leg was hurting.  She was concerned about her elevated D-dimer and we advised her to call her PCP for further work-up to rule out DVT.  She could not get into see her PCP until 6/14/2021 and was asking if we could order Doppler studies on her legs.  She was not having any lower extremity edema, heat, or redness.  Just a little bit of pain.  She reported that she  had some pain in the side of her leg that she been dealing with for the past month or so on the right side.  She sees a podiatrist for plantar fasciitis and had seen a vascular surgeon in April for a long vein that hurts on the side of her leg.  She reports they took a picture and wanted to do surgery, but she canceled that appointment.  Given that her symptoms were not new, had been evaluated by vascular surgery, and were not typical of DVT we did not proceed with a Doppler at this time.  I did advise her to follow-up with her vascular surgeons.    Follow-up.  She reports that she has not had any chest pain or pressure since the night in the emergency room, it has resolved.  She has stopped taking the Trintellix and is feeling better.  She has not had any dizziness, lightheadedness, syncope, or near syncope.  She gets short of breath when she goes up the stairs or has to walk up a hill, but otherwise she does not feel any shortness of breath.  She reports fatigue, it is noted that she has chronic fatigue and that it is only been at the end of the day when she has done a lot that day that she gets very tired.  She is able to complete all of her activities of daily living without shortness of breath or fatigue.  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  She has occasional palpitations but these are not new.  Her blood pressure is 126/80 today.  She does not have any lower extremity edema.  She does have some prominence of the superficial vasculature in her right leg.  The pain has not been accompanied by any redness, heat, or swelling.      Past Medical History:   Diagnosis Date   • Anemia    • Anxiety and depression    • Appendiceal carcinoid tumor    • Arthritis    • Asthma    • Breast nodule    • Chest pain    • Coronary artery disease    • Diabetes mellitus (CMS/HCC)     a1c only at 5, diet controlled right now   • Disease of thyroid gland    • Dizziness    • Elevated  cholesterol    • Environmental allergies    • Fatigue     in the evening   • Fibromuscular hyperplasia of artery (CMS/HCC)     in the carotid artery with no stenosis   • GERD (gastroesophageal reflux disease)    • Headache    • Heart disease    • History of anesthesia complications 2017    lidocaine caused reaction during dental procedure per patient   • Hyperlipidemia    • Hypertension    • Lumbar pain    • Malaise and fatigue    • Myalgia    • LEATHA (obstructive sleep apnea)    • Palpitations    • RBBB (right bundle branch block)    • Retinal detachment     left  //  when blood pressure gets high she notices flashing lights in her vision   • Sleep apnea     wears machine   • Vertigo          Past Surgical History:   Procedure Laterality Date   • APPENDECTOMY     • BREAST BIOPSY Left     2017 or 2018   • BREAST CYST ASPIRATION Left     2017 or 2018   • CATARACT EXTRACTION, BILATERAL     •  SECTION     • COLONOSCOPY N/A 2019    Procedure: COLONOSCOPY to CECUM;  Surgeon: Damien Aleman MD;  Location: [facility] ENDOSCOPY;  Service: General   • CORNEA LACERATION REPAIR     • ENDOSCOPY N/A 2016    Z-line regular, 40 cm from the incisors, gastritis, erythematous duodenopathy   • ENDOSCOPY N/A 2018    Normal exophagus, Erythematous mucosa in the antrum, Normal examined duodenum-Dr. Devyn Stahl   • ENDOSCOPY N/A 2019    Procedure: ESOPHAGOGASTRODUODENOSCOPY with BX;  Surgeon: Damien Aleman MD;  Location: [facility] ENDOSCOPY;  Service: General   • ENDOSCOPY AND COLONOSCOPY N/A 2014    EGD with biopsy and colonoscopy, Mild gastritis, Normal colon, Repeat Colonoscopy in 5 years, Dr. Damien Aleman   • ENDOSCOPY AND COLONOSCOPY N/A 2008    EGD with biopsy and colonoscopy-Dr. Damien Aleman   • URETEROCELE REPAIR     • US GUIDED FINE NEEDLE ASPIRATION  2019       Current Outpatient Medications on File Prior to Visit   Medication Sig Dispense Refill   • ALPRAZolam (XANAX)  0.5 MG tablet Take 0.5 mg by mouth 3 (Three) Times a Day.     • ALPRAZolam XR (XANAX XR) 0.5 MG 24 hr tablet Take 0.5 mg by mouth Every Morning.     • cetirizine (zyrTEC) 5 MG tablet Take 10 mg by mouth Daily As Needed.     • Cholecalciferol (VITAMIN D3) 5000 UNITS capsule capsule Take 5,000 Units by mouth Daily.     • docusate sodium (Colace) 100 MG capsule Colace 100 mg capsule   Take 1 capsule every day by oral route for 90 days.     • EUTHYROX 75 MCG tablet Take 1 tablet by mouth Daily. 30 tablet 6   • famotidine (PEPCID) 10 MG tablet Take 10 mg by mouth as needed.     • olmesartan (BENICAR) 40 MG tablet Take 0.5 tablets by mouth 2 (Two) Times a Day. (Patient taking differently: Take 20 mg by mouth 2 (Two) Times a Day.) 30 tablet 3   • Omega-3 Fatty Acids (OMEGA 3 PO) Take 1 tablet by mouth daily.     • pantoprazole (PROTONIX) 40 MG EC tablet Take 1 tablet by mouth Daily. 90 tablet 3   • rosuvastatin (CRESTOR) 10 MG tablet Take 1 tablet by mouth Daily.     • sucralfate (CARAFATE) 1 g tablet Take 1 tablet by mouth 4 (Four) Times a Day. Dissolve in 10 cc water 120 tablet 5   • traZODone (DESYREL) 150 MG tablet Take 150 mg by mouth Every Night.     • vilazodone (Viibryd) 20 MG tablet tablet 10 mg.     • vitamin B-12 (CYANOCOBALAMIN) 100 MCG tablet Take 50 mcg by mouth Daily.       No current facility-administered medications on file prior to visit.       Social History     Socioeconomic History   • Marital status:      Spouse name: Not on file   • Number of children: Not on file   • Years of education: Not on file   • Highest education level: Not on file   Tobacco Use   • Smoking status: Never Smoker   • Smokeless tobacco: Never Used   • Tobacco comment: CAFFINE USE   Vaping Use   • Vaping Use: Never used   Substance and Sexual Activity   • Alcohol use: Yes     Comment: occasional use   • Drug use: No   • Sexual activity: Defer           Review of Systems   Constitutional: Positive for malaise/fatigue.  Negative for chills, decreased appetite, diaphoresis and fever.   HENT: Negative for nosebleeds.    Eyes: Negative for blurred vision.   Cardiovascular: Positive for irregular heartbeat. Negative for chest pain, claudication, cyanosis, dyspnea on exertion, leg swelling, near-syncope, orthopnea, palpitations, paroxysmal nocturnal dyspnea and syncope.   Respiratory: Positive for shortness of breath. Negative for cough, hemoptysis, sleep disturbances due to breathing, snoring and wheezing.    Hematologic/Lymphatic: Negative for bleeding problem. Does not bruise/bleed easily.   Musculoskeletal: Negative for falls.   Gastrointestinal: Negative for heartburn.   Neurological: Negative for excessive daytime sleepiness, dizziness, headaches, light-headedness, numbness and weakness.       Procedures    ECG 12 Lead    Date/Time: 6/10/2021 11:20 AM  Performed by: Jennifer Gaming APRN  Authorized by: Jennifer Gaming APRN   Comparison: compared with previous ECG from 6/3/2021  Similar to previous ECG  Rhythm: sinus rhythm  Conduction: right bundle branch block and left anterior fascicular block    Clinical impression: abnormal EKG                Objective:    There were no vitals filed for this visit.  There is no height or weight on file to calculate BMI.    Vitals reviewed.   Constitutional:       Appearance: Normal and healthy appearance. Well-developed, well-groomed and not in distress.   Eyes:      Conjunctiva/sclera: Conjunctivae normal.   Neck:      Vascular: No carotid bruit. JVD normal.   Pulmonary:      Effort: Pulmonary effort is normal.      Breath sounds: Normal breath sounds.   Cardiovascular:      PMI at left midclavicular line. Normal rate. Regular rhythm.      Murmurs: There is no murmur.   Pulses:     Intact distal pulses.   Edema:     Peripheral edema absent.   Abdominal:      Palpations: Abdomen is soft.      Tenderness: There is no abdominal tenderness.   Skin:     General: Skin is warm and dry.    Neurological:      Mental Status: Alert and oriented to person, place and time.   Psychiatric:         Attention and Perception: Attention and perception normal.         Behavior: Behavior is cooperative.         Lab Review:       Assessment:      Diagnosis Plan   1. Essential hypertension     2. Hyperlipidemia, unspecified hyperlipidemia type     3. RBBB (right bundle branch block with left anterior fascicular block)     4. LEATHA on auto CPAP       1. Hypertension, goal less than 120/80.  Blood pressure stable.  2. Chronically abnormal electrocardiogram left anterior fascicular block and right bundle branch block.    3. Mild left anterior descending artery stenosis treated medically.  Nonischemic stress study done 2012 and 2017.  4. Palpitations made worse by atherotech.  Normal Holter monitor September 2021.  5. Upper lipidemia on Crestor.  Well-controlled.  6. Obstructive sleep apnea.  Uses CPAP.  7. History of appendiceal carcinoma.  8. History of asthma, stable.  9. Anxiety  10. Gastroesophageal reflux disease.  11. Lumbar degenerative disc disease with sciatic pain.     Plan:       Overall she looks good.  She has an appointment to follow-up with Dr. Morocho 6/14/2021.  I have asked her to follow-up with vascular surgery for further evaluation of her right lower extremity.  She requested a second opinion to have placed a referral again.  I will have her follow-up with Dr. Paula in 6 months to 1 year.

## 2021-06-10 NOTE — TELEPHONE ENCOUNTER
Patient is requesting to speak to Jennifer in regards to her visit today, patient states she saw her PCP after her visit with Jennifer today and she has a few things she would like to speak to Jennifer in regards to.  She can be reached at [phone]

## 2021-06-10 NOTE — TELEPHONE ENCOUNTER
----- Message from Devyn Stahl MD sent at 6/6/2021  5:55 PM EDT -----  Labs normal, keep us updated on response to PPI

## 2021-06-22 ENCOUNTER — TELEPHONE (OUTPATIENT)
Dept: GASTROENTEROLOGY | Facility: CLINIC | Age: 66
End: 2021-06-22

## 2021-06-22 NOTE — TELEPHONE ENCOUNTER
----- Message from Margo Lagos Rep sent at 6/21/2021 10:43 AM EDT -----  Regarding: Questions  Contact: [phone]  Pt is having issues and wants to speak to someone

## 2021-06-24 ENCOUNTER — APPOINTMENT (OUTPATIENT)
Dept: CARDIOLOGY | Facility: HOSPITAL | Age: 66
End: 2021-06-24

## 2021-06-24 NOTE — TELEPHONE ENCOUNTER
Returned patient's phone call. She states she is having reflux and right upper quadrant pain. Reports nausea and vomiting. Reports diminished appetite.   She states she ate greasy sausage and drank alcohol on an empty stomach.   Advised patient to avoid trigger foods and to keep her appointment.

## 2021-07-21 NOTE — PROGRESS NOTES
Subjective   Vesta Sutton is a 65 y.o. female.     F/u from dr Torers for  hypothyroidism,hyperlipidemia, vitamin d def, obesity, solitary thyroid nodule        Patient is a 65-year-old female came in for follow-up.  She is new to me.    She has multinodular thyroid with hypothyroidism.  She has no history of head or neck radiation therapy.  She had a fine-needle biopsy of the left dominant nodule in May 2019 which was benign.  She is on levothyroxine 75 mcg/day.  She has intermittent constipation.  She has no significant weight change since 2020.  TSH done in 2021 is low at 0.347 with elevated free T4 at 1.85 ng per DL.    She has hyperlipidemia and is on Crestor 10 mg/day.  She denies myalgia.  Lipid panel done in 2021 are as follows: Cholesterol 170.  Triglycerides 55.  HDL is 82.  LDL 77.    She has no history of diabetes mellitus.  Her 2 sisters have diabetes mellitus.  Hemoglobin A1c done in 2021 is mildly elevated at 5.8% with a fasting glucose of 93 mg per DL.  She denies urinary symptoms.    She is  4 para 4.  She went into her natural menopause at age 59.  She is not on estrogen replacement therapy.  She had a normal bone density in 2019.  She is on vitamin D 5000 units/day.  She does not exercise regularly.    She has sleep apnea and uses a CPAP regularly.    She has acid reflux disease which is controlled by pantoprazole 40 mg/day and Pepcid as needed.  She denies heartburn.  She follows with Dr. Stahl.    She has hypertension.  She has mild coronary disease by cardiac catheterization in .  She has no history of heart attack or stroke.  She is on olmesartan 20 mg twice daily.  She denies chest pain.  She follows with Dr. Paula.      The following portions of the patient's history were reviewed and updated as appropriate: allergies, current medications, past family history, past medical history, past social history, past surgical history and problem  "list.    Review of Systems   HENT: Negative.    Eyes:        No exophthalmos or lid lag.   Respiratory: Negative for shortness of breath.    Cardiovascular: Negative for chest pain and palpitations.   Gastrointestinal: Negative for constipation and diarrhea.   Endocrine: Negative.  Negative for cold intolerance and heat intolerance.   Genitourinary: Negative.    Musculoskeletal: Negative.  Negative for joint swelling.   Neurological: Negative for numbness.   Hematological: Negative for adenopathy. Does not bruise/bleed easily.     Objective      Vitals:    07/27/21 1006   BP: 130/82   BP Location: Right arm   Patient Position: Sitting   Cuff Size: Large Adult   Pulse: 61   SpO2: 98%   Weight: 81.1 kg (178 lb 12.8 oz)   Height: 168.9 cm (66.5\")     Physical Exam  Constitutional:       General: She is not in acute distress.     Appearance: Normal appearance. She is not ill-appearing, toxic-appearing or diaphoretic.   Eyes:      General: No scleral icterus.        Right eye: No discharge.         Left eye: No discharge.   Neck:      Vascular: No carotid bruit.   Cardiovascular:      Rate and Rhythm: Normal rate and regular rhythm.      Pulses: Normal pulses.      Heart sounds: Normal heart sounds. No murmur heard.   No friction rub. No gallop.    Pulmonary:      Effort: Pulmonary effort is normal. No respiratory distress.      Breath sounds: Normal breath sounds. No stridor. No wheezing or rales.   Chest:      Chest wall: No tenderness.   Abdominal:      General: Bowel sounds are normal. There is no distension.      Palpations: Abdomen is soft. There is no mass.      Tenderness: There is no abdominal tenderness. There is no right CVA tenderness or left CVA tenderness.   Musculoskeletal:         General: Normal range of motion.      Cervical back: Normal range of motion and neck supple. No rigidity or tenderness.   Lymphadenopathy:      Cervical: No cervical adenopathy.   Skin:     General: Skin is warm and dry.      " Coloration: Skin is not jaundiced or pale.   Neurological:      General: No focal deficit present.      Mental Status: She is alert and oriented to person, place, and time.   Psychiatric:         Mood and Affect: Mood normal.       Admission on 06/03/2021, Discharged on 06/03/2021   Component Date Value Ref Range Status   • QT Interval 06/03/2021 431  ms Final   • Glucose 06/03/2021 110* 65 - 99 mg/dL Final   • BUN 06/03/2021 12  8 - 23 mg/dL Final   • Creatinine 06/03/2021 0.78  0.57 - 1.00 mg/dL Final   • Sodium 06/03/2021 133* 136 - 145 mmol/L Final   • Potassium 06/03/2021 3.7  3.5 - 5.2 mmol/L Final    Slight hemolysis detected by analyzer. Results may be affected.   • Chloride 06/03/2021 99  98 - 107 mmol/L Final   • CO2 06/03/2021 26.2  22.0 - 29.0 mmol/L Final   • Calcium 06/03/2021 8.9  8.6 - 10.5 mg/dL Final   • Total Protein 06/03/2021 6.8  6.0 - 8.5 g/dL Final   • Albumin 06/03/2021 4.30  3.50 - 5.20 g/dL Final   • ALT (SGPT) 06/03/2021 24  1 - 33 U/L Final   • AST (SGOT) 06/03/2021 19  1 - 32 U/L Final    Slight hemolysis detected by analyzer. Results may be affected.   • Alkaline Phosphatase 06/03/2021 58  39 - 117 U/L Final   • Total Bilirubin 06/03/2021 0.3  0.0 - 1.2 mg/dL Final   • eGFR Non  Amer 06/03/2021 75  >60 mL/min/1.73 Final   • Globulin 06/03/2021 2.5  gm/dL Final   • A/G Ratio 06/03/2021 1.7  g/dL Final   • BUN/Creatinine Ratio 06/03/2021 15.4  7.0 - 25.0 Final   • Anion Gap 06/03/2021 7.8  5.0 - 15.0 mmol/L Final   • Troponin T 06/03/2021 <0.010  0.000 - 0.030 ng/mL Final   • Troponin T 06/03/2021 <0.010  0.000 - 0.030 ng/mL Final   • Extra Tube 06/03/2021 hold for add-on   Final    Auto resulted   • Extra Tube 06/03/2021 Hold for add-ons.   Final    Auto resulted.   • Extra Tube 06/03/2021 hold for add-on   Final    Auto resulted   • Extra Tube 06/03/2021 Hold for add-ons.   Final    Auto resulted.   • WBC 06/03/2021 6.95  3.40 - 10.80 10*3/mm3 Final   • RBC 06/03/2021 3.86  3.77  - 5.28 10*6/mm3 Final   • Hemoglobin 06/03/2021 12.4  12.0 - 15.9 g/dL Final   • Hematocrit 06/03/2021 36.0  34.0 - 46.6 % Final   • MCV 06/03/2021 93.3  79.0 - 97.0 fL Final   • MCH 06/03/2021 32.1  26.6 - 33.0 pg Final   • MCHC 06/03/2021 34.4  31.5 - 35.7 g/dL Final   • RDW 06/03/2021 12.7  12.3 - 15.4 % Final   • RDW-SD 06/03/2021 42.8  37.0 - 54.0 fl Final   • MPV 06/03/2021 9.6  6.0 - 12.0 fL Final   • Platelets 06/03/2021 199  140 - 450 10*3/mm3 Final   • Neutrophil % 06/03/2021 70.6  42.7 - 76.0 % Final   • Lymphocyte % 06/03/2021 19.0* 19.6 - 45.3 % Final   • Monocyte % 06/03/2021 7.9  5.0 - 12.0 % Final   • Eosinophil % 06/03/2021 1.2  0.3 - 6.2 % Final   • Basophil % 06/03/2021 1.0  0.0 - 1.5 % Final   • Immature Grans % 06/03/2021 0.3  0.0 - 0.5 % Final   • Neutrophils, Absolute 06/03/2021 4.91  1.70 - 7.00 10*3/mm3 Final   • Lymphocytes, Absolute 06/03/2021 1.32  0.70 - 3.10 10*3/mm3 Final   • Monocytes, Absolute 06/03/2021 0.55  0.10 - 0.90 10*3/mm3 Final   • Eosinophils, Absolute 06/03/2021 0.08  0.00 - 0.40 10*3/mm3 Final   • Basophils, Absolute 06/03/2021 0.07  0.00 - 0.20 10*3/mm3 Final   • Immature Grans, Absolute 06/03/2021 0.02  0.00 - 0.05 10*3/mm3 Final   • nRBC 06/03/2021 0.0  0.0 - 0.2 /100 WBC Final   • D-Dimer, Quantitative 06/03/2021 2.70* 0.00 - 0.49 MCGFEU/mL Final     Assessment/Plan   Diagnoses and all orders for this visit:    1. Acquired hypothyroidism (Primary)  -     TSH; Future  -     T4, Free; Future    2. Hyperlipidemia, unspecified hyperlipidemia type    3. Essential hypertension    4. LEATHA on auto CPAP    5. Vitamin D deficiency    6. Elevated hemoglobin A1c    7. Overweight (BMI 25.0-29.9)    Other orders  -     Euthyrox 75 MCG tablet; 1 tablet daily from Monday to Saturdays only.  Dispense: 30 tablet; Refill: 6      Decrease levothyroxine 75 mcg to 1 tablet on Mondays to Saturdays only.  No thyroid hormone on Sundays.  Repeat free T4 and TSH in 2 months.  Continue Crestor 10  mg/day and low-fat diet.  Continue olmesartan per cardiologist.  Continue CPAP.  Continue vitamin D3 5000 units/day.  Start regular walking exercise.    Copy my note sent to Dr. Morocho    RTCARLA 6 mos

## 2021-07-27 ENCOUNTER — OFFICE VISIT (OUTPATIENT)
Dept: ENDOCRINOLOGY | Age: 66
End: 2021-07-27

## 2021-07-27 VITALS
SYSTOLIC BLOOD PRESSURE: 130 MMHG | OXYGEN SATURATION: 98 % | WEIGHT: 178.8 LBS | HEART RATE: 61 BPM | DIASTOLIC BLOOD PRESSURE: 82 MMHG | HEIGHT: 66 IN | BODY MASS INDEX: 28.73 KG/M2

## 2021-07-27 DIAGNOSIS — E78.5 HYPERLIPIDEMIA, UNSPECIFIED HYPERLIPIDEMIA TYPE: ICD-10-CM

## 2021-07-27 DIAGNOSIS — R73.09 ELEVATED HEMOGLOBIN A1C: ICD-10-CM

## 2021-07-27 DIAGNOSIS — E55.9 VITAMIN D DEFICIENCY: ICD-10-CM

## 2021-07-27 DIAGNOSIS — E66.3 OVERWEIGHT (BMI 25.0-29.9): ICD-10-CM

## 2021-07-27 DIAGNOSIS — Z99.89 OSA ON CPAP: ICD-10-CM

## 2021-07-27 DIAGNOSIS — G47.33 OSA ON CPAP: ICD-10-CM

## 2021-07-27 DIAGNOSIS — I10 ESSENTIAL HYPERTENSION: ICD-10-CM

## 2021-07-27 DIAGNOSIS — E03.9 ACQUIRED HYPOTHYROIDISM: Primary | ICD-10-CM

## 2021-07-27 PROCEDURE — 99214 OFFICE O/P EST MOD 30 MIN: CPT | Performed by: INTERNAL MEDICINE

## 2021-07-27 RX ORDER — KETOCONAZOLE 20 MG/G
CREAM TOPICAL
COMMUNITY
Start: 2021-05-24 | End: 2021-12-29

## 2021-07-27 RX ORDER — LEVOTHYROXINE SODIUM 75 UG/1
TABLET ORAL
Qty: 30 TABLET | Refills: 6
Start: 2021-07-27 | End: 2022-02-07 | Stop reason: SDUPTHER

## 2021-07-29 ENCOUNTER — OFFICE VISIT (OUTPATIENT)
Dept: GASTROENTEROLOGY | Facility: CLINIC | Age: 66
End: 2021-07-29

## 2021-07-29 VITALS — TEMPERATURE: 98.2 F | BODY MASS INDEX: 28.16 KG/M2 | HEIGHT: 67 IN | WEIGHT: 179.4 LBS

## 2021-07-29 DIAGNOSIS — K21.9 GASTROESOPHAGEAL REFLUX DISEASE WITHOUT ESOPHAGITIS: Primary | ICD-10-CM

## 2021-07-29 PROCEDURE — 99213 OFFICE O/P EST LOW 20 MIN: CPT | Performed by: INTERNAL MEDICINE

## 2021-07-29 RX ORDER — ONDANSETRON 4 MG/1
4 TABLET, FILM COATED ORAL EVERY 8 HOURS PRN
Qty: 90 TABLET | Refills: 5 | Status: SHIPPED | OUTPATIENT
Start: 2021-07-29 | End: 2021-12-29

## 2021-07-29 NOTE — PROGRESS NOTES
Chief Complaint   Patient presents with   • Follow-up   • Heartburn   • Abdominal Pain       Vesta Sutton is a  65 y.o. female here for a follow up visit for reflux abdominal pain and nausea.    HPI     Patient 65-year-old female with history of asthma, anxiety, carcinoid of the appendix, hypertension and hyperlipidemia here for follow-up of her GERD symptoms.  EGD was last unremarkable but patient reports is completely unreliable about taking her medication.  Patient will forget her PPI and started developing symptoms sometimes severe especially if she eats fatty foods.  Patient will then try to treat her issues symptomatically with Pepcid or Carafate.  Patient denies any fever or chills does get some epigastric pain that feels like it radiates to her back at times.    Past Medical History:   Diagnosis Date   • Anemia    • Anxiety and depression    • Appendiceal carcinoid tumor    • Arthritis    • Asthma    • Breast nodule    • Cancer (CMS/HCC) Carcinoid of apendix   • Coronary artery disease    • Diabetes mellitus (CMS/HCC)     a1c only at 5, diet controlled right now   • Dizziness    • Environmental allergies    • Fatigue     in the evening   • Fibromuscular hyperplasia of artery (CMS/HCC)     in the carotid artery with no stenosis   • GERD (gastroesophageal reflux disease)    • Headache    • Heart disease    • Hernia in stomach   • History of anesthesia complications 2017    lidocaine caused reaction during dental procedure per patient   • Hyperlipidemia    • Hypertension    • Lumbar pain    • Malaise and fatigue    • Myalgia    • LEATHA (obstructive sleep apnea)    • Palpitations    • RBBB (right bundle branch block)    • Retinal detachment     left  //  when blood pressure gets high she notices flashing lights in her vision   • Sleep apnea     wears machine   • Vertigo          Current Outpatient Medications:   •  ALPRAZolam (XANAX) 0.5 MG tablet, Take 0.5 mg by mouth 3 (Three) Times a Day., Disp: , Rfl:   •   cetirizine (zyrTEC) 5 MG tablet, Take 10 mg by mouth Daily As Needed., Disp: , Rfl:   •  Cholecalciferol (VITAMIN D3) 5000 UNITS capsule capsule, Take 5,000 Units by mouth Daily., Disp: , Rfl:   •  ciclopirox (PENLAC) 8 % solution, , Disp: , Rfl:   •  docusate sodium (Colace) 100 MG capsule, Colace 100 mg capsule  Take 1 capsule every day by oral route for 90 days., Disp: , Rfl:   •  Euthyrox 75 MCG tablet, 1 tablet daily from Monday to Saturdays only., Disp: 30 tablet, Rfl: 6  •  famotidine (PEPCID) 10 MG tablet, Take 10 mg by mouth as needed., Disp: , Rfl:   •  ketoconazole (NIZORAL) 2 % cream, , Disp: , Rfl:   •  olmesartan (BENICAR) 40 MG tablet, Take 0.5 tablets by mouth 2 (Two) Times a Day. (Patient taking differently: Take 20 mg by mouth 2 (Two) Times a Day.), Disp: 30 tablet, Rfl: 3  •  Omega-3 Fatty Acids (OMEGA 3 PO), Take 1 tablet by mouth daily., Disp: , Rfl:   •  pantoprazole (PROTONIX) 40 MG EC tablet, Take 1 tablet by mouth Daily., Disp: 90 tablet, Rfl: 3  •  rosuvastatin (CRESTOR) 10 MG tablet, Take 1 tablet by mouth Daily., Disp: , Rfl:   •  sucralfate (CARAFATE) 1 g tablet, Take 1 tablet by mouth 4 (Four) Times a Day. Dissolve in 10 cc water (Patient taking differently: Take 1 g by mouth As Needed. Dissolve in 10 cc water), Disp: 120 tablet, Rfl: 5  •  traZODone (DESYREL) 150 MG tablet, Take 150 mg by mouth Every Night., Disp: , Rfl:   •  vitamin B-12 (CYANOCOBALAMIN) 100 MCG tablet, Take 50 mcg by mouth Daily., Disp: , Rfl:   •  ALPRAZolam XR (XANAX XR) 0.5 MG 24 hr tablet, Take 1 mg by mouth Every Night., Disp: , Rfl:   •  ondansetron (Zofran) 4 MG tablet, Take 1 tablet by mouth Every 8 (Eight) Hours As Needed for Nausea or Vomiting., Disp: 90 tablet, Rfl: 5    Allergies   Allergen Reactions   • Beta Adrenergic Blockers Other (See Comments)     Asthma     • Other Other (See Comments) and Dizziness     Septocaine   • Sulfa Antibiotics Hives   • Amlodipine Other (See Comments)     Hypotension    •  Hctz [Hydrochlorothiazide] Other (See Comments)     dehydration, stone in salivary gland, hypokalemia   • Sulfamethoxazole-Trimethoprim Unknown (See Comments)   • Viibryd [Vilazodone Hcl] Anxiety and Dizziness       Social History     Socioeconomic History   • Marital status:      Spouse name: Not on file   • Number of children: Not on file   • Years of education: Not on file   • Highest education level: Not on file   Tobacco Use   • Smoking status: Never Smoker   • Smokeless tobacco: Never Used   • Tobacco comment: CAFFINE USE   Vaping Use   • Vaping Use: Never used   Substance and Sexual Activity   • Alcohol use: Not Currently     Alcohol/week: 0.0 standard drinks     Comment: occasional use   • Drug use: No   • Sexual activity: Defer       Family History   Problem Relation Age of Onset   • Heart failure Father    • Heart disease Father    • Diabetes Sister    • Heart disease Mother    • Irritable bowel syndrome Mother    • Heart disease Brother    • Colon polyps Brother        Review of Systems   Constitutional: Negative.    Respiratory: Negative.    Cardiovascular: Negative.    Gastrointestinal: Negative.    Musculoskeletal: Negative.    Skin: Negative.    Hematological: Negative.        Vitals:    07/29/21 0933   Temp: 98.2 °F (36.8 °C)       Physical Exam  Vitals reviewed.   Constitutional:       Appearance: Normal appearance. She is well-developed.   HENT:      Head: Normocephalic and atraumatic.   Eyes:      General: No scleral icterus.     Pupils: Pupils are equal, round, and reactive to light.   Cardiovascular:      Rate and Rhythm: Normal rate and regular rhythm.      Heart sounds: Normal heart sounds.   Pulmonary:      Effort: Pulmonary effort is normal. No respiratory distress.      Breath sounds: Normal breath sounds.   Abdominal:      General: Bowel sounds are normal. There is no distension.      Palpations: Abdomen is soft. There is no mass.      Tenderness: There is no abdominal tenderness.       Hernia: No hernia is present.   Skin:     General: Skin is warm and dry.      Coloration: Skin is not jaundiced.      Findings: No rash.   Neurological:      General: No focal deficit present.      Mental Status: She is alert and oriented to person, place, and time.   Psychiatric:         Behavior: Behavior normal.         Thought Content: Thought content normal.         Judgment: Judgment normal.         Admission on 06/03/2021, Discharged on 06/03/2021   Component Date Value Ref Range Status   • QT Interval 06/03/2021 431  ms Final   • Glucose 06/03/2021 110* 65 - 99 mg/dL Final   • BUN 06/03/2021 12  8 - 23 mg/dL Final   • Creatinine 06/03/2021 0.78  0.57 - 1.00 mg/dL Final   • Sodium 06/03/2021 133* 136 - 145 mmol/L Final   • Potassium 06/03/2021 3.7  3.5 - 5.2 mmol/L Final   • Chloride 06/03/2021 99  98 - 107 mmol/L Final   • CO2 06/03/2021 26.2  22.0 - 29.0 mmol/L Final   • Calcium 06/03/2021 8.9  8.6 - 10.5 mg/dL Final   • Total Protein 06/03/2021 6.8  6.0 - 8.5 g/dL Final   • Albumin 06/03/2021 4.30  3.50 - 5.20 g/dL Final   • ALT (SGPT) 06/03/2021 24  1 - 33 U/L Final   • AST (SGOT) 06/03/2021 19  1 - 32 U/L Final   • Alkaline Phosphatase 06/03/2021 58  39 - 117 U/L Final   • Total Bilirubin 06/03/2021 0.3  0.0 - 1.2 mg/dL Final   • eGFR Non  Amer 06/03/2021 75  >60 mL/min/1.73 Final   • Globulin 06/03/2021 2.5  gm/dL Final   • A/G Ratio 06/03/2021 1.7  g/dL Final   • BUN/Creatinine Ratio 06/03/2021 15.4  7.0 - 25.0 Final   • Anion Gap 06/03/2021 7.8  5.0 - 15.0 mmol/L Final   • Troponin T 06/03/2021 <0.010  0.000 - 0.030 ng/mL Final   • Troponin T 06/03/2021 <0.010  0.000 - 0.030 ng/mL Final   • Extra Tube 06/03/2021 hold for add-on   Final   • Extra Tube 06/03/2021 Hold for add-ons.   Final   • Extra Tube 06/03/2021 hold for add-on   Final   • Extra Tube 06/03/2021 Hold for add-ons.   Final   • WBC 06/03/2021 6.95  3.40 - 10.80 10*3/mm3 Final   • RBC 06/03/2021 3.86  3.77 - 5.28 10*6/mm3 Final    • Hemoglobin 06/03/2021 12.4  12.0 - 15.9 g/dL Final   • Hematocrit 06/03/2021 36.0  34.0 - 46.6 % Final   • MCV 06/03/2021 93.3  79.0 - 97.0 fL Final   • MCH 06/03/2021 32.1  26.6 - 33.0 pg Final   • MCHC 06/03/2021 34.4  31.5 - 35.7 g/dL Final   • RDW 06/03/2021 12.7  12.3 - 15.4 % Final   • RDW-SD 06/03/2021 42.8  37.0 - 54.0 fl Final   • MPV 06/03/2021 9.6  6.0 - 12.0 fL Final   • Platelets 06/03/2021 199  140 - 450 10*3/mm3 Final   • Neutrophil % 06/03/2021 70.6  42.7 - 76.0 % Final   • Lymphocyte % 06/03/2021 19.0* 19.6 - 45.3 % Final   • Monocyte % 06/03/2021 7.9  5.0 - 12.0 % Final   • Eosinophil % 06/03/2021 1.2  0.3 - 6.2 % Final   • Basophil % 06/03/2021 1.0  0.0 - 1.5 % Final   • Immature Grans % 06/03/2021 0.3  0.0 - 0.5 % Final   • Neutrophils, Absolute 06/03/2021 4.91  1.70 - 7.00 10*3/mm3 Final   • Lymphocytes, Absolute 06/03/2021 1.32  0.70 - 3.10 10*3/mm3 Final   • Monocytes, Absolute 06/03/2021 0.55  0.10 - 0.90 10*3/mm3 Final   • Eosinophils, Absolute 06/03/2021 0.08  0.00 - 0.40 10*3/mm3 Final   • Basophils, Absolute 06/03/2021 0.07  0.00 - 0.20 10*3/mm3 Final   • Immature Grans, Absolute 06/03/2021 0.02  0.00 - 0.05 10*3/mm3 Final   • nRBC 06/03/2021 0.0  0.0 - 0.2 /100 WBC Final   • D-Dimer, Quantitative 06/03/2021 2.70* 0.00 - 0.49 MCGFEU/mL Final       Diagnoses and all orders for this visit:    1. Gastroesophageal reflux disease without esophagitis (Primary)    Other orders  -     ondansetron (Zofran) 4 MG tablet; Take 1 tablet by mouth Every 8 (Eight) Hours As Needed for Nausea or Vomiting.  Dispense: 90 tablet; Refill: 5      Patient 65-year-old female with recurrent reflux issues with heartburn nausea.  Patient reports constant breakthrough but also reports continuously forgets to take her medicine.  When symptoms get bad she will take a Pepcid where she will take her Carafate with some relief but sometimes she will not take her proton pump inhibitor and get such severe heartburn or  reflux she is uncomfortable for several days afterwards.  Taking medication for symptom control after the fact.  Explained to patient the idea of trying to preempt the issue by controlling the acid before she has symptoms.  Patient also instructed to put some of her medication in her pocketbook so she runs out forgetting to take her pills at least she can take them a little late instead of forgetting them all day.  We will follow-up clinically.

## 2021-09-13 ENCOUNTER — LAB (OUTPATIENT)
Dept: ENDOCRINOLOGY | Age: 66
End: 2021-09-13

## 2021-09-13 DIAGNOSIS — E03.9 ACQUIRED HYPOTHYROIDISM: ICD-10-CM

## 2021-09-14 LAB
T4 FREE SERPL-MCNC: 1.36 NG/DL (ref 0.93–1.7)
TSH SERPL DL<=0.005 MIU/L-ACNC: 0.89 UIU/ML (ref 0.27–4.2)

## 2021-09-16 NOTE — PROGRESS NOTES
Normal thyroid function tests.Continue levothyroxine 75 mcg 1 tablet on Mondays to Saturdays only.  Copy of labs sent to Dr. Morocho and to patient through my chart.

## 2021-10-04 ENCOUNTER — TRANSCRIBE ORDERS (OUTPATIENT)
Dept: ADMINISTRATIVE | Facility: HOSPITAL | Age: 66
End: 2021-10-04

## 2021-10-04 DIAGNOSIS — Z12.31 VISIT FOR SCREENING MAMMOGRAM: Primary | ICD-10-CM

## 2021-10-04 DIAGNOSIS — Z78.0 MENOPAUSE: ICD-10-CM

## 2021-10-07 ENCOUNTER — OFFICE VISIT (OUTPATIENT)
Dept: GASTROENTEROLOGY | Facility: CLINIC | Age: 66
End: 2021-10-07

## 2021-10-07 VITALS — BODY MASS INDEX: 28.77 KG/M2 | TEMPERATURE: 96.6 F | HEIGHT: 66 IN | WEIGHT: 179 LBS

## 2021-10-07 DIAGNOSIS — R10.30 LOWER ABDOMINAL PAIN: ICD-10-CM

## 2021-10-07 DIAGNOSIS — K62.5 RECTAL BLEEDING: ICD-10-CM

## 2021-10-07 DIAGNOSIS — R19.4 CHANGE IN BOWEL HABITS: Primary | ICD-10-CM

## 2021-10-07 DIAGNOSIS — R11.0 NAUSEA: ICD-10-CM

## 2021-10-07 DIAGNOSIS — R10.13 EPIGASTRIC PAIN: ICD-10-CM

## 2021-10-07 DIAGNOSIS — D3A.020 CARCINOID TUMOR OF APPENDIX, UNSPECIFIED WHETHER MALIGNANT: ICD-10-CM

## 2021-10-07 DIAGNOSIS — K59.09 OTHER CONSTIPATION: ICD-10-CM

## 2021-10-07 PROCEDURE — 99214 OFFICE O/P EST MOD 30 MIN: CPT | Performed by: NURSE PRACTITIONER

## 2021-10-07 RX ORDER — METAXALONE 800 MG/1
800 TABLET ORAL 3 TIMES DAILY PRN
COMMUNITY
Start: 2021-09-02 | End: 2021-12-29

## 2021-10-07 RX ORDER — PRUCALOPRIDE 2 MG/1
1 TABLET, FILM COATED ORAL DAILY
Qty: 14 TABLET | Refills: 0 | Status: SHIPPED | OUTPATIENT
Start: 2021-10-07 | End: 2021-10-21

## 2021-10-07 NOTE — PROGRESS NOTES
Chief Complaint   Patient presents with   • Abdominal Pain       HPI    Vesta Sutton is a  65 y.o. female here for a follow up visit for abdominal pain and rectal bleeding.    This patient follows with Dr. Stahl, new to me.    Last seen by Dr. Stahl over the summer for GERD symptoms.  Her last EGD was unremarkable but patient reported frequently taking her medication.    On visit today patient has multiple GI complaints. She reports epigastric pain with nausea as well as lower abdominal pain and change in bowel habits.  Denies acid reflux or heartburn.  She reports change in bowel habits.  She been having episodes of constipation. Occasional BRBPR.  Patient infrequently taking Protonix.  Takes Carafate as needed.    Her last Cscope was 2019 w/ Dr. Luna showing  diverticulosis.      This patient has a personal history of appendiceal cancer.    Past Medical History:   Diagnosis Date   • Anemia    • Anxiety and depression    • Appendiceal carcinoid tumor    • Arthritis    • Asthma    • Breast nodule    • Cancer (HCC) Carcinoid of apendix   • Coronary artery disease    • Diabetes mellitus (HCC)     a1c only at 5, diet controlled right now   • Dizziness    • Environmental allergies    • Fatigue     in the evening   • Fibromuscular hyperplasia of artery (HCC)     in the carotid artery with no stenosis   • GERD (gastroesophageal reflux disease)    • Headache    • Heart disease    • Hernia in stomach   • History of anesthesia complications 2017    lidocaine caused reaction during dental procedure per patient   • Hyperlipidemia    • Hypertension    • Lumbar pain    • Malaise and fatigue    • Myalgia    • LEATHA (obstructive sleep apnea)    • Palpitations    • RBBB (right bundle branch block)    • Retinal detachment     left  //  when blood pressure gets high she notices flashing lights in her vision   • Sleep apnea     wears machine   • Vertigo        Past Surgical History:   Procedure Laterality Date   • APPENDECTOMY      • BREAST BIOPSY Left      or 2018   • BREAST CYST ASPIRATION Left      or 2018   • CATARACT EXTRACTION, BILATERAL     •  SECTION     • COLONOSCOPY N/A 2019    Procedure: COLONOSCOPY to CECUM;  Surgeon: Damien Aleman MD;  Location: [facility] ENDOSCOPY;  Service: General   • CORNEA LACERATION REPAIR     • ENDOSCOPY N/A 2016    Z-line regular, 40 cm from the incisors, gastritis, erythematous duodenopathy   • ENDOSCOPY N/A 2018    Normal exophagus, Erythematous mucosa in the antrum, Normal examined duodenum-Dr. Devyn Stahl   • ENDOSCOPY N/A 2019    Procedure: ESOPHAGOGASTRODUODENOSCOPY with BX;  Surgeon: Damien Aleman MD;  Location: [facility] ENDOSCOPY;  Service: General   • ENDOSCOPY AND COLONOSCOPY N/A 2014    EGD with biopsy and colonoscopy, Mild gastritis, Normal colon, Repeat Colonoscopy in 5 years, Dr. Damien Aleman   • ENDOSCOPY AND COLONOSCOPY N/A 2008    EGD with biopsy and colonoscopy-Dr. Damien Aleman   • UPPER GASTROINTESTINAL ENDOSCOPY  Cannot remember   • URETEROCELE REPAIR     • US GUIDED FINE NEEDLE ASPIRATION  2019       Scheduled Meds:     Continuous Infusions: No current facility-administered medications for this visit.      PRN Meds:     Allergies   Allergen Reactions   • Beta Adrenergic Blockers Other (See Comments)     Asthma     • Other Other (See Comments) and Dizziness     Septocaine   • Amlodipine Other (See Comments)     Hypotension    • Hctz [Hydrochlorothiazide] Other (See Comments)     dehydration, stone in salivary gland, hypokalemia   • Sulfa Antibiotics Hives   • Sulfamethoxazole-Trimethoprim Unknown (See Comments)   • Viibryd [Vilazodone Hcl] Anxiety and Dizziness       Social History     Socioeconomic History   • Marital status:    Tobacco Use   • Smoking status: Never Smoker   • Smokeless tobacco: Never Used   • Tobacco comment: CAFFINE USE   Vaping Use   • Vaping Use: Never used   Substance and Sexual  Activity   • Alcohol use: Not Currently     Alcohol/week: 0.0 standard drinks     Comment: occasional use   • Drug use: No   • Sexual activity: Defer       Family History   Problem Relation Age of Onset   • Heart failure Father    • Heart disease Father    • Diabetes Sister    • Heart disease Mother    • Irritable bowel syndrome Mother    • Heart disease Brother    • Colon polyps Brother        Review of Systems   Constitutional: Negative for activity change, appetite change, fatigue, fever and unexpected weight change.   HENT: Negative for trouble swallowing.    Respiratory: Negative for apnea, cough, choking, chest tightness, shortness of breath and wheezing.    Cardiovascular: Negative for chest pain, palpitations and leg swelling.   Gastrointestinal: Positive for abdominal pain, constipation and nausea. Negative for abdominal distention, anal bleeding, blood in stool, diarrhea, rectal pain and vomiting.       Vitals:    10/07/21 1404   Temp: 96.6 °F (35.9 °C)       Physical Exam  Constitutional:       Appearance: She is well-developed.   Abdominal:      General: Bowel sounds are normal. There is no distension.      Palpations: Abdomen is soft. There is no mass.      Tenderness: There is abdominal tenderness. There is no guarding.      Hernia: No hernia is present.          Comments: Tenderness marked in the area above.    Skin:     General: Skin is warm and dry.      Capillary Refill: Capillary refill takes less than 2 seconds.   Neurological:      Mental Status: She is alert and oriented to person, place, and time.   Psychiatric:         Behavior: Behavior normal.     Assessment    Diagnoses and all orders for this visit:    1. Change in bowel habits (Primary)  -     Case Request; Standing  -     Case Request  -     CBC & Differential  -     Comprehensive Metabolic Panel  -     Celiac Comprehensive Panel  -     TSH  -     CT Abdomen Pelvis With Contrast; Future    2. Other constipation  -     Case Request;  Standing  -     Case Request  -     CT Abdomen Pelvis With Contrast; Future    3. Rectal bleeding  -     Case Request; Standing  -     Case Request  -     CT Abdomen Pelvis With Contrast; Future    4. Epigastric pain  -     Case Request; Standing  -     Case Request  -     CT Abdomen Pelvis With Contrast; Future    5. Nausea  -     Case Request; Standing  -     Case Request  -     CT Abdomen Pelvis With Contrast; Future    6. Lower abdominal pain  -     CT Abdomen Pelvis With Contrast; Future    7. Carcinoid tumor of appendix, unspecified whether malignant  Overview:  Added automatically from request for surgery [id number]    Orders:  -     Case Request; Standing  -     Case Request    Other orders  -     Prucalopride Succinate (Motegrity) 2 MG tablet; Take 1 tablet by mouth Daily for 14 days.  Dispense: 14 tablet; Refill: 0  -     TSH   Plan    Pleasant 65-year-old female seen today with a number of GI complaints.  Regarding lower abdominal pain, change in bowel habits, epigastric pain, nausea recommend CT of the abdomen and pelvis with IV and oral contrast to rule out abnormalities it could be contributing such as diverticulitis, peptic ulcer disease, and evaluate her biliary tree.  Schedule bidirectional endoscopic evaluation which will be done by Dr. Willard as Dr. Stahl will be on medical leave.  Trial of Motegrity, samples provided.  Labs as above.  Further recommendations and follow-up pending the aforementioned work-up.           DESI Husain  [facility]  [address] [place] [address]  Office: [phone]    .diag

## 2021-10-08 ENCOUNTER — TELEPHONE (OUTPATIENT)
Dept: GASTROENTEROLOGY | Facility: CLINIC | Age: 66
End: 2021-10-08

## 2021-10-08 LAB
ALBUMIN SERPL-MCNC: 4.8 G/DL (ref 3.5–5.2)
ALBUMIN/GLOB SERPL: 2 G/DL
ALP SERPL-CCNC: 63 U/L (ref 39–117)
ALT SERPL-CCNC: 26 U/L (ref 1–33)
AST SERPL-CCNC: 20 U/L (ref 1–32)
BASOPHILS # BLD AUTO: 0.09 10*3/MM3 (ref 0–0.2)
BASOPHILS NFR BLD AUTO: 1.3 % (ref 0–1.5)
BILIRUB SERPL-MCNC: 0.3 MG/DL (ref 0–1.2)
BUN SERPL-MCNC: 15 MG/DL (ref 8–23)
BUN/CREAT SERPL: 16.5 (ref 7–25)
CALCIUM SERPL-MCNC: 9.8 MG/DL (ref 8.6–10.5)
CHLORIDE SERPL-SCNC: 102 MMOL/L (ref 98–107)
CO2 SERPL-SCNC: 26.9 MMOL/L (ref 22–29)
CREAT SERPL-MCNC: 0.91 MG/DL (ref 0.57–1)
ENDOMYSIUM IGA SER QL: NEGATIVE
EOSINOPHIL # BLD AUTO: 0.05 10*3/MM3 (ref 0–0.4)
EOSINOPHIL NFR BLD AUTO: 0.7 % (ref 0.3–6.2)
ERYTHROCYTE [DISTWIDTH] IN BLOOD BY AUTOMATED COUNT: 12.1 % (ref 12.3–15.4)
GLIADIN PEPTIDE IGA SER-ACNC: 34 UNITS (ref 0–19)
GLIADIN PEPTIDE IGG SER-ACNC: 1 UNITS (ref 0–19)
GLOBULIN SER CALC-MCNC: 2.4 GM/DL
GLUCOSE SERPL-MCNC: 60 MG/DL (ref 65–99)
HCT VFR BLD AUTO: 39 % (ref 34–46.6)
HGB BLD-MCNC: 12.8 G/DL (ref 12–15.9)
IGA SERPL-MCNC: 218 MG/DL (ref 87–352)
IMM GRANULOCYTES # BLD AUTO: 0.02 10*3/MM3 (ref 0–0.05)
IMM GRANULOCYTES NFR BLD AUTO: 0.3 % (ref 0–0.5)
LYMPHOCYTES # BLD AUTO: 1.22 10*3/MM3 (ref 0.7–3.1)
LYMPHOCYTES NFR BLD AUTO: 17.6 % (ref 19.6–45.3)
MCH RBC QN AUTO: 31.3 PG (ref 26.6–33)
MCHC RBC AUTO-ENTMCNC: 32.8 G/DL (ref 31.5–35.7)
MCV RBC AUTO: 95.4 FL (ref 79–97)
MONOCYTES # BLD AUTO: 0.51 10*3/MM3 (ref 0.1–0.9)
MONOCYTES NFR BLD AUTO: 7.3 % (ref 5–12)
NEUTROPHILS # BLD AUTO: 5.05 10*3/MM3 (ref 1.7–7)
NEUTROPHILS NFR BLD AUTO: 72.8 % (ref 42.7–76)
NRBC BLD AUTO-RTO: 0.1 /100 WBC (ref 0–0.2)
PLATELET # BLD AUTO: 230 10*3/MM3 (ref 140–450)
POTASSIUM SERPL-SCNC: 4.6 MMOL/L (ref 3.5–5.2)
PROT SERPL-MCNC: 7.2 G/DL (ref 6–8.5)
RBC # BLD AUTO: 4.09 10*6/MM3 (ref 3.77–5.28)
SODIUM SERPL-SCNC: 143 MMOL/L (ref 136–145)
TSH SERPL DL<=0.005 MIU/L-ACNC: 0.45 UIU/ML (ref 0.27–4.2)
TTG IGA SER-ACNC: <2 U/ML (ref 0–3)
TTG IGG SER-ACNC: <2 U/ML (ref 0–5)
WBC # BLD AUTO: 6.94 10*3/MM3 (ref 3.4–10.8)

## 2021-10-08 NOTE — PROGRESS NOTES
Please inform the patient that her lab work shows no anemia.  Normal kidney and liver function.  Her blood sugar is 60 but could be related to possibly not eating prior to labs.  Follow-up with PCP about that.  Normal thyroid.  Await imaging.

## 2021-10-08 NOTE — TELEPHONE ENCOUNTER
----- Message from DESI Husain sent at 10/8/2021 10:06 AM EDT -----  Please inform the patient that her lab work shows no anemia.  Normal kidney and liver function.  Her blood sugar is 60 but could be related to possibly not eating prior to labs.  Follow-up with PCP about that.  Normal thyroid.  Await imaging.

## 2021-10-09 NOTE — PROGRESS NOTES
Let her know that celiac panel shows a positive allergy marker. This is concerning for possible celiac disease. We will make a definitive dx after the scopes but don't change diet yet as his will skew our work up.

## 2021-10-11 ENCOUNTER — TELEPHONE (OUTPATIENT)
Dept: GASTROENTEROLOGY | Facility: CLINIC | Age: 66
End: 2021-10-11

## 2021-10-11 NOTE — TELEPHONE ENCOUNTER
----- Message from DESI Husain sent at 10/9/2021 10:14 AM EDT -----  Let her know that celiac panel shows a positive allergy marker. This is concerning for possible celiac disease. We will make a definitive dx after the scopes but don't change diet yet as his will skew our work up.

## 2021-10-28 ENCOUNTER — TELEPHONE (OUTPATIENT)
Dept: GASTROENTEROLOGY | Facility: CLINIC | Age: 66
End: 2021-10-28

## 2021-10-28 NOTE — TELEPHONE ENCOUNTER
Called pt, she is having severe epigastric pain, states she is having a EGD/colonoscopy on 11/11.  States she has not been consistent with taking the pantoprazole bc she has to take levothyroxine also and cannot always get her medications coordinated.  Advised pt to take levothyroxine first, wait 30 minutes and then take pantoprazole before eating.  Pt states she has carafate at home and is asking if she can take it also. Advised pt she can take carafate up to QID.  Also, advised if reflux is bad at night she can take pepcid she has on hand.  Pt verbalized understanding. Advised will send a msg to Marissa MIRELES for further recommendations.

## 2021-11-08 ENCOUNTER — LAB REQUISITION (OUTPATIENT)
Dept: LAB | Facility: HOSPITAL | Age: 66
End: 2021-11-08

## 2021-11-08 DIAGNOSIS — Z00.00 ENCOUNTER FOR GENERAL ADULT MEDICAL EXAMINATION WITHOUT ABNORMAL FINDINGS: ICD-10-CM

## 2021-11-08 PROCEDURE — U0004 COV-19 TEST NON-CDC HGH THRU: HCPCS | Performed by: INTERNAL MEDICINE

## 2021-11-09 LAB — SARS-COV-2 ORF1AB RESP QL NAA+PROBE: NOT DETECTED

## 2021-11-11 ENCOUNTER — APPOINTMENT (OUTPATIENT)
Dept: GENERAL RADIOLOGY | Facility: HOSPITAL | Age: 66
End: 2021-11-11

## 2021-11-11 ENCOUNTER — HOSPITAL ENCOUNTER (EMERGENCY)
Facility: HOSPITAL | Age: 66
Discharge: LEFT BEFORE DISCHARGE | End: 2021-11-11

## 2021-11-11 ENCOUNTER — OUTSIDE FACILITY SERVICE (OUTPATIENT)
Dept: GASTROENTEROLOGY | Facility: CLINIC | Age: 66
End: 2021-11-11

## 2021-11-11 VITALS
TEMPERATURE: 98.3 F | HEART RATE: 77 BPM | BODY MASS INDEX: 28.45 KG/M2 | RESPIRATION RATE: 16 BRPM | OXYGEN SATURATION: 98 % | WEIGHT: 177 LBS | HEIGHT: 66 IN | DIASTOLIC BLOOD PRESSURE: 102 MMHG | SYSTOLIC BLOOD PRESSURE: 178 MMHG

## 2021-11-11 PROCEDURE — 43239 EGD BIOPSY SINGLE/MULTIPLE: CPT | Performed by: INTERNAL MEDICINE

## 2021-11-11 PROCEDURE — 93005 ELECTROCARDIOGRAM TRACING: CPT

## 2021-11-11 PROCEDURE — 45378 DIAGNOSTIC COLONOSCOPY: CPT | Performed by: INTERNAL MEDICINE

## 2021-11-11 PROCEDURE — 99211 OFF/OP EST MAY X REQ PHY/QHP: CPT

## 2021-11-11 PROCEDURE — 93010 ELECTROCARDIOGRAM REPORT: CPT | Performed by: INTERNAL MEDICINE

## 2021-11-11 RX ORDER — SODIUM CHLORIDE 0.9 % (FLUSH) 0.9 %
10 SYRINGE (ML) INJECTION AS NEEDED
Status: DISCONTINUED | OUTPATIENT
Start: 2021-11-11 | End: 2021-11-11 | Stop reason: HOSPADM

## 2021-11-12 ENCOUNTER — OFFICE VISIT (OUTPATIENT)
Dept: GASTROENTEROLOGY | Facility: CLINIC | Age: 66
End: 2021-11-12

## 2021-11-12 ENCOUNTER — TELEPHONE (OUTPATIENT)
Dept: GASTROENTEROLOGY | Facility: CLINIC | Age: 66
End: 2021-11-12

## 2021-11-12 VITALS — WEIGHT: 176.8 LBS | HEIGHT: 66 IN | TEMPERATURE: 96.6 F | BODY MASS INDEX: 28.42 KG/M2

## 2021-11-12 DIAGNOSIS — R11.0 NAUSEA: ICD-10-CM

## 2021-11-12 DIAGNOSIS — K21.9 GASTROESOPHAGEAL REFLUX DISEASE WITHOUT ESOPHAGITIS: ICD-10-CM

## 2021-11-12 DIAGNOSIS — R19.06 EPIGASTRIC FULLNESS: ICD-10-CM

## 2021-11-12 DIAGNOSIS — R10.11 RUQ PAIN: Primary | ICD-10-CM

## 2021-11-12 DIAGNOSIS — R10.13 EPIGASTRIC PAIN: ICD-10-CM

## 2021-11-12 LAB — QT INTERVAL: 461 MS

## 2021-11-12 PROCEDURE — 99214 OFFICE O/P EST MOD 30 MIN: CPT | Performed by: PHYSICIAN ASSISTANT

## 2021-11-12 RX ORDER — PANTOPRAZOLE SODIUM 40 MG/1
40 TABLET, DELAYED RELEASE ORAL 2 TIMES DAILY
Qty: 180 TABLET | Refills: 1 | Status: SHIPPED | OUTPATIENT
Start: 2021-11-12 | End: 2022-10-13 | Stop reason: SDUPTHER

## 2021-11-12 RX ORDER — SUCRALFATE 1 G/1
1 TABLET ORAL 4 TIMES DAILY
Qty: 120 TABLET | Refills: 5 | Status: SHIPPED | OUTPATIENT
Start: 2021-11-12 | End: 2022-02-07

## 2021-11-12 NOTE — TELEPHONE ENCOUNTER
Patient called. Advised RG Garcia will see the patient at 12 noon. She verb understanding and will be here. Update to Radha.

## 2021-11-12 NOTE — TELEPHONE ENCOUNTER
----- Message from Margo Haider sent at 11/12/2021  9:47 AM EST -----  Regarding: Pain  Contact: [phone]  Pain after the procedure, went to the emergency room last night but did not get seen.  She needs to speak to someone about this.  Worried.

## 2021-11-12 NOTE — ED TRIAGE NOTES
"Pt comes to the ED tonight via PV.    Pt states \"Colonoscopy and endoscopy this morning I did fine with the bowel prep, and now I am having severe abdominal pain. And the Dr told me to come in for maybe a puncture.\"  "

## 2021-11-12 NOTE — TELEPHONE ENCOUNTER
Per Dr. Willard:   Office visit Marissa overbook at this afternoon thank you   If Marissa will not overbook I totally understand please send her back to the emergency room, I am covering the hospital this weekend and I am happy to see her.

## 2021-11-12 NOTE — TELEPHONE ENCOUNTER
"Returned patient's phone call.   She state she has an EGD and colonoscopy yesterday. She developed right upper quadrant pain and went to the ER for an evaluation. She states the ER was extremely busy and she left without being seen.     She states this morning she eat jello and \"felt it come back up to the back of her throat\". Denies vomiting. She continues to complain of the right upper quadrant pain. When asked if it was as bad as yesterday, the patient states it comes and goes. States she is concerned because she will leave for [place] on Monday and does not want to be sick when she is down there. Update to Dr. Willard.   "

## 2021-11-12 NOTE — PROGRESS NOTES
"Chief Complaint  Abdominal Pain, Heartburn, and Nausea    Subjective          History of Present Illness    Vesta Sutton is a  66 y.o. female presents for acute work in due to abdominal pain that started after her bidirectional endoscopies on 11/11/2021.  She was undergoing endoscopies due to abdominal pain and rectal bleeding.  She is a patient of Dr. Stahl however Dr. Willard did her endoscopies.    She presents today acutely with abdominal pain, reflux, nausea.   She had bidirectional endoscopies yesterday which were unremarkable for source of pain (see below).    She did have a celiac panel on 10/7/2021 that showed elevated gliadin Deamidated peptide antibody IgA at 34.  We are waiting pathology results from the EGD.    She did call the office on 10/28/2021 before her endoscopies and reports severe epigastric pain.  She also reported epigastric pain with nausea on her 10/11/21 office visit.  She was instructed to take her pantoprazole twice daily and start Carafate 4 times daily.    In addition she reported lower abdominal pain and change in bowel habits including episodes of constipation at her October office visit with Marissa.  After some discussion it appears the pain is similar to what she has had prior to the endoscopies.  She does report sensitivity to the scopic biopsies in the past and feels that she may have been feeling the biopsies.    She had EGD with Dr. Willard on 11/11/2021 which showed normal upper endoscopy.  We are pending pathology at this time.  Colonoscopy on same date showed normal ileum, nonbleeding internal hemorrhoids, diverticulosis in the sigmoid and descending colon, otherwise normal mucosa.  Recall recommended in 10 years.  No biopsies collected.    She has history of appendiceal cancer    Objective   Vital Signs:   Temp 96.6 °F (35.9 °C)   Ht 167.6 cm (66\")   Wt 80.2 kg (176 lb 12.8 oz)   BMI 28.54 kg/m²       Physical Exam  Vitals reviewed.   Constitutional:       General: She " is awake. She is not in acute distress.     Appearance: Normal appearance. She is well-developed and well-groomed.   HENT:      Head: Normocephalic and atraumatic.      Mouth/Throat:      Mouth: Mucous membranes are moist.   Cardiovascular:      Rate and Rhythm: Normal rate and regular rhythm.      Heart sounds: Normal heart sounds.   Pulmonary:      Effort: Pulmonary effort is normal. No respiratory distress.      Breath sounds: Normal breath sounds.   Abdominal:      General: Abdomen is flat. Bowel sounds are normal. There is no distension.      Palpations: Abdomen is soft. There is no mass.      Tenderness: There is abdominal tenderness in the right upper quadrant and epigastric area. There is no guarding or rebound.   Skin:     General: Skin is warm and dry.      Coloration: Skin is not pale.   Neurological:      Mental Status: She is alert and oriented to person, place, and time.      Gait: Gait normal.   Psychiatric:         Mood and Affect: Mood and affect normal.         Speech: Speech normal.         Behavior: Behavior is cooperative.         Judgment: Judgment normal.          Result Review :             Assessment and Plan    Diagnoses and all orders for this visit:    1. RUQ pain (Primary)    2. Epigastric pain    3. Epigastric fullness    4. Gastroesophageal reflux disease without esophagitis    5. Nausea    Other orders  -     ultra-purified peppermint oil (IBGARD) 90 mg capsule capsule; Take 2 capsules by mouth 2 (Two) Times a Day Before Meals.  Dispense: 60 capsule; Refill: 0  -     pantoprazole (PROTONIX) 40 MG EC tablet; Take 1 tablet by mouth 2 (Two) Times a Day.  Dispense: 180 tablet; Refill: 1  -     sucralfate (CARAFATE) 1 g tablet; Take 1 tablet by mouth 4 (Four) Times a Day. Dissolve in 10 cc water  Dispense: 120 tablet; Refill: 5    Start IBGard prior to meals for the pain.   Increase pantoprazole to BID.  Start Carafate as needed.    Keep appt for CT scan on 11/29 and appointment with  DESI Jay on 12/2/21    Consider gallbladder work up vs gastroparesis if biopsy is unremarkable and no response to medication.      Follow Up   Return for Next scheduled follow up with Marissa on 12/2. And schedule with Dr. Willard next available. .     I spent 30 minutes caring for Ms. Sutton  on this date of service. This time includes time spent by me in the following activities: preparing for the visit, reviewing tests, obtaining and/or reviewing a separately obtained history, performing a medically appropriate examination and/or evaluation , counseling and educating the patient/family/caregiver, ordering medications, tests, or procedures, documenting information in the medical record, independently interpreting results and communicating that information with the patient/family/caregiver and care coordination.    Dragon dictation used throughout this note.     Radha Victoria PA-C

## 2021-11-18 ENCOUNTER — APPOINTMENT (OUTPATIENT)
Dept: CT IMAGING | Facility: HOSPITAL | Age: 66
End: 2021-11-18

## 2021-11-19 ENCOUNTER — TELEPHONE (OUTPATIENT)
Dept: GASTROENTEROLOGY | Facility: CLINIC | Age: 66
End: 2021-11-19

## 2021-11-19 NOTE — TELEPHONE ENCOUNTER
----- Message from Blu Willard MD sent at 11/19/2021 10:54 AM EST -----  Pathology benignContinue IBjeffrydAppointment 12/2/2021 with Marissa already scheduledColonoscopy recall 10 years

## 2021-11-19 NOTE — TELEPHONE ENCOUNTER
Called pt and advised of Dr. Willard's note.  Pt verb understanding.  Pt placed in 10 year c/s recall.

## 2021-11-29 ENCOUNTER — APPOINTMENT (OUTPATIENT)
Dept: CT IMAGING | Facility: HOSPITAL | Age: 66
End: 2021-11-29

## 2021-12-02 ENCOUNTER — HOSPITAL ENCOUNTER (OUTPATIENT)
Dept: CT IMAGING | Facility: HOSPITAL | Age: 66
Discharge: HOME | End: 2021-12-02
Admitting: NURSE PRACTITIONER

## 2021-12-02 DIAGNOSIS — R10.13 EPIGASTRIC PAIN: ICD-10-CM

## 2021-12-02 DIAGNOSIS — R19.4 CHANGE IN BOWEL HABITS: ICD-10-CM

## 2021-12-02 DIAGNOSIS — K59.09 OTHER CONSTIPATION: ICD-10-CM

## 2021-12-02 DIAGNOSIS — K62.5 RECTAL BLEEDING: ICD-10-CM

## 2021-12-02 DIAGNOSIS — R11.0 NAUSEA: ICD-10-CM

## 2021-12-02 DIAGNOSIS — R10.30 LOWER ABDOMINAL PAIN: ICD-10-CM

## 2021-12-02 LAB — CREAT BLDA-MCNC: 0.8 MG/DL (ref 0.6–1.3)

## 2021-12-02 PROCEDURE — 74177 CT ABD & PELVIS W/CONTRAST: CPT

## 2021-12-02 PROCEDURE — 0 DIATRIZOATE MEGLUMINE & SODIUM PER 1 ML: Performed by: NURSE PRACTITIONER

## 2021-12-02 PROCEDURE — 25010000002 IOPAMIDOL 61 % SOLUTION: Performed by: NURSE PRACTITIONER

## 2021-12-02 PROCEDURE — 82565 ASSAY OF CREATININE: CPT

## 2021-12-02 RX ADMIN — IOPAMIDOL 85 ML: 612 INJECTION, SOLUTION INTRAVENOUS at 08:21

## 2021-12-02 RX ADMIN — DIATRIZOATE MEGLUMINE AND DIATRIZOATE SODIUM 30 ML: 600; 100 SOLUTION ORAL; RECTAL at 07:25

## 2021-12-02 NOTE — PROGRESS NOTES
Please inform the patient that her CT shows a moderate amount of stool within the colon.  Stable right add renal adenoma and a stable splenic artery aneurysm stable since 2014.    Nodular appearing density in the right lung could be scar tissue or fluid but recommendations are for a CT of the chest.  Please touch base with primary care provider for non-GI findings.      Keep follow-up.

## 2021-12-03 ENCOUNTER — TELEPHONE (OUTPATIENT)
Dept: GASTROENTEROLOGY | Facility: CLINIC | Age: 66
End: 2021-12-03

## 2021-12-03 NOTE — TELEPHONE ENCOUNTER
----- Message from DESI Husain sent at 12/2/2021 12:22 PM EST -----  Please inform the patient that her CT shows a moderate amount of stool within the colon.  Stable right add renal adenoma and a stable splenic artery aneurysm stable since 2014.    Nodular appearing density in the right lung could be scar tissue or fluid but recommendations are for a CT of the chest.  Please touch base with primary care provider for non-GI findings.      Keep follow-up.

## 2021-12-15 ENCOUNTER — TELEPHONE (OUTPATIENT)
Dept: GASTROENTEROLOGY | Facility: CLINIC | Age: 66
End: 2021-12-15

## 2021-12-15 NOTE — TELEPHONE ENCOUNTER
Notification patient had not read her my chart:  Marissa has sign off on your results and the following is from her:     Please inform the patient that her CT shows a moderate amount of stool within the colon.  Stable right add renal adenoma and a stable splenic artery aneurysm stable since 2014.     Nodular appearing density in the right lung could be scar tissue or fluid but recommendations are for a CT of the chest.  Please touch base with primary care provider for non-GI findings.       Keep follow-up with Dr. Willard on 01/31/22.     If you have any questions, please respond to this e-mail.

## 2021-12-23 ENCOUNTER — OFFICE VISIT (OUTPATIENT)
Dept: GASTROENTEROLOGY | Facility: CLINIC | Age: 66
End: 2021-12-23

## 2021-12-23 VITALS — BODY MASS INDEX: 28.9 KG/M2 | TEMPERATURE: 97.7 F | WEIGHT: 179.8 LBS | HEIGHT: 66 IN

## 2021-12-23 DIAGNOSIS — R10.13 EPIGASTRIC PAIN: Primary | ICD-10-CM

## 2021-12-23 DIAGNOSIS — R19.06 EPIGASTRIC FULLNESS: ICD-10-CM

## 2021-12-23 DIAGNOSIS — R11.0 NAUSEA: ICD-10-CM

## 2021-12-23 PROCEDURE — 99214 OFFICE O/P EST MOD 30 MIN: CPT | Performed by: NURSE PRACTITIONER

## 2021-12-23 NOTE — PROGRESS NOTES
Chief Complaint   Patient presents with   • Abdominal Pain       HPI    Vesta Sutton is a  66 y.o. female here with a history of appendiceal cancer for a follow up visit for abdominal pain.    This patient follows with Dr. Stahl and myself.    Patient underwent bidirectional anoscopic examination which are view dated 11/11/2021:    EGD w/ normal.   Cscope w/ normal ileum, nonbleeding terminal hemorrhoids, diverticulosis.  Recall 10 years.    On visit today she still complains of indigestion, epigastric fullness, and epigastric discomfort. She does admit she had 4 slices of pizza last night. She intermittently follows an antireflux diet. Bowels are better. She is on a high-fiber supplement. She is off Carafate. She stopped taking PPI therapy several weeks ago. No vomiting. No rectal bleeding.    CT A/P 12/2/2021 reviewed:    IMPRESSION:  1. Moderate amount of stool in the colon.  2. Stable right adrenal adenoma.  3. Stable 8 mm to 10 mm splenic artery aneurysm.  4. Subpleural somewhat nodular-appearing density in the posterior right  lung base may represent small area of scar or peripheral atelectasis.  The right lung base has cleared somewhat since the previous study of  06/03/2021. Suggest CT of the chest without contrast in 6 months to  assess further resolution or stability of this finding.    Patient has since followed up with her PCP to discuss non-GI findings on imaging.    Past Medical History:   Diagnosis Date   • Anemia    • Anxiety and depression    • Appendiceal carcinoid tumor    • Arthritis    • Asthma    • Breast nodule    • Cancer (HCC) Carcinoid of apendix   • Coronary artery disease    • Diabetes mellitus (HCC)     a1c only at 5, diet controlled right now   • Dizziness    • Environmental allergies    • Fatigue     in the evening   • Fibromuscular hyperplasia of artery (HCC)     in the carotid artery with no stenosis   • GERD (gastroesophageal reflux disease)    • Headache    • Heart disease    •  Hernia in stomach   • History of anesthesia complications     lidocaine caused reaction during dental procedure per patient   • Hyperlipidemia    • Hypertension    • Lumbar pain    • Malaise and fatigue    • Myalgia    • LEATHA (obstructive sleep apnea)    • Palpitations    • RBBB (right bundle branch block)    • Retinal detachment     left  //  when blood pressure gets high she notices flashing lights in her vision   • Sleep apnea     wears machine   • Vertigo        Past Surgical History:   Procedure Laterality Date   • APPENDECTOMY     • BREAST BIOPSY Left      or 2018   • BREAST CYST ASPIRATION Left      or 2018   • CATARACT EXTRACTION, BILATERAL     •  SECTION     • COLONOSCOPY N/A 2019    Procedure: COLONOSCOPY to CECUM;  Surgeon: Damien Aleman MD;  Location: [facility] ENDOSCOPY;  Service: General   • CORNEA LACERATION REPAIR     • ENDOSCOPY N/A 2016    Z-line regular, 40 cm from the incisors, gastritis, erythematous duodenopathy   • ENDOSCOPY N/A 2018    Normal exophagus, Erythematous mucosa in the antrum, Normal examined duodenum-Dr. Devyn Stahl   • ENDOSCOPY N/A 2019    Procedure: ESOPHAGOGASTRODUODENOSCOPY with BX;  Surgeon: Damien Aleman MD;  Location: [facility] ENDOSCOPY;  Service: General   • ENDOSCOPY AND COLONOSCOPY N/A 2014    EGD with biopsy and colonoscopy, Mild gastritis, Normal colon, Repeat Colonoscopy in 5 years, Dr. Damien Aleman   • ENDOSCOPY AND COLONOSCOPY N/A 2008    EGD with biopsy and colonoscopy-Dr. Damien Aleman   • UPPER GASTROINTESTINAL ENDOSCOPY  Cannot remember   • URETEROCELE REPAIR     • US GUIDED FINE NEEDLE ASPIRATION  2019       Scheduled Meds:     Continuous Infusions: No current facility-administered medications for this visit.      PRN Meds:     Allergies   Allergen Reactions   • Beta Adrenergic Blockers Other (See Comments)     Asthma     • Other Other (See Comments) and Dizziness     Septocaine   •  Amlodipine Other (See Comments)     Hypotension    • Hctz [Hydrochlorothiazide] Other (See Comments)     dehydration, stone in salivary gland, hypokalemia   • Sulfa Antibiotics Hives   • Sulfamethoxazole-Trimethoprim Unknown (See Comments)   • Viibryd [Vilazodone Hcl] Anxiety and Dizziness       Social History     Socioeconomic History   • Marital status:    Tobacco Use   • Smoking status: Never Smoker   • Smokeless tobacco: Never Used   • Tobacco comment: CAFFINE USE   Vaping Use   • Vaping Use: Never used   Substance and Sexual Activity   • Alcohol use: Not Currently     Alcohol/week: 0.0 standard drinks     Comment: occasional use   • Drug use: No   • Sexual activity: Defer       Family History   Problem Relation Age of Onset   • Heart failure Father    • Heart disease Father    • Diabetes Sister    • Heart disease Mother    • Irritable bowel syndrome Mother    • Heart disease Brother    • Colon polyps Brother        Review of Systems   Constitutional: Negative for activity change, appetite change, fatigue, fever and unexpected weight change.   HENT: Negative for trouble swallowing.    Respiratory: Negative for apnea, cough, choking, chest tightness, shortness of breath and wheezing.    Cardiovascular: Negative for chest pain, palpitations and leg swelling.   Gastrointestinal: Positive for abdominal pain and nausea. Negative for abdominal distention, anal bleeding, blood in stool, constipation, diarrhea, rectal pain and vomiting.       Vitals:    12/23/21 1117   Temp: 97.7 °F (36.5 °C)       Physical Exam  Constitutional:       Appearance: She is well-developed.   Abdominal:      General: Bowel sounds are normal. There is no distension.      Palpations: Abdomen is soft. There is no mass.      Tenderness: There is no abdominal tenderness. There is no guarding.      Hernia: No hernia is present.   Skin:     General: Skin is warm and dry.      Capillary Refill: Capillary refill takes less than 2 seconds.    Neurological:      Mental Status: She is alert and oriented to person, place, and time.   Psychiatric:         Behavior: Behavior normal.     Assessment    Diagnoses and all orders for this visit:    1. Epigastric pain (Primary)  -     NM HIDA Scan With Pharmacological Intervention; Future    2. Epigastric fullness  -     NM HIDA Scan With Pharmacological Intervention; Future    3. Nausea  -     NM HIDA Scan With Pharmacological Intervention; Future       Plan    Pleasant 66-year-old female seen today in follow-up status post bidirectional to scopic examination and CT A/P. Results of procedures, pathology, and imaging reviewed the patient in great detail. She still struggle with epigastric discomfort, fullness, and nausea does admit to dietary indiscretions and is currently off of PPI therapy which is probably contributing.    At this point reinforced and reiterated antireflux diet.  Resume Protonix 40 mg once daily.  Arrange HIDA scan to rule out dyskinesia as EGD was normal.    Further recommendations and follow-up pending the aforementioned work-up.         DESI Husain  [facility]  [address] [place] [address]  Office: [phone]

## 2021-12-28 ENCOUNTER — APPOINTMENT (OUTPATIENT)
Dept: GENERAL RADIOLOGY | Facility: HOSPITAL | Age: 66
End: 2021-12-28

## 2021-12-28 LAB
ALBUMIN SERPL-MCNC: 4.7 G/DL (ref 3.5–5.2)
ALBUMIN/GLOB SERPL: 1.9 G/DL
ALP SERPL-CCNC: 62 U/L (ref 39–117)
ALT SERPL W P-5'-P-CCNC: 30 U/L (ref 1–33)
ANION GAP SERPL CALCULATED.3IONS-SCNC: 7.1 MMOL/L (ref 5–15)
AST SERPL-CCNC: 21 U/L (ref 1–32)
BASOPHILS # BLD AUTO: 0.08 10*3/MM3 (ref 0–0.2)
BASOPHILS NFR BLD AUTO: 1.3 % (ref 0–1.5)
BILIRUB SERPL-MCNC: 0.4 MG/DL (ref 0–1.2)
BUN SERPL-MCNC: 11 MG/DL (ref 8–23)
BUN/CREAT SERPL: 13.6 (ref 7–25)
CALCIUM SPEC-SCNC: 9.8 MG/DL (ref 8.6–10.5)
CHLORIDE SERPL-SCNC: 96 MMOL/L (ref 98–107)
CO2 SERPL-SCNC: 30.9 MMOL/L (ref 22–29)
CREAT SERPL-MCNC: 0.81 MG/DL (ref 0.57–1)
DEPRECATED RDW RBC AUTO: 42.7 FL (ref 37–54)
EOSINOPHIL # BLD AUTO: 0.08 10*3/MM3 (ref 0–0.4)
EOSINOPHIL NFR BLD AUTO: 1.3 % (ref 0.3–6.2)
ERYTHROCYTE [DISTWIDTH] IN BLOOD BY AUTOMATED COUNT: 12.3 % (ref 12.3–15.4)
GFR SERPL CREATININE-BSD FRML MDRD: 71 ML/MIN/1.73
GLOBULIN UR ELPH-MCNC: 2.5 GM/DL
GLUCOSE SERPL-MCNC: 97 MG/DL (ref 65–99)
HCT VFR BLD AUTO: 38.8 % (ref 34–46.6)
HGB BLD-MCNC: 12.7 G/DL (ref 12–15.9)
HOLD SPECIMEN: NORMAL
HOLD SPECIMEN: NORMAL
IMM GRANULOCYTES # BLD AUTO: 0.02 10*3/MM3 (ref 0–0.05)
IMM GRANULOCYTES NFR BLD AUTO: 0.3 % (ref 0–0.5)
LYMPHOCYTES # BLD AUTO: 1.48 10*3/MM3 (ref 0.7–3.1)
LYMPHOCYTES NFR BLD AUTO: 24.1 % (ref 19.6–45.3)
MCH RBC QN AUTO: 31.2 PG (ref 26.6–33)
MCHC RBC AUTO-ENTMCNC: 32.7 G/DL (ref 31.5–35.7)
MCV RBC AUTO: 95.3 FL (ref 79–97)
MONOCYTES # BLD AUTO: 0.69 10*3/MM3 (ref 0.1–0.9)
MONOCYTES NFR BLD AUTO: 11.2 % (ref 5–12)
NEUTROPHILS NFR BLD AUTO: 3.79 10*3/MM3 (ref 1.7–7)
NEUTROPHILS NFR BLD AUTO: 61.8 % (ref 42.7–76)
NRBC BLD AUTO-RTO: 0 /100 WBC (ref 0–0.2)
PLATELET # BLD AUTO: 236 10*3/MM3 (ref 140–450)
PMV BLD AUTO: 9.5 FL (ref 6–12)
POTASSIUM SERPL-SCNC: 3.7 MMOL/L (ref 3.5–5.2)
PROT SERPL-MCNC: 7.2 G/DL (ref 6–8.5)
RBC # BLD AUTO: 4.07 10*6/MM3 (ref 3.77–5.28)
SODIUM SERPL-SCNC: 134 MMOL/L (ref 136–145)
TROPONIN T SERPL-MCNC: <0.01 NG/ML (ref 0–0.03)
WBC NRBC COR # BLD: 6.14 10*3/MM3 (ref 3.4–10.8)
WHOLE BLOOD HOLD SPECIMEN: NORMAL
WHOLE BLOOD HOLD SPECIMEN: NORMAL

## 2021-12-28 PROCEDURE — 96360 HYDRATION IV INFUSION INIT: CPT

## 2021-12-28 PROCEDURE — 71046 X-RAY EXAM CHEST 2 VIEWS: CPT

## 2021-12-28 PROCEDURE — 80053 COMPREHEN METABOLIC PANEL: CPT

## 2021-12-28 PROCEDURE — 36415 COLL VENOUS BLD VENIPUNCTURE: CPT

## 2021-12-28 PROCEDURE — G0378 HOSPITAL OBSERVATION PER HR: HCPCS

## 2021-12-28 PROCEDURE — 85025 COMPLETE CBC W/AUTO DIFF WBC: CPT

## 2021-12-28 PROCEDURE — 93005 ELECTROCARDIOGRAM TRACING: CPT | Performed by: EMERGENCY MEDICINE

## 2021-12-28 PROCEDURE — 93005 ELECTROCARDIOGRAM TRACING: CPT

## 2021-12-28 PROCEDURE — 93010 ELECTROCARDIOGRAM REPORT: CPT | Performed by: INTERNAL MEDICINE

## 2021-12-28 PROCEDURE — 80053 COMPREHEN METABOLIC PANEL: CPT | Performed by: EMERGENCY MEDICINE

## 2021-12-28 PROCEDURE — 99285 EMERGENCY DEPT VISIT HI MDM: CPT

## 2021-12-28 PROCEDURE — 84484 ASSAY OF TROPONIN QUANT: CPT

## 2021-12-28 RX ORDER — ASPIRIN 325 MG
325 TABLET ORAL ONCE
Status: COMPLETED | OUTPATIENT
Start: 2021-12-28 | End: 2021-12-29

## 2021-12-28 RX ORDER — SODIUM CHLORIDE 0.9 % (FLUSH) 0.9 %
10 SYRINGE (ML) INJECTION AS NEEDED
Status: DISCONTINUED | OUTPATIENT
Start: 2021-12-28 | End: 2021-12-29 | Stop reason: HOSPADM

## 2021-12-29 ENCOUNTER — APPOINTMENT (OUTPATIENT)
Dept: NUCLEAR MEDICINE | Facility: HOSPITAL | Age: 66
End: 2021-12-29

## 2021-12-29 ENCOUNTER — HOSPITAL ENCOUNTER (OUTPATIENT)
Facility: HOSPITAL | Age: 66
Setting detail: OBSERVATION
Discharge: HOME | End: 2021-12-29
Attending: EMERGENCY MEDICINE | Admitting: INTERNAL MEDICINE

## 2021-12-29 VITALS
DIASTOLIC BLOOD PRESSURE: 78 MMHG | HEART RATE: 64 BPM | HEIGHT: 66 IN | SYSTOLIC BLOOD PRESSURE: 130 MMHG | RESPIRATION RATE: 18 BRPM | BODY MASS INDEX: 28.91 KG/M2 | OXYGEN SATURATION: 97 % | TEMPERATURE: 97.5 F | WEIGHT: 179.9 LBS

## 2021-12-29 DIAGNOSIS — R07.9 CHEST PAIN, UNSPECIFIED TYPE: Primary | ICD-10-CM

## 2021-12-29 LAB
BH CV REST NUCLEAR ISOTOPE DOSE: 9 MCI
BH CV STRESS BP STAGE 1: NORMAL
BH CV STRESS BP STAGE 2: NORMAL
BH CV STRESS DURATION MIN STAGE 1: 3
BH CV STRESS DURATION MIN STAGE 2: 2
BH CV STRESS DURATION SEC STAGE 1: 0
BH CV STRESS DURATION SEC STAGE 2: 46
BH CV STRESS GRADE STAGE 1: 10
BH CV STRESS GRADE STAGE 2: 12
BH CV STRESS HR STAGE 1: 114
BH CV STRESS HR STAGE 2: 143
BH CV STRESS METS STAGE 1: 5
BH CV STRESS METS STAGE 2: 7.5
BH CV STRESS NUCLEAR ISOTOPE DOSE: 30 MCI
BH CV STRESS PROTOCOL 1: NORMAL
BH CV STRESS RECOVERY BP: NORMAL MMHG
BH CV STRESS RECOVERY HR: 78 BPM
BH CV STRESS SPEED STAGE 1: 1.7
BH CV STRESS SPEED STAGE 2: 2.5
BH CV STRESS STAGE 1: 1
BH CV STRESS STAGE 2: 2
LV EF NUC BP: 84 %
MAXIMAL PREDICTED HEART RATE: 156 BPM
PERCENT MAX PREDICTED HR: 92.95 %
QT INTERVAL: 438 MS
SARS-COV-2 RNA PNL SPEC NAA+PROBE: NOT DETECTED
STRESS BASELINE BP: NORMAL MMHG
STRESS BASELINE HR: 64 BPM
STRESS PERCENT HR: 109 %
STRESS POST ESTIMATED WORKLOAD: 7.1 METS
STRESS POST EXERCISE DUR MIN: 5 MIN
STRESS POST EXERCISE DUR SEC: 46 SEC
STRESS POST PEAK BP: NORMAL MMHG
STRESS POST PEAK HR: 145 BPM
STRESS TARGET HR: 133 BPM
TROPONIN T SERPL-MCNC: <0.01 NG/ML (ref 0–0.03)
TSH SERPL DL<=0.05 MIU/L-ACNC: 1.51 UIU/ML (ref 0.27–4.2)

## 2021-12-29 PROCEDURE — 93018 CV STRESS TEST I&R ONLY: CPT | Performed by: INTERNAL MEDICINE

## 2021-12-29 PROCEDURE — 93016 CV STRESS TEST SUPVJ ONLY: CPT | Performed by: INTERNAL MEDICINE

## 2021-12-29 PROCEDURE — 99244 OFF/OP CNSLTJ NEW/EST MOD 40: CPT | Performed by: INTERNAL MEDICINE

## 2021-12-29 PROCEDURE — 78452 HT MUSCLE IMAGE SPECT MULT: CPT | Performed by: INTERNAL MEDICINE

## 2021-12-29 PROCEDURE — 78452 HT MUSCLE IMAGE SPECT MULT: CPT

## 2021-12-29 PROCEDURE — 93017 CV STRESS TEST TRACING ONLY: CPT

## 2021-12-29 PROCEDURE — 0 TECHNETIUM SESTAMIBI: Performed by: INTERNAL MEDICINE

## 2021-12-29 PROCEDURE — 84484 ASSAY OF TROPONIN QUANT: CPT | Performed by: EMERGENCY MEDICINE

## 2021-12-29 PROCEDURE — 87635 SARS-COV-2 COVID-19 AMP PRB: CPT | Performed by: EMERGENCY MEDICINE

## 2021-12-29 PROCEDURE — G0378 HOSPITAL OBSERVATION PER HR: HCPCS

## 2021-12-29 PROCEDURE — A9500 TC99M SESTAMIBI: HCPCS | Performed by: INTERNAL MEDICINE

## 2021-12-29 PROCEDURE — 84443 ASSAY THYROID STIM HORMONE: CPT | Performed by: INTERNAL MEDICINE

## 2021-12-29 PROCEDURE — 96360 HYDRATION IV INFUSION INIT: CPT

## 2021-12-29 RX ORDER — ALPRAZOLAM 0.25 MG/1
0.5 TABLET ORAL 4 TIMES DAILY
Status: DISCONTINUED | OUTPATIENT
Start: 2021-12-29 | End: 2021-12-29 | Stop reason: HOSPADM

## 2021-12-29 RX ORDER — LOSARTAN POTASSIUM 100 MG/1
100 TABLET ORAL
Status: DISCONTINUED | OUTPATIENT
Start: 2021-12-29 | End: 2021-12-29 | Stop reason: HOSPADM

## 2021-12-29 RX ORDER — ROSUVASTATIN CALCIUM 10 MG/1
10 TABLET, COATED ORAL NIGHTLY
Status: DISCONTINUED | OUTPATIENT
Start: 2021-12-29 | End: 2021-12-29 | Stop reason: HOSPADM

## 2021-12-29 RX ORDER — SUCRALFATE 1 G/1
1 TABLET ORAL 4 TIMES DAILY
Status: DISCONTINUED | OUTPATIENT
Start: 2021-12-29 | End: 2021-12-29 | Stop reason: HOSPADM

## 2021-12-29 RX ORDER — LEVOTHYROXINE SODIUM 0.07 MG/1
75 TABLET ORAL
Status: DISCONTINUED | OUTPATIENT
Start: 2021-12-29 | End: 2021-12-29 | Stop reason: HOSPADM

## 2021-12-29 RX ORDER — PANTOPRAZOLE SODIUM 40 MG/1
40 TABLET, DELAYED RELEASE ORAL 2 TIMES DAILY
Status: DISCONTINUED | OUTPATIENT
Start: 2021-12-29 | End: 2021-12-29 | Stop reason: HOSPADM

## 2021-12-29 RX ORDER — SODIUM CHLORIDE 9 MG/ML
75 INJECTION, SOLUTION INTRAVENOUS CONTINUOUS
Status: DISCONTINUED | OUTPATIENT
Start: 2021-12-29 | End: 2021-12-29 | Stop reason: HOSPADM

## 2021-12-29 RX ORDER — ALPRAZOLAM 0.25 MG/1
0.5 TABLET ORAL ONCE
Status: COMPLETED | OUTPATIENT
Start: 2021-12-29 | End: 2021-12-29

## 2021-12-29 RX ADMIN — PANTOPRAZOLE SODIUM 40 MG: 40 TABLET, DELAYED RELEASE ORAL at 12:42

## 2021-12-29 RX ADMIN — SODIUM CHLORIDE 75 ML/HR: 9 INJECTION, SOLUTION INTRAVENOUS at 12:51

## 2021-12-29 RX ADMIN — LOSARTAN POTASSIUM 100 MG: 100 TABLET, FILM COATED ORAL at 12:38

## 2021-12-29 RX ADMIN — TECHNETIUM TC 99M SESTAMIBI 1 DOSE: 1 INJECTION INTRAVENOUS at 08:55

## 2021-12-29 RX ADMIN — ALPRAZOLAM 0.5 MG: 0.25 TABLET ORAL at 04:58

## 2021-12-29 RX ADMIN — ALPRAZOLAM 0.5 MG: 0.25 TABLET ORAL at 12:42

## 2021-12-29 RX ADMIN — ASPIRIN 325 MG: 325 TABLET ORAL at 06:48

## 2021-12-29 RX ADMIN — LEVOTHYROXINE SODIUM 75 MCG: 0.07 TABLET ORAL at 12:38

## 2022-01-17 ENCOUNTER — OFFICE VISIT (OUTPATIENT)
Dept: CARDIOLOGY | Facility: CLINIC | Age: 67
End: 2022-01-17

## 2022-01-17 VITALS
HEART RATE: 69 BPM | BODY MASS INDEX: 28.77 KG/M2 | SYSTOLIC BLOOD PRESSURE: 134 MMHG | HEIGHT: 66 IN | DIASTOLIC BLOOD PRESSURE: 76 MMHG | WEIGHT: 179 LBS

## 2022-01-17 DIAGNOSIS — I25.10 NONOBSTRUCTIVE ATHEROSCLEROSIS OF CORONARY ARTERY: ICD-10-CM

## 2022-01-17 DIAGNOSIS — R60.0 LOWER EXTREMITY EDEMA: ICD-10-CM

## 2022-01-17 DIAGNOSIS — E78.2 MIXED HYPERLIPIDEMIA: ICD-10-CM

## 2022-01-17 DIAGNOSIS — R07.2 PRECORDIAL PAIN: Primary | ICD-10-CM

## 2022-01-17 DIAGNOSIS — I45.2 RBBB (RIGHT BUNDLE BRANCH BLOCK WITH LEFT ANTERIOR FASCICULAR BLOCK): ICD-10-CM

## 2022-01-17 DIAGNOSIS — I10 PRIMARY HYPERTENSION: ICD-10-CM

## 2022-01-17 PROBLEM — R07.9 CHEST PAIN: Status: RESOLVED | Noted: 2021-12-29 | Resolved: 2022-01-17

## 2022-01-17 PROCEDURE — 99214 OFFICE O/P EST MOD 30 MIN: CPT | Performed by: NURSE PRACTITIONER

## 2022-01-17 PROCEDURE — 93000 ELECTROCARDIOGRAM COMPLETE: CPT | Performed by: NURSE PRACTITIONER

## 2022-01-17 RX ORDER — BLOOD-GLUCOSE METER
EACH MISCELLANEOUS
COMMUNITY
Start: 2021-10-11 | End: 2022-08-08

## 2022-01-17 RX ORDER — AMLODIPINE BESYLATE 2.5 MG/1
1 TABLET ORAL AS NEEDED
COMMUNITY
End: 2023-01-19 | Stop reason: ALTCHOICE

## 2022-01-17 RX ORDER — BLOOD SUGAR DIAGNOSTIC
STRIP MISCELLANEOUS
COMMUNITY
Start: 2021-10-11 | End: 2022-08-08

## 2022-01-17 RX ORDER — LANCETS
EACH MISCELLANEOUS
COMMUNITY
Start: 2021-10-11 | End: 2022-08-08

## 2022-01-17 NOTE — PROGRESS NOTES
Date of Office Visit: 2022  Encounter Provider: DESI Newby  Place of Service: [facility] CARDIOLOGY  Patient Name: Vesta Sutton  :1955  Primary Cardiologist: Dr. Susan Paula     Chief Complaint   Patient presents with   • Chest Pain   • Hospital Follow Up Visit   :     HPI: Vesta Sutton is a pleasant 66 y.o. female who presents today for follow-up on chest pain.  I have reviewed her medical records.    She has been diagnosed with hypertension, hyperlipidemia, and abnormal EKG (right bundle branch block & left anterior fascicular block). In , she had a cardiac catheterization which showed very mild left anterior descending artery stenosis. In May 2017, stress echocardiogram was normal. In 2020, Holter monitor was normal.    In 2021, she presented to the [facility] ED with chest pain, palpitations, and fatigue.  Troponin levels were negative.  CTA of the chest was negative for pulmonary embolism and she was noted to have a small right pleural effusion.  Her blood pressure was elevated and olmesartan was increased.  She was recommended to follow-up with vascular surgery.    In 2021, she presented to the [facility] ED with chest pain radiating to her right arm and felt nauseated.  Her blood pressure and EKG were normal.  She was admitted to the hospital and a stress test was normal.  Her pain was felt to be due to possible esophageal spasms and she was recommended to start taking Carafate twice per day.  She followed up with her PCP and denied any further chest pain.  Her blood pressure was elevated at her PCP office and he started her on amlodipine 2.5 mg daily.  He noted that she had been on HCTZ in the past which caused hypokalemia and dehydration.    She presents today for a follow-up visit.  She denies any further chest pain.  She reports occasional lower extremity edema, but denies it today.  She has noticed some puffiness  around her eyes.  She denies shortness of breath, palpitations, dizziness, syncope, or bleeding.  Her blood pressure is stable today.      Past Medical History:   Diagnosis Date   • Anemia    • Anxiety and depression    • Appendiceal carcinoid tumor    • Arthritis    • Asthma    • Breast nodule    • Cancer (HCC) Carcinoid of apendix   • Coronary artery disease     nonobstructive    • Diabetes mellitus (HCC)     a1c only at 5, diet controlled right now   • Dizziness    • Environmental allergies    • Fatigue     in the evening   • Fibromuscular hyperplasia of artery (HCC)     in the carotid artery with no stenosis   • GERD (gastroesophageal reflux disease)    • Headache    • Hernia in stomach   • History of anesthesia complications 2017    lidocaine caused reaction during dental procedure per patient   • Hyperlipidemia    • Hypertension    • Lumbar pain    • Malaise and fatigue    • Myalgia    • LEATHA (obstructive sleep apnea)     uses CPAP machine   • Palpitations    • RBBB (right bundle branch block)    • Retinal detachment     left  //  when blood pressure gets high she notices flashing lights in her vision   • Vertigo        Past Surgical History:   Procedure Laterality Date   • APPENDECTOMY     • BREAST BIOPSY Left      or 2018   • BREAST CYST ASPIRATION Left      or 2018   • CATARACT EXTRACTION, BILATERAL     •  SECTION     • COLONOSCOPY N/A 2019    Procedure: COLONOSCOPY to CECUM;  Surgeon: Damien Aleman MD;  Location: [facility] ENDOSCOPY;  Service: General   • CORNEA LACERATION REPAIR     • ENDOSCOPY N/A 2016    Z-line regular, 40 cm from the incisors, gastritis, erythematous duodenopathy   • ENDOSCOPY N/A 2018    Normal exophagus, Erythematous mucosa in the antrum, Normal examined duodenum-Dr. Devyn Stahl   • ENDOSCOPY N/A 2019    Procedure: ESOPHAGOGASTRODUODENOSCOPY with BX;  Surgeon: Damien Aleman MD;  Location: [facility] ENDOSCOPY;  Service: General   •  ENDOSCOPY AND COLONOSCOPY N/A 09/03/2014    EGD with biopsy and colonoscopy, Mild gastritis, Normal colon, Repeat Colonoscopy in 5 years, Dr. Damien Aleman   • ENDOSCOPY AND COLONOSCOPY N/A 07/24/2008    EGD with biopsy and colonoscopy-Dr. Damien Aleman   • UPPER GASTROINTESTINAL ENDOSCOPY  Cannot remember   • URETEROCELE REPAIR     • US GUIDED FINE NEEDLE ASPIRATION  5/29/2019       Social History     Socioeconomic History   • Marital status:    Tobacco Use   • Smoking status: Never Smoker   • Smokeless tobacco: Never Used   • Tobacco comment: CAFFINE USE   Vaping Use   • Vaping Use: Never used   Substance and Sexual Activity   • Alcohol use: Not Currently     Alcohol/week: 5.0 standard drinks     Types: 5 Glasses of wine per week   • Drug use: No   • Sexual activity: Defer       Family History   Problem Relation Age of Onset   • Heart failure Father    • Heart disease Father    • Diabetes Sister    • Heart disease Mother    • Irritable bowel syndrome Mother    • Heart disease Brother    • Colon polyps Brother        The following portion of the patient's history were reviewed and updated as appropriate: past medical history, past surgical history, past social history, past family history, allergies, current medications, and problem list.    Review of Systems   Constitutional: Positive for weight gain.   Cardiovascular: Negative.    Respiratory: Positive for snoring.    Hematologic/Lymphatic: Bruises/bleeds easily.   Musculoskeletal: Positive for joint pain.   Gastrointestinal: Positive for nausea.   Neurological: Negative.    Psychiatric/Behavioral: Positive for depression. The patient is nervous/anxious.        Allergies   Allergen Reactions   • Beta Adrenergic Blockers Other (See Comments)     Asthma     • Other Other (See Comments) and Dizziness     Septocaine   • Hctz [Hydrochlorothiazide] Other (See Comments)     dehydration, stone in salivary gland, hypokalemia   • Sulfa Antibiotics Hives   •  "Sulfamethoxazole-Trimethoprim Unknown (See Comments)   • Viibryd [Vilazodone Hcl] Anxiety and Dizziness         Current Outpatient Medications:   •  Accu-Chek Guide test strip, , Disp: , Rfl:   •  Accu-Chek Softclix Lancets lancets, , Disp: , Rfl:   •  ALPRAZolam (XANAX) 0.5 MG tablet, Take 0.5 mg by mouth 4 (Four) Times a Day., Disp: , Rfl:   •  amLODIPine (NORVASC) 2.5 MG tablet, Take 1 tablet by mouth Daily., Disp: , Rfl:   •  BIOTIN MAXIMUM PO, Take  by mouth., Disp: , Rfl:   •  Blood Glucose Monitoring Suppl (Accu-Chek Guide) w/Device kit, , Disp: , Rfl:   •  cetirizine (zyrTEC) 5 MG tablet, Take 10 mg by mouth Daily As Needed., Disp: , Rfl:   •  Cholecalciferol (VITAMIN D3) 5000 UNITS capsule capsule, Take 5,000 Units by mouth Daily., Disp: , Rfl:   •  Euthyrox 75 MCG tablet, 1 tablet daily from Monday to Saturdays only., Disp: 30 tablet, Rfl: 6  •  olmesartan (BENICAR) 40 MG tablet, Take 0.5 tablets by mouth 2 (Two) Times a Day. (Patient taking differently: Take 20 mg by mouth 2 (Two) Times a Day.), Disp: 30 tablet, Rfl: 3  •  Omega-3 Fatty Acids (OMEGA 3 PO), Take 1 tablet by mouth daily., Disp: , Rfl:   •  pantoprazole (PROTONIX) 40 MG EC tablet, Take 1 tablet by mouth 2 (Two) Times a Day., Disp: 180 tablet, Rfl: 1  •  rosuvastatin (CRESTOR) 10 MG tablet, Take 1 tablet by mouth Daily., Disp: , Rfl:   •  sucralfate (CARAFATE) 1 g tablet, Take 1 tablet by mouth 4 (Four) Times a Day. Dissolve in 10 cc water (Patient taking differently: Take 1 g by mouth 4 (Four) Times a Day As Needed. Dissolve in 10 cc water), Disp: 120 tablet, Rfl: 5  •  traZODone (DESYREL) 150 MG tablet, Take 150 mg by mouth Every Night., Disp: , Rfl:   •  vitamin B-12 (CYANOCOBALAMIN) 100 MCG tablet, Take 50 mcg by mouth Daily., Disp: , Rfl:         Objective:     Vitals:    01/17/22 1135 01/17/22 1153   BP: 140/80 134/76   BP Location: Left arm Right arm   Pulse: 69    Weight: 81.2 kg (179 lb)    Height: 167.6 cm (66\")      Body mass index " is 28.89 kg/m².    PHYSICAL EXAM:    Vitals Reviewed.   General Appearance: No acute distress, well developed and well nourished.  Eyes: Conjunctiva and lids: No erythema, swelling, or discharge. Sclera non-icteric. Glasses.   HENT: Atraumatic, normocephalic. External eyes, ears, and nose normal. No hearing loss noted. Mucous membranes normal. Lips not cyanotic. Neck supple with no tenderness. Wearing mask.   Respiratory: No signs of respiratory distress. Respiration rhythm and depth normal.   Clear to auscultation. No rales, crackles, rhonchi, or wheezing auscultated.   Cardiovascular:  Jugular Venous Pressure: Normal  Heart Rate and Rhythm: Normal, Heart Sounds: Normal S1 and S2. No S3 or S4 noted.  Murmurs: No murmurs noted. No rubs, thrills, or gallops.   Lower Extremities: No edema noted.  Gastrointestinal:  Abdomen soft, non-distended, non-tender.    Musculoskeletal: Normal movement of extremities.  Skin and Nails: General appearance normal. No pallor, cyanosis, diaphoresis. Skin temperature normal. No clubbing of fingernails.   Psychiatric: Patient alert and oriented to person, place, and time. Speech and behavior appropriate. Normal mood and affect.       ECG 12 Lead    Date/Time: 1/17/2022 11:42 AM  Performed by: Lisa Dumont APRN  Authorized by: Lisa Dumont APRN   Comparison: compared with previous ECG from 12/28/2021  Similar to previous ECG  Rhythm: sinus rhythm  Rate: normal  BPM: 69  Conduction: right bundle branch block and left anterior fascicular block  QRS axis: normal  Other findings: non-specific ST-T wave changes    Clinical impression: abnormal EKG              Assessment:       Diagnosis Plan   1. Precordial pain     2. Nonobstructive atherosclerosis of coronary artery     3. Primary hypertension     4. Mixed hyperlipidemia     5. RBBB (right bundle branch block with left anterior fascicular block)     6. Lower extremity edema            Plan:       1.  Chest Pain: Resolved.    2.   Nonobstructive Coronary Artery Disease: Recent stress test was normal.  Continue rosuvastatin.    3.  Hypertension: This is being managed by her PCP.  Blood pressure goal of 120s/80s or less recommended.    4.  Hyperlipidemia: On rosuvastatin.    5.  RBBB with LAFB: Chronic and unchanged on EKG.    6.  Lower Extremity Edema: She reports occasional edema, but there is no edema present today.    7.  I recommended follow-up with Dr. Paula in 1 year, unless otherwise needed sooner.      ADDENDUM 1/21/2022:  · I requested and reviewed her blood work from September 2021: Hemoglobin and hematocrit normal.  · In June 2021: Her COVID test was positive. Hemoglobin A1c 5.8. Total cholesterol 170, triglycerides 55, HDL 82, and LDL 77. CMP normal. Hemoglobin and hematocrit normal.    As always, it has been a pleasure to participate in your patient's care. Thank you.       Sincerely,         DESI Cyr  [facility]      · Dictated utilizing Dragon Dictation  · COVID-19 Precautions - Patient was compliant in wearing a mask. When I saw the patient, I used appropriate personal protective equipment (PPE) including mask and eye shield (standard procedure).  Additionally, I used gown and gloves if indicated.  Hand hygiene was completed before and after seeing the patient.  · I spent 30 minutes reviewing her medical records/testing/previous office notes/labs, face-to-face interaction with patient, physical examination, formulating the plan of care, and discussion of plan of care with patient.

## 2022-01-26 ENCOUNTER — TELEPHONE (OUTPATIENT)
Dept: ENDOCRINOLOGY | Age: 67
End: 2022-01-26

## 2022-01-26 NOTE — TELEPHONE ENCOUNTER
Patient called    She is coming to Endo for thyroid issues but she is wanting to get her glucose checked. Can we do that at her appointment?

## 2022-01-27 ENCOUNTER — HOSPITAL ENCOUNTER (OUTPATIENT)
Dept: NUCLEAR MEDICINE | Facility: HOSPITAL | Age: 67
Discharge: HOME | End: 2022-01-27

## 2022-01-27 DIAGNOSIS — R11.0 NAUSEA: ICD-10-CM

## 2022-01-27 DIAGNOSIS — R10.13 EPIGASTRIC PAIN: ICD-10-CM

## 2022-01-27 DIAGNOSIS — R19.06 EPIGASTRIC FULLNESS: ICD-10-CM

## 2022-01-27 PROCEDURE — A9537 TC99M MEBROFENIN: HCPCS | Performed by: NURSE PRACTITIONER

## 2022-01-27 PROCEDURE — 0 TECHNETIUM TC 99M MEBROFENIN KIT: Performed by: NURSE PRACTITIONER

## 2022-01-27 PROCEDURE — 78227 HEPATOBIL SYST IMAGE W/DRUG: CPT

## 2022-01-27 PROCEDURE — 25010000002 SINCALIDE PER 5 MCG: Performed by: NURSE PRACTITIONER

## 2022-01-27 RX ORDER — KIT FOR THE PREPARATION OF TECHNETIUM TC 99M MEBROFENIN 45 MG/10ML
1 INJECTION, POWDER, LYOPHILIZED, FOR SOLUTION INTRAVENOUS
Status: COMPLETED | OUTPATIENT
Start: 2022-01-27 | End: 2022-01-27

## 2022-01-27 RX ADMIN — SINCALIDE 1.6 MCG: 5 INJECTION, POWDER, LYOPHILIZED, FOR SOLUTION INTRAVENOUS at 10:55

## 2022-01-27 RX ADMIN — MEBROFENIN 1 DOSE: 45 INJECTION, POWDER, LYOPHILIZED, FOR SOLUTION INTRAVENOUS at 09:24

## 2022-01-28 ENCOUNTER — TELEPHONE (OUTPATIENT)
Dept: GASTROENTEROLOGY | Facility: CLINIC | Age: 67
End: 2022-01-28

## 2022-01-28 NOTE — TELEPHONE ENCOUNTER
----- Message from Radha Victoria PA-C sent at 1/27/2022  6:27 PM EST -----  Please let patient know that her HIDA scan is normal.  See how she is doing and let Marissa know for further recommendations.

## 2022-01-29 ENCOUNTER — TRANSCRIBE ORDERS (OUTPATIENT)
Dept: ADMINISTRATIVE | Facility: HOSPITAL | Age: 67
End: 2022-01-29

## 2022-01-29 DIAGNOSIS — Z12.31 ENCOUNTER FOR SCREENING MAMMOGRAM FOR MALIGNANT NEOPLASM OF BREAST: Primary | ICD-10-CM

## 2022-01-31 ENCOUNTER — OFFICE VISIT (OUTPATIENT)
Dept: GASTROENTEROLOGY | Facility: CLINIC | Age: 67
End: 2022-01-31

## 2022-01-31 ENCOUNTER — TELEPHONE (OUTPATIENT)
Dept: SLEEP MEDICINE | Facility: HOSPITAL | Age: 67
End: 2022-01-31

## 2022-01-31 VITALS — TEMPERATURE: 96.9 F | WEIGHT: 181.4 LBS | HEIGHT: 66 IN | BODY MASS INDEX: 29.15 KG/M2

## 2022-01-31 DIAGNOSIS — E55.9 VITAMIN D DEFICIENCY: ICD-10-CM

## 2022-01-31 DIAGNOSIS — E03.9 ACQUIRED HYPOTHYROIDISM: ICD-10-CM

## 2022-01-31 DIAGNOSIS — R73.9 HYPERGLYCEMIA: ICD-10-CM

## 2022-01-31 DIAGNOSIS — K21.9 GASTROESOPHAGEAL REFLUX DISEASE WITHOUT ESOPHAGITIS: ICD-10-CM

## 2022-01-31 DIAGNOSIS — K59.00 CONSTIPATION, UNSPECIFIED CONSTIPATION TYPE: Primary | ICD-10-CM

## 2022-01-31 DIAGNOSIS — E78.2 MIXED HYPERLIPIDEMIA: Primary | ICD-10-CM

## 2022-01-31 PROCEDURE — 99213 OFFICE O/P EST LOW 20 MIN: CPT | Performed by: PHYSICIAN ASSISTANT

## 2022-01-31 NOTE — TELEPHONE ENCOUNTER
Pt called stating that she has started experiencing a bad smell coming from her CPAP.  She turned off the humidifier and said that did remedy the issue.  However she is now worried about the health effects from her recalled unit.  I have reached out to Dr. Batista to see if he would recommend a good, yet affordable, travel unit.

## 2022-01-31 NOTE — PROGRESS NOTES
"Chief Complaint  Follow-up (abd pain)    Subjective        History of Present Illness  Vesta Sutton is a  66 y.o. female with a history of appendiceal cancer diagnosed in 2001 here for follow-up.  She is a patient of Dr. Stahl, new to me today.  She was last seen by DESI Castro on 12/23/2021.    She continues to complain of right upper quadrant abdominal pain, heartburn and reflux.  She recently underwent HIDA scan which showed an ejection fraction of 86%.  Symptoms were controlled with pantoprazole but she discontinued use stating she does not want to remain on the medication lifelong.  She also notes that she has not been compliant with the GERD/antireflux diet.  She has also been snacking just prior to bed on several occasions.  This often results in her awaking the following morning with the taste of stomach acid in her mouth.     She also admits to constipation. She was taking dulcolax which was not helping. We discussed the addition of fiber supplement.  Patient states when she does take the fiber supplement daily, her bowels move regularly. She admits that she is not compliant with daily regimen.     EGD and colonoscopy completed by Dr. Vega on 11/11/2021 with grade 1 internal hemorrhoids (nonbleeding), sigmoid and descending colon diverticulosis, and normal EGD    Review of Systems     Objective   Vital Signs:   Temp 96.9 °F (36.1 °C)   Ht 167.6 cm (66\")   Wt 82.3 kg (181 lb 6.4 oz)   BMI 29.28 kg/m²       Physical Exam  Constitutional:       Appearance: Normal appearance.   HENT:      Head: Normocephalic and atraumatic.      Right Ear: External ear normal.      Left Ear: External ear normal.   Eyes:      Conjunctiva/sclera: Conjunctivae normal.      Pupils: Pupils are equal, round, and reactive to light.   Pulmonary:      Effort: Pulmonary effort is normal.   Skin:     General: Skin is warm and dry.   Neurological:      General: No focal deficit present.      Mental Status: She is alert and " oriented to person, place, and time.   Psychiatric:         Mood and Affect: Mood normal.         Behavior: Behavior normal.          Result Review :                     Assessment and Plan    Diagnoses and all orders for this visit:    1. Constipation, unspecified constipation type (Primary)    2. Gastroesophageal reflux disease without esophagitis    Other orders  -     SCANNED PATHOLOGY  -     ENDOSCOPY, INT  -     SCANNED - COLONOSCOPY    Pleasant 66-year-old female seen today for continued complaints of abdominal pain, heartburn and reflux.  She was doing quite well until she discontinued Protonix.  She is also struggling with constipation.  HIDA scan was normal.  Recent EGD was normal.    We again had a long discussion regarding antireflux diet and the need for adherence.  She voiced her understanding.  She is to resume Protonix daily.  She is to incorporate more water into her daily regimen.  She is to increase her dietary fiber.  We also discussed the addition of a daily fiber supplement.  She will follow up in 4 weeks with Dr. Stahl or DESI Jay.      Follow Up   Return in about 1 month (around 2/28/2022) for with  or Marissa.    Katey dictation used throughout this note.     RG Magallanes

## 2022-01-31 NOTE — TELEPHONE ENCOUNTER
Orders written today in separate note and  Patient here today for that lab draw.   Brisa Do MD 1/31/22

## 2022-02-01 LAB
25(OH)D3+25(OH)D2 SERPL-MCNC: 52.8 NG/ML (ref 30–100)
ALBUMIN SERPL-MCNC: 4.5 G/DL (ref 3.8–4.8)
ALBUMIN/CREAT UR: <11 MG/G CREAT (ref 0–29)
ALBUMIN/GLOB SERPL: 1.9 {RATIO} (ref 1.2–2.2)
ALP SERPL-CCNC: 61 IU/L (ref 44–121)
ALT SERPL-CCNC: 19 IU/L (ref 0–32)
AST SERPL-CCNC: 16 IU/L (ref 0–40)
BILIRUB SERPL-MCNC: 0.3 MG/DL (ref 0–1.2)
BUN SERPL-MCNC: 9 MG/DL (ref 8–27)
BUN/CREAT SERPL: 11 (ref 12–28)
CALCIUM SERPL-MCNC: 9.6 MG/DL (ref 8.7–10.3)
CHLORIDE SERPL-SCNC: 103 MMOL/L (ref 96–106)
CHOLEST SERPL-MCNC: 157 MG/DL (ref 100–199)
CO2 SERPL-SCNC: 22 MMOL/L (ref 20–29)
CREAT SERPL-MCNC: 0.84 MG/DL (ref 0.57–1)
CREAT UR-MCNC: 26.7 MG/DL
GLOBULIN SER CALC-MCNC: 2.4 G/DL (ref 1.5–4.5)
GLUCOSE SERPL-MCNC: 97 MG/DL (ref 65–99)
HBA1C MFR BLD: 5.5 % (ref 4.8–5.6)
HDLC SERPL-MCNC: 82 MG/DL
IMP & REVIEW OF LAB RESULTS: NORMAL
LDLC SERPL CALC-MCNC: 65 MG/DL (ref 0–99)
MICROALBUMIN UR-MCNC: <3 UG/ML
POTASSIUM SERPL-SCNC: 3.9 MMOL/L (ref 3.5–5.2)
PROT SERPL-MCNC: 6.9 G/DL (ref 6–8.5)
SODIUM SERPL-SCNC: 141 MMOL/L (ref 134–144)
T4 FREE SERPL-MCNC: 1.64 NG/DL (ref 0.82–1.77)
TRIGL SERPL-MCNC: 42 MG/DL (ref 0–149)
TSH SERPL-ACNC: 0.46 UIU/ML (ref 0.45–4.5)
VLDLC SERPL CALC-MCNC: 10 MG/DL (ref 5–40)

## 2022-02-02 ENCOUNTER — TELEPHONE (OUTPATIENT)
Dept: SLEEP MEDICINE | Facility: HOSPITAL | Age: 67
End: 2022-02-02

## 2022-02-02 ENCOUNTER — TELEPHONE (OUTPATIENT)
Dept: GASTROENTEROLOGY | Facility: CLINIC | Age: 67
End: 2022-02-02

## 2022-02-02 NOTE — TELEPHONE ENCOUNTER
Patient called stating Maribel told her Dr Batista could send something to [facility] to see if her insueace would approve Z1 or Z2 travel CPAP until her machine can be replaces . Will reache out to Dr James pt request

## 2022-02-07 ENCOUNTER — OFFICE VISIT (OUTPATIENT)
Dept: ENDOCRINOLOGY | Age: 67
End: 2022-02-07

## 2022-02-07 VITALS
HEIGHT: 66 IN | SYSTOLIC BLOOD PRESSURE: 130 MMHG | BODY MASS INDEX: 29.22 KG/M2 | WEIGHT: 181.8 LBS | DIASTOLIC BLOOD PRESSURE: 74 MMHG | OXYGEN SATURATION: 99 % | HEART RATE: 72 BPM

## 2022-02-07 DIAGNOSIS — E03.9 ACQUIRED HYPOTHYROIDISM: Primary | ICD-10-CM

## 2022-02-07 DIAGNOSIS — E04.2 NONTOXIC MULTINODULAR GOITER: ICD-10-CM

## 2022-02-07 PROCEDURE — 99214 OFFICE O/P EST MOD 30 MIN: CPT | Performed by: INTERNAL MEDICINE

## 2022-02-07 RX ORDER — LEVOTHYROXINE SODIUM 75 UG/1
TABLET ORAL
Qty: 30 TABLET | Refills: 6
Start: 2022-02-07

## 2022-02-07 NOTE — PROGRESS NOTES
"Chief Complaint  Hypothyroidism    Subjective          Vesta Sutton presents to [facility] ENDOCRINOLOGY  History of Present Illness  66-year-old  female from [place] wth CAD, one native artery 50% narrowing and. 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  She was last seen at this endocrine clinic on 7/27/2021.  Today is the first visit with me.    She has multinodular thyroid with hypothyroidism.  She has no history of head or neck radiation therapy.  She had a fine-needle biopsy of the left dominant nodule in May 2019 which was benign.  She is on levothyroxine 75 mcg/day.      Family history: Her sister has had a thyroidectomy for thyroid cancer that invaded the parathyroid. She had ROWAN therapy.     Hypothyroid Symptoms : She has hair loss, brittle nails. Last week she lost her blister pack missing 3 doses. She has intermittently constipation.  She has no significant weight change since January.     Goiter: No dysphagia or dysphonia. No anterior neck tenderness.     Current thyroid  Medication history:  7/27/2021 Euthyrox  75 mcg daily except none on Sundays [ aveg  69.64 mcg daily]  7/20/2020 Euthyrox 75 mcg daily  8/24/2019 levothyroxine 88 mcg daily.  She takes Euthyrox about 1 hr prior to meals  2020.  TSH done in July 2021 is low at 0.347 with elevated free T4 at 1.85 ng per DL.    Objective   Vital Signs:   /74   Pulse 72   Ht 167.6 cm (65.98\")   Wt 82.5 kg (181 lb 12.8 oz)   SpO2 99%   BMI 29.36 kg/m²     Physical Exam  Vitals and nursing note reviewed.   HENT:      Head: Normocephalic.      Mouth/Throat:      Mouth: Mucous membranes are moist.   Neck:      Thyroid: Thyromegaly present. No thyroid mass.      Trachea: Trachea and phonation normal.      Comments: The thyroid is mildly generous 40-45 g none tender without thrill or bruit. No palpable nodules.  Cardiovascular:      Rate and Rhythm: " Normal rate.      Pulses: Normal pulses.      Heart sounds: Normal heart sounds.   Pulmonary:      Effort: Pulmonary effort is normal.      Breath sounds: Normal breath sounds. No wheezing or rhonchi.   Abdominal:      General: Bowel sounds are normal.      Palpations: Abdomen is soft.   Musculoskeletal:         General: No swelling. Normal range of motion.      Cervical back: Normal range of motion and neck supple.   Lymphadenopathy:      Cervical: No cervical adenopathy.   Skin:     General: Skin is warm and dry.   Neurological:      Mental Status: She is alert and oriented to person, place, and time. Mental status is at baseline.   Psychiatric:         Mood and Affect: Mood normal.         Behavior: Behavior normal.         Judgment: Judgment normal.        Result Review :   The following data was reviewed by: Brisa Do MD on 02/07/2022:  Component      Latest Ref Rng & Units 1/31/2022   Glucose      65 - 99 mg/dL 97   BUN      8 - 27 mg/dL 9   Creatinine      0.57 - 1.00 mg/dL 0.84   eGFR Non African Am      >59 mL/min/1.73 74   eGFR African Am      >59 mL/min/1.73 85   BUN/Creatinine Ratio      12 - 28 11 (L)   Sodium      134 - 144 mmol/L 141   Potassium      3.5 - 5.2 mmol/L 3.9   Chloride      96 - 106 mmol/L 103   CO2      20 - 29 mmol/L 22   Calcium      8.7 - 10.3 mg/dL 9.6   Total Protein      6.0 - 8.5 g/dL 6.9   Albumin      3.8 - 4.8 g/dL 4.5   Globulin      1.5 - 4.5 g/dL 2.4   A/G Ratio      1.2 - 2.2 1.9   Total Bilirubin      0.0 - 1.2 mg/dL 0.3   Alkaline Phosphatase      44 - 121 IU/L 61   AST (SGOT)      0 - 40 IU/L 16   ALT (SGPT)      0 - 32 IU/L 19   Total Cholesterol      100 - 199 mg/dL 157   Triglycerides      0 - 149 mg/dL 42   HDL Cholesterol      >39 mg/dL 82   VLDL Cholesterol Jean Marie      5 - 40 mg/dL 10   LDL Cholesterol       0 - 99 mg/dL 65   Creatinine, Urine      Not Estab. mg/dL 26.7   Microalbumin, Urine      Not Estab. ug/mL <3.0   Microalbumin/Creatinine Ratio       0 - 29 mg/g creat <11   Hemoglobin A1C      4.8 - 5.6 % 5.5   TSH Baseline      0.450 - 4.500 uIU/mL 0.456   Free T4      0.82 - 1.77 ng/dL 1.64   25 Hydroxy, Vitamin D      30.0 - 100.0 ng/mL 52.8     Component      Latest Ref Rng & Units 8/22/2019 11/11/2019 1/31/2022   Thyroid Peroxidase Antibody      0 - 34 IU/mL 11 14    Interpretation         Note     4/20/2021 thyroid ultrasound  Dominant left-sided thyroid nodule previously biopsied [place] category 2.  No other thyroid nodules qualify for fine-needle aspiration per radiology.   Nodule 1  the left thyroid gland there is a solid predominantly  isoechoic solid nodule which measures 2.7 x 1.8 x 1.5 cm. ACR TR  Category 3. This has been previously biopsied on 05/29/2019Nodule 2  Nodule 2  Isthmus right side 14 x 11 x 5 mm  Nodule 3  Right lobe 11 x 9 x 8 mm  Nodule 4   Left lobe gnnoum74 x 7 x 5 mm    Assessment and Plan    1. Hypothyroid Stable  Continue Euthyrox 75 mcg 6 tablets per week [none on Sundays]  2. Multinoduler goiter stable. TPO Ab negative.     Discussion.   Multinodular goiter FNA 5/2019 of dominant left thyroid nodule. Remaining 3 nodules are less than 1.4 cm without concerning features for thyroid cancer.   Recommend repeat US this year April 2021    Diagnoses and all orders for this visit:    1. Acquired hypothyroidism (Primary)  -     US thyroid; Future  -     Anti-Thyroglobulin Antibody; Future  -     Euthyrox 75 MCG tablet; 1 tablet daily from Monday to Saturdays only.  Dispense: 30 tablet; Refill: 6  -     T4; Future  -     T3; Future  -     TSH; Future    2. Nontoxic multinodular goiter  -     US thyroid; Future  -     Anti-Thyroglobulin Antibody; Future  -     Euthyrox 75 MCG tablet; 1 tablet daily from Monday to Saturdays only.  Dispense: 30 tablet; Refill: 6  -     T4; Future  -     T3; Future  -     TSH; Future      I spent 30 minutes caring for Vesta on this date of service. This time includes time spent by me in the following  activities:reviewing tests, obtaining and/or reviewing a separately obtained history, performing a medically appropriate examination and/or evaluation  and counseling and educating the patient/family/caregiver  Follow Up   Return in about 6 months (around 8/7/2022).   Labs in 6 months.  Patient was given instructions and counseling regarding her condition or for health maintenance advice. Please see specific information pulled into the AVS if appropriate.

## 2022-02-10 ENCOUNTER — HOSPITAL ENCOUNTER (OUTPATIENT)
Dept: MAMMOGRAPHY | Facility: HOSPITAL | Age: 67
End: 2022-02-10

## 2022-02-10 ENCOUNTER — HOSPITAL ENCOUNTER (OUTPATIENT)
Dept: BONE DENSITY | Facility: HOSPITAL | Age: 67
End: 2022-02-10

## 2022-02-28 ENCOUNTER — OFFICE VISIT (OUTPATIENT)
Dept: CARDIOLOGY | Facility: CLINIC | Age: 67
End: 2022-02-28

## 2022-02-28 VITALS
HEART RATE: 75 BPM | BODY MASS INDEX: 29.49 KG/M2 | HEIGHT: 65 IN | WEIGHT: 177 LBS | DIASTOLIC BLOOD PRESSURE: 98 MMHG | SYSTOLIC BLOOD PRESSURE: 166 MMHG

## 2022-02-28 DIAGNOSIS — I25.10 CORONARY ARTERIOSCLEROSIS IN NATIVE ARTERY: ICD-10-CM

## 2022-02-28 DIAGNOSIS — E78.2 MIXED HYPERLIPIDEMIA: ICD-10-CM

## 2022-02-28 DIAGNOSIS — I45.2 RBBB (RIGHT BUNDLE BRANCH BLOCK WITH LEFT ANTERIOR FASCICULAR BLOCK): ICD-10-CM

## 2022-02-28 DIAGNOSIS — R00.2 PALPITATIONS: ICD-10-CM

## 2022-02-28 DIAGNOSIS — I10 PRIMARY HYPERTENSION: Primary | ICD-10-CM

## 2022-02-28 PROBLEM — Z12.11 ENCOUNTER FOR SCREENING FOR MALIGNANT NEOPLASM OF COLON: Status: ACTIVE | Noted: 2021-06-28

## 2022-02-28 PROBLEM — R93.5 ABNORMAL COMPUTERIZED AXIAL TOMOGRAPHY OF ABDOMEN: Status: ACTIVE | Noted: 2021-12-16

## 2022-02-28 PROBLEM — Z12.11 ENCOUNTER FOR SCREENING FOR MALIGNANT NEOPLASM OF COLON: Status: RESOLVED | Noted: 2021-06-28 | Resolved: 2022-02-28

## 2022-02-28 PROBLEM — R10.9 ABDOMINAL PAIN: Status: ACTIVE | Noted: 2020-03-02

## 2022-02-28 PROBLEM — R79.89 POSITIVE D-DIMER: Status: RESOLVED | Noted: 2021-06-10 | Resolved: 2022-02-28

## 2022-02-28 PROBLEM — E55.9 VITAMIN D DEFICIENCY: Status: ACTIVE | Noted: 2020-06-25

## 2022-02-28 PROBLEM — J06.9 VIRAL UPPER RESPIRATORY TRACT INFECTION: Status: RESOLVED | Noted: 2022-01-24 | Resolved: 2022-02-28

## 2022-02-28 PROBLEM — I83.819 PAIN DUE TO VARICOSE VEINS OF LOWER EXTREMITY: Status: RESOLVED | Noted: 2021-02-25 | Resolved: 2022-02-28

## 2022-02-28 PROBLEM — M54.50 LOW BACK PAIN: Status: RESOLVED | Noted: 2018-12-13 | Resolved: 2022-02-28

## 2022-02-28 PROBLEM — E16.2 HYPOGLYCEMIA: Status: ACTIVE | Noted: 2021-10-11

## 2022-02-28 PROBLEM — R63.5 WEIGHT GAIN: Status: RESOLVED | Noted: 2019-12-30 | Resolved: 2022-02-28

## 2022-02-28 PROBLEM — R79.89 POSITIVE D-DIMER: Status: ACTIVE | Noted: 2021-06-10

## 2022-02-28 PROBLEM — E16.2 HYPOGLYCEMIA: Status: RESOLVED | Noted: 2021-10-11 | Resolved: 2022-02-28

## 2022-02-28 PROBLEM — M79.661 PAIN OF RIGHT CALF: Status: RESOLVED | Noted: 2021-06-10 | Resolved: 2022-02-28

## 2022-02-28 PROBLEM — I83.10 VARICOSE VEINS OF LOWER EXTREMITY WITH INFLAMMATION: Status: ACTIVE | Noted: 2021-02-25

## 2022-02-28 PROBLEM — R53.83 FATIGUE: Status: RESOLVED | Noted: 2020-06-25 | Resolved: 2022-02-28

## 2022-02-28 PROBLEM — Z11.59 ENCOUNTER FOR HEPATITIS C SCREENING TEST FOR LOW RISK PATIENT: Status: ACTIVE | Noted: 2019-11-18

## 2022-02-28 PROBLEM — J06.9 VIRAL UPPER RESPIRATORY TRACT INFECTION: Status: ACTIVE | Noted: 2022-01-24

## 2022-02-28 PROBLEM — M79.629 PAIN IN AXILLA: Status: RESOLVED | Noted: 2020-09-25 | Resolved: 2022-02-28

## 2022-02-28 PROBLEM — G47.21 CIRCADIAN RHYTHM SLEEP DISORDER, DELAYED SLEEP PHASE TYPE: Status: RESOLVED | Noted: 2018-08-04 | Resolved: 2022-02-28

## 2022-02-28 PROBLEM — Z92.89 HX OF SCREENING MAMMOGRAPHY: Status: RESOLVED | Noted: 2019-04-08 | Resolved: 2022-02-28

## 2022-02-28 PROBLEM — R53.82 CHRONIC FATIGUE: Status: RESOLVED | Noted: 2019-09-15 | Resolved: 2022-02-28

## 2022-02-28 PROBLEM — M79.629 PAIN IN AXILLA: Status: ACTIVE | Noted: 2020-09-25

## 2022-02-28 PROBLEM — F10.10 ALCOHOL ABUSE: Status: ACTIVE | Noted: 2021-06-28

## 2022-02-28 PROBLEM — F10.10 ALCOHOL ABUSE: Status: RESOLVED | Noted: 2021-06-28 | Resolved: 2022-02-28

## 2022-02-28 PROBLEM — Z92.89 HX OF SCREENING MAMMOGRAPHY: Status: ACTIVE | Noted: 2019-04-08

## 2022-02-28 PROBLEM — D3A.020 CARCINOID, OF APPENDIX: Status: RESOLVED | Noted: 2019-08-28 | Resolved: 2022-02-28

## 2022-02-28 PROBLEM — R07.9 CHEST PAIN: Status: ACTIVE | Noted: 2022-01-05

## 2022-02-28 PROBLEM — R63.5 WEIGHT GAIN: Status: ACTIVE | Noted: 2019-12-30

## 2022-02-28 PROBLEM — Z12.31 OTHER SCREENING MAMMOGRAM: Status: RESOLVED | Noted: 2019-04-08 | Resolved: 2022-02-28

## 2022-02-28 PROBLEM — E66.3 OVERWEIGHT WITH BODY MASS INDEX (BMI) 25.0-29.9: Status: ACTIVE | Noted: 2021-06-28

## 2022-02-28 PROBLEM — R06.00 DYSPNEA: Status: ACTIVE | Noted: 2022-01-24

## 2022-02-28 PROBLEM — Z13.31 SCREENING FOR DEPRESSION: Status: ACTIVE | Noted: 2021-06-28

## 2022-02-28 PROBLEM — R10.9 ABDOMINAL PAIN: Status: RESOLVED | Noted: 2020-03-02 | Resolved: 2022-02-28

## 2022-02-28 PROBLEM — Z13.6 SCREENING FOR OTHER AND UNSPECIFIED CARDIOVASCULAR CONDITIONS: Status: ACTIVE | Noted: 2019-04-08

## 2022-02-28 PROBLEM — J30.2 SEASONAL ALLERGIC RHINITIS: Status: ACTIVE | Noted: 2019-04-08

## 2022-02-28 PROBLEM — D3A.020 CARCINOID, OF APPENDIX: Status: ACTIVE | Noted: 2019-08-28

## 2022-02-28 PROBLEM — Z11.59 ENCOUNTER FOR HEPATITIS C SCREENING TEST FOR LOW RISK PATIENT: Status: RESOLVED | Noted: 2019-11-18 | Resolved: 2022-02-28

## 2022-02-28 PROBLEM — I72.8 ANEURYSM OF SPLENIC ARTERY: Status: ACTIVE | Noted: 2021-12-16

## 2022-02-28 PROBLEM — I83.10 VARICOSE VEINS OF LOWER EXTREMITY WITH INFLAMMATION: Status: RESOLVED | Noted: 2021-02-25 | Resolved: 2022-02-28

## 2022-02-28 PROBLEM — R07.9 CHEST PAIN: Status: RESOLVED | Noted: 2022-01-05 | Resolved: 2022-02-28

## 2022-02-28 PROBLEM — Z13.31 SCREENING FOR DEPRESSION: Status: RESOLVED | Noted: 2021-06-28 | Resolved: 2022-02-28

## 2022-02-28 PROBLEM — J30.2 SEASONAL ALLERGIC RHINITIS: Status: RESOLVED | Noted: 2019-04-08 | Resolved: 2022-02-28

## 2022-02-28 PROBLEM — Z79.899 LONG-TERM CURRENT USE OF ANXIOLYTIC MEDICATION: Status: ACTIVE | Noted: 2021-09-02

## 2022-02-28 PROBLEM — A08.4 VIRAL GASTROENTERITIS: Status: RESOLVED | Noted: 2020-09-25 | Resolved: 2022-02-28

## 2022-02-28 PROBLEM — Z12.11 SCREEN FOR COLON CANCER: Status: RESOLVED | Noted: 2019-08-28 | Resolved: 2022-02-28

## 2022-02-28 PROBLEM — Z79.899 LONG-TERM CURRENT USE OF ANXIOLYTIC MEDICATION: Status: RESOLVED | Noted: 2021-09-02 | Resolved: 2022-02-28

## 2022-02-28 PROBLEM — I83.819 PAIN DUE TO VARICOSE VEINS OF LOWER EXTREMITY: Status: ACTIVE | Noted: 2021-02-25

## 2022-02-28 PROBLEM — Z12.11 SCREEN FOR COLON CANCER: Status: ACTIVE | Noted: 2019-08-28

## 2022-02-28 PROBLEM — R73.09 ELEVATED HEMOGLOBIN A1C: Status: ACTIVE | Noted: 2019-04-08

## 2022-02-28 PROBLEM — M51.369 DEGENERATION OF LUMBAR INTERVERTEBRAL DISC: Status: ACTIVE | Noted: 2019-12-30

## 2022-02-28 PROBLEM — S16.1XXA STRAIN OF NECK MUSCLE: Status: ACTIVE | Noted: 2019-07-08

## 2022-02-28 PROBLEM — R53.83 FATIGUE: Status: ACTIVE | Noted: 2020-06-25

## 2022-02-28 PROBLEM — K59.09 CHRONIC CONSTIPATION: Status: ACTIVE | Noted: 2019-10-14

## 2022-02-28 PROBLEM — S16.1XXA STRAIN OF NECK MUSCLE: Status: RESOLVED | Noted: 2019-07-08 | Resolved: 2022-02-28

## 2022-02-28 PROBLEM — Z12.31 OTHER SCREENING MAMMOGRAM: Status: ACTIVE | Noted: 2019-04-08

## 2022-02-28 PROBLEM — G47.00 INSOMNIA: Status: ACTIVE | Noted: 2020-06-25

## 2022-02-28 PROBLEM — R23.3 SPONTANEOUS ECCHYMOSIS: Status: ACTIVE | Noted: 2021-09-02

## 2022-02-28 PROBLEM — Z13.6 SCREENING FOR OTHER AND UNSPECIFIED CARDIOVASCULAR CONDITIONS: Status: RESOLVED | Noted: 2019-04-08 | Resolved: 2022-02-28

## 2022-02-28 PROBLEM — M79.661 PAIN OF RIGHT CALF: Status: ACTIVE | Noted: 2021-06-10

## 2022-02-28 PROBLEM — M51.36 DEGENERATION OF LUMBAR INTERVERTEBRAL DISC: Status: ACTIVE | Noted: 2019-12-30

## 2022-02-28 PROBLEM — R06.00 DYSPNEA: Status: RESOLVED | Noted: 2022-01-24 | Resolved: 2022-02-28

## 2022-02-28 PROBLEM — R23.3 SPONTANEOUS ECCHYMOSIS: Status: RESOLVED | Noted: 2021-09-02 | Resolved: 2022-02-28

## 2022-02-28 PROBLEM — E66.3 OVERWEIGHT (BMI 25.0-29.9): Status: RESOLVED | Noted: 2021-04-08 | Resolved: 2022-02-28

## 2022-02-28 PROBLEM — A08.4 VIRAL GASTROENTERITIS: Status: ACTIVE | Noted: 2020-09-25

## 2022-02-28 PROBLEM — M54.50 LOW BACK PAIN: Status: ACTIVE | Noted: 2018-12-13

## 2022-02-28 PROBLEM — G47.00 INSOMNIA: Status: RESOLVED | Noted: 2020-06-25 | Resolved: 2022-02-28

## 2022-02-28 PROBLEM — R93.5 ABNORMAL COMPUTERIZED AXIAL TOMOGRAPHY OF ABDOMEN: Status: RESOLVED | Noted: 2021-12-16 | Resolved: 2022-02-28

## 2022-02-28 PROCEDURE — 93000 ELECTROCARDIOGRAM COMPLETE: CPT | Performed by: NURSE PRACTITIONER

## 2022-02-28 PROCEDURE — 99214 OFFICE O/P EST MOD 30 MIN: CPT | Performed by: NURSE PRACTITIONER

## 2022-02-28 RX ORDER — ALBUTEROL SULFATE 90 UG/1
AEROSOL, METERED RESPIRATORY (INHALATION)
COMMUNITY

## 2022-02-28 RX ORDER — NEFAZODONE HYDROCHLORIDE 200 MG/1
TABLET ORAL
COMMUNITY
Start: 2022-02-26 | End: 2022-04-09

## 2022-02-28 NOTE — PROGRESS NOTES
Date of Office Visit: 2022  Encounter Provider: DESI Newby  Place of Service: [facility] CARDIOLOGY  Patient Name: Vesta Sutton  :1955  Primary Cardiologist: Dr. Susan Paula     Chief Complaint   Patient presents with   • Hypertension   • Slow Heart Rate   • Med Management   • Follow-up   :     HPI: Vesta Sutton is a pleasant 66 y.o. female who presents today for evaluation of hypertension and palpitations.  I have reviewed her medical records.    She has been diagnosed with hypertension, hyperlipidemia, and abnormal EKG (right bundle branch block & left anterior fascicular block). In , she had a cardiac catheterization which showed very mild left anterior descending artery stenosis. In May 2017, stress echocardiogram was normal. In 2020, Holter monitor was normal.    In 2021, she presented to the [facility] ED with chest pain, palpitations, and fatigue.  Troponin levels were negative.  CTA of the chest was negative for pulmonary embolism and she was noted to have a small right pleural effusion.  Her blood pressure was elevated and olmesartan was increased.  She was recommended to follow-up with vascular surgery.    In 2021, she presented to the [facility] ED with chest pain radiating to her right arm and felt nauseated.  Her blood pressure and EKG were normal.  She was admitted to the hospital and a stress test was normal.  Her pain was felt to be due to possible esophageal spasms and she was recommended to start taking Carafate twice per day.  She followed up with her PCP and denied any further chest pain.  Her blood pressure was elevated at her PCP office and he started her on amlodipine 2.5 mg daily.  He noted that she had been on HCTZ in the past which caused hypokalemia and dehydration.    In 2022, she followed up with me in the office.  Her blood pressure was better controlled.  She reported occasional lower  extremity edema, but denied at that day.  I recommended that she continue with her current dose of olmesartan and amlodipine.    She presents today with a chief concern over elevated blood pressure and palpitations.  She went to [place] to take care of her mother.  For a few days she had intermittent palpitations when her heart rate was in the low 60s.  She decided to stop her amlodipine because she thought that may be the cause of her palpitations.  Her blood pressure is now elevated and is only taking olmesartan 20 mg twice per day.  She denies chest pain, shortness of air, edema, dizziness, syncope, or bleeding.  She reports that she has been under an increased amount of stress taking care of her mother and now her brother is in the ICU in [place].    I reviewed her blood work from January 2022: CMP normal.  Total cholesterol 157, triglycerides 42, HDL 82, and LDL 65.  TSH and free T4 normal.    Past Medical History:   Diagnosis Date   • Anemia    • Anxiety and depression    • Appendiceal carcinoid tumor    • Arthritis    • Asthma    • Breast nodule    • Cancer (HCC) Carcinoid of apendix   • Coronary artery disease     nonobstructive    • COVID-19 06/2021   • Diabetes mellitus (HCC)     a1c only at 5, diet controlled right now   • Dizziness    • Environmental allergies    • Fatigue     in the evening   • Fibromuscular hyperplasia of artery (HCC)     in the carotid artery with no stenosis   • GERD (gastroesophageal reflux disease)    • Headache    • Hernia in stomach   • History of anesthesia complications 2017    lidocaine caused reaction during dental procedure per patient   • Hyperlipidemia    • Hypertension    • Lumbar pain    • Malaise and fatigue    • Myalgia    • LEATHA (obstructive sleep apnea)     uses CPAP machine   • Palpitations    • RBBB (right bundle branch block)    • Retinal detachment     left  //  when blood pressure gets high she notices flashing lights in her vision   • Vertigo        Past  Surgical History:   Procedure Laterality Date   • APPENDECTOMY     • BREAST BIOPSY Left     2017 or 2018   • BREAST CYST ASPIRATION Left     2017 or 2018   • CATARACT EXTRACTION, BILATERAL     •  SECTION     • COLONOSCOPY N/A 2019    Procedure: COLONOSCOPY to CECUM;  Surgeon: Damien Aleman MD;  Location: [facility] ENDOSCOPY;  Service: General   • CORNEA LACERATION REPAIR     • ENDOSCOPY N/A 2016    Z-line regular, 40 cm from the incisors, gastritis, erythematous duodenopathy   • ENDOSCOPY N/A 2018    Normal exophagus, Erythematous mucosa in the antrum, Normal examined duodenum-Dr. Devyn Stahl   • ENDOSCOPY N/A 2019    Procedure: ESOPHAGOGASTRODUODENOSCOPY with BX;  Surgeon: Damien Aleman MD;  Location: [facility] ENDOSCOPY;  Service: General   • ENDOSCOPY AND COLONOSCOPY N/A 2014    EGD with biopsy and colonoscopy, Mild gastritis, Normal colon, Repeat Colonoscopy in 5 years, Dr. Damien Aleman   • ENDOSCOPY AND COLONOSCOPY N/A 2008    EGD with biopsy and colonoscopy-Dr. Damien Aleman   • UPPER GASTROINTESTINAL ENDOSCOPY  Cannot remember   • URETEROCELE REPAIR     • US GUIDED FINE NEEDLE ASPIRATION  2019       Social History     Socioeconomic History   • Marital status:    Tobacco Use   • Smoking status: Never Smoker   • Smokeless tobacco: Never Used   • Tobacco comment: CAFFINE USE: 2 cups daily   Vaping Use   • Vaping Use: Never used   Substance and Sexual Activity   • Alcohol use: Not Currently     Alcohol/week: 5.0 standard drinks     Types: 5 Glasses of wine per week   • Drug use: No   • Sexual activity: Defer       Family History   Problem Relation Age of Onset   • Heart failure Father    • Heart disease Father    • Diabetes Sister    • Heart disease Mother    • Irritable bowel syndrome Mother    • Heart disease Brother    • Colon polyps Brother        The following portion of the patient's history were reviewed and updated as appropriate: past  medical history, past surgical history, past social history, past family history, allergies, current medications, and problem list.    Review of Systems   Constitutional: Negative. Negative for weight gain.   Cardiovascular: Positive for palpitations.   Respiratory: Negative.  Negative for snoring.    Hematologic/Lymphatic: Does not bruise/bleed easily.   Musculoskeletal: Negative for joint pain.   Gastrointestinal: Positive for nausea.   Neurological: Negative.    Psychiatric/Behavioral: Positive for depression. The patient is nervous/anxious.        Allergies   Allergen Reactions   • Beta Adrenergic Blockers Other (See Comments)     Asthma     • Other Other (See Comments) and Dizziness     Septocaine   • Hctz [Hydrochlorothiazide] Other (See Comments)     dehydration, stone in salivary gland, hypokalemia   • Sulfa Antibiotics Hives   • Sulfamethoxazole-Trimethoprim Unknown (See Comments)   • Viibryd [Vilazodone Hcl] Anxiety and Dizziness         Current Outpatient Medications:   •  Accu-Chek Guide test strip, , Disp: , Rfl:   •  Accu-Chek Softclix Lancets lancets, , Disp: , Rfl:   •  albuterol sulfate  (90 Base) MCG/ACT inhaler, albuterol sulfate HFA 90 mcg/actuation aerosol inhaler  INHALE 2 PUFFS BY MOUTH 4 TIMES DAILY AS NEEDED, Disp: , Rfl:   •  ALPRAZolam (XANAX) 0.5 MG tablet, Take 0.5 mg by mouth 4 (Four) Times a Day., Disp: , Rfl:   •  BIOTIN MAXIMUM PO, Take  by mouth., Disp: , Rfl:   •  Blood Glucose Monitoring Suppl (Accu-Chek Guide) w/Device kit, , Disp: , Rfl:   •  Cholecalciferol (VITAMIN D3) 5000 UNITS capsule capsule, Take 5,000 Units by mouth Daily., Disp: , Rfl:   •  ciclopirox (PENLAC) 8 % solution, ciclopirox 8 % topical solution  APPLY SOLUTION TOPICALLY TO TOENAILS AT BEDTIME, Disp: , Rfl:   •  Euthyrox 75 MCG tablet, 1 tablet daily from Monday to Saturdays only., Disp: 30 tablet, Rfl: 6  •  nefazodone (SERZONE) 200 MG tablet, TAKE 1 & 1/2 (ONE & ONE-HALF) TABLETS BY MOUTH AT BEDTIME,  "Disp: , Rfl:   •  olmesartan (BENICAR) 40 MG tablet, Take 0.5 tablets by mouth 2 (Two) Times a Day. (Patient taking differently: Take 20 mg by mouth 2 (Two) Times a Day.), Disp: 30 tablet, Rfl: 3  •  Omega-3 Fatty Acids (OMEGA 3 PO), Take 1 tablet by mouth daily., Disp: , Rfl:   •  pantoprazole (PROTONIX) 40 MG EC tablet, Take 1 tablet by mouth 2 (Two) Times a Day., Disp: 180 tablet, Rfl: 1  •  rosuvastatin (CRESTOR) 10 MG tablet, Take 1 tablet by mouth Daily., Disp: , Rfl:   •  vitamin B-12 (CYANOCOBALAMIN) 100 MCG tablet, Take 50 mcg by mouth Daily., Disp: , Rfl:   •  •  cetirizine (zyrTEC) 5 MG tablet, Take 10 mg by mouth Daily As Needed., Disp: , Rfl:         Objective:     Vitals:    02/28/22 1300 02/28/22 1315   BP: 160/92 166/98   BP Location: Left arm Right arm   Pulse: 75    Weight: 80.3 kg (177 lb)    Height: 165.1 cm (65\")      Body mass index is 29.45 kg/m².    PHYSICAL EXAM:    Vitals Reviewed.   General Appearance: No acute distress, well developed and well nourished.  Eyes: Conjunctiva and lids: No erythema, swelling, or discharge. Sclera non-icteric. Glasses.   HENT: Atraumatic, normocephalic. External eyes, ears, and nose normal. No hearing loss noted. Mucous membranes normal. Lips not cyanotic. Neck supple with no tenderness. Wearing mask.   Respiratory: No signs of respiratory distress. Respiration rhythm and depth normal.   Clear to auscultation. No rales, crackles, rhonchi, or wheezing auscultated.   Cardiovascular:  Jugular Venous Pressure: Normal  Heart Rate and Rhythm: Normal, Heart Sounds: Normal S1 and S2. No S3 or S4 noted.  Murmurs: No murmurs noted. No rubs, thrills, or gallops.   Lower Extremities: No edema noted.  Gastrointestinal:  Abdomen soft, non-distended, non-tender.    Musculoskeletal: Normal movement of extremities.  Skin and Nails: General appearance normal. No pallor, cyanosis, diaphoresis. Skin temperature normal. No clubbing of fingernails.   Psychiatric: Patient alert and " oriented to person, place, and time. Speech and behavior appropriate. Normal mood and affect.       ECG 12 Lead    Date/Time: 2/28/2022 1:05 PM  Performed by: Lisa Dumont APRN  Authorized by: Lisa Dumont APRN   Comparison: compared with previous ECG from 1/17/2022  Similar to previous ECG  Rhythm: sinus rhythm  Rate: normal  BPM: 75  Conduction: right bundle branch block and left anterior fascicular block  QRS axis: normal  Other findings: non-specific ST-T wave changes    Clinical impression: abnormal EKG              Assessment:       Diagnosis Plan   1. Primary hypertension     2. Palpitations     3. RBBB (right bundle branch block with left anterior fascicular block)     4. Coronary arteriosclerosis in native artery     5. Mixed hyperlipidemia            Plan:       1.  Hypertension: Blood pressure elevated today off the amlodipine.  I reassured her that the amlodipine is not causing her to have palpitations I recommended that she restart this.  She will call me with an update in a couple of weeks.    2.  Palpitations: I offered a Holter monitor to be completed and she wants to hold off on the monitor at this time.  I encouraged her to drink 64 ounces of water daily.    3.  RBBB with LAFB: Chronic and unchanged on EKG.    4.  Nonobstructive Coronary Artery Disease: Denies chest pain.  Recent stress test was normal.  Continue rosuvastatin.    5.  Hyperlipidemia: On rosuvastatin.    6.  She will call me with an update on her blood pressure and palpitations in a couple of weeks.  She was already scheduled for a 1 year follow-up visit with Dr. Paula.      As always, it has been a pleasure to participate in your patient's care. Thank you.       Sincerely,         DESI Cyr  [facility] Cardiology      · Dictated utilizing Dragon Dictation  · COVID-19 Precautions - Patient was compliant in wearing a mask. When I saw the patient, I used appropriate personal protective  equipment (PPE) including mask and eye shield (standard procedure).  Additionally, I used gown and gloves if indicated.  Hand hygiene was completed before and after seeing the patient.  · I spent 30 minutes reviewing her medical records/testing/previous office notes/labs, face-to-face interaction with patient, physical examination, formulating the plan of care, and discussion of plan of care with patient.

## 2022-03-03 ENCOUNTER — HOSPITAL ENCOUNTER (OUTPATIENT)
Dept: ULTRASOUND IMAGING | Facility: HOSPITAL | Age: 67
End: 2022-03-03

## 2022-03-17 ENCOUNTER — HOSPITAL ENCOUNTER (OUTPATIENT)
Dept: ULTRASOUND IMAGING | Facility: HOSPITAL | Age: 67
Discharge: HOME | End: 2022-03-17
Admitting: INTERNAL MEDICINE

## 2022-03-17 DIAGNOSIS — E03.9 ACQUIRED HYPOTHYROIDISM: ICD-10-CM

## 2022-03-17 DIAGNOSIS — E04.2 NONTOXIC MULTINODULAR GOITER: ICD-10-CM

## 2022-03-17 PROCEDURE — 76536 US EXAM OF HEAD AND NECK: CPT

## 2022-04-07 ENCOUNTER — OFFICE VISIT (OUTPATIENT)
Dept: SLEEP MEDICINE | Facility: HOSPITAL | Age: 67
End: 2022-04-07

## 2022-04-07 VITALS — OXYGEN SATURATION: 98 % | HEART RATE: 82 BPM | HEIGHT: 65 IN | WEIGHT: 177 LBS | BODY MASS INDEX: 29.49 KG/M2

## 2022-04-07 DIAGNOSIS — G47.33 OSA ON CPAP: Primary | ICD-10-CM

## 2022-04-07 DIAGNOSIS — Z99.89 OSA ON CPAP: Primary | ICD-10-CM

## 2022-04-07 DIAGNOSIS — F51.04 PSYCHOPHYSIOLOGICAL INSOMNIA: ICD-10-CM

## 2022-04-07 DIAGNOSIS — G47.21 CIRCADIAN RHYTHM SLEEP DISORDER, DELAYED SLEEP PHASE TYPE: ICD-10-CM

## 2022-04-07 PROCEDURE — 99213 OFFICE O/P EST LOW 20 MIN: CPT | Performed by: INTERNAL MEDICINE

## 2022-04-07 PROCEDURE — G0463 HOSPITAL OUTPT CLINIC VISIT: HCPCS

## 2022-04-07 NOTE — PROGRESS NOTES
"Follow Up [facility] Disorders Center Note     Chief Complaint:  LEATHA     Primary Care Physician: Benjamín Morocho MD    Interval History:   The patient is a 66 y.o. female  who I last saw 4/29/2021 and that note was reviewed.  The patient reports she is unchanged.  She does complain about opening her mouth and that wakes her up with a extremely dry mouth and throat.  She tries to wear a chinstrap.  She also reports some vertigo when getting out of bed.  The patient will go to bed between 10 PM and midnight and awakens between 6 and 9:30 AM or 10 AM.  She will use the restroom during that time.    Self-administered [place] Sleepiness Scale test results: 0  0-5 Lower normal daytime sleepiness  6-10 Higher normal daytime sleepiness  11-12 Mild, 13-15 Moderate, & 16-24 Severe excessive daytime sleepiness    Review of Systems:    A complete review of systems was done and all were negative with the exception of some nasal congestion, STANLEY, and anxiety and depression    Social History:    Social History     Socioeconomic History   • Marital status:    Tobacco Use   • Smoking status: Never Smoker   • Smokeless tobacco: Never Used   • Tobacco comment: CAFFINE USE: 2 cups daily   Vaping Use   • Vaping Use: Never used   Substance and Sexual Activity   • Alcohol use: Not Currently     Alcohol/week: 5.0 standard drinks     Types: 5 Glasses of wine per week   • Drug use: No   • Sexual activity: Defer       Allergies:  Beta adrenergic blockers, Other, Hctz [hydrochlorothiazide], Sulfa antibiotics, Sulfamethoxazole-trimethoprim, and Viibryd [vilazodone hcl]     Medication Review: Her list was reviewed.  She does take Xanax and trazodone.    Vital Signs:    Vitals:    04/07/22 0900   Pulse: 82   SpO2: 98%   Weight: 80.3 kg (177 lb)   Height: 165.1 cm (65\")     Body mass index is 29.45 kg/m².    Physical Exam:    Constitutional:  Well developed 66 y.o. female that appears in no apparent distress.  Awake & oriented times 3.  Normal " mood with normal recent and remote memory and normal judgement.  Eyes:  Conjunctivae normal.  Oropharynx: Previously, moist mucous membranes without exudate and a large tongue and normal uvula and patent posterior pharyngeal opening, patient is wearing a facemask.     Downloaded PAP Data Reviewed For Compliance:  DME is [facility] and she uses a fullface mask with or without a chinstrap at times.  Downloads between 1/7 and 4/6/2022 compliance 100%.  Average usage is 7 hours and 54 minutes.  Average AHI is normal at 5 with a leak of 40 minutes.  Average auto CPAP pressure is 12.6 and her auto CPAP is 8-15.    I have reviewed the above results and compared them with the patient's last downloads and reviewed with the patient.    Impression:   Obstructive sleep apnea adequately treated with auto CPAP. The patient appears to be at goal with good compliance and usage. The patient has no complaints of hypersomnolence.  Psychophysiologic insomnia  Circadian rhythm sleep disorder, delayed sleep phase type    Plan:  Good sleep hygiene measures should be maintained.  Weight loss would be beneficial in this patient who is nearly obese by BMI.      After evaluating the patient and assessing results available, the patient is benefiting from the treatment being provided.     The patient will continue auto CPAP.  After clinical evaluation and review of downloads, I recommend no changes to the patient's pressures.  A new prescription will be sent to the patient's DME.    Additionally, Mandie recall discussed.  The patient has registered her device.    I answered all of the patient's questions.  The patient will call for any problems and will follow up in 1 year.      Jass Batista MD  Sleep Medicine  04/07/22  11:07 EDT

## 2022-04-14 ENCOUNTER — APPOINTMENT (OUTPATIENT)
Dept: MAMMOGRAPHY | Facility: HOSPITAL | Age: 67
End: 2022-04-14

## 2022-04-21 ENCOUNTER — HOSPITAL ENCOUNTER (OUTPATIENT)
Dept: MAMMOGRAPHY | Facility: HOSPITAL | Age: 67
Discharge: HOME | End: 2022-04-21
Admitting: SPECIALIST

## 2022-04-21 DIAGNOSIS — Z12.31 ENCOUNTER FOR SCREENING MAMMOGRAM FOR MALIGNANT NEOPLASM OF BREAST: ICD-10-CM

## 2022-04-21 PROCEDURE — 77067 SCR MAMMO BI INCL CAD: CPT

## 2022-04-21 PROCEDURE — 77063 BREAST TOMOSYNTHESIS BI: CPT

## 2022-06-13 ENCOUNTER — TELEPHONE (OUTPATIENT)
Dept: CARDIOLOGY | Facility: CLINIC | Age: 67
End: 2022-06-13

## 2022-06-13 NOTE — TELEPHONE ENCOUNTER
Based on her ECG, should be ok for her to take paxil - if she is having side effects then talk to the prescriber of paxil to see if she needs something else.pls call and let her know.     rm

## 2022-06-13 NOTE — TELEPHONE ENCOUNTER
Started taking Paxil on Saturday and she started feeling weird for about an hour and half or so.  She could not tell if it was an arrhythmia.  She stated that she looked at the last EKG that was done at our office and she is wanting to know if it is ok for her to continue to take the medication?  She stated that all the antidepressants that she has taken so far have caused her to have palpitations.  She stated that this one lasted longer and just made her feel weird. Please advise.    CB: [phone]    Song

## 2022-06-20 ENCOUNTER — TELEPHONE (OUTPATIENT)
Dept: ENDOCRINOLOGY | Age: 67
End: 2022-06-20

## 2022-06-20 DIAGNOSIS — E03.9 ACQUIRED HYPOTHYROIDISM: Primary | ICD-10-CM

## 2022-06-20 DIAGNOSIS — R53.82 CHRONIC FATIGUE: ICD-10-CM

## 2022-06-20 NOTE — TELEPHONE ENCOUNTER
PATIENT CALLED IS HAVING ISSUE WITH DRY SKIN, COLD, FATIGUE, DEPRESSION, BRITTLE HAIR, NOT EATING MUCH.    CALL HER BACK [phone]

## 2022-06-21 NOTE — TELEPHONE ENCOUNTER
Please call patient back.     1. Labs order now for diagnosis of hypothyroid.  May draw here or at hospital or lab of her choice.     MUST BE OFF BIOTIN X 3 MORNINGS PRIOR TO LAB DRAW ON 4TH MORNING TO AVOID INTERFERENCE WITH RESULTS.     Current Outpatient Medications on File Prior to Visit   Medication Sig Dispense Refill   • Accu-Chek Guide test strip      • Accu-Chek Softclix Lancets lancets      • albuterol sulfate  (90 Base) MCG/ACT inhaler albuterol sulfate HFA 90 mcg/actuation aerosol inhaler   INHALE 2 PUFFS BY MOUTH 4 TIMES DAILY AS NEEDED     • ALPRAZolam (XANAX) 0.5 MG tablet Take 0.5 mg by mouth 4 (Four) Times a Day.     • amLODIPine (NORVASC) 2.5 MG tablet Take 1 tablet by mouth Daily.     • BIOTIN MAXIMUM PO Take  by mouth.     • Blood Glucose Monitoring Suppl (Accu-Chek Guide) w/Device kit      • Cholecalciferol (VITAMIN D3) 5000 UNITS capsule capsule Take 5,000 Units by mouth Daily.     • ciclopirox (PENLAC) 8 % solution ciclopirox 8 % topical solution   APPLY SOLUTION TOPICALLY TO TOENAILS AT BEDTIME     • Euthyrox 75 MCG tablet 1 tablet daily from Monday to Saturdays only. 30 tablet 6   • olmesartan (BENICAR) 40 MG tablet Take 0.5 tablets by mouth 2 (Two) Times a Day. (Patient taking differently: Take 20 mg by mouth 2 (Two) Times a Day.) 30 tablet 3   • Omega-3 Fatty Acids (OMEGA 3 PO) Take 1 tablet by mouth daily.     • pantoprazole (PROTONIX) 40 MG EC tablet Take 1 tablet by mouth 2 (Two) Times a Day. 180 tablet 1   • rosuvastatin (CRESTOR) 10 MG tablet Take 1 tablet by mouth Daily.     • vitamin B-12 (CYANOCOBALAMIN) 100 MCG tablet Take 50 mcg by mouth Daily.       No current facility-administered medications on file prior to visit.     Brisa Do MD

## 2022-06-23 ENCOUNTER — LAB (OUTPATIENT)
Dept: ENDOCRINOLOGY | Age: 67
End: 2022-06-23

## 2022-06-23 DIAGNOSIS — E04.2 NONTOXIC MULTINODULAR GOITER: ICD-10-CM

## 2022-06-23 DIAGNOSIS — E03.9 ACQUIRED HYPOTHYROIDISM: ICD-10-CM

## 2022-06-24 LAB
T3 SERPL-MCNC: 82 NG/DL (ref 71–180)
T4 SERPL-MCNC: 8.9 UG/DL (ref 4.5–12)
THYROGLOB AB SERPL-ACNC: <1 IU/ML (ref 0–0.9)
TSH SERPL DL<=0.005 MIU/L-ACNC: 0.81 UIU/ML (ref 0.45–4.5)

## 2022-07-18 ENCOUNTER — TELEPHONE (OUTPATIENT)
Dept: ENDOCRINOLOGY | Age: 67
End: 2022-07-18

## 2022-07-21 ENCOUNTER — TELEPHONE (OUTPATIENT)
Dept: ENDOCRINOLOGY | Age: 67
End: 2022-07-21

## 2022-07-25 ENCOUNTER — APPOINTMENT (OUTPATIENT)
Dept: BONE DENSITY | Facility: HOSPITAL | Age: 67
End: 2022-07-25

## 2022-08-08 ENCOUNTER — OFFICE VISIT (OUTPATIENT)
Dept: ENDOCRINOLOGY | Age: 67
End: 2022-08-08

## 2022-08-08 VITALS
BODY MASS INDEX: 29.79 KG/M2 | HEIGHT: 65 IN | DIASTOLIC BLOOD PRESSURE: 70 MMHG | OXYGEN SATURATION: 97 % | SYSTOLIC BLOOD PRESSURE: 130 MMHG | HEART RATE: 71 BPM | TEMPERATURE: 99.1 F | WEIGHT: 178.8 LBS

## 2022-08-08 DIAGNOSIS — R73.9 HYPERGLYCEMIA: ICD-10-CM

## 2022-08-08 DIAGNOSIS — E03.9 HYPOTHYROIDISM, UNSPECIFIED TYPE: Primary | ICD-10-CM

## 2022-08-08 DIAGNOSIS — F43.21 GRIEVING: ICD-10-CM

## 2022-08-08 DIAGNOSIS — E03.9 ACQUIRED HYPOTHYROIDISM: ICD-10-CM

## 2022-08-08 PROCEDURE — 99214 OFFICE O/P EST MOD 30 MIN: CPT | Performed by: INTERNAL MEDICINE

## 2022-08-08 RX ORDER — LORATADINE 10 MG/1
TABLET ORAL
COMMUNITY
End: 2023-01-19 | Stop reason: ALTCHOICE

## 2022-08-08 RX ORDER — TRAZODONE HYDROCHLORIDE 150 MG/1
TABLET ORAL
COMMUNITY
Start: 2022-07-27

## 2022-08-19 ENCOUNTER — TELEPHONE (OUTPATIENT)
Dept: GASTROENTEROLOGY | Facility: CLINIC | Age: 67
End: 2022-08-19

## 2022-08-19 NOTE — TELEPHONE ENCOUNTER
Patient scheduled with wrong provider. Call to patient to reschedule to Marissa Chan. Left voicemail for call back.

## 2022-08-25 ENCOUNTER — OFFICE VISIT (OUTPATIENT)
Dept: SLEEP MEDICINE | Facility: HOSPITAL | Age: 67
End: 2022-08-25

## 2022-08-25 VITALS — BODY MASS INDEX: 29.85 KG/M2 | OXYGEN SATURATION: 97 % | HEIGHT: 65 IN | HEART RATE: 81 BPM | WEIGHT: 179.2 LBS

## 2022-08-25 DIAGNOSIS — F51.04 PSYCHOPHYSIOLOGICAL INSOMNIA: ICD-10-CM

## 2022-08-25 DIAGNOSIS — G47.21 CIRCADIAN RHYTHM SLEEP DISORDER, DELAYED SLEEP PHASE TYPE: ICD-10-CM

## 2022-08-25 DIAGNOSIS — Z99.89 OSA ON CPAP: Primary | ICD-10-CM

## 2022-08-25 DIAGNOSIS — G47.33 OSA ON CPAP: Primary | ICD-10-CM

## 2022-08-25 PROCEDURE — 99213 OFFICE O/P EST LOW 20 MIN: CPT | Performed by: INTERNAL MEDICINE

## 2022-08-25 PROCEDURE — G0463 HOSPITAL OUTPT CLINIC VISIT: HCPCS

## 2022-08-25 NOTE — PROGRESS NOTES
"Follow Up Sleep Disorders Center Note     Chief Complaint:  LEATHA     Primary Care Physician: Benjamín Morocho MD    Interval History:   The patient is a 66 y.o. female  who I last saw 4/7/2022 and that note was reviewed.  The patient reports she is unchanged.  She goes to bed between 10 and 11 PM and awakens between 6 and 9:30 AM.  She will use the restroom during that time.    Review of Systems:    A complete review of systems was done and all were negative with the exception of some nasal congestion, occasional cough, STANLEY, and anxiety and depression.    Social History:    Social History     Socioeconomic History   • Marital status:    Tobacco Use   • Smoking status: Never Smoker   • Smokeless tobacco: Never Used   • Tobacco comment: CAFFINE USE: 2 cups daily   Vaping Use   • Vaping Use: Never used   Substance and Sexual Activity   • Alcohol use: Yes     Alcohol/week: 5.0 standard drinks     Types: 5 Glasses of wine per week     Comment: Occasionally   • Drug use: No   • Sexual activity: Defer       Allergies:  Beta adrenergic blockers, Other, Hctz [hydrochlorothiazide], Sulfa antibiotics, Sulfamethoxazole-trimethoprim, and Viibryd [vilazodone hcl]     Medication Review:  Reviewed.  Alprazolam 0.5, trazodone 150+75 at bedtime rosuvastatin olmesartan vitamin D loratadine famotidine Euthyrox    Vital Signs:    Vitals:    08/25/22 0900   Pulse: 81   SpO2: 97%   Weight: 81.3 kg (179 lb 3.2 oz)   Height: 165.1 cm (65\")     Body mass index is 29.82 kg/m².    Physical Exam:    Constitutional:  Well developed 66 y.o. female that appears in no apparent distress.  Awake & oriented times 3.  Normal mood with normal recent and remote memory and normal judgement.  Eyes:  Conjunctivae normal.  Oropharynx: Previously, moist mucous membranes without exudate and a large tongue and normal uvula and patent posterior pharyngeal opening, patient is wearing a facemask.    Self-administered [place] Sleepiness Scale test results: " 0  0-5 Lower normal daytime sleepiness  6-10 Higher normal daytime sleepiness  11-12 Mild, 13-15 Moderate, & 16-24 Severe excessive daytime sleepiness     Downloaded PAP Data Reviewed For Compliance:  D[facility] is [facility] and she uses a fullface mask.  Downloads between 5/27 and 8/24/2022 compliance 100%.  Average usage is 8 hours and 13 minutes.  Average AHI is mildly abnormal at 7 but all subsets are normal and she did have a leak 2 months ago which has resolved.  Average auto CPAP pressure is 15 and her auto CPAP is 8-15    I have reviewed the above results and compared them with the patient's last downloads and reviewed with the patient.    Impression:   Obstructive sleep apnea adequately treated with auto CPAP. The patient appears to be at goal with good compliance and usage. The patient has no complaints of hypersomnolence.  Psychophysiologic insomnia  Circadian rhythm sleep disorder, delayed sleep phase type     Plan:  Good sleep hygiene measures should be maintained.  Weight loss would be beneficial in this patient who is overweight by BMI.      After evaluating the patient and assessing results available, the patient is benefiting from the treatment being provided.     The patient will continue auto CPAP.  After clinical evaluation and review of downloads, I recommend no changes to the patient's pressures.  However, due to the age of her device, the patient wishes a new auto CPAP between 8 and 16 cm water pressure.  A new prescription will be sent to the patient's DME.    Additionally, due to dryness, the patient should increase her humidifier up to 1.  I also described Biotene to her.    I answered all of the patient's questions.  The patient will call for any problems and will follow up in 2 months after obtaining her new device..      Jass Batista MD  Sleep Medicine  08/25/22  09:40 EDT

## 2022-09-28 ENCOUNTER — TELEPHONE (OUTPATIENT)
Dept: ENDOCRINOLOGY | Age: 67
End: 2022-09-28

## 2022-09-28 DIAGNOSIS — R73.09 IMPAIRED GLUCOSE METABOLISM: ICD-10-CM

## 2022-09-28 DIAGNOSIS — E03.9 HYPOTHYROIDISM, UNSPECIFIED TYPE: Primary | ICD-10-CM

## 2022-09-28 PROBLEM — E66.3 OVERWEIGHT WITH BODY MASS INDEX (BMI) 25.0-29.9: Status: RESOLVED | Noted: 2021-06-28 | Resolved: 2022-09-28

## 2022-09-28 PROBLEM — F43.21 GRIEVING: Status: RESOLVED | Noted: 2022-08-08 | Resolved: 2022-09-28

## 2022-09-28 PROBLEM — L30.9 HAND ECZEMA: Status: ACTIVE | Noted: 2022-03-07

## 2022-09-28 PROBLEM — R73.9 HYPERGLYCEMIA: Status: RESOLVED | Noted: 2022-08-08 | Resolved: 2022-09-28

## 2022-09-28 PROBLEM — R60.0 EDEMA OF LOWER EXTREMITY: Status: ACTIVE | Noted: 2022-04-28

## 2022-09-28 PROBLEM — E04.1 SOLITARY THYROID NODULE: Status: RESOLVED | Noted: 2019-04-24 | Resolved: 2022-09-28

## 2022-09-29 ENCOUNTER — TELEPHONE (OUTPATIENT)
Dept: ENDOCRINOLOGY | Age: 67
End: 2022-09-29

## 2022-09-29 NOTE — TELEPHONE ENCOUNTER
PT CALLED STATING SHE JUST HAD LABS DONE LAST WEEK AND ITS THE EXACT SAME LABS AS SHE WILL BE GETTING DONE ON Monday. SHE WANTS TO KNOW IF SHE STILL HAS TO COME IN ON Monday NAD GET LABS DONE. SHE SAID IF SHE DOES NOT ANSWER TO LEAVE HER A MESSAGE.    PLEASE CALL HER BACK [phone]

## 2022-10-03 ENCOUNTER — APPOINTMENT (OUTPATIENT)
Dept: BONE DENSITY | Facility: HOSPITAL | Age: 67
End: 2022-10-03

## 2022-10-10 ENCOUNTER — TRANSCRIBE ORDERS (OUTPATIENT)
Dept: ADMINISTRATIVE | Facility: HOSPITAL | Age: 67
End: 2022-10-10

## 2022-10-10 DIAGNOSIS — Z78.0 MENOPAUSE: Primary | ICD-10-CM

## 2022-10-13 ENCOUNTER — OFFICE VISIT (OUTPATIENT)
Dept: GASTROENTEROLOGY | Facility: CLINIC | Age: 67
End: 2022-10-13

## 2022-10-13 VITALS
DIASTOLIC BLOOD PRESSURE: 82 MMHG | BODY MASS INDEX: 30.09 KG/M2 | WEIGHT: 180.6 LBS | HEART RATE: 67 BPM | OXYGEN SATURATION: 97 % | HEIGHT: 65 IN | SYSTOLIC BLOOD PRESSURE: 156 MMHG | TEMPERATURE: 97.1 F

## 2022-10-13 DIAGNOSIS — R11.0 NAUSEA: Primary | ICD-10-CM

## 2022-10-13 DIAGNOSIS — K59.09 CHRONIC CONSTIPATION: ICD-10-CM

## 2022-10-13 DIAGNOSIS — K21.9 GASTROESOPHAGEAL REFLUX DISEASE WITHOUT ESOPHAGITIS: ICD-10-CM

## 2022-10-13 DIAGNOSIS — R10.11 RUQ PAIN: ICD-10-CM

## 2022-10-13 PROCEDURE — 99213 OFFICE O/P EST LOW 20 MIN: CPT | Performed by: INTERNAL MEDICINE

## 2022-10-13 RX ORDER — POLYETHYLENE GLYCOL 3350 17 G/17G
17 POWDER, FOR SOLUTION ORAL DAILY
Qty: 578 G | Refills: 11 | Status: SHIPPED | OUTPATIENT
Start: 2022-10-13

## 2022-10-13 RX ORDER — PANTOPRAZOLE SODIUM 40 MG/1
40 TABLET, DELAYED RELEASE ORAL 2 TIMES DAILY
Qty: 180 TABLET | Refills: 3 | Status: SHIPPED | OUTPATIENT
Start: 2022-10-13

## 2022-10-13 NOTE — PROGRESS NOTES
Chief Complaint   Patient presents with   • Abdominal Pain   • Nausea       Vesta Sutton is a  66 y.o. female here for a follow up visit for abdominal pain nausea with constipation.    HPI     Patient 66-year-old female with history of hypertension, hyperlipidemia and GERD with normal colonoscopy and EGD last year CT with retained stool in the colon otherwise unremarkable presenting in follow-up.  Patient reports has been drinking some alcohol intermittently which can cause some increased right upper quadrant discomfort as does eating particularly fatty foods.  Patient reports still having constipation without relief with Colace or Metamucil.  Patient denies weight loss actually has noted weight gain here for further recommendations.    Past Medical History:   Diagnosis Date   • Anemia    • Anxiety and depression    • Appendiceal carcinoid tumor    • Arthritis    • Asthma    • Breast nodule    • Cancer of appendix (HCC)    • Coronary artery disease     nonobstructive    • COVID-19 virus infection 06/2021   • Dizziness    • Environmental allergies    • Fibromuscular hyperplasia of artery (HCC)     in the carotid artery with no stenosis   • GERD (gastroesophageal reflux disease)    • Headache    • Hernia in stomach   • History of anesthesia complications 2017    lidocaine caused reaction during dental procedure per patient   • Hyperlipidemia    • Hypertension    • Hypothyroidism    • Impaired glucose metabolism    • Lumbar pain    • Myalgia    • Nodular goiter     last US 3/21/22 stable 2.5 cm left lobe  and  a 9 mm left lobe and a 10 mm mid right lobe. Sister with thyroid cancer   • LEATHA (obstructive sleep apnea)     uses CPAP machine   • Palpitations    • RBBB (right bundle branch block)    • Retinal detachment     left  //  when blood pressure gets high she notices flashing lights in her vision   • Vertigo          Current Outpatient Medications:   •  albuterol sulfate  (90 Base) MCG/ACT inhaler, albuterol  sulfate HFA 90 mcg/actuation aerosol inhaler  INHALE 2 PUFFS BY MOUTH 4 TIMES DAILY AS NEEDED, Disp: , Rfl:   •  ALPRAZolam (XANAX) 0.5 MG tablet, Take 0.5 mg by mouth 4 (Four) Times a Day., Disp: , Rfl:   •  amLODIPine (NORVASC) 2.5 MG tablet, Take 1 tablet by mouth As Needed., Disp: , Rfl:   •  Cholecalciferol (VITAMIN D3) 5000 UNITS capsule capsule, Take 5,000 Units by mouth Daily., Disp: , Rfl:   •  ciclopirox (PENLAC) 8 % solution, ciclopirox 8 % topical solution  APPLY SOLUTION TOPICALLY TO TOENAILS AT BEDTIME, Disp: , Rfl:   •  Euthyrox 75 MCG tablet, 1 tablet daily from Monday to Saturdays only., Disp: 30 tablet, Rfl: 6  •  loratadine (CLARITIN) 10 MG tablet, , Disp: , Rfl:   •  olmesartan (BENICAR) 40 MG tablet, Take 0.5 tablets by mouth 2 (Two) Times a Day. (Patient taking differently: Take 20 mg by mouth 2 (Two) Times a Day.), Disp: 30 tablet, Rfl: 3  •  Omega-3 Fatty Acids (OMEGA 3 PO), Take 1 tablet by mouth daily., Disp: , Rfl:   •  pantoprazole (PROTONIX) 40 MG EC tablet, Take 1 tablet by mouth 2 (Two) Times a Day., Disp: 180 tablet, Rfl: 1  •  rosuvastatin (CRESTOR) 10 MG tablet, Take 1 tablet by mouth Daily., Disp: , Rfl:   •  traZODone (DESYREL) 150 MG tablet, TAKE 1 TO 1 & 1/2 (ONE TO ONE & ONE-HALF) TABLETS BY MOUTH ONCE DAILY AS NEEDED AT BEDTIME, Disp: , Rfl:   •  vitamin B-12 (CYANOCOBALAMIN) 100 MCG tablet, Take 50 mcg by mouth Daily., Disp: , Rfl:     Allergies   Allergen Reactions   • Beta Adrenergic Blockers Other (See Comments)     Asthma     • Other Other (See Comments) and Dizziness     Septocaine   • Hctz [Hydrochlorothiazide] Other (See Comments)     dehydration, stone in salivary gland, hypokalemia   • Sulfa Antibiotics Hives   • Sulfamethoxazole-Trimethoprim Unknown (See Comments)   • Viibryd [Vilazodone Hcl] Anxiety and Dizziness       Social History     Socioeconomic History   • Marital status:    Tobacco Use   • Smoking status: Never   • Smokeless tobacco: Never   •  Tobacco comments:     CAFFINE USE: 2 cups daily   Vaping Use   • Vaping Use: Never used   Substance and Sexual Activity   • Alcohol use: Yes     Alcohol/week: 5.0 standard drinks     Types: 5 Glasses of wine per week     Comment: Occasionally   • Drug use: No   • Sexual activity: Defer       Family History   Problem Relation Age of Onset   • Heart disease Mother    • Irritable bowel syndrome Mother    • Heart failure Father    • Heart disease Father    • Diabetes Sister    • Heart disease Brother    • Colon polyps Brother    • Breast cancer Neg Hx    • Ovarian cancer Neg Hx        Review of Systems   Constitutional: Negative.    Respiratory: Negative.    Cardiovascular: Negative.    Gastrointestinal: Positive for abdominal distention, abdominal pain, constipation and nausea. Negative for anal bleeding, blood in stool, diarrhea, rectal pain and vomiting.   Musculoskeletal: Negative.    Skin: Negative.    Hematological: Negative.        Vitals:    10/13/22 1424   BP: 156/82   Pulse: 67   Temp: 97.1 °F (36.2 °C)   SpO2: 97%       Physical Exam  Vitals reviewed.   Constitutional:       Appearance: Normal appearance. She is well-developed.   HENT:      Head: Normocephalic and atraumatic.   Eyes:      General: No scleral icterus.     Pupils: Pupils are equal, round, and reactive to light.   Pulmonary:      Effort: No respiratory distress.      Breath sounds: Normal breath sounds.   Abdominal:      General: Bowel sounds are normal. There is no distension.      Palpations: Abdomen is soft. There is no mass.      Tenderness: There is no abdominal tenderness.      Hernia: No hernia is present.   Skin:     General: Skin is warm and dry.      Coloration: Skin is not jaundiced.      Findings: No rash.   Neurological:      General: No focal deficit present.      Mental Status: She is alert and oriented to person, place, and time.      Cranial Nerves: No cranial nerve deficit.   Psychiatric:         Behavior: Behavior normal.          Thought Content: Thought content normal.         Judgment: Judgment normal.         No visits with results within 2 Month(s) from this visit.   Latest known visit with results is:   Lab on 06/23/2022   Component Date Value Ref Range Status   • TSH 06/23/2022 0.811  0.450 - 4.500 uIU/mL Final   • T3, Total 06/23/2022 82  71 - 180 ng/dL Final   • T4, Total 06/23/2022 8.9  4.5 - 12.0 ug/dL Final   • Thyroglobulin Ab 06/23/2022 <1.0  0.0 - 0.9 IU/mL Final       Diagnoses and all orders for this visit:    1. Nausea (Primary)  -     US Liver; Future    2. RUQ pain  -     US Liver; Future    3. Chronic constipation    4. Gastroesophageal reflux disease without esophagitis      Patient 66-year-old female with history of hypothyroid, hypertension and hyperlipidemia as well as GERD status post EGD and colonoscopy last year that were unremarkable presenting with ongoing symptoms.  Patient still constipated still complaining of reflux and abdominal bloating with right upper quadrant pain.  Patient self DC'd her pantoprazole.  At this point would recommend going back on the pantoprazole for to help her reflux will also get a right upper quadrant ultrasound to make sure that it noncalcified stones in the gallbladder have not been missed.  We will begin MiraLAX 17 g daily and adjust for effect with follow-up in 3 months.

## 2022-10-17 ENCOUNTER — OFFICE VISIT (OUTPATIENT)
Dept: ENDOCRINOLOGY | Age: 67
End: 2022-10-17

## 2022-10-17 ENCOUNTER — TELEPHONE (OUTPATIENT)
Dept: GASTROENTEROLOGY | Facility: CLINIC | Age: 67
End: 2022-10-17

## 2022-10-17 VITALS
WEIGHT: 177.4 LBS | OXYGEN SATURATION: 96 % | BODY MASS INDEX: 29.56 KG/M2 | DIASTOLIC BLOOD PRESSURE: 82 MMHG | SYSTOLIC BLOOD PRESSURE: 136 MMHG | HEIGHT: 65 IN | TEMPERATURE: 97.5 F | HEART RATE: 78 BPM

## 2022-10-17 DIAGNOSIS — E78.2 MIXED HYPERLIPIDEMIA: ICD-10-CM

## 2022-10-17 DIAGNOSIS — E04.2 NONTOXIC MULTINODULAR GOITER: ICD-10-CM

## 2022-10-17 DIAGNOSIS — E55.9 VITAMIN D DEFICIENCY: ICD-10-CM

## 2022-10-17 DIAGNOSIS — E03.9 HYPOTHYROIDISM, UNSPECIFIED TYPE: Primary | ICD-10-CM

## 2022-10-17 DIAGNOSIS — R73.03 PREDIABETES: ICD-10-CM

## 2022-10-17 DIAGNOSIS — G47.33 OSA ON CPAP: ICD-10-CM

## 2022-10-17 DIAGNOSIS — Z99.89 OSA ON CPAP: ICD-10-CM

## 2022-10-17 PROCEDURE — 99214 OFFICE O/P EST MOD 30 MIN: CPT | Performed by: INTERNAL MEDICINE

## 2022-10-17 RX ORDER — FLUTICASONE PROPIONATE 50 MCG
1 SPRAY, SUSPENSION (ML) NASAL DAILY
COMMUNITY
Start: 2022-10-07

## 2022-10-17 NOTE — TELEPHONE ENCOUNTER
Called pt and advised that the liver US will also see the gall bladder. Pt verbalized understanding.

## 2022-10-17 NOTE — TELEPHONE ENCOUNTER
Caller: Vesta Sutton    Relationship: Self    Best call back number: [phone]    What is the best time to reach you: ANYTIME TODAY PT WILL BE AT WORK TOMORROW    Who are you requesting to speak with (clinical staff, provider,  specific staff member): NURSE    Do you know the name of the person who called:     What was the call regarding: PT WAS UNDER THE IMPRESSON THAT SHE WAS TO HAVE AN U/S OF HER GALLBLADDER BUT REFERRAL IS FOR LIVER PLEASE CALL PT AND ADVISE    Do you require a callback: YES

## 2022-11-03 ENCOUNTER — APPOINTMENT (OUTPATIENT)
Dept: MAMMOGRAPHY | Facility: HOSPITAL | Age: 67
End: 2022-11-03

## 2022-11-10 ENCOUNTER — HOSPITAL ENCOUNTER (OUTPATIENT)
Dept: ULTRASOUND IMAGING | Facility: HOSPITAL | Age: 67
Discharge: HOME | End: 2022-11-10
Admitting: INTERNAL MEDICINE

## 2022-11-10 DIAGNOSIS — R10.11 RUQ PAIN: ICD-10-CM

## 2022-11-10 DIAGNOSIS — R11.0 NAUSEA: ICD-10-CM

## 2022-11-10 PROCEDURE — 76705 ECHO EXAM OF ABDOMEN: CPT

## 2022-11-21 ENCOUNTER — TELEPHONE (OUTPATIENT)
Dept: CARDIOLOGY | Facility: CLINIC | Age: 67
End: 2022-11-21

## 2022-11-21 NOTE — TELEPHONE ENCOUNTER
Patient called because she said that she has been having palpitations the last few days. When she feels them her HR jumps to 85-90, one time it got to 100. Normally her HR runs in the 60-70s. She does not complain of any other symptoms. SBP has been in the 130s. She is on olmesartan 20mg BID and since she has been having these palpitations she has been taking amlodipine 2.5mg daily.    Janice Garcia RN  Triage [facility] [place]

## 2022-11-21 NOTE — TELEPHONE ENCOUNTER
"Patient called triage again and was inquiring again on what she should do. She said \"I know you told me early that my heart rate is still normal, but I do not feel like this is normal.\" I told her if she felt like her symptoms were getting worse then she needed to go to the ER. She then said \"I would rather die at home than at the hospital.\" I told her once I heard back from Dr. Paula I would give her a call back. She verbalized understanding.    Janice Garcia RN  Triage [facility] [place]    "

## 2022-11-22 DIAGNOSIS — I10 PRIMARY HYPERTENSION: Primary | ICD-10-CM

## 2022-11-22 DIAGNOSIS — R00.2 PALPITATIONS: ICD-10-CM

## 2022-11-22 NOTE — TELEPHONE ENCOUNTER
Called and left VM. Will continue to try to reach patient.     Janice Garcia RN  [place] [facility] [place]

## 2022-11-22 NOTE — TELEPHONE ENCOUNTER
Notified patient of recommendations. Patient verbalized understanding.    Scheduling, please schedule patient for holter monitor placement.    Janice Garcia RN  Triage [facility] [place]

## 2022-11-23 ENCOUNTER — LAB (OUTPATIENT)
Dept: LAB | Facility: HOSPITAL | Age: 67
End: 2022-11-23

## 2022-11-23 ENCOUNTER — TELEPHONE (OUTPATIENT)
Dept: CARDIOLOGY | Facility: CLINIC | Age: 67
End: 2022-11-23

## 2022-11-23 ENCOUNTER — HOSPITAL ENCOUNTER (OUTPATIENT)
Dept: CT IMAGING | Facility: HOSPITAL | Age: 67
Discharge: HOME | End: 2022-11-23

## 2022-11-23 DIAGNOSIS — I10 PRIMARY HYPERTENSION: ICD-10-CM

## 2022-11-23 DIAGNOSIS — I25.10 CORONARY ARTERIOSCLEROSIS IN NATIVE ARTERY: Primary | ICD-10-CM

## 2022-11-23 DIAGNOSIS — I25.10 CORONARY ARTERIOSCLEROSIS IN NATIVE ARTERY: ICD-10-CM

## 2022-11-23 DIAGNOSIS — R79.89 ELEVATED D-DIMER: ICD-10-CM

## 2022-11-23 DIAGNOSIS — R00.2 PALPITATIONS: ICD-10-CM

## 2022-11-23 LAB
ALBUMIN SERPL-MCNC: 4.3 G/DL (ref 3.5–5.2)
ALBUMIN/GLOB SERPL: 1.8 G/DL
ALP SERPL-CCNC: 66 U/L (ref 39–117)
ALT SERPL W P-5'-P-CCNC: 19 U/L (ref 1–33)
ANION GAP SERPL CALCULATED.3IONS-SCNC: 10.2 MMOL/L (ref 5–15)
AST SERPL-CCNC: 17 U/L (ref 1–32)
BASOPHILS # BLD AUTO: 0.08 10*3/MM3 (ref 0–0.2)
BASOPHILS NFR BLD AUTO: 1 % (ref 0–1.5)
BILIRUB SERPL-MCNC: 0.4 MG/DL (ref 0–1.2)
BUN SERPL-MCNC: 12 MG/DL (ref 8–23)
BUN/CREAT SERPL: 13.8 (ref 7–25)
CALCIUM SPEC-SCNC: 9.3 MG/DL (ref 8.6–10.5)
CHLORIDE SERPL-SCNC: 98 MMOL/L (ref 98–107)
CHOLEST SERPL-MCNC: 176 MG/DL (ref 0–200)
CO2 SERPL-SCNC: 27.8 MMOL/L (ref 22–29)
CREAT SERPL-MCNC: 0.87 MG/DL (ref 0.57–1)
D DIMER PPP FEU-MCNC: 1.84 MCGFEU/ML (ref 0–0.49)
DEPRECATED RDW RBC AUTO: 43.4 FL (ref 37–54)
EGFRCR SERPLBLD CKD-EPI 2021: 74 ML/MIN/1.73
EOSINOPHIL # BLD AUTO: 0.22 10*3/MM3 (ref 0–0.4)
EOSINOPHIL NFR BLD AUTO: 2.9 % (ref 0.3–6.2)
ERYTHROCYTE [DISTWIDTH] IN BLOOD BY AUTOMATED COUNT: 12.4 % (ref 12.3–15.4)
GLOBULIN UR ELPH-MCNC: 2.4 GM/DL
GLUCOSE SERPL-MCNC: 97 MG/DL (ref 65–99)
HCT VFR BLD AUTO: 37 % (ref 34–46.6)
HDLC SERPL-MCNC: 85 MG/DL (ref 40–60)
HGB BLD-MCNC: 12.4 G/DL (ref 12–15.9)
IMM GRANULOCYTES # BLD AUTO: 0.04 10*3/MM3 (ref 0–0.05)
IMM GRANULOCYTES NFR BLD AUTO: 0.5 % (ref 0–0.5)
LDLC SERPL CALC-MCNC: 82 MG/DL (ref 0–100)
LDLC/HDLC SERPL: 0.98 {RATIO}
LYMPHOCYTES # BLD AUTO: 1.2 10*3/MM3 (ref 0.7–3.1)
LYMPHOCYTES NFR BLD AUTO: 15.6 % (ref 19.6–45.3)
MAGNESIUM SERPL-MCNC: 2.1 MG/DL (ref 1.6–2.4)
MCH RBC QN AUTO: 32 PG (ref 26.6–33)
MCHC RBC AUTO-ENTMCNC: 33.5 G/DL (ref 31.5–35.7)
MCV RBC AUTO: 95.4 FL (ref 79–97)
MONOCYTES # BLD AUTO: 0.63 10*3/MM3 (ref 0.1–0.9)
MONOCYTES NFR BLD AUTO: 8.2 % (ref 5–12)
NEUTROPHILS NFR BLD AUTO: 5.5 10*3/MM3 (ref 1.7–7)
NEUTROPHILS NFR BLD AUTO: 71.8 % (ref 42.7–76)
NRBC BLD AUTO-RTO: 0 /100 WBC (ref 0–0.2)
PLATELET # BLD AUTO: 188 10*3/MM3 (ref 140–450)
PMV BLD AUTO: 9.6 FL (ref 6–12)
POTASSIUM SERPL-SCNC: 3.9 MMOL/L (ref 3.5–5.2)
PROT SERPL-MCNC: 6.7 G/DL (ref 6–8.5)
RBC # BLD AUTO: 3.88 10*6/MM3 (ref 3.77–5.28)
SODIUM SERPL-SCNC: 136 MMOL/L (ref 136–145)
TRIGL SERPL-MCNC: 40 MG/DL (ref 0–150)
TROPONIN T SERPL-MCNC: <0.01 NG/ML (ref 0–0.03)
TSH SERPL DL<=0.05 MIU/L-ACNC: 1.39 UIU/ML (ref 0.27–4.2)
VLDLC SERPL-MCNC: 9 MG/DL (ref 5–40)
WBC NRBC COR # BLD: 7.67 10*3/MM3 (ref 3.4–10.8)

## 2022-11-23 PROCEDURE — 36415 COLL VENOUS BLD VENIPUNCTURE: CPT

## 2022-11-23 PROCEDURE — 80053 COMPREHEN METABOLIC PANEL: CPT

## 2022-11-23 PROCEDURE — 80061 LIPID PANEL: CPT

## 2022-11-23 PROCEDURE — 84484 ASSAY OF TROPONIN QUANT: CPT

## 2022-11-23 PROCEDURE — 71275 CT ANGIOGRAPHY CHEST: CPT

## 2022-11-23 PROCEDURE — 84443 ASSAY THYROID STIM HORMONE: CPT

## 2022-11-23 PROCEDURE — 85025 COMPLETE CBC W/AUTO DIFF WBC: CPT

## 2022-11-23 PROCEDURE — 0 IOPAMIDOL PER 1 ML: Performed by: NURSE PRACTITIONER

## 2022-11-23 PROCEDURE — 85379 FIBRIN DEGRADATION QUANT: CPT

## 2022-11-23 PROCEDURE — 83735 ASSAY OF MAGNESIUM: CPT

## 2022-11-23 RX ADMIN — IOPAMIDOL 95 ML: 755 INJECTION, SOLUTION INTRAVENOUS at 13:25

## 2022-11-23 NOTE — TELEPHONE ENCOUNTER
Thank you.  I was just checking on this and it looks like the pt is having the CT done now.  It was scheduled for 13:00 today.    Let me know if there's anything else I can help with.    Chhaya Arreguin RN  Triage [facility]  11/23/22 13:19 EST

## 2022-11-23 NOTE — NURSING NOTE
1325  Patient arrived to radiology triage as look and let go.    1409  Per Dr Graf it was ok for patient to go home.    1410  Patient directed to main lobby to exit.

## 2022-11-23 NOTE — PROGRESS NOTES
"I had already spoken to patient as she had questions and stated she had gotten a preliminary report and that everything \"looked fine\".  I called and left a message and told her there was no PE or lung masses.  Instructed to call back with any further questions."

## 2022-11-23 NOTE — TELEPHONE ENCOUNTER
----- Message from DESI De La Cruz sent at 11/23/2022  8:38 AM EST -----  Please call, all of her labs are normal accept the D-Dimer.  This was elevated a zeeshan ago as well.  Is she having any chest pain or SOA?

## 2022-11-23 NOTE — TELEPHONE ENCOUNTER
"I spoke with Vesta Sutton and updated pt on results from provider.  Pt verbalized understanding and has no further questions at this time.    Pt does not report any SOA but does tell me she was having some sharp, stabbing pain near her L chest by her breast 1-2 weeks ago.  She states that she had also felt some palpitations.  Pt unable to describe in specific detail what happened.  She tells me she had been active at that time when this occurred, either \"working or walking\".  Pt reports she thought it was just some acid reflux and chose to just wait it out.  She did not take anything oral for any reflux and it eventually went away.    Do you have any recommendations for this pt?    Thank you,    Chhaya Arreguin RN  Triage [facility] [place]  11/23/22 09:09 EST      "

## 2022-11-23 NOTE — PROGRESS NOTES
Please call, all of her labs are normal accept the D-Dimer.  This was elevated a zeeshan ago as well.  Is she having any chest pain or SOA?

## 2022-12-07 ENCOUNTER — TELEPHONE (OUTPATIENT)
Dept: CARDIOLOGY | Facility: CLINIC | Age: 67
End: 2022-12-07

## 2022-12-07 NOTE — TELEPHONE ENCOUNTER
I tried to call Vesta Sutton but there was no answer.  Left a voicemail asking patient to call back.  Will continue to try to reach pt.    Thank you,    Chhaya Arreguin RN  Triage [facility] [place]  12/07/22 14:47 EST

## 2022-12-08 NOTE — TELEPHONE ENCOUNTER
I spoke with Vesta Sutton and updated pt on results from provider.  Pt verbalized understanding and has no further questions at this time.    Thank you,    Chhaya Arreguin, RN  Triage [facility] [place]  12/08/22 09:52 EST

## 2022-12-15 ENCOUNTER — APPOINTMENT (OUTPATIENT)
Dept: SLEEP MEDICINE | Facility: HOSPITAL | Age: 67
End: 2022-12-15

## 2022-12-19 ENCOUNTER — TELEPHONE (OUTPATIENT)
Dept: GASTROENTEROLOGY | Facility: CLINIC | Age: 67
End: 2022-12-19

## 2022-12-19 NOTE — TELEPHONE ENCOUNTER
----- Message from Devyn Stahl MD sent at 11/27/2022  9:25 PM EST -----  US normal, check on symptom response

## 2022-12-19 NOTE — TELEPHONE ENCOUNTER
"Patient notified of results and recommendations and verbalized understanding    Pt stated that the pain in her abd is better.  She stated she still \"feels something move in her abd when she turns on her rt side\"  She said it doesn't do it all the time.    She is taking pantoprazole and stated it is helping her, she does have occasions when she has break through symptoms and will take a 2nd pill before dinner.    She said her constipation is better.  She started on probiotics and that has helped.    "

## 2023-01-05 ENCOUNTER — TRANSCRIBE ORDERS (OUTPATIENT)
Dept: ADMINISTRATIVE | Facility: HOSPITAL | Age: 68
End: 2023-01-05
Payer: MEDICARE

## 2023-01-05 ENCOUNTER — OFFICE VISIT (OUTPATIENT)
Dept: SLEEP MEDICINE | Facility: HOSPITAL | Age: 68
End: 2023-01-05
Payer: MEDICARE

## 2023-01-05 VITALS — WEIGHT: 178 LBS | BODY MASS INDEX: 28.61 KG/M2 | OXYGEN SATURATION: 98 % | HEIGHT: 66 IN

## 2023-01-05 DIAGNOSIS — F51.04 PSYCHOPHYSIOLOGICAL INSOMNIA: ICD-10-CM

## 2023-01-05 DIAGNOSIS — Z99.89 OSA ON CPAP: Primary | ICD-10-CM

## 2023-01-05 DIAGNOSIS — G47.33 OSA ON CPAP: Primary | ICD-10-CM

## 2023-01-05 DIAGNOSIS — G47.21 CIRCADIAN RHYTHM SLEEP DISORDER, DELAYED SLEEP PHASE TYPE: ICD-10-CM

## 2023-01-05 DIAGNOSIS — M71.22 SYNOVIAL CYST OF POPLITEAL SPACE (BAKER), LEFT KNEE: Primary | ICD-10-CM

## 2023-01-05 PROCEDURE — 99213 OFFICE O/P EST LOW 20 MIN: CPT | Performed by: INTERNAL MEDICINE

## 2023-01-05 PROCEDURE — G0463 HOSPITAL OUTPT CLINIC VISIT: HCPCS

## 2023-01-05 NOTE — PROGRESS NOTES
Follow Up [facility] Note     Chief Complaint:  LEATHA     Primary Care Physician: Benjamín Morocho MD    Interval History:   The patient is a 67 y.o. female  who I last saw 8/25/2022 and that note was reviewed.  The patient called to be seen because she is in need of getting supplies.  She reports she is unchanged.  She goes to bed between 10 PM or midnight and gets out of bed the at 6:05 AM or 9:30 AM if she is not working.  She will use the restroom.    Review of Systems:    A complete review of systems was done and all were negative with the exception of some nasal congestion and postnasal drip, STANLEY, and some anxiety and depression    Social History:    Social History     Socioeconomic History   • Marital status:    Tobacco Use   • Smoking status: Never   • Smokeless tobacco: Never   • Tobacco comments:     CAFFINE USE: 2 cups daily   Vaping Use   • Vaping Use: Never used   Substance and Sexual Activity   • Alcohol use: Yes     Alcohol/week: 5.0 standard drinks     Types: 5 Glasses of wine per week     Comment: Occasionally   • Drug use: No   • Sexual activity: Defer       Allergies:  Beta adrenergic blockers, Other, Hctz [hydrochlorothiazide], Sulfa antibiotics, Sulfamethoxazole-trimethoprim, and Viibryd [vilazodone hcl]     Medication Review: Her list was reviewed.  She takes trazodone at bedtime as well as as needed Xanax.    Vital Signs:    Vitals:    01/05/23 1100   SpO2: 98%   Weight: 80.7 kg (178 lb)   Height: 167.6 cm (66\")     Body mass index is 28.73 kg/m².    Physical Exam:    Constitutional:  Well developed 67 y.o. female that appears in no apparent distress.  Awake & oriented times 3.  Normal mood with normal recent and remote memory and normal judgement.  Eyes:  Conjunctivae normal.  Oropharynx: Previously, moist mucous membranes without exudate and a large tongue and normal uvula and patent posterior pharyngeal opening, patient is wearing a facemask.    Self-administered [place]  Sleepiness Scale test results: 0  0-5 Lower normal daytime sleepiness  6-10 Higher normal daytime sleepiness  11-12 Mild, 13-15 Moderate, & 16-24 Severe excessive daytime sleepiness     Downloaded PAP Data Reviewed For Compliance:  DME is [facility] and she uses a fullface mask with a chinstrap.  Downloads between 10/5 and 1/2/2023 compliance 99%.  Average usage is 8 hours and 39 minutes.  Average AHI is mildly abnormal at 6.9 but all subsets are normal and she does not have a significant leak..  Average auto CPAP pressure is 15 and her DreamStation 2 auto CPAP is set at 8-15    I have reviewed the above results and compared them with the patient's last downloads and reviewed with the patient.    Impression:   Mild obstructive sleep apnea, moderate when supine, with sleep-related hypoxia by overnight polysomnogram 4/3/2008, weight 166 pounds adequately treated with DreamStation 2 auto CPAP. The patient appears to be at goal with good compliance and usage. The patient has no complaints of hypersomnolence.  Psychophysiologic insomnia  Circadian rhythm sleep disorder, delayed sleep phase type    Plan:  Good sleep hygiene measures should be maintained.  Some weight loss would be beneficial in this patient who is overweight by Body mass index is 28.73 kg/m²..      After evaluating the patient and assessing results available, the patient is benefiting from the treatment being provided.     The patient will continue DreamStation 2 auto CPAP.  After clinical evaluation and review of downloads, I recommend no changes to the patient's pressures.  A new prescription will be sent to the patient's DME.    I answered all of the patient's questions.  The patient will call for any problems and will follow up in 1 year.      Jass Batista MD  Sleep Medicine  01/05/23  11:25 EST

## 2023-01-12 ENCOUNTER — APPOINTMENT (OUTPATIENT)
Dept: ULTRASOUND IMAGING | Facility: HOSPITAL | Age: 68
End: 2023-01-12
Payer: MEDICARE

## 2023-01-12 ENCOUNTER — HOSPITAL ENCOUNTER (OUTPATIENT)
Dept: ULTRASOUND IMAGING | Facility: HOSPITAL | Age: 68
Discharge: HOME | End: 2023-01-12
Payer: MEDICARE

## 2023-01-19 ENCOUNTER — TRANSCRIBE ORDERS (OUTPATIENT)
Dept: ADMINISTRATIVE | Facility: HOSPITAL | Age: 68
End: 2023-01-19
Payer: MEDICARE

## 2023-01-19 ENCOUNTER — LAB (OUTPATIENT)
Dept: LAB | Facility: HOSPITAL | Age: 68
End: 2023-01-19
Payer: MEDICARE

## 2023-01-19 ENCOUNTER — HOSPITAL ENCOUNTER (OUTPATIENT)
Dept: ULTRASOUND IMAGING | Facility: HOSPITAL | Age: 68
Discharge: HOME | End: 2023-01-19
Payer: MEDICARE

## 2023-01-19 ENCOUNTER — OFFICE VISIT (OUTPATIENT)
Dept: CARDIOLOGY | Facility: CLINIC | Age: 68
End: 2023-01-19
Payer: MEDICARE

## 2023-01-19 VITALS
WEIGHT: 178 LBS | HEIGHT: 66 IN | HEART RATE: 61 BPM | BODY MASS INDEX: 28.61 KG/M2 | SYSTOLIC BLOOD PRESSURE: 156 MMHG | DIASTOLIC BLOOD PRESSURE: 98 MMHG

## 2023-01-19 DIAGNOSIS — E78.2 MIXED HYPERLIPIDEMIA: ICD-10-CM

## 2023-01-19 DIAGNOSIS — M71.22 SYNOVIAL CYST OF POPLITEAL SPACE (BAKER), LEFT KNEE: ICD-10-CM

## 2023-01-19 DIAGNOSIS — I10 PRIMARY HYPERTENSION: Primary | ICD-10-CM

## 2023-01-19 DIAGNOSIS — I25.10 CORONARY ARTERIOSCLEROSIS IN NATIVE ARTERY: ICD-10-CM

## 2023-01-19 DIAGNOSIS — Z13.6 SCREENING FOR CARDIOVASCULAR CONDITION: Primary | ICD-10-CM

## 2023-01-19 DIAGNOSIS — I45.2 RBBB (RIGHT BUNDLE BRANCH BLOCK WITH LEFT ANTERIOR FASCICULAR BLOCK): ICD-10-CM

## 2023-01-19 PROCEDURE — 99214 OFFICE O/P EST MOD 30 MIN: CPT | Performed by: INTERNAL MEDICINE

## 2023-01-19 PROCEDURE — 36415 COLL VENOUS BLD VENIPUNCTURE: CPT

## 2023-01-19 PROCEDURE — 82172 ASSAY OF APOLIPOPROTEIN: CPT

## 2023-01-19 PROCEDURE — 93000 ELECTROCARDIOGRAM COMPLETE: CPT | Performed by: INTERNAL MEDICINE

## 2023-01-19 PROCEDURE — 83695 ASSAY OF LIPOPROTEIN(A): CPT

## 2023-01-19 PROCEDURE — 76881 US COMPL JOINT R-T W/IMG: CPT

## 2023-01-19 RX ORDER — OLMESARTAN MEDOXOMIL 40 MG/1
40 TABLET ORAL NIGHTLY
Qty: 90 TABLET | Refills: 3 | Status: SHIPPED | OUTPATIENT
Start: 2023-01-19 | End: 2023-01-19 | Stop reason: SDUPTHER

## 2023-01-19 RX ORDER — ROSUVASTATIN CALCIUM 20 MG/1
20 TABLET, COATED ORAL NIGHTLY
Qty: 90 TABLET | Refills: 3 | Status: SHIPPED | OUTPATIENT
Start: 2023-01-19

## 2023-01-19 RX ORDER — CETIRIZINE HYDROCHLORIDE 10 MG/1
10 TABLET ORAL DAILY
COMMUNITY

## 2023-01-19 RX ORDER — SPIRONOLACTONE 25 MG/1
12.5 TABLET ORAL EVERY MORNING
Qty: 45 TABLET | Refills: 3 | Status: SHIPPED | OUTPATIENT
Start: 2023-01-19 | End: 2023-01-31 | Stop reason: SINTOL

## 2023-01-19 RX ORDER — OLMESARTAN MEDOXOMIL 40 MG/1
40 TABLET ORAL NIGHTLY
Qty: 90 TABLET | Refills: 3 | Status: SHIPPED | OUTPATIENT
Start: 2023-01-19

## 2023-01-19 RX ORDER — ROSUVASTATIN CALCIUM 20 MG/1
20 TABLET, COATED ORAL NIGHTLY
Qty: 90 TABLET | Refills: 3 | Status: SHIPPED | COMMUNITY
Start: 2023-01-19 | End: 2023-01-19 | Stop reason: SDUPTHER

## 2023-01-19 NOTE — PROGRESS NOTES
Date of Office Visit: 2023  Encounter Provider: Susan Paula MD  Place of Service: [facility] CARDIOLOGY  Patient Name: Vesta Sutton  :1955      Patient ID:  Vesta Sutton is a 67 y.o. female is here for  followup for hypertension and coronary artery calcification.        History of Present Illness    She has a history of hypertension, GERD, hyperlipidemia and coronary calcification.     The patient has a history of having intermittent chest pain for which she has never had a  myocardial infarction. She has had echocardiography done. The last one was in  and  it was normal. She had carotid ultrasound done at the same time showed no stenosis but  did show mild intimal thickening. She had an abnormal stress study done in  showing  an apical ischemia but had a cardiac catheterization showing very mild left anterior  descending artery stenosis. She was admitted in  with chest pain. Her stress study  there showed no ischemia.       She has a history of retinal detachment of the left eye. She has a lot of anxiety and  stress. She is treated for hypertension and hyperlipidemia.       She also has some lumbar degenerative disc disease and so does not exercise regularly and  has had some sciatic pain with that as well.       She has a history of palpitations in the past but has had a normal Holter recording.       She had 2D echocardiogram with Doppler which was done 10/2014. It was normal. Ejection  fraction 66%. She also had a carotid duplex done 10/2014 which was also normal.      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   The rest of the right leg looked okay. It looks like it was just only done on her right leg.       She was having chest pain because of the Zoloft.  She did end up stopping the Zoloft.   She is had significant  GERD with venlafaxine.     She remains on Crestor at this time for her hyperlipidemia.  She had a normal stress echocardiogram done 5/2017.     Labs on 11/23/2022 shows normal troponin, normal CMP, normal TSH, total cholesterol 36, HDL 85, LDL 82, VLDL 9, triglycerides 40, D-dimer 1.4, normal CBC.  Venous study done 1/19/2023 shows no DVT on the left.  CTA of the chest done 11/23/2022 shows no evidence of pulmonary embolism.  I reviewed the CTA chest images and there is dense calcification of the proximal RCA with dense calcification in the proximal LAD, distal left main and ramus intermedius, spotty calcification of the proximal circumflex.  Stress nuclear perfusion study in 12/29/2021 shows no evidence of ischemia.  24-hour Holter monitor done 12/1/2022 was normal.    She has no chest pain or pressure.  She does have anxiety and palpitations.  She is had no dizziness or syncope.  She has no orthopnea or PND.  She has no exertional dyspnea.  She is taking her medications as directed without difficulty.  Her energy level stable.    Past Medical History:   Diagnosis Date   • Anemia    • Anxiety and depression    • Appendiceal carcinoid tumor    • Arthritis    • Asthma    • Breast nodule    • Cancer of appendix (HCC)    • Coronary artery disease     nonobstructive    • COVID-19 virus infection 06/2021   • Dizziness    • Environmental allergies    • Fibromuscular hyperplasia of artery (HCC)     in the carotid artery with no stenosis   • GERD (gastroesophageal reflux disease)    • Headache    • Hernia in stomach   • History of anesthesia complications 2017    lidocaine caused reaction during dental procedure per patient   • Hyperlipidemia    • Hypertension    • Hypothyroidism    • Impaired glucose metabolism    • Lumbar pain    • Myalgia    • Nodular goiter     last US 3/21/22 stable 2.5 cm left lobe  and  a 9 mm left lobe and a 10 mm mid right lobe. Sister with thyroid cancer   • LEATHA (obstructive sleep apnea)  2008    Overnight polysomnogram.  Weight 166 pounds.  Mild LEATHA with AHI 7.5 events per hour.  When supine, moderately abnormal at 15.1 events per hour.  Low oxygen saturation 78% and sleep-related hypoxia present for 24 minutes.   • Palpitations    • RBBB (right bundle branch block)    • Retinal detachment     left  //  when blood pressure gets high she notices flashing lights in her vision   • Vertigo          Past Surgical History:   Procedure Laterality Date   • APPENDECTOMY     • BREAST BIOPSY Left     2017   • BREAST CYST ASPIRATION Left        • CATARACT EXTRACTION, BILATERAL     •  SECTION     • COLONOSCOPY N/A 2019    Procedure: COLONOSCOPY to CECUM;  Surgeon: Damien Aleman MD;  Location: [facility] ENDOSCOPY;  Service: General   • CORNEA LACERATION REPAIR     • ENDOSCOPY N/A 2016    Z-line regular, 40 cm from the incisors, gastritis, erythematous duodenopathy   • ENDOSCOPY N/A 2018    Normal exophagus, Erythematous mucosa in the antrum, Normal examined duodenum-Dr. Devyn Stahl   • ENDOSCOPY N/A 2019    Procedure: ESOPHAGOGASTRODUODENOSCOPY with BX;  Surgeon: Damien Aleman MD;  Location: [facility] ENDOSCOPY;  Service: General   • ENDOSCOPY AND COLONOSCOPY N/A 2014    EGD with biopsy and colonoscopy, Mild gastritis, Normal colon, Repeat Colonoscopy in 5 years, Dr. Damien Aleman   • ENDOSCOPY AND COLONOSCOPY N/A 2008    EGD with biopsy and colonoscopy-Dr. Damien Aleman   • UPPER GASTROINTESTINAL ENDOSCOPY  Cannot remember   • URETEROCELE REPAIR     • US GUIDED FINE NEEDLE ASPIRATION  2019       Current Outpatient Medications on File Prior to Visit   Medication Sig Dispense Refill   • albuterol sulfate  (90 Base) MCG/ACT inhaler albuterol sulfate HFA 90 mcg/actuation aerosol inhaler   INHALE 2 PUFFS BY MOUTH 4 TIMES DAILY AS NEEDED     • ALPRAZolam (XANAX) 0.5 MG tablet Take 0.5 mg by mouth 4 (Four) Times a Day.     • cetirizine  (zyrTEC) 10 MG tablet Take 10 mg by mouth Daily.     • Cholecalciferol (VITAMIN D3) 5000 UNITS capsule capsule Take 5,000 Units by mouth Daily.     • Euthyrox 75 MCG tablet 1 tablet daily from Monday to Saturdays only. 30 tablet 6   • fluticasone (FLONASE) 50 MCG/ACT nasal spray 1 spray by Each Nare route Daily.     • Omega-3 Fatty Acids (OMEGA 3 PO) Take 1 tablet by mouth daily.     • pantoprazole (PROTONIX) 40 MG EC tablet Take 1 tablet by mouth 2 (Two) Times a Day. 180 tablet 3   • polyethylene glycol (MIRALAX) 17 GM/SCOOP powder Take 17 g by mouth Daily. 578 g 11   • traZODone (DESYREL) 150 MG tablet TAKE 1 TO 1 & 1/2 (ONE TO ONE & ONE-HALF) TABLETS BY MOUTH ONCE DAILY AS NEEDED AT BEDTIME     • vitamin B-12 (CYANOCOBALAMIN) 100 MCG tablet Take 50 mcg by mouth Daily.     • [DISCONTINUED] olmesartan (BENICAR) 40 MG tablet Take 0.5 tablets by mouth 2 (Two) Times a Day. (Patient taking differently: Take 40 mg by mouth Daily.) 30 tablet 3   • [DISCONTINUED] rosuvastatin (CRESTOR) 10 MG tablet Take 1 tablet by mouth Daily.     • [DISCONTINUED] rosuvastatin (CRESTOR) 20 MG tablet Take 1 tablet by mouth Every Night. 90 tablet 3   • [DISCONTINUED] amLODIPine (NORVASC) 2.5 MG tablet Take 1 tablet by mouth As Needed.     • [DISCONTINUED] ciclopirox (PENLAC) 8 % solution ciclopirox 8 % topical solution   APPLY SOLUTION TOPICALLY TO TOENAILS AT BEDTIME     • [DISCONTINUED] loratadine (CLARITIN) 10 MG tablet      • [DISCONTINUED] oseltamivir (TAMIFLU) 75 MG capsule Take 1 capsule by mouth 2 (Two) Times a Day. 10 capsule 0     No current facility-administered medications on file prior to visit.       Social History     Socioeconomic History   • Marital status:    Tobacco Use   • Smoking status: Never   • Smokeless tobacco: Never   • Tobacco comments:     CAFFINE USE: 2 cups daily   Vaping Use   • Vaping Use: Never used   Substance and Sexual Activity   • Alcohol use: Yes     Alcohol/week: 5.0 standard drinks      "Types: 5 Glasses of wine per week     Comment: Occasionally   • Drug use: No   • Sexual activity: Defer           ROS    Procedures    ECG 12 Lead    Date/Time: 1/19/2023 1:26 PM  Performed by: Susan Paula MD  Authorized by: Susan Paula MD   Comparison: compared with previous ECG   Similar to previous ECG  Rhythm: sinus rhythm  Conduction: right bundle branch block and left anterior fascicular block    Clinical impression: abnormal EKG                Objective:      Vitals:    01/19/23 1313   BP: 156/98   Pulse: 61   Weight: 80.7 kg (178 lb)   Height: 167.6 cm (66\")     Body mass index is 28.73 kg/m².    Vitals reviewed.   Constitutional:       General: Not in acute distress.     Appearance: Well-developed. Not diaphoretic.   Eyes:      General: No scleral icterus.     Conjunctiva/sclera: Conjunctivae normal.   HENT:      Head: Normocephalic and atraumatic.   Neck:      Thyroid: No thyromegaly.      Vascular: No carotid bruit or JVD.      Lymphadenopathy: No cervical adenopathy.   Pulmonary:      Effort: Pulmonary effort is normal. No respiratory distress.      Breath sounds: Normal breath sounds. No wheezing. No rhonchi. No rales.   Chest:      Chest wall: Not tender to palpatation.   Cardiovascular:      Normal rate. Regular rhythm.      Murmurs: There is no murmur.      No gallop.   Pulses:     Intact distal pulses.   Edema:     Peripheral edema absent.   Abdominal:      General: Bowel sounds are normal. There is no distension or abdominal bruit.      Palpations: Abdomen is soft. There is no abdominal mass.      Tenderness: There is no abdominal tenderness.   Musculoskeletal:         General: No deformity.      Extremities: No clubbing present.     Cervical back: Neck supple. Skin:     General: Skin is warm and dry. There is no cyanosis.      Coloration: Skin is not pale.      Findings: No rash.   Neurological:      Mental Status: Alert and oriented to person, place, and time.      Cranial " Nerves: No cranial nerve deficit.   Psychiatric:         Judgment: Judgment normal.         Lab Review:       Assessment:      Diagnosis Plan   1. Primary hypertension        2. Mixed hyperlipidemia  Apolipoprotein B    Lipoprotein A (LPA)      3. RBBB (right bundle branch block with left anterior fascicular block)        4. Coronary arteriosclerosis in native artery  Apolipoprotein B    Lipoprotein A (LPA)        1. Hypertension, BP high, goal <120/80.   2. Chronically abnormal electrocardiogram showing a left anterior fascicular block and  right bundle branch block.   3. Coronary artery calcification.  No anginal chest pain.  4. Palpitations, made worse with anxiety.  5. Salt intake. I advised her to stay away from salt.   6. Hyperlipidemia on Crestor. Well controlled.   7. Obstructive sleep. Use CPAP.   8. History of appendiceal carcinoma.   9. History of asthma, stable.   10. Anxiety.   11. Gastroesophageal reflux disease.   12. Lumbar degenerative disc disease with sciatic pain.   13.  LEATHA on CPAP.         Plan:       See Bita in 3 months.  Move olmesartan to 40 mg nightly, increase rosuvastatin to 20 mg nightly, add spironolactone 12.5 mg in the morning.  Check apolipoprotein B LP(a) because she has significant coronary artery calcification.  No other testing at this time.  Advised vascular screening.

## 2023-01-20 LAB — LPA SERPL-SCNC: 272.1 NMOL/L

## 2023-01-21 LAB — APO B SERPL-MCNC: 68 MG/DL

## 2023-01-24 ENCOUNTER — TELEPHONE (OUTPATIENT)
Dept: CARDIOLOGY | Facility: CLINIC | Age: 68
End: 2023-01-24
Payer: MEDICARE

## 2023-01-24 NOTE — TELEPHONE ENCOUNTER
Please call her.  Her lipoprotein a is high, apolipoprotein B is normal.  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  Also would advise that her children consider being tested for LP(a) as well.  Please call and let her know.

## 2023-01-24 NOTE — TELEPHONE ENCOUNTER
I tried to call Vesta Sutton but there was no answer.  Left a voicemail asking patient to call back.  Will continue to try to reach pt.    Thank you,    Chhaya Arreguin RN  Triage [facility] [place]  01/24/23 08:14 EST

## 2023-01-25 NOTE — TELEPHONE ENCOUNTER
I tried to call Vesta Sutton but there was no answer.  Left a voicemail asking patient to call back.  Will continue to try to reach pt.    Thank you,    Chhaya Arreguin RN  Triage [facility] [place]  01/25/23 08:30 EST

## 2023-01-26 ENCOUNTER — OFFICE VISIT (OUTPATIENT)
Dept: ENDOCRINOLOGY | Age: 68
End: 2023-01-26
Payer: MEDICARE

## 2023-01-26 VITALS
DIASTOLIC BLOOD PRESSURE: 80 MMHG | OXYGEN SATURATION: 100 % | BODY MASS INDEX: 28.64 KG/M2 | HEART RATE: 63 BPM | SYSTOLIC BLOOD PRESSURE: 122 MMHG | TEMPERATURE: 96.9 F | WEIGHT: 178.2 LBS | HEIGHT: 66 IN

## 2023-01-26 DIAGNOSIS — E55.9 VITAMIN D DEFICIENCY: ICD-10-CM

## 2023-01-26 DIAGNOSIS — E78.2 MIXED HYPERLIPIDEMIA: ICD-10-CM

## 2023-01-26 DIAGNOSIS — E03.9 HYPOTHYROIDISM, UNSPECIFIED TYPE: Primary | ICD-10-CM

## 2023-01-26 DIAGNOSIS — R73.03 PREDIABETES: ICD-10-CM

## 2023-01-26 PROCEDURE — 99214 OFFICE O/P EST MOD 30 MIN: CPT | Performed by: INTERNAL MEDICINE

## 2023-01-26 NOTE — PROGRESS NOTES
Chief Complaint  Hypothyroidism (The patient states that her energy levels have been good she has family hx of thyroid disease(sisters) her weight is lower than expected but she has been changing her diet to eat healthier, and she no longer has menstrual cycles )    Subjective      Interval history is negative for any new symptoms, ER visits or hospitalizations.    Patient presents for follow-up for hypothyroidism.  She states she has been under care of her cardiologist, who is assisting her with management of her dyslipidemia.  She has scheduled the patient for a transthoracic echo and carotid Doppler as well as other studies for PAD evaluation.  Her cardiologist doubled her rosuvastatin.    She is on levothyroxine 75 mcg daily at 7 tablets/week.  She has miscellaneous nonspecific symptoms.    Most recent labs from January 19, 2023 were reviewed.    Note is made of a hemoglobin A1c of at 5.7%, up from 5.5% last year.    Her TSH is 0.59 µIU/mL.    Her lipid panel shows a total cholesterol of 181, HDL of 79, LDL of 92 triglycerides 51.  Lipoprotein a to 72.1 with Apo lipoprotein B at 68.    She had labs drawn on 9/22/2022:  TSH 0.570 uIU/mL (0.45-4.5)  Free t3 2.6 pg/mL (2- 4.4)  Free t4 1.60 ng/dLTPO <8   Fasting lipid panel 156/45/71/75  HbA1 5.9%  CMP random glucose 93 MG/DL with gfr 80        Vesta Sutton presents to [facility] ENDOCRINOLOGY  Hypothyroidism      66-year-old  female from [place] wth CAD, one native artery 50% narrowing and. 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  She was last seen at this endocrine clinic on 7/27/2021.  8/8/22 At times have episodes of irritated throat.     She has multinodular thyroid with hypothyroidism.  She has no history of head or neck radiation therapy.  She had a fine-needle biopsy of the left dominant nodule in May 2019 which was benign.  She is on levothyroxine 75  "mcg/day.      22Symptoms :  A. She recalls years of morning bowl movements changed to bowl movements only when has larger quantities or adds fiber or eats fruit.  B.  also noted 2 years ago when 88 mcg changed to 75  C. Hair  and eye brows are falling out  D. Sadness and grieving as mother  in 2022    Family history: Her sister has had a thyroidectomy for thyroid cancer that invaded the parathyroid. She had ROWAN therapy.     Hypothyroid Symptoms : She has hair loss, brittle nails. Last week she lost her blister pack missing 3 doses. She has intermittently constipation.  She has no significant weight change since January.     Goiter: No dysphagia or dysphonia. No anterior neck tenderness.     Current thyroid  Medication history:  22 Euthyrox 75 mcg 1 daily Mon through Saturday, none on Sundays.  2021 Euthyrox  75 mcg daily except none on Sundays [ aveg  69.64 mcg daily]  2020 Euthyrox 75 mcg daily  2019 levothyroxine 88 mcg daily.  She takes Euthyrox about 1 hr prior to meals  .  TSH done in 2021 is low at 0.347 with elevated free T4 at 1.85 ng per DL.      History of HGA1C elevation over 2 years ago. HGA1C one year ago and now is normal at HGA1C.    Family history: youngest sister has thyroid cancer diagnosed at 59. She had radiation iodine completion of surgery and now has thyroid hormone tablets.     Objective   Vital Signs:   /80   Pulse 63   Temp 96.9 °F (36.1 °C) (Temporal)   Ht 167.6 cm (66\")   Wt 80.8 kg (178 lb 3.2 oz)   SpO2 100%   BMI 28.76 kg/m²     Physical Exam  Vitals and nursing note reviewed.   HENT:      Head: Normocephalic.      Mouth/Throat:      Mouth: Mucous membranes are moist.   Neck:      Thyroid: Thyromegaly present. No thyroid mass.      Trachea: Trachea and phonation normal.      Comments: The thyroid is mildly generous 40-45 g none tender without thrill or bruit. No palpable nodules.  Cardiovascular:      Rate and Rhythm: " Normal rate.      Pulses: Normal pulses.      Heart sounds: Normal heart sounds.   Pulmonary:      Effort: Pulmonary effort is normal.      Breath sounds: Normal breath sounds. No wheezing or rhonchi.   Abdominal:      General: Bowel sounds are normal.      Palpations: Abdomen is soft.   Musculoskeletal:         General: No swelling. Normal range of motion.      Cervical back: Normal range of motion and neck supple.   Lymphadenopathy:      Cervical: No cervical adenopathy.   Skin:     General: Skin is warm and dry.   Neurological:      Mental Status: She is alert and oriented to person, place, and time. Mental status is at baseline.   Psychiatric:         Mood and Affect: Mood normal.         Behavior: Behavior normal.         Judgment: Judgment normal.        Result Review :   The following data was reviewed by: Tori Goins MD on 02/07/2022:    Component       TSH Baseline Free T4 T3, Free   Latest Ref Rng & Units       0.450 - 4.500 uIU/mL 0.82 - 1.77 ng/dL 2.0 - 4.4 pg/mL   1/31/2022      11:56 AM 0.456     1/31/2022      11:56 AM  1.64    6/23/2022      9:15 AM 0.811     7/25/2022      8:50 AM 0.986     7/25/2022      8:50 AM  1.41    7/25/2022      8:50 AM   2.1     Component       Cortisol - AM ACTH   Latest Ref Rng & Units       6.2 - 19.4 ug/dL 7.2 - 63.3 pg/mL   7/25/2022      8:50 AM 17.0    7/25/2022      8:50 AM  31.3     Component       T4, Total Thyroglobulin Ab   Latest Ref Rng & Units       4.5 - 12.0 ug/dL 0.0 - 0.9 IU/mL   6/23/2022      9:15 AM 8.9    6/23/2022      9:15 AM  <1.0     Component      Latest Ref Rng & Units 8/22/2019 11/11/2019 1/31/2022   Thyroid Peroxidase Antibody      0 - 34 IU/mL 11 14    Interpretation         Note     4/20/2021 thyroid ultrasound an unchanged in 2022  Dominant left-sided thyroid nodule previously biopsied [place] category 2.  No other thyroid nodules qualify for fine-needle aspiration per radiology.   Nodule 1  the left thyroid gland there is a solid  predominantly  isoechoic solid nodule which measures 2.7 x 1.8 x 1.5 cm. ACR TR  Category 3. This has been previously biopsied on 05/29/2019Nodule 2  Nodule 2  Isthmus right side 14 x 11 x 5 mm  Nodule 3  Right lobe 11 x 9 x 8 mm  Nodule 4   Left lobe wftayy60 x 7 x 5 mm    Narrative & Impression   THYROID ULTRASOUND     CLINICAL HISTORY: Multinodular goiter     COMPARISON: Thyroid ultrasound dated 04/22/2021.     Transverse and longitudinal images of both lobes of the thyroid gland  were obtained. Both lobes demonstrate somewhat heterogeneous  echogenicity and demonstrate a few solid nodules. The largest nodule is  in the middle of the left lobe. It measures 2.5 x 1.7 x 1.4 cm. It  appears unchanged since in size and echotexture since the preceding  ultrasound. A tiny 9 mm diameter solid nodule in the inferior aspect of  the left lobe is also unchanged. A 1.0 x 0.7 x 0.7 cm diameter solid  nodule in the middle of the right lobe is unchanged. There is also a  tiny nodule in the thyroid isthmus that is unchanged. The right lobe  measures 4.9 x 1.6 x 1.8 cm. The left lobe measures 4.6 x 1.9 x 2.2 cm.     IMPRESSIONS: Stable mild multinodular goiter changes.     This report was finalized on 3/21/2022 11:14 AM by Dr. Alexy Wesley M.D.       Assessment and Plan    1. Hypothyroid Stable    Continue levothyroxine 75 mcg tablets at 6 tablets per week i.e. 62 mcg average daily dose. I explained to patient that having normal thyroid function now is unlikely to change her hair and skin concerns.  TSH goal is 2-4 uIU/mL.     2. Multinoduler goiter stable. TPO Ab negative.     3.  Prediabetes   Discussed in detail lifestyle changes i.e. time restricted eating with restricted carb intake as well as initiating an exercise routine of 30 minutes or longer.  She is to lose 10% of her current body weight with lifestyle changes in order to reduce the risk/delay the onset of diabetes.    4. Multinodular goiter FNA 5/2019 of [facility]  left thyroid nodule. Remaining 3 nodules are less than 1.4 cm without concerning features for thyroid cancer.   Recommend repeat US at 1 to 2 years intervals.      There are no diagnoses linked to this encounter.  I spent 30 minutes caring for Vesta on this date of service. This time includes time spent by me in the following activities:reviewing tests, obtaining and/or reviewing a separately obtained history, performing a medically appropriate examination and/or evaluation  and counseling and educating the patient/family/caregiver  Follow Up   No follow-ups on file.   Labs in 3 months.  Patient was given instructions and counseling regarding her condition or for health maintenance advice. Please see specific information pulled into the AVS if appropriate.

## 2023-01-26 NOTE — TELEPHONE ENCOUNTER
Vesta MYLES Sutton returned call.  Reviewed results and recommendations with patient.  Her questions were answered and she verbalized understanding results and recommendations.    Thank you,  Ginette Leon RN  Triage Nurse [facility] [place]

## 2023-01-26 NOTE — TELEPHONE ENCOUNTER
I tried to call Vesta Sutton but there was no answer.  Left a voicemail asking patient to call back.  Will continue to try to reach pt.    Thank you,    Chhaya Arreguin RN  Triage [facility] [place]  01/26/23 09:03 EST

## 2023-01-30 ENCOUNTER — TELEPHONE (OUTPATIENT)
Dept: CARDIOLOGY | Facility: CLINIC | Age: 68
End: 2023-01-30
Payer: MEDICARE

## 2023-01-30 NOTE — TELEPHONE ENCOUNTER
Patient calling and connected to triage to report that she does not feel like she can tolerate spironolactone 12.5 mg daily you started her on 1/19.  She is having abd pain and cramping, nausea and vomiting, and fatigue with the medication.      She reports is has helped lower her BP though, but she says she will not be able to stay on it.  Today her BPs were 119/77 this AM and 107/72 this afternoon.      She states you are aware she is sensitive to medications.  She states she has tried HCTZ before and has not tolerated that either.    Recommendations?    Arianna Alex RN  [facility] Triage  01/30/23 15:50 EST    Note to myself* Pt works until 430 daily and cannot have her phone.  She would like RM recommendations sent to her in a [employer] message.

## 2023-01-31 ENCOUNTER — TELEPHONE (OUTPATIENT)
Dept: GASTROENTEROLOGY | Facility: CLINIC | Age: 68
End: 2023-01-31

## 2023-01-31 RX ORDER — AMLODIPINE BESYLATE 2.5 MG/1
1.25 TABLET ORAL DAILY
Qty: 30 TABLET | Refills: 11 | Status: SHIPPED | OUTPATIENT
Start: 2023-01-31

## 2023-01-31 NOTE — TELEPHONE ENCOUNTER
Reviewed recommendations with Vesta Sutton and the patient verbalized understanding of the recommendations.    Thank you,  Ginette DODD RN  Triage Nurse [facility] [place]

## 2023-01-31 NOTE — TELEPHONE ENCOUNTER
Caller: Vesta Sutton    Relationship to patient: Self    Best call back number: [phone]    Chief complaint: PATIENT COMPLAINING OF MIDDLE ABDOMINAL PAIN, NAUSEA, BLOATING, NO APPETITE, GAS AND HAS BEEN GOING ON FOR SEVERAL DAYS. NO BOWEL MOVEMENTS SINCE Sunday. PATIENT DOES HAVE AN APPT WITH DR. KWOK ON 02/23/23 @ 2PM. BUT WAS WANTING TO BE SEEN EITHER TODAY, TOMORROW OR THIS WEEK. SHE MISSED TODAY SINCE SHE WAS SO SICK AND PATIENT STATED THAT SHE WASN'T FEELING RIGHT.

## 2023-01-31 NOTE — TELEPHONE ENCOUNTER
Discontinued spironolactone from med list. Sent in script for amlodipine 2.5 mg tablets, take 0.5 tablet daily.    I added spironolactone to allergy list as intolerance.    Patient wanted a [email or web address] message with instructions, and that was done.    Arianna Alex RN  [facility] Triage  01/31/23 10:46 EST

## 2023-01-31 NOTE — TELEPHONE ENCOUNTER
Vesta Sutton returned call to notify of low BP readings.    /71, HR 79  BP 95/67, HR 73    Patient expressed concern about taking amlodipine with low blood pressure readings.  Patient also mentioned that she had previously been prescribed amlodipine by her PCP but that it was stopped due to her feet swelling.  Patient stated she was on a higher dose at the time and is willing to try the dose Dr. Paula prescribed though she is worried her BP is to low.  Patient is asking if she should start taking amlodipine at this time or just continue to monitor BP?    Patient reports she had to stay home from work today due to fatigue.  Patient also reports lower right abdominal discomfort, dry heaves, nausea, and her appetite is reduced.  Patient denies fever.  Patient stated she has an appointment on Thursday with GI to address these symptoms.    Patient admits she has not been drinking much water or eating.  Encouraged patient to try to increase water intake to help increase her blood pressure.      Please let me know how you would like to proceed.    Thank you,  Ginette DODD RN  Triage Nurse [facility] [place]

## 2023-01-31 NOTE — TELEPHONE ENCOUNTER
For clarification.. half of a 2.5 tablet of amlodipine daily or 1.25 mg in am?    Thanks     Arianna Alex RN  [facility] Triage  01/31/23 08:56 EST

## 2023-02-02 ENCOUNTER — OFFICE VISIT (OUTPATIENT)
Dept: GASTROENTEROLOGY | Facility: CLINIC | Age: 68
End: 2023-02-02
Payer: MEDICARE

## 2023-02-02 VITALS
SYSTOLIC BLOOD PRESSURE: 132 MMHG | OXYGEN SATURATION: 97 % | DIASTOLIC BLOOD PRESSURE: 74 MMHG | BODY MASS INDEX: 28.25 KG/M2 | TEMPERATURE: 96.8 F | HEIGHT: 66 IN | HEART RATE: 60 BPM | WEIGHT: 175.8 LBS

## 2023-02-02 DIAGNOSIS — K59.09 CHRONIC CONSTIPATION: Primary | ICD-10-CM

## 2023-02-02 DIAGNOSIS — R14.0 BLOATING: ICD-10-CM

## 2023-02-02 PROCEDURE — 99212 OFFICE O/P EST SF 10 MIN: CPT | Performed by: INTERNAL MEDICINE

## 2023-02-02 RX ORDER — LEVOTHYROXINE SODIUM 0.07 MG/1
75 TABLET ORAL DAILY
COMMUNITY

## 2023-02-09 ENCOUNTER — TELEPHONE (OUTPATIENT)
Dept: CARDIOLOGY | Facility: CLINIC | Age: 68
End: 2023-02-09
Payer: MEDICARE

## 2023-02-09 ENCOUNTER — HOSPITAL ENCOUNTER (OUTPATIENT)
Dept: BONE DENSITY | Facility: HOSPITAL | Age: 68
Discharge: HOME | End: 2023-02-09
Admitting: SPECIALIST
Payer: MEDICARE

## 2023-02-09 DIAGNOSIS — Z78.0 MENOPAUSE: ICD-10-CM

## 2023-02-09 PROCEDURE — 77080 DXA BONE DENSITY AXIAL: CPT

## 2023-02-09 NOTE — TELEPHONE ENCOUNTER
I spoke with Vesta Sutton and updated pt on recommendations from provider.  Pt verbalized understanding and has no further questions at this time.    Thank you,    Chhaya Arreguin, RN  [place] [facility] [place]  02/09/23 13:34 EST

## 2023-02-09 NOTE — TELEPHONE ENCOUNTER
Pt called the office to provide us with her BP log.  She does endorse fatigue when her BP was lower (SBP 100s).  Otherwise, she has been feeling well.    BP HR   114/74 65   118/76 73   118/77 64   104/68 60   112/71 64   140/82 74   131/79 77   128/79 68   122/74 68   115/76 67   133/81 70     Pt is currently taking:  - Olmesartan 40 mg QHS    Pt says she stopped the amlodipine per your recommendations, but I do not see in any of the notes where we recommended this as it was just ordered on 1/31/23.  On 1/30/23 we listed spironolactone as an intolerance.  She also reports that sometimes she would take only half a pill (20 mg) of Olmesartan.  Sometimes when she takes the entire dose she feels palpitations.  She also does report that she's been drinking more caffeine.  I encouraged pt to decrease her caffeine intake to help with this.  Pt would like to know if you'd prefer for her to take the full 40 mg QHS or if you would prefer 20 mg QAM and then 20 mg QPM.    Do you have any recommendations for this patient?    Thank you,    Chhaya Arreguin RN  [facility] [place] [facility]riage  02/09/23  11:22 EST

## 2023-02-13 ENCOUNTER — TELEPHONE (OUTPATIENT)
Dept: CARDIOLOGY | Facility: CLINIC | Age: 68
End: 2023-02-13
Payer: MEDICARE

## 2023-02-13 NOTE — PROGRESS NOTES
Chief Complaint   Patient presents with   • Abdominal Pain   • Bloated       Vesta Sutton is a  67 y.o. female here for a follow up visit for bloating and abdominal pain.    HPI     Patient 67-year-old female with history of hypertension, hyperlipidemia and GERD as well as coronary artery disease presenting for abdominal pain and bloating.  Patient with EGD and colonoscopy with GERD otherwise negative presenting for ongoing symptoms.  Patient unfortunately only taking her PPI as needed for pain despite being recommended daily.  Patient reports taking the MiraLAX with some improvement in her constipation here for further recommendations.    Past Medical History:   Diagnosis Date   • Anemia    • Anxiety and depression    • Appendiceal carcinoid tumor    • Arthritis    • Asthma    • Breast nodule    • Cancer of appendix (HCC)    • Coronary artery disease     nonobstructive    • COVID-19 virus infection 06/2021   • Dizziness    • Environmental allergies    • Fibromuscular hyperplasia of artery (HCC)     in the carotid artery with no stenosis   • GERD (gastroesophageal reflux disease)    • Headache    • Hernia in stomach   • History of anesthesia complications 2017    lidocaine caused reaction during dental procedure per patient   • Hyperlipidemia    • Hypertension    • Hypothyroidism    • Impaired glucose metabolism    • Lumbar pain    • Myalgia    • Nodular goiter     last US 3/21/22 stable 2.5 cm left lobe  and  a 9 mm left lobe and a 10 mm mid right lobe. Sister with thyroid cancer   • LEATHA (obstructive sleep apnea) 04/03/2008    Overnight polysomnogram.  Weight 166 pounds.  Mild LEATHA with AHI 7.5 events per hour.  When supine, moderately abnormal at 15.1 events per hour.  Low oxygen saturation 78% and sleep-related hypoxia present for 24 minutes.   • Palpitations    • RBBB (right bundle branch block)    • Retinal detachment     left  //  when blood pressure gets high she notices flashing lights in her vision   •  Vertigo          Current Outpatient Medications:   •  albuterol sulfate  (90 Base) MCG/ACT inhaler, albuterol sulfate HFA 90 mcg/actuation aerosol inhaler  INHALE 2 PUFFS BY MOUTH 4 TIMES DAILY AS NEEDED, Disp: , Rfl:   •  ALPRAZolam (XANAX) 0.5 MG tablet, Take 0.5 mg by mouth 4 (Four) Times a Day., Disp: , Rfl:   •  cetirizine (zyrTEC) 10 MG tablet, Take 10 mg by mouth Daily., Disp: , Rfl:   •  Cholecalciferol (VITAMIN D3) 5000 UNITS capsule capsule, Take 5,000 Units by mouth Daily., Disp: , Rfl:   •  Euthyrox 75 MCG tablet, 1 tablet daily from Monday to Saturdays only., Disp: 30 tablet, Rfl: 6  •  fluticasone (FLONASE) 50 MCG/ACT nasal spray, 1 spray by Each Nare route Daily., Disp: , Rfl:   •  levothyroxine (SYNTHROID, LEVOTHROID) 75 MCG tablet, Take 75 mcg by mouth Daily. Daily except Sundays, Disp: , Rfl:   •  olmesartan (BENICAR) 40 MG tablet, Take 1 tablet by mouth Every Night., Disp: 90 tablet, Rfl: 3  •  Omega-3 Fatty Acids (OMEGA 3 PO), Take 1 tablet by mouth daily., Disp: , Rfl:   •  rosuvastatin (CRESTOR) 20 MG tablet, Take 1 tablet by mouth Every Night., Disp: 90 tablet, Rfl: 3  •  traZODone (DESYREL) 150 MG tablet, TAKE 1 TO 1 & 1/2 (ONE TO ONE & ONE-HALF) TABLETS BY MOUTH ONCE DAILY AS NEEDED AT BEDTIME, Disp: , Rfl:   •  vitamin B-12 (CYANOCOBALAMIN) 100 MCG tablet, Take 50 mcg by mouth Daily., Disp: , Rfl:   •  amLODIPine (NORVASC) 2.5 MG tablet, Take 0.5 tablets by mouth Daily., Disp: 30 tablet, Rfl: 11  •  pantoprazole (PROTONIX) 40 MG EC tablet, Take 1 tablet by mouth 2 (Two) Times a Day. (Patient taking differently: Take 40 mg by mouth As Needed.), Disp: 180 tablet, Rfl: 3  •  polyethylene glycol (MIRALAX) 17 GM/SCOOP powder, Take 17 g by mouth Daily., Disp: 578 g, Rfl: 11    Allergies   Allergen Reactions   • Beta Adrenergic Blockers Other (See Comments)     Asthma     • Other Other (See Comments) and Dizziness     Septocaine   • Hctz [Hydrochlorothiazide] Other (See Comments)      dehydration, stone in salivary gland, hypokalemia   • Sulfa Antibiotics Hives   • Spironolactone GI Intolerance   • Sulfamethoxazole-Trimethoprim Unknown (See Comments)   • Viibryd [Vilazodone Hcl] Anxiety and Dizziness       Social History     Socioeconomic History   • Marital status:    Tobacco Use   • Smoking status: Never     Passive exposure: Never   • Smokeless tobacco: Never   • Tobacco comments:     CAFFINE USE: 2 cups daily   Vaping Use   • Vaping Use: Never used   Substance and Sexual Activity   • Alcohol use: Yes     Alcohol/week: 5.0 standard drinks     Types: 5 Glasses of wine per week     Comment: Occasionally   • Drug use: No   • Sexual activity: Defer       Family History   Problem Relation Age of Onset   • Heart disease Mother    • Irritable bowel syndrome Mother    • Heart failure Father    • Heart disease Father    • Diabetes Sister    • Heart disease Brother    • Colon polyps Brother    • Breast cancer Neg Hx    • Ovarian cancer Neg Hx        Review of Systems   Constitutional: Negative.    Respiratory: Negative.    Cardiovascular: Negative.    Gastrointestinal: Positive for abdominal distention and abdominal pain. Negative for anal bleeding, blood in stool, constipation, diarrhea, nausea, rectal pain and vomiting.   Musculoskeletal: Negative.    Skin: Negative.    Hematological: Negative.        Vitals:    02/02/23 1524   BP: 132/74   Pulse: 60   Temp: 96.8 °F (36 °C)   SpO2: 97%       Physical Exam  Vitals reviewed.   Constitutional:       Appearance: Normal appearance. She is well-developed.   HENT:      Head: Normocephalic and atraumatic.   Abdominal:      General: Bowel sounds are normal. There is no distension.      Palpations: Abdomen is soft. There is no mass.      Tenderness: There is no abdominal tenderness.      Hernia: No hernia is present.   Skin:     General: Skin is warm and dry.      Coloration: Skin is not jaundiced.      Findings: No rash.   Neurological:      General:  No focal deficit present.      Mental Status: She is alert. She is disoriented.      Cranial Nerves: No cranial nerve deficit.   Psychiatric:         Behavior: Behavior normal.         Thought Content: Thought content normal.         Judgment: Judgment normal.         Lab on 01/19/2023   Component Date Value Ref Range Status   • Apolipoprotein B 01/19/2023 68  <90 mg/dL Final   • Lipoprotein (a) 01/19/2023 272.1 (H)  <75.0 nmol/L Final   Results Encounter on 01/17/2023   Component Date Value Ref Range Status   • Hemoglobin A1C 01/19/2023 5.70 (H)  4.80 - 5.60 % Final   • Glucose 01/19/2023 88  65 - 99 mg/dL Final   • BUN 01/19/2023 14  8 - 23 mg/dL Final   • Creatinine 01/19/2023 0.86  0.57 - 1.00 mg/dL Final   • EGFR Result 01/19/2023 75.1  >60.0 mL/min/1.73 Final   • BUN/Creatinine Ratio 01/19/2023 16.3  7.0 - 25.0 Final   • Sodium 01/19/2023 138  136 - 145 mmol/L Final   • Potassium 01/19/2023 4.3  3.5 - 5.2 mmol/L Final   • Chloride 01/19/2023 102  98 - 107 mmol/L Final   • Total CO2 01/19/2023 26.0  22.0 - 29.0 mmol/L Final   • Calcium 01/19/2023 9.6  8.6 - 10.5 mg/dL Final   • Total Protein 01/19/2023 6.3  6.0 - 8.5 g/dL Final   • Albumin 01/19/2023 4.6  3.5 - 5.2 g/dL Final   • Globulin 01/19/2023 1.7  gm/dL Final   • A/G Ratio 01/19/2023 2.7  g/dL Final   • Total Bilirubin 01/19/2023 0.4  0.0 - 1.2 mg/dL Final   • Alkaline Phosphatase 01/19/2023 57  39 - 117 U/L Final   • AST (SGOT) 01/19/2023 14  1 - 32 U/L Final   • ALT (SGPT) 01/19/2023 18  1 - 33 U/L Final   • Total Cholesterol 01/19/2023 181  0 - 200 mg/dL Final   • Triglycerides 01/19/2023 51  0 - 150 mg/dL Final   • HDL Cholesterol 01/19/2023 79 (H)  40 - 60 mg/dL Final   • VLDL Cholesterol Jean Marie 01/19/2023 10  5 - 40 mg/dL Final   • LDL Chol Calc ([facility]) 01/19/2023 92  0 - 100 mg/dL Final   • 25 Hydroxy, Vitamin D 01/19/2023 51.6  30.0 - 100.0 ng/ml Final   • TSH 01/19/2023 0.590  0.270 - 4.200 uIU/mL Final   • Free T4 01/19/2023 1.55  0.93 - 1.70 ng/dL  Final   • Interpretation 01/19/2023 Note   Final       Diagnoses and all orders for this visit:    1. Chronic constipation (Primary)    2. Bloating      Patient is patient 67-year-old female with hypertension, hyperlipidemia and GERD as well as obstructive sleep apnea complaining of bloating and abdominal pain along with a history of chronic constipation.  Patient reports bloating has been severe over the last few weeks.  Patient only taking her PPI as needed.  At this point would recommend daily use of the PPI and follow response to medication.  Patient should continue MiraLAX for constipation and follow-up in 3 months.

## 2023-02-13 NOTE — TELEPHONE ENCOUNTER
"RM pt LOV 1/19/23.   Hx of coronary artery calcification, HTN, palpiations, HLD, LEATHA/cpap therapy    Patient states that RM wanted her to begin exercising so over the weekend she began some light walking for around 20 minutes the past 2 days.  She is calling today because she is having significant fatigue.  She also notes some right arm pain intermittently.    She then tell me she had right arm pain when they discovered \"blockage\" in her heart.  She said it was 50% blockage when she was 51 years old.  She did not get any intervention.    Her BP right after walking was 144/84.      She denies CP and SOA.      She is on olmesartan 40 mg daily for BP.  She never started the amlodipine back on 1/31 because at that time she was having GI issues and low BP at that time.     She is asking if it is normal to have this much fatigue after starting to walk?  She is not a very active person but does work 3 days a week.    Last had stress test 12/29/21 was negative. I let her know I would let Channing NP know about the fatigue/right arm pain to see if they recommend any additional testing?  Do you think she needs an office visit this week?    Advised her to go to ER with worsening right arm pain or chest pain or SOA.    Arianna Alex RN  [facility] Triage  02/13/23 15:45 EST    Ok to leave  for this pt if she does not answer.  "

## 2023-02-14 DIAGNOSIS — R06.09 DYSPNEA ON EXERTION: ICD-10-CM

## 2023-02-14 DIAGNOSIS — R06.09 DYSPNEA ON EXERTION: Primary | ICD-10-CM

## 2023-02-14 DIAGNOSIS — R07.2 PRECORDIAL PAIN: Primary | ICD-10-CM

## 2023-02-14 DIAGNOSIS — R07.2 PRECORDIAL PAIN: ICD-10-CM

## 2023-02-14 NOTE — TELEPHONE ENCOUNTER
Called and spoke with patient and let her know MM wanted to get labs and a stress test done.  Asked her if she could get the labs done the sooner the better.  She has been to outpatient lab before in the hospital.  She is aware of the location.  I let her know she does not need an appointment and they are open [id number].      Let her know MM also would like to get a treadmill stress test done after the labs.      Advised her to go to the ER at any point with any worsening symptoms or development of CP or SOA.    She verbalizes understanding and agrees with plan.    Arianna Alex RN  [facility] Triage  02/14/23 11:30 EST

## 2023-02-14 NOTE — TELEPHONE ENCOUNTER
I ordered labs and a treadmill stress test. We'll see what this shows prior to moving up her appointment.     Thanks!  DESI Castano

## 2023-02-16 ENCOUNTER — LAB (OUTPATIENT)
Dept: LAB | Facility: HOSPITAL | Age: 68
End: 2023-02-16
Payer: MEDICARE

## 2023-02-16 ENCOUNTER — TELEPHONE (OUTPATIENT)
Dept: CARDIOLOGY | Facility: CLINIC | Age: 68
End: 2023-02-16
Payer: MEDICARE

## 2023-02-16 DIAGNOSIS — R07.2 PRECORDIAL PAIN: ICD-10-CM

## 2023-02-16 DIAGNOSIS — R06.09 DYSPNEA ON EXERTION: ICD-10-CM

## 2023-02-16 LAB
ANION GAP SERPL CALCULATED.3IONS-SCNC: 8.2 MMOL/L (ref 5–15)
BASOPHILS # BLD AUTO: 0.08 10*3/MM3 (ref 0–0.2)
BASOPHILS NFR BLD AUTO: 1.2 % (ref 0–1.5)
BUN SERPL-MCNC: 12 MG/DL (ref 8–23)
BUN/CREAT SERPL: 14 (ref 7–25)
CALCIUM SPEC-SCNC: 9.4 MG/DL (ref 8.6–10.5)
CHLORIDE SERPL-SCNC: 97 MMOL/L (ref 98–107)
CO2 SERPL-SCNC: 27.8 MMOL/L (ref 22–29)
CREAT SERPL-MCNC: 0.86 MG/DL (ref 0.57–1)
DEPRECATED RDW RBC AUTO: 42.3 FL (ref 37–54)
EGFRCR SERPLBLD CKD-EPI 2021: 75.1 ML/MIN/1.73
EOSINOPHIL # BLD AUTO: 0.06 10*3/MM3 (ref 0–0.4)
EOSINOPHIL NFR BLD AUTO: 0.9 % (ref 0.3–6.2)
ERYTHROCYTE [DISTWIDTH] IN BLOOD BY AUTOMATED COUNT: 12.1 % (ref 12.3–15.4)
GLUCOSE SERPL-MCNC: 132 MG/DL (ref 65–99)
HCT VFR BLD AUTO: 41.1 % (ref 34–46.6)
HGB BLD-MCNC: 13.3 G/DL (ref 12–15.9)
HOLD SPECIMEN: NORMAL
IMM GRANULOCYTES # BLD AUTO: 0.02 10*3/MM3 (ref 0–0.05)
IMM GRANULOCYTES NFR BLD AUTO: 0.3 % (ref 0–0.5)
LYMPHOCYTES # BLD AUTO: 0.87 10*3/MM3 (ref 0.7–3.1)
LYMPHOCYTES NFR BLD AUTO: 13 % (ref 19.6–45.3)
MAGNESIUM SERPL-MCNC: 2 MG/DL (ref 1.6–2.4)
MCH RBC QN AUTO: 30.9 PG (ref 26.6–33)
MCHC RBC AUTO-ENTMCNC: 32.4 G/DL (ref 31.5–35.7)
MCV RBC AUTO: 95.4 FL (ref 79–97)
MONOCYTES # BLD AUTO: 0.51 10*3/MM3 (ref 0.1–0.9)
MONOCYTES NFR BLD AUTO: 7.6 % (ref 5–12)
NEUTROPHILS NFR BLD AUTO: 5.15 10*3/MM3 (ref 1.7–7)
NEUTROPHILS NFR BLD AUTO: 77 % (ref 42.7–76)
NRBC BLD AUTO-RTO: 0 /100 WBC (ref 0–0.2)
NT-PROBNP SERPL-MCNC: 142 PG/ML (ref 0–900)
PLATELET # BLD AUTO: 223 10*3/MM3 (ref 140–450)
PMV BLD AUTO: 9.7 FL (ref 6–12)
POTASSIUM SERPL-SCNC: 4.1 MMOL/L (ref 3.5–5.2)
RBC # BLD AUTO: 4.31 10*6/MM3 (ref 3.77–5.28)
SODIUM SERPL-SCNC: 133 MMOL/L (ref 136–145)
TROPONIN T SERPL HS-MCNC: <6 NG/L
WBC NRBC COR # BLD: 6.69 10*3/MM3 (ref 3.4–10.8)
WHOLE BLOOD HOLD COAG: NORMAL

## 2023-02-16 PROCEDURE — 83735 ASSAY OF MAGNESIUM: CPT

## 2023-02-16 PROCEDURE — 85025 COMPLETE CBC W/AUTO DIFF WBC: CPT

## 2023-02-16 PROCEDURE — 83880 ASSAY OF NATRIURETIC PEPTIDE: CPT

## 2023-02-16 PROCEDURE — 36415 COLL VENOUS BLD VENIPUNCTURE: CPT

## 2023-02-16 PROCEDURE — 80048 BASIC METABOLIC PNL TOTAL CA: CPT

## 2023-02-16 PROCEDURE — 84484 ASSAY OF TROPONIN QUANT: CPT

## 2023-02-16 NOTE — TELEPHONE ENCOUNTER
Reviewed results with Vesta Sutton and patient verbalized understanding of results.    Transferred call to scheduling to arrange stress test.    Thank you,  Ginette DODD RN  [place] [facility] [place]

## 2023-02-16 NOTE — TELEPHONE ENCOUNTER
Her labs look good. HF lab is normal and CP lab is normal. Has she been scheduled for treadmill stress test?    Thanks!  DESI Castano

## 2023-02-16 NOTE — TELEPHONE ENCOUNTER
Stress test is scheduled for 3/9.    Call transferred back to me. Patient's questions were answered and she verbalized understanding.    Please let me know if there is anything else you would like me to do for this patient.    Thank you,  Ginette DODD RN  Triage Nurse [facility] [place]

## 2023-03-08 ENCOUNTER — TELEPHONE (OUTPATIENT)
Dept: CARDIOLOGY | Facility: CLINIC | Age: 68
End: 2023-03-08
Payer: MEDICARE

## 2023-03-08 NOTE — TELEPHONE ENCOUNTER
Called and left a message on voicemail for the patient to call back and reschedule appointment if she desires.      Song

## 2023-03-08 NOTE — TELEPHONE ENCOUNTER
Caller: Vesta Sutton    Relationship to patient: Self    Best call back number: [phone]    Patient is needing: PT IS SCHEDULED FOR STRESS TEST 3.9.23 BUT SHE IS FEELING VERY FATIGUED AND BELIEVES ITS FROM HER MEDICATION. SHES WORRIED ABOUT HAVING HER STRESS TEST TOMORROW.

## 2023-03-08 NOTE — TELEPHONE ENCOUNTER
Called and spoke with the patient and she is concerned about doing the stress test tomorrow.  She stated that she started Auvelity about 2 weeks ago and has noticed that she has become very fatigued.  She did not know if you would want her to do it anyway.  Please advise.    CB: [phone]    Thanks,  Song

## 2023-03-23 ENCOUNTER — HOSPITAL ENCOUNTER (OUTPATIENT)
Dept: CARDIOLOGY | Facility: HOSPITAL | Age: 68
Discharge: HOME | End: 2023-03-23
Admitting: NURSE PRACTITIONER
Payer: MEDICARE

## 2023-03-23 DIAGNOSIS — R07.2 PRECORDIAL PAIN: ICD-10-CM

## 2023-03-23 DIAGNOSIS — R06.09 DYSPNEA ON EXERTION: ICD-10-CM

## 2023-03-23 LAB
BH CV STRESS BP STAGE 1: NORMAL
BH CV STRESS BP STAGE 2: NORMAL
BH CV STRESS DURATION MIN STAGE 1: 3
BH CV STRESS DURATION MIN STAGE 2: 3
BH CV STRESS DURATION SEC STAGE 1: 0
BH CV STRESS DURATION SEC STAGE 2: 0
BH CV STRESS GRADE STAGE 1: 10
BH CV STRESS GRADE STAGE 2: 12
BH CV STRESS HR STAGE 1: 108
BH CV STRESS HR STAGE 2: 141
BH CV STRESS METS STAGE 1: 5
BH CV STRESS METS STAGE 2: 7.5
BH CV STRESS PROTOCOL 1: NORMAL
BH CV STRESS RECOVERY BP: NORMAL MMHG
BH CV STRESS RECOVERY HR: 71 BPM
BH CV STRESS SPEED STAGE 1: 1.7
BH CV STRESS SPEED STAGE 2: 2.5
BH CV STRESS STAGE 1: 1
BH CV STRESS STAGE 2: 2
MAXIMAL PREDICTED HEART RATE: 155 BPM
PERCENT MAX PREDICTED HR: 90.97 %
STRESS BASELINE BP: NORMAL MMHG
STRESS BASELINE HR: 70 BPM
STRESS PERCENT HR: 107 %
STRESS POST ESTIMATED WORKLOAD: 7.5 METS
STRESS POST EXERCISE DUR MIN: 6 MIN
STRESS POST EXERCISE DUR SEC: 0 SEC
STRESS POST PEAK BP: NORMAL MMHG
STRESS POST PEAK HR: 141 BPM
STRESS TARGET HR: 132 BPM

## 2023-03-23 PROCEDURE — 93016 CV STRESS TEST SUPVJ ONLY: CPT | Performed by: INTERNAL MEDICINE

## 2023-03-23 PROCEDURE — 93018 CV STRESS TEST I&R ONLY: CPT | Performed by: INTERNAL MEDICINE

## 2023-03-23 PROCEDURE — 93017 CV STRESS TEST TRACING ONLY: CPT

## 2023-03-24 ENCOUNTER — TELEPHONE (OUTPATIENT)
Dept: CARDIOLOGY | Facility: CLINIC | Age: 68
End: 2023-03-24
Payer: MEDICARE

## 2023-03-24 NOTE — TELEPHONE ENCOUNTER
Please call her about her stress test. There was no EKG evidence of ischemia with stress. Her [facility] treadmill score was 6 which corresponds with low risk of blockage.     She did complain of chest pain during stress test. The stress test was done due to right arm pain.     I don't think she needs intervention based on her EKG results, but I do want to see if she's having any chest pain or arm pain?    We could try to treat her medically, though she has not tolerated beta blockers in the past. We could try amlodipine again if she's having symptoms.     Thanks!  DESI Castano

## 2023-03-28 NOTE — TELEPHONE ENCOUNTER
Left voicemail for Vesta Sutton requesting callback.    Thank you,  Ginette DODD RN  Triage Nurse [facility] [place]

## 2023-03-29 NOTE — TELEPHONE ENCOUNTER
Left voicemail for Vesta Sutton requesting callback.    Thank you,  Ginette DODD RN  [place] [facility] [place]

## 2023-03-29 NOTE — TELEPHONE ENCOUNTER
Notified patient of results. Patient verbalized understanding. She said she wasn't having pain, it was more of a tightness that lasted for a second. She denies any further problems with pain/discomfort in her chest and arms.     Janice Garcia RN  Triage [facility] [place]

## 2023-04-13 ENCOUNTER — LAB (OUTPATIENT)
Dept: ENDOCRINOLOGY | Age: 68
End: 2023-04-13
Payer: MEDICARE

## 2023-04-13 DIAGNOSIS — R73.03 PREDIABETES: ICD-10-CM

## 2023-04-13 DIAGNOSIS — E55.9 VITAMIN D DEFICIENCY: ICD-10-CM

## 2023-04-13 DIAGNOSIS — E78.2 MIXED HYPERLIPIDEMIA: ICD-10-CM

## 2023-04-13 DIAGNOSIS — E03.9 HYPOTHYROIDISM, UNSPECIFIED TYPE: ICD-10-CM

## 2023-04-14 LAB
25(OH)D3+25(OH)D2 SERPL-MCNC: 57.7 NG/ML (ref 30–100)
ALBUMIN SERPL-MCNC: 4.5 G/DL (ref 3.5–5.2)
ALBUMIN/GLOB SERPL: 2 G/DL
ALP SERPL-CCNC: 59 U/L (ref 39–117)
ALT SERPL-CCNC: 21 U/L (ref 1–33)
AST SERPL-CCNC: 13 U/L (ref 1–32)
BILIRUB SERPL-MCNC: 0.3 MG/DL (ref 0–1.2)
BUN SERPL-MCNC: 8 MG/DL (ref 8–23)
BUN/CREAT SERPL: 8.4 (ref 7–25)
CALCIUM SERPL-MCNC: 9.9 MG/DL (ref 8.6–10.5)
CHLORIDE SERPL-SCNC: 99 MMOL/L (ref 98–107)
CHOLEST SERPL-MCNC: 152 MG/DL (ref 0–200)
CO2 SERPL-SCNC: 29.5 MMOL/L (ref 22–29)
CREAT SERPL-MCNC: 0.95 MG/DL (ref 0.57–1)
EGFRCR SERPLBLD CKD-EPI 2021: 66.6 ML/MIN/1.73
GLOBULIN SER CALC-MCNC: 2.3 GM/DL
GLUCOSE SERPL-MCNC: 101 MG/DL (ref 65–99)
HBA1C MFR BLD: 5.6 % (ref 4.8–5.6)
HDLC SERPL-MCNC: 81 MG/DL (ref 40–60)
IMP & REVIEW OF LAB RESULTS: NORMAL
LDLC SERPL CALC-MCNC: 61 MG/DL (ref 0–100)
POTASSIUM SERPL-SCNC: 4.3 MMOL/L (ref 3.5–5.2)
PROT SERPL-MCNC: 6.8 G/DL (ref 6–8.5)
SODIUM SERPL-SCNC: 136 MMOL/L (ref 136–145)
T4 FREE SERPL-MCNC: 1.68 NG/DL (ref 0.93–1.7)
TRIGL SERPL-MCNC: 41 MG/DL (ref 0–150)
TSH SERPL DL<=0.005 MIU/L-ACNC: 0.86 UIU/ML (ref 0.27–4.2)
VLDLC SERPL CALC-MCNC: 10 MG/DL (ref 5–40)

## 2023-04-20 ENCOUNTER — OFFICE VISIT (OUTPATIENT)
Dept: CARDIOLOGY | Facility: CLINIC | Age: 68
End: 2023-04-20
Payer: MEDICARE

## 2023-04-20 VITALS
OXYGEN SATURATION: 99 % | DIASTOLIC BLOOD PRESSURE: 76 MMHG | SYSTOLIC BLOOD PRESSURE: 134 MMHG | BODY MASS INDEX: 28.61 KG/M2 | HEART RATE: 60 BPM | HEIGHT: 66 IN | WEIGHT: 178 LBS

## 2023-04-20 DIAGNOSIS — I45.2 RBBB (RIGHT BUNDLE BRANCH BLOCK WITH LEFT ANTERIOR FASCICULAR BLOCK): ICD-10-CM

## 2023-04-20 DIAGNOSIS — E03.9 HYPOTHYROIDISM, UNSPECIFIED TYPE: ICD-10-CM

## 2023-04-20 DIAGNOSIS — Z99.89 OSA ON CPAP: ICD-10-CM

## 2023-04-20 DIAGNOSIS — F41.1 GENERALIZED ANXIETY DISORDER: ICD-10-CM

## 2023-04-20 DIAGNOSIS — M79.605 LEFT LEG PAIN: Primary | ICD-10-CM

## 2023-04-20 DIAGNOSIS — M51.36 DEGENERATION OF LUMBAR INTERVERTEBRAL DISC: ICD-10-CM

## 2023-04-20 DIAGNOSIS — I25.10 CORONARY ARTERIOSCLEROSIS IN NATIVE ARTERY: ICD-10-CM

## 2023-04-20 DIAGNOSIS — E78.2 MIXED HYPERLIPIDEMIA: ICD-10-CM

## 2023-04-20 DIAGNOSIS — I10 PRIMARY HYPERTENSION: ICD-10-CM

## 2023-04-20 DIAGNOSIS — G47.33 OSA ON CPAP: ICD-10-CM

## 2023-04-20 RX ORDER — CLOBETASOL PROPIONATE 0.5 MG/G
CREAM TOPICAL
COMMUNITY
Start: 2023-02-17

## 2023-04-20 NOTE — PROGRESS NOTES
"      [facility] CARDIOLOGY  [address]  [facility] [address]  [facility] [address]  Phone: [phone]  Patient Name: Vesta Sutton  :1955  Age: 67 y.o.  Primary Cardiologist: Susan Paula MD  Encounter Provider:  DESI Green    History of Present Illness     Vesta Sutton is a 67 y.o.  female whose medical history includes hypertension, GERD, hyperlipidemia, anxiety, degenerative disc disease, and retinal detachment of the left eye.  She is followed in our office by Dr. Paula for coronary artery calcification and hypertension. I have reviewed the past medical records in preparation of today's visit.     23 Follow-up:  She is here for 3-month follow-up and I am seeing her for the first time today.  At last visit she had stress test ordered which was normal.  She has been advised to increase her Crestor given her coronary artery calcification; she is tolerating this but feels it makes her breast swell.  She was also started on spironolactone for better blood pressure control but could not tolerate this; amlodipine was then started but she also cannot tolerate that.  For now she continues on 20 mg olmesartan twice daily and her blood pressure is good in office today.  She sometimes holds the nighttime dose of olmesartan if her heart rate is less than 60 but then the next morning her blood pressure will be high.  Today she is having left thigh pain; she had to stand all day at work yesterday.  It somewhat better with naproxen and Tylenol.  She has occasional palpitations but relates this to drinking more caffeine.  She also notices that her heart rate becomes elevated with eating; heart rate usually goes from 60-85.  She has sleep apnea and is compliant with CPAP.     Data Review     The following data was reviewed by DESI Green on 23:    Vital Signs:   /76   Pulse 60   Ht 167.6 cm (66\")   Wt 80.7 kg (178 lb)   " SpO2 99%   BMI 28.73 kg/m²       Weight:  Wt Readings from Last 3 Encounters:   04/20/23 80.7 kg (178 lb)   02/02/23 79.7 kg (175 lb 12.8 oz)   01/26/23 80.8 kg (178 lb 3.2 oz)     Body mass index is 28.73 kg/m².    Below is a summary of pertinent cardiology findings:  • She has history of intermittent chest pain but no history of MI.  She is also had a long history of palpitations but normal Holter recording.  • 2006 she had abnormal stress test showing apical ischemia.  Cardiac catheterization showed very mild LAD stenosis.  • 2007 she was admitted with chest pain but stress study showed no evidence of ischemia.  • 2009 echocardiogram was normal.  Carotid artery ultrasound at that time showed no stenosis but intimal thickening.  • October 2014 echocardiogram was normal with EF 66%.  Carotid artery Doppler study at that time was normal.  • 2017 she had duplex of her lower extremity veins and legs which found chronic right lower extremity superficial thrombophlebitis below the kn 04/13/2023 ee.  • May 2017 stress echocardiogram was normal.  • December 2021 nuclear stress test showed no evidence of ischemia and 24-hour Holter monitor was normal.  • November 2022 CTA chest showed no evidence of pulmonary embolism; Dr. Paula reviewed the images and saw dense calcification of the proximal RCA, dense calcification in the proximal LAD, distal left main, and ramus intermedius, and spotty calcification of the proximal left circumflex.  • January 2023 venous study showed no DVT of the left leg.  • March 2023 treadmill stress test showed no evidence of ischemia.  She did complain of chest pain at stage II of the Hi protocol.    Labs:  • 04/13/2023:  cr 0.9, K 4.3, otherwise unremarkable CMP, HgbA1c 5.6, TSH 0.860, free T4   1.68, Chol 152, HDL 81, LDL 61, Trig 41      ECG 12 Lead    Date/Time: 4/20/2023 11:53 AM  Performed by: Radha Ace APRN  Authorized by: Radha Ace APRN    Comparison: compared with previous ECG from 1/19/2023  Similar to previous ECG  Rhythm: sinus rhythm  Rate: normal  BPM: 60  Conduction: right bundle branch block and left anterior fascicular block    Clinical impression: abnormal EKG            Medications     Allergies as of 04/20/2023 - Reviewed 04/20/2023   Allergen Reaction Noted   • Beta adrenergic blockers Other (See Comments) 09/12/2016   • Other Other (See Comments) and Dizziness 05/28/2019   • Hctz [hydrochlorothiazide] Other (See Comments) 07/29/2019   • Sulfa antibiotics Hives 05/04/2017   • Spironolactone GI Intolerance 01/31/2023   • Sulfamethoxazole-trimethoprim Unknown (See Comments) 10/03/2019   • Viibryd [vilazodone hcl] Anxiety and Dizziness 06/10/2021         Current Outpatient Medications:   •  albuterol sulfate  (90 Base) MCG/ACT inhaler, albuterol sulfate HFA 90 mcg/actuation aerosol inhaler  INHALE 2 PUFFS BY MOUTH 4 TIMES DAILY AS NEEDED, Disp: , Rfl:   •  ALPRAZolam (XANAX) 0.5 MG tablet, Take 1 tablet by mouth 4 (Four) Times a Day., Disp: , Rfl:   •  cetirizine (zyrTEC) 10 MG tablet, Take 1 tablet by mouth Daily., Disp: , Rfl:   •  Cholecalciferol (VITAMIN D3) 5000 UNITS capsule capsule, Take 1 capsule by mouth Daily., Disp: , Rfl:   •  clobetasol (TEMOVATE) 0.05 % cream, APPLY THIN LAYER TO AFFECTED AREAS TWICE A DAY, Disp: , Rfl:   •  Euthyrox 75 MCG tablet, 1 tablet daily from Monday to Saturdays only., Disp: 30 tablet, Rfl: 6  •  levothyroxine (SYNTHROID, LEVOTHROID) 75 MCG tablet, Take 1 tablet by mouth Daily. Daily except Sundays, Disp: , Rfl:   •  olmesartan (BENICAR) 40 MG tablet, Take 1 tablet by mouth Every Night., Disp: 90 tablet, Rfl: 3  •  Omega-3 Fatty Acids (OMEGA 3 PO), Take 1 tablet by mouth daily., Disp: , Rfl:   •  pantoprazole (PROTONIX) 40 MG EC tablet, Take 1 tablet by mouth 2 (Two) Times a Day. (Patient taking differently: Take 1 tablet by mouth As Needed.), Disp: 180 tablet, Rfl: 3  •  rosuvastatin  (CRESTOR) 20 MG tablet, Take 1 tablet by mouth Every Night., Disp: 90 tablet, Rfl: 3  •  traZODone (DESYREL) 150 MG tablet, TAKE 1 TO 1 & 1/2 (ONE TO ONE & ONE-HALF) TABLETS BY MOUTH ONCE DAILY AS NEEDED AT BEDTIME, Disp: , Rfl:   •  vitamin B-12 (CYANOCOBALAMIN) 100 MCG tablet, Take 50 mcg by mouth Daily., Disp: , Rfl:      Past History, Review of Systems, Exam     Past Medical History:   Diagnosis Date   • Anemia    • Anxiety and depression    • Appendiceal carcinoid tumor    • Arthritis    • Asthma    • Breast nodule    • Cancer of appendix    • Coronary artery disease    • COVID-19 virus infection 2021   • Dizziness    • Environmental allergies    • Fibromuscular hyperplasia of artery    • GERD (gastroesophageal reflux disease)    • Headache    • Hernia in stomach   • History of anesthesia complications    • Hyperlipidemia    • Hypertension    • Hypothyroidism    • Impaired glucose metabolism    • Lumbar pain    • Myalgia    • Nodular goiter    • LEATHA (obstructive sleep apnea) 2008   • Palpitations    • RBBB (right bundle branch block)    • Retinal detachment    • Vertigo        Past Surgical History:   has a past surgical history that includes  section; endoscopy and colonoscopy (N/A, 2014); endoscopy and colonoscopy (N/A, 2008); Ureterocele Repair; Esophagogastroduodenoscopy (N/A, 2016); Appendectomy (); Cornea laceration repair; Esophagogastroduodenoscopy (N/A, 2018); US Guided Fine Needle Aspiration (2019); Breast biopsy (Left); Breast cyst aspiration (Left); Colonoscopy (N/A, 2019); Esophagogastroduodenoscopy (N/A, 2019); Cataract extraction, bilateral; and Upper gastrointestinal endoscopy (Cannot remember).     Social History     Socioeconomic History   • Marital status:    Tobacco Use   • Smoking status: Never     Passive exposure: Never   • Smokeless tobacco: Never   • Tobacco comments:     CAFFINE USE: 2 cups daily   Vaping Use    • Vaping Use: Never used   Substance and Sexual Activity   • Alcohol use: Yes     Alcohol/week: 5.0 standard drinks     Types: 5 Glasses of wine per week     Comment: Occasionally   • Drug use: No   • Sexual activity: Defer       Review of Systems   Cardiovascular: Positive for palpitations. Negative for chest pain, claudication, cyanosis, dyspnea on exertion, irregular heartbeat, leg swelling, near-syncope, orthopnea, paroxysmal nocturnal dyspnea and syncope.   Musculoskeletal: Positive for myalgias.       Vitals reviewed.   Constitutional:       Appearance: Not in distress.   Eyes:      Conjunctiva/sclera: Conjunctivae normal.      Pupils: Pupils are equal, round, and reactive to light.   HENT:      Head: Normocephalic.      Nose: Nose normal.    Mouth/Throat:      Pharynx: Oropharynx is clear.   Neck:      Vascular: JVD normal.   Pulmonary:      Effort: Pulmonary effort is normal.      Breath sounds: Normal breath sounds. No wheezing. No rhonchi. No rales.   Cardiovascular:      Normal rate. Regular rhythm. Normal S1. Normal S2.      Murmurs: There is no murmur.   Pulses:     Intact distal pulses.   Edema:     Peripheral edema absent.   Abdominal:      General: Bowel sounds are normal. There is no distension.      Palpations: Abdomen is soft.      Tenderness: There is no abdominal tenderness.   Musculoskeletal: Normal range of motion.      Cervical back: Normal range of motion and neck supple. Skin:     General: Skin is warm and dry.   Neurological:      Mental Status: Alert and oriented to person, place and time.   Psychiatric:         Attention and Perception: Attention normal.         Mood and Affect: Mood normal.         Speech: Speech normal.         Behavior: Behavior is cooperative.          Assessment and Plan     Assessment:  1. Left leg pain    2. Coronary arteriosclerosis in native artery    3. RBBB (right bundle branch block with left anterior fascicular block)    4. Primary hypertension    5. Mixed  hyperlipidemia    6. Hypothyroidism, unspecified type    7. Degeneration of lumbar intervertebral disc    8. LEATHA on auto CPAP    9. Generalized anxiety disorder         1. Coronary artery disease: She had multiple stress tests but no evidence of MI.  2006 cardiac catheterization showed mild disease of the LAD only.  November 2022 CTA chest was reviewed by Dr. Paula; she was noted to have dense calcification of the proximal RCA, proximal LAD, distal left main, ramus intermedius, and spotty calcification at the proximal left circumflex.  Her Crestor has been increased to 40 mg rosuvastatin daily.  2. Right bundle branch block: This is chronic and stable.  3. Hypertension: Currently controlled on 20 mg olmesartan twice daily.  She did not tolerate spironolactone or amlodipine.  4. Hyperlipidemia: Controlled on 40 mg Crestor daily.  Her lipoprotein a was elevated but apolipoprotein B was normal.  5. Hypothyroidism: Thyroid studies at goal when checked in April 2023.  6. Degenerative disc disease: She does complain of left leg pain today.  7. LEATHA: She is compliant with CPAP.  8. Anxiety: Currently treated with alprazolam as needed.  9. Left leg pain: She has pain after standing for long periods yesterday.    Ms. Sutton is a patient of Dr. Paula's with known coronary artery disease of the LAD and dense calcification of the RCA, LAD, left main coronary artery, and ramus intermedius.  Her Crestor has been increased to 40 mg daily.    Her blood pressure is currently controlled on 20 mg olmesartan twice daily; I did encourage her to take the olmesartan regularly to prevent spikes in blood pressure.    She is having left leg pain today and is worried about blood clot as she has history of right lower extremity superficial thrombophlebitis; I will order an ultrasound.    Barring any surprises I will see her back in 3 months.    Return in about 3 months (around 7/20/2023) for Follow-up with Bita Ace, APRN; 6  MONTHS WITH RM.  Orders Placed This Encounter   Procedures   • ECG 12 Lead      No orders of the defined types were placed in this encounter.        Thank you for the opportunity to participate in this patient's care.    DESI Castano    This office note has been dictated.

## 2023-04-24 ENCOUNTER — TELEPHONE (OUTPATIENT)
Dept: CARDIOLOGY | Facility: CLINIC | Age: 68
End: 2023-04-24
Payer: MEDICARE

## 2023-04-24 NOTE — TELEPHONE ENCOUNTER
Does she have sleep apnea; I think she does. Is she having trouble with her CPAP mask.    Thanks!  DESI Castano

## 2023-04-24 NOTE — TELEPHONE ENCOUNTER
"Vesta Sutton called to report variable heart rate.    Patient expressed concern that her HR is 101 when she first wakes up in the morning.  Patient reports she has been taking olmesartan 20 mg BID since her appointment.  Patient reports she felt off when she first woke up which made her check her HR and found it to be 101.  Patient reports she felt \"agitated\".  Patient stated HR is elevated before drinking any caffeine.  Patient reports during the day HR can be anywhere from 59-68.      HR currently 68 bpm.    Patient is asking if further investigation needs to be done or if she should just continue to monitor?    Please let me know how you would like to proceed.    Thank you,  Ginette DODD RN  Triage Nurse [facility]      "

## 2023-04-25 NOTE — TELEPHONE ENCOUNTER
I discussed with Dr. Paula; not sure why her heart rate is a little elevated in the morning. If it resolves pretty quickly in the morning, I would make sure that she has a glass of water first thing in the morning. Perhaps, she's a little dehydrated in the morning.     Thanks!  DESI Castano

## 2023-04-25 NOTE — TELEPHONE ENCOUNTER
Called Vesta Sutton for additional inforamtion.    Patient reports she has had her CPAP machine for years and thinks it is working properly.  Patient stated she sleep ok, usually waking up once during the night to use the restroom, though some nights she does wake multiple times.  Patient stated she Dr. Batista within the last year (maybe August or October of 2022) and states that she brought her machine to the office to have settings checked.    Patient stated it is ok to leave detailed voicemail if she does not answer return call.  Explained that it will depend on Bita's response whether detailed voicemail can be left.  Patient verbalized understanding.    Please let me know how you would like to proceed.    Thank you,  Ginette DODD RN  Triage Nurse [facility]

## 2023-04-25 NOTE — TELEPHONE ENCOUNTER
Patient called back to report something that she thought of this morning, after speaking to Ginette.  For about 2-3 weeks she has had some nights where she has not slept as well due to her CPAP mask.  She is not sure if it is an issue with the fit.  She has not reached out to sleep MD, but I asked her to reach out to them.    I let her know what you all said about drinking a glass of water when she first wakes up that perhaps she is a little dehydrated when she wakes up.    She verbalizes understanding and will do that.      Arianna Alex RN  [facility] Triage  04/25/23 10:18 EDT

## 2023-04-27 ENCOUNTER — OFFICE VISIT (OUTPATIENT)
Dept: ENDOCRINOLOGY | Age: 68
End: 2023-04-27
Payer: MEDICARE

## 2023-04-27 VITALS
WEIGHT: 178.8 LBS | BODY MASS INDEX: 28.73 KG/M2 | HEIGHT: 66 IN | SYSTOLIC BLOOD PRESSURE: 130 MMHG | OXYGEN SATURATION: 96 % | TEMPERATURE: 97.5 F | HEART RATE: 69 BPM | DIASTOLIC BLOOD PRESSURE: 80 MMHG

## 2023-04-27 DIAGNOSIS — E03.9 HYPOTHYROIDISM, UNSPECIFIED TYPE: Primary | ICD-10-CM

## 2023-04-27 DIAGNOSIS — E04.2 NONTOXIC MULTINODULAR GOITER: ICD-10-CM

## 2023-04-27 DIAGNOSIS — E03.9 ACQUIRED HYPOTHYROIDISM: ICD-10-CM

## 2023-04-27 DIAGNOSIS — R73.03 PREDIABETES: ICD-10-CM

## 2023-04-27 RX ORDER — LEVOTHYROXINE SODIUM 0.07 MG/1
75 TABLET ORAL DAILY
Qty: 90 TABLET | Refills: 3 | Status: SHIPPED | OUTPATIENT
Start: 2023-04-27

## 2023-04-27 RX ORDER — LEVOTHYROXINE SODIUM 75 UG/1
TABLET ORAL
Qty: 30 TABLET | Refills: 6
Start: 2023-04-27

## 2023-04-27 NOTE — PROGRESS NOTES
New Patient      Chief Complaint    Chief Complaint   Patient presents with   • Hypothyroidism     Pt states that energy levels have been low, weight is lower than expected, does have family hx of thyroid disease(sister).    • Prediabetes        HPI:   Vesta Sutton is a 67 y.o. female usually seen in the Endocrinology clinic for consultative follow up and management of hypothyroidism and a thyroid nodule.  She was last seen on January 26, 2023.  She has hypothyroidism that she thinks was diagnosed about 3 or 4 years ago.  She was not entirely sure.  She is on L-thyroxine replacement therapy and is currently taking levothyroxine at 75 mcg daily.  She has been on that dose for a long time and she takes it consistently.  Her TSH on April 13, 2022 was 0.860 with a free T4 of 1.68 ng/dL compared to 0.590 with a free T4 of 1.55 ng/dL in January 19, 2023 on two 1.390 on November 23, 2022.  She has several bilateral thyroid nodules, the most dominant of which is on the left and measures 2.5 x 1.7 x 1.4 cm.  She has had a fine needle aspiration of this nodule on May 29, 2019 and the cytology was benign.  Thereafter she has had 2 thyroid ultrasounds and they have demonstrated that the nodule(s) are unchanged.  The most recent ultrasound was on March 17, 2022.  It was stated that the largest nodule was 2.5 x 1.7 x 1.4 cm in the middle of the left lobe and that it appeared unchanged in size and echotexture since the prior ultrasound.  She also had a 9 mm diameter solid nodule in the inferior aspect of the left lobe which was described as unchanged and a 1.0 x 0.7 x 0.7 cm diameter solid nodule in the middle of the right lobe that was also stated to be unchanged.  The right lobe was said to measure 4.9 x 1.6 x 1.8 cm and the left lobe was 4.6 x 1.9 x 2.2 cm.  She does not have any history of head or neck irradiation or any major risk factors for thyroid cancer.  She has a history of prediabetes that she is currently managing  by diet and A1c on April 13, 2022 was 5.6% compared to 5.7% on January 19, 2023.      Past Medical History:   Diagnosis Date   • Anemia    • Anxiety and depression    • Appendiceal carcinoid tumor    • Arthritis    • Asthma    • Breast nodule    • Cancer of appendix    • Coronary artery disease     nonobstructive    • COVID-19 virus infection 06/2021   • Dizziness    • Environmental allergies    • Fibromuscular hyperplasia of artery     in the carotid artery with no stenosis   • GERD (gastroesophageal reflux disease)    • Headache    • Hernia in stomach   • History of anesthesia complications 2017    lidocaine caused reaction during dental procedure per patient   • Hyperlipidemia    • Hypertension    • Hypothyroidism    • Impaired glucose metabolism    • Lumbar pain    • Myalgia    • Nodular goiter     last US 3/21/22 stable 2.5 cm left lobe  and  a 9 mm left lobe and a 10 mm mid right lobe. Sister with thyroid cancer   • LEATHA (obstructive sleep apnea) 04/03/2008    Overnight polysomnogram.  Weight 166 pounds.  Mild LEATHA with AHI 7.5 events per hour.  When supine, moderately abnormal at 15.1 events per hour.  Low oxygen saturation 78% and sleep-related hypoxia present for 24 minutes.   • Palpitations    • RBBB (right bundle branch block)    • Retinal detachment     left  //  when blood pressure gets high she notices flashing lights in her vision   • Vertigo           ROS:  Pertinent to this visit, only as mentioned above.  The rest was negative.    Social History     Socioeconomic History   • Marital status:    Tobacco Use   • Smoking status: Never     Passive exposure: Never   • Smokeless tobacco: Never   • Tobacco comments:     CAFFINE USE: 2 cups daily   Vaping Use   • Vaping Use: Never used   Substance and Sexual Activity   • Alcohol use: Yes     Alcohol/week: 5.0 standard drinks     Types: 5 Glasses of wine per week     Comment: Occasionally   • Drug use: No   • Sexual activity: Defer       Physical  Exam:  GENERAL: She looked well.  HEENT: Normal examination.  NECK: Normal examination with a barely palpable left thyroid nodule  LUNGS: Clear to auscultation bilaterally  CVS: RRR  EXTREMITIES: Normal examination.    Assessment:  1.  Hypothyroidism on L-thyroxine replacement therapy.  2.  Bilateral thyroid nodules with a dominant left thyroid nodule that has remained stable in size since fine-needle aspiration with a benign cytology on May 29, 2019.    Diagnoses and all orders for this visit:    1. Hypothyroidism, unspecified type (Primary)  -     TSH; Future  -     T4, Free; Future    2. Acquired hypothyroidism  -     TSH; Future  -     T4, Free; Future  -     Euthyrox 75 MCG tablet; 1 tablet daily from Monday to Saturdays only.  Dispense: 30 tablet; Refill: 6    3. Nontoxic multinodular goiter  -     Euthyrox 75 MCG tablet; 1 tablet daily from Monday to Saturdays only.  Dispense: 30 tablet; Refill: 6    4. Prediabetes  -     Hemoglobin A1c; Future    Other orders  -     levothyroxine (SYNTHROID, LEVOTHROID) 75 MCG tablet; Take 1 tablet by mouth Daily. Daily except Sundays  Dispense: 90 tablet; Refill: 3    Recommendations:  1.  We reviewed with Ms. Sutton the history of hypothyroidism, her L-thyroxine replacement therapy and levothyroxine dose and her most recent TSH level.  2.  She will continue with levothyroxine as 75 mcg daily and we gave her a prescription refill.  3.  We reviewed her thyroid ultrasound and talked about the finding of the left thyroid nodule that had a benign cytology on the last fine-needle aspiration and has remained stable in size and appearance thereafter.  4.  We discussed the fact that there is currently no clear indication for repeat fine-needle aspiration.  5.  We will obtain a follow-up thyroid ultrasound 2 years from the last one, sometime in February/March in 2024.  6.  We also reviewed her A1c that has decreased and talked about the importance of caloric restriction and and  regular exercise to facilitate weight control and glycemic control.  7.  We gave her the opportunity to ask questions, which we answered.  She did not have any other concerns today

## 2023-05-04 ENCOUNTER — OFFICE VISIT (OUTPATIENT)
Dept: GASTROENTEROLOGY | Facility: CLINIC | Age: 68
End: 2023-05-04
Payer: MEDICARE

## 2023-05-04 VITALS
HEIGHT: 66 IN | OXYGEN SATURATION: 98 % | BODY MASS INDEX: 28.87 KG/M2 | DIASTOLIC BLOOD PRESSURE: 78 MMHG | SYSTOLIC BLOOD PRESSURE: 131 MMHG | WEIGHT: 179.6 LBS | HEART RATE: 54 BPM | TEMPERATURE: 96.1 F

## 2023-05-04 DIAGNOSIS — R10.11 RUQ PAIN: Primary | ICD-10-CM

## 2023-05-04 PROCEDURE — 1159F MED LIST DOCD IN RCRD: CPT | Performed by: INTERNAL MEDICINE

## 2023-05-04 PROCEDURE — 3078F DIAST BP <80 MM HG: CPT | Performed by: INTERNAL MEDICINE

## 2023-05-04 PROCEDURE — 1160F RVW MEDS BY RX/DR IN RCRD: CPT | Performed by: INTERNAL MEDICINE

## 2023-05-04 PROCEDURE — 3075F SYST BP GE 130 - 139MM HG: CPT | Performed by: INTERNAL MEDICINE

## 2023-05-04 PROCEDURE — 99213 OFFICE O/P EST LOW 20 MIN: CPT | Performed by: INTERNAL MEDICINE

## 2023-05-04 NOTE — PROGRESS NOTES
Chief Complaint   Patient presents with   • Abdominal Pain   • Diarrhea       Vesta Sutton is a  67 y.o. female here for a follow up visit for abdominal pain and diarrhea.    HPI     Patient 67-year-old female with history of hypertension, hyperlipidemia and GERD previously treated for constipation in the last month reporting episodic diarrhea episodes typically related to fatty meals.  Patient describes to particularly after having food with white sauce and then again with queso with severe bloating cramping and diarrhea.  Patient status post EGD and colonoscopy negative for celiac was concerned this might be a wheat allergy though reports both times that had no wheat products.    Past Medical History:   Diagnosis Date   • Anemia    • Anxiety and depression    • Appendiceal carcinoid tumor    • Arthritis    • Asthma    • Breast nodule    • Cancer of appendix    • Coronary artery disease     nonobstructive    • COVID-19 virus infection 06/2021   • Dizziness    • Environmental allergies    • Fibromuscular hyperplasia of artery     in the carotid artery with no stenosis   • GERD (gastroesophageal reflux disease)    • Headache    • Hernia in stomach   • History of anesthesia complications 2017    lidocaine caused reaction during dental procedure per patient   • Hyperlipidemia    • Hypertension    • Hypothyroidism    • Impaired glucose metabolism    • Lumbar pain    • Myalgia    • Nodular goiter     last US 3/21/22 stable 2.5 cm left lobe  and  a 9 mm left lobe and a 10 mm mid right lobe. Sister with thyroid cancer   • LEATHA (obstructive sleep apnea) 04/03/2008    Overnight polysomnogram.  Weight 166 pounds.  Mild LEATHA with AHI 7.5 events per hour.  When supine, moderately abnormal at 15.1 events per hour.  Low oxygen saturation 78% and sleep-related hypoxia present for 24 minutes.   • Palpitations    • RBBB (right bundle branch block)    • Retinal detachment     left  //  when blood pressure gets high she notices  flashing lights in her vision   • Vertigo          Current Outpatient Medications:   •  albuterol sulfate  (90 Base) MCG/ACT inhaler, albuterol sulfate HFA 90 mcg/actuation aerosol inhaler  INHALE 2 PUFFS BY MOUTH 4 TIMES DAILY AS NEEDED, Disp: , Rfl:   •  ALPRAZolam (XANAX) 0.5 MG tablet, Take 1 tablet by mouth 4 (Four) Times a Day., Disp: , Rfl:   •  cetirizine (zyrTEC) 10 MG tablet, Take 1 tablet by mouth Daily., Disp: , Rfl:   •  Cholecalciferol (VITAMIN D3) 5000 UNITS capsule capsule, Take 1 capsule by mouth Daily., Disp: , Rfl:   •  clobetasol (TEMOVATE) 0.05 % cream, APPLY THIN LAYER TO AFFECTED AREAS TWICE A DAY, Disp: , Rfl:   •  Euthyrox 75 MCG tablet, 1 tablet daily from Monday to Saturdays only., Disp: 30 tablet, Rfl: 6  •  levothyroxine (SYNTHROID, LEVOTHROID) 75 MCG tablet, Take 1 tablet by mouth Daily. Daily except Sundays, Disp: 90 tablet, Rfl: 3  •  olmesartan (BENICAR) 40 MG tablet, Take 1 tablet by mouth Every Night., Disp: 90 tablet, Rfl: 3  •  Omega-3 Fatty Acids (OMEGA 3 PO), Take 1 tablet by mouth daily., Disp: , Rfl:   •  pantoprazole (PROTONIX) 40 MG EC tablet, Take 1 tablet by mouth 2 (Two) Times a Day. (Patient taking differently: Take 1 tablet by mouth As Needed.), Disp: 180 tablet, Rfl: 3  •  rosuvastatin (CRESTOR) 20 MG tablet, Take 1 tablet by mouth Every Night., Disp: 90 tablet, Rfl: 3  •  traZODone (DESYREL) 150 MG tablet, TAKE 1 TO 1 & 1/2 (ONE TO ONE & ONE-HALF) TABLETS BY MOUTH ONCE DAILY AS NEEDED AT BEDTIME, Disp: , Rfl:   •  vitamin B-12 (CYANOCOBALAMIN) 100 MCG tablet, Take 50 mcg by mouth Daily., Disp: , Rfl:     Allergies   Allergen Reactions   • Beta Adrenergic Blockers Other (See Comments)     Asthma     • Other Other (See Comments) and Dizziness     Septocaine   • Hctz [Hydrochlorothiazide] Other (See Comments)     dehydration, stone in salivary gland, hypokalemia   • Sulfa Antibiotics Hives   • Spironolactone GI Intolerance   • Sulfamethoxazole-Trimethoprim  Unknown (See Comments)   • Viibryd [Vilazodone Hcl] Anxiety and Dizziness       Social History     Socioeconomic History   • Marital status:    Tobacco Use   • Smoking status: Never     Passive exposure: Never   • Smokeless tobacco: Never   • Tobacco comments:     CAFFINE USE: 2 cups daily   Vaping Use   • Vaping Use: Never used   Substance and Sexual Activity   • Alcohol use: Yes     Alcohol/week: 5.0 standard drinks     Types: 5 Glasses of wine per week     Comment: Occasionally   • Drug use: No   • Sexual activity: Defer       Family History   Problem Relation Age of Onset   • Heart disease Mother    • Irritable bowel syndrome Mother    • Heart failure Father    • Heart disease Father    • Diabetes Sister    • Heart disease Brother    • Colon polyps Brother    • Breast cancer Neg Hx    • Ovarian cancer Neg Hx        Review of Systems   Constitutional: Negative.    Respiratory: Negative.    Cardiovascular: Negative.    Gastrointestinal: Positive for abdominal distention, abdominal pain and diarrhea. Negative for anal bleeding, blood in stool, constipation, nausea and rectal pain.   Musculoskeletal: Negative.    Skin: Negative.    Hematological: Negative.        Vitals:    05/04/23 1152   BP: 131/78   Pulse: 54   Temp: 96.1 °F (35.6 °C)   SpO2: 98%       Physical Exam  Vitals reviewed.   Constitutional:       Appearance: Normal appearance. She is well-developed.   HENT:      Head: Normocephalic and atraumatic.   Eyes:      General: No scleral icterus.     Pupils: Pupils are equal, round, and reactive to light.   Cardiovascular:      Rate and Rhythm: Normal rate and regular rhythm.      Heart sounds: Normal heart sounds.   Pulmonary:      Effort: Pulmonary effort is normal. No respiratory distress.      Breath sounds: Normal breath sounds.   Abdominal:      General: Bowel sounds are normal. There is no distension.      Palpations: Abdomen is soft.      Tenderness: There is no abdominal tenderness.   Skin:      General: Skin is warm and dry.      Coloration: Skin is not jaundiced.      Findings: No rash.   Neurological:      General: No focal deficit present.      Mental Status: She is alert and oriented to person, place, and time.      Cranial Nerves: No cranial nerve deficit.   Psychiatric:         Mood and Affect: Mood normal.         Behavior: Behavior normal.         Thought Content: Thought content normal.         Lab on 04/13/2023   Component Date Value Ref Range Status   • Free T4 04/13/2023 1.68  0.93 - 1.70 ng/dL Final   • TSH 04/13/2023 0.860  0.270 - 4.200 uIU/mL Final   • 25 Hydroxy, Vitamin D 04/13/2023 57.7  30.0 - 100.0 ng/ml Final   • Total Cholesterol 04/13/2023 152  0 - 200 mg/dL Final   • Triglycerides 04/13/2023 41  0 - 150 mg/dL Final   • HDL Cholesterol 04/13/2023 81 (H)  40 - 60 mg/dL Final   • VLDL Cholesterol Jean Marie 04/13/2023 10  5 - 40 mg/dL Final   • LDL Chol Calc ([facility]) 04/13/2023 61  0 - 100 mg/dL Final   • Glucose 04/13/2023 101 (H)  65 - 99 mg/dL Final   • BUN 04/13/2023 8  8 - 23 mg/dL Final   • Creatinine 04/13/2023 0.95  0.57 - 1.00 mg/dL Final   • EGFR Result 04/13/2023 66.6  >60.0 mL/min/1.73 Final   • BUN/Creatinine Ratio 04/13/2023 8.4  7.0 - 25.0 Final   • Sodium 04/13/2023 136  136 - 145 mmol/L Final   • Potassium 04/13/2023 4.3  3.5 - 5.2 mmol/L Final   • Chloride 04/13/2023 99  98 - 107 mmol/L Final   • Total CO2 04/13/2023 29.5 (H)  22.0 - 29.0 mmol/L Final   • Calcium 04/13/2023 9.9  8.6 - 10.5 mg/dL Final   • Total Protein 04/13/2023 6.8  6.0 - 8.5 g/dL Final   • Albumin 04/13/2023 4.5  3.5 - 5.2 g/dL Final   • Globulin 04/13/2023 2.3  gm/dL Final   • A/G Ratio 04/13/2023 2.0  g/dL Final   • Total Bilirubin 04/13/2023 0.3  0.0 - 1.2 mg/dL Final   • Alkaline Phosphatase 04/13/2023 59  39 - 117 U/L Final   • AST (SGOT) 04/13/2023 13  1 - 32 U/L Final   • ALT (SGPT) 04/13/2023 21  1 - 33 U/L Final   • Hemoglobin A1C 04/13/2023 5.60  4.80 - 5.60 % Final   • Interpretation 04/13/2023  Note   Final   Hospital Outpatient Visit on 03/23/2023   Component Date Value Ref Range Status   • Target HR (85%) 03/23/2023 132  bpm Final   • Max. Pred. HR (100%) 03/23/2023 155  bpm Final   • BH CV STRESS PROTOCOL 1 03/23/2023 Hi   Final   • Stage 1 03/23/2023 1   Final   • HR Stage 1 03/23/2023 108   Final   • BP Stage 1 03/23/2023 148/90   Final   • Duration Min Stage 1 03/23/2023 3   Final   • Duration Sec Stage 1 03/23/2023 0   Final   • Grade Stage 1 03/23/2023 10   Final   • Speed Stage 1 03/23/2023 1.7   Final   • BH CV STRESS METS STAGE 1 03/23/2023 5   Final   • Stage 2 03/23/2023 2   Final   • HR Stage 2 03/23/2023 141   Final   • BP Stage 2 03/23/2023 162/84   Final   • Duration Min Stage 2 03/23/2023 3   Final   • Duration Sec Stage 2 03/23/2023 0   Final   • Grade Stage 2 03/23/2023 12   Final   • Speed Stage 2 03/23/2023 2.5   Final   • BH CV STRESS METS STAGE 2 03/23/2023 7.5   Final   • Baseline HR 03/23/2023 70  bpm Final   • Baseline BP 03/23/2023 140/88  mmHg Final   • Peak HR 03/23/2023 141  bpm Final   • Percent Max Pred HR 03/23/2023 90.97  % Final   • Percent Target HR 03/23/2023 107  % Final   • Peak BP 03/23/2023 162/84  mmHg Final   • Recovery HR 03/23/2023 71  bpm Final   • Recovery BP 03/23/2023 156/72  mmHg Final   • Exercise duration (min) 03/23/2023 6  min Final   • Exercise duration (sec) 03/23/2023 0  sec Final   • Estimated workload 03/23/2023 7.5  METS Final       Diagnoses and all orders for this visit:    1. RUQ pain (Primary)  -     NM HIDA Scan With Pharmacological Intervention; Future      Patient 67-year-old female with history of hypertension, hyperlipidemia, coronary artery disease with recurrent abdominal pain and bloating treated for constipation now complaining of intermittent postprandial diarrhea.  Patient with no fever chills no bright red blood per rectum or melena.  Status post EGD and colonoscopy negative for celiac.  Patient seemingly episodes of diarrhea  associated with fatty meals including 1 meal with case of another with white sauce.  Patient status post normal gallbladder ultrasound concerned this might possibly be related biliary dyskinesia.  We will arrange HIDA scan for further recommendations.

## 2023-05-11 ENCOUNTER — TELEPHONE (OUTPATIENT)
Dept: CARDIOLOGY | Facility: CLINIC | Age: 68
End: 2023-05-11
Payer: MEDICARE

## 2023-05-11 ENCOUNTER — HOSPITAL ENCOUNTER (OUTPATIENT)
Dept: CARDIOLOGY | Facility: HOSPITAL | Age: 68
Discharge: HOME | End: 2023-05-11
Payer: MEDICARE

## 2023-05-11 DIAGNOSIS — M79.605 LEFT LEG PAIN: ICD-10-CM

## 2023-05-11 LAB
BH CV LOWER VASCULAR LEFT COMMON FEMORAL AUGMENT: NORMAL
BH CV LOWER VASCULAR LEFT COMMON FEMORAL COMPETENT: NORMAL
BH CV LOWER VASCULAR LEFT COMMON FEMORAL COMPRESS: NORMAL
BH CV LOWER VASCULAR LEFT COMMON FEMORAL PHASIC: NORMAL
BH CV LOWER VASCULAR LEFT COMMON FEMORAL SPONT: NORMAL
BH CV LOWER VASCULAR LEFT DISTAL FEMORAL COMPRESS: NORMAL
BH CV LOWER VASCULAR LEFT GASTRONEMIUS COMPRESS: NORMAL
BH CV LOWER VASCULAR LEFT GREATER SAPH AK COMPRESS: NORMAL
BH CV LOWER VASCULAR LEFT GREATER SAPH BK COMPRESS: NORMAL
BH CV LOWER VASCULAR LEFT LESSER SAPH COMPRESS: NORMAL
BH CV LOWER VASCULAR LEFT MID FEMORAL AUGMENT: NORMAL
BH CV LOWER VASCULAR LEFT MID FEMORAL COMPETENT: NORMAL
BH CV LOWER VASCULAR LEFT MID FEMORAL COMPRESS: NORMAL
BH CV LOWER VASCULAR LEFT MID FEMORAL PHASIC: NORMAL
BH CV LOWER VASCULAR LEFT MID FEMORAL SPONT: NORMAL
BH CV LOWER VASCULAR LEFT PERONEAL COMPRESS: NORMAL
BH CV LOWER VASCULAR LEFT POPLITEAL AUGMENT: NORMAL
BH CV LOWER VASCULAR LEFT POPLITEAL COMPETENT: NORMAL
BH CV LOWER VASCULAR LEFT POPLITEAL COMPRESS: NORMAL
BH CV LOWER VASCULAR LEFT POPLITEAL PHASIC: NORMAL
BH CV LOWER VASCULAR LEFT POPLITEAL SPONT: NORMAL
BH CV LOWER VASCULAR LEFT POSTERIOR TIBIAL COMPRESS: NORMAL
BH CV LOWER VASCULAR LEFT PROFUNDA FEMORAL COMPRESS: NORMAL
BH CV LOWER VASCULAR LEFT PROXIMAL FEMORAL COMPRESS: NORMAL
BH CV LOWER VASCULAR LEFT SAPHENOFEMORAL JUNCTION COMPRESS: NORMAL
BH CV LOWER VASCULAR RIGHT COMMON FEMORAL AUGMENT: NORMAL
BH CV LOWER VASCULAR RIGHT COMMON FEMORAL COMPETENT: NORMAL
BH CV LOWER VASCULAR RIGHT COMMON FEMORAL COMPRESS: NORMAL
BH CV LOWER VASCULAR RIGHT COMMON FEMORAL PHASIC: NORMAL
BH CV LOWER VASCULAR RIGHT COMMON FEMORAL SPONT: NORMAL
MAXIMAL PREDICTED HEART RATE: 155 BPM
STRESS TARGET HR: 132 BPM

## 2023-05-11 PROCEDURE — 93971 EXTREMITY STUDY: CPT

## 2023-05-11 NOTE — TELEPHONE ENCOUNTER
I spoke with Vesta Sutton and updated pt on results/recommendations from provider.  Pt verbalized understanding and has no further questions at this time.    She did mention to me that her HR has occasionally been hitting 101 and 102.  She acknowledges that she probably needs to hydrate better and wanted me to relay this information to you.    Thank you,    Chhaya Arreguin, RN  [place] [facility] [place]  05/11/23 16:11 EDT

## 2023-05-15 ENCOUNTER — HOSPITAL ENCOUNTER (OUTPATIENT)
Dept: CARDIOLOGY | Facility: HOSPITAL | Age: 68
Discharge: HOME | End: 2023-05-15
Admitting: INTERNAL MEDICINE

## 2023-05-15 VITALS
BODY MASS INDEX: 28.28 KG/M2 | SYSTOLIC BLOOD PRESSURE: 130 MMHG | DIASTOLIC BLOOD PRESSURE: 82 MMHG | HEIGHT: 66 IN | HEART RATE: 57 BPM | WEIGHT: 176 LBS

## 2023-05-15 DIAGNOSIS — Z13.6 SCREENING FOR CARDIOVASCULAR CONDITION: ICD-10-CM

## 2023-05-15 LAB
BH CV ECHO MEAS - DIST AO DIAM: 1.4 CM
BH CV VAS BP LEFT ARM: NORMAL MMHG
BH CV VAS BP RIGHT ARM: NORMAL MMHG
BH CV XLRA MEAS - MID AO DIAM: 1.77 CM
BH CV XLRA MEAS - PAD LEFT ABI DP: 1.37
BH CV XLRA MEAS - PAD LEFT ABI PT: 1.34
BH CV XLRA MEAS - PAD LEFT ARM: 129 MMHG
BH CV XLRA MEAS - PAD LEFT LEG DP: 178 MMHG
BH CV XLRA MEAS - PAD LEFT LEG PT: 174 MMHG
BH CV XLRA MEAS - PAD RIGHT ABI DP: 1.35
BH CV XLRA MEAS - PAD RIGHT ABI PT: 1.24
BH CV XLRA MEAS - PAD RIGHT ARM: 130 MMHG
BH CV XLRA MEAS - PAD RIGHT LEG DP: 176 MMHG
BH CV XLRA MEAS - PAD RIGHT LEG PT: 161 MMHG
BH CV XLRA MEAS - PROX AO DIAM: 1.94 CM
BH CV XLRA MEAS LEFT ICA/CCA RATIO: 0.96
BH CV XLRA MEAS LEFT MID CCA PSV: NORMAL CM/SEC
BH CV XLRA MEAS LEFT MID ICA PSV: NORMAL CM/SEC
BH CV XLRA MEAS LEFT PROX ECA PSV: NORMAL CM/SEC
BH CV XLRA MEAS RIGHT ICA/CCA RATIO: 1.17
BH CV XLRA MEAS RIGHT MID CCA PSV: NORMAL CM/SEC
BH CV XLRA MEAS RIGHT MID ICA PSV: NORMAL CM/SEC
BH CV XLRA MEAS RIGHT PROX ECA PSV: NORMAL CM/SEC
MAXIMAL PREDICTED HEART RATE: 155 BPM
STRESS TARGET HR: 132 BPM

## 2023-05-15 PROCEDURE — 93799 UNLISTED CV SVC/PROCEDURE: CPT

## 2023-05-16 ENCOUNTER — TELEPHONE (OUTPATIENT)
Dept: CARDIOLOGY | Facility: CLINIC | Age: 68
End: 2023-05-16
Payer: MEDICARE

## 2023-05-17 NOTE — TELEPHONE ENCOUNTER
Left VM of results and to call back with any further questions or concerns, allowed by verbal release form.     Janice Garcia RN  Triage [facility]

## 2023-05-25 ENCOUNTER — HOSPITAL ENCOUNTER (OUTPATIENT)
Dept: NUCLEAR MEDICINE | Facility: HOSPITAL | Age: 68
Discharge: HOME | End: 2023-05-25
Payer: MEDICARE

## 2023-05-25 DIAGNOSIS — R10.11 RUQ PAIN: ICD-10-CM

## 2023-05-25 PROCEDURE — 78227 HEPATOBIL SYST IMAGE W/DRUG: CPT

## 2023-05-25 PROCEDURE — 0 TECHNETIUM TC 99M MEBROFENIN KIT: Performed by: INTERNAL MEDICINE

## 2023-05-25 PROCEDURE — 25010000002 SINCALIDE PER 5 MCG: Performed by: INTERNAL MEDICINE

## 2023-05-25 PROCEDURE — A9537 TC99M MEBROFENIN: HCPCS | Performed by: INTERNAL MEDICINE

## 2023-05-25 RX ORDER — KIT FOR THE PREPARATION OF TECHNETIUM TC 99M MEBROFENIN 45 MG/10ML
1 INJECTION, POWDER, LYOPHILIZED, FOR SOLUTION INTRAVENOUS
Status: COMPLETED | OUTPATIENT
Start: 2023-05-25 | End: 2023-05-25

## 2023-05-25 RX ADMIN — MEBROFENIN 1 DOSE: 45 INJECTION, POWDER, LYOPHILIZED, FOR SOLUTION INTRAVENOUS at 08:38

## 2023-05-25 RX ADMIN — SODIUM CHLORIDE 1.6 MCG: 9 INJECTION, SOLUTION INTRAVENOUS at 09:58

## 2023-06-12 ENCOUNTER — TELEPHONE (OUTPATIENT)
Dept: GASTROENTEROLOGY | Facility: CLINIC | Age: 68
End: 2023-06-12
Payer: MEDICARE

## 2023-06-12 NOTE — TELEPHONE ENCOUNTER
Hub staff attempted to follow warm transfer process and was unsuccessful     Caller: Vesta Sutton    Relationship to patient: Self    Best call back number: [phone] (Mobile)      Patient is needing: PT IS CALLING TO SCHED A SOONER APPT THEN 9/7/23 DUE TO ISSUES SHE IS EXPERIENCING, BUT NO SOONER APPTS WERE AVAIL. SHE IS HAVING ISSUES WHERE AFTER SHE USES THE BATHROOM SHE STILL FEEL THE URGE TO GO EVEN WHEN URINATING. THIS HAS BEEN HAPPENING SINCE Friday. SHE HAD STOMACH PAIN, BUT IT GOES AWAY UNTIL SHE TRIES TO URINATE. PLEASE CALL HER ASAP.

## 2023-06-12 NOTE — TELEPHONE ENCOUNTER
Returned patient's phone call.     She states she at [employer] last Wednesday. Reports the next day she had issues with gas pain. Reports she took Simethicone which helped some with the gas.     She states she had constipation on Friday. She attempted manually to remove the stool.   She states she took an Dulcolax supp and after straining for BM she was able to pass a hard stool.   She reports after passing the BM she had rectal pain. States the rectal pain     Now reports lower abdominal pain and BM's whenever she urinates.     Advised patient to increase her fluid intake and to avoid high fiber foods until her bowels are regulated again.     Advised an update will be sent to Dr. Stahl. She verb understanding.

## 2023-06-14 NOTE — TELEPHONE ENCOUNTER
Patient called. No answer. Left message advising as per Dr. Stahl's note. Advised to call back for questions.

## 2023-06-14 NOTE — TELEPHONE ENCOUNTER
Per Dr. Stahl:   And to half a glass of MiraLAX daily to keep her from getting constipated again, okay to hold 24 hours if she gets diarrhea

## 2023-08-03 ENCOUNTER — OFFICE VISIT (OUTPATIENT)
Dept: CARDIOLOGY | Facility: CLINIC | Age: 68
End: 2023-08-03
Payer: MEDICARE

## 2023-08-03 VITALS
HEIGHT: 65 IN | SYSTOLIC BLOOD PRESSURE: 134 MMHG | DIASTOLIC BLOOD PRESSURE: 74 MMHG | HEART RATE: 67 BPM | BODY MASS INDEX: 30.32 KG/M2 | WEIGHT: 182 LBS

## 2023-08-03 DIAGNOSIS — G47.33 OSA ON CPAP: ICD-10-CM

## 2023-08-03 DIAGNOSIS — Z99.89 OSA ON CPAP: ICD-10-CM

## 2023-08-03 DIAGNOSIS — I10 PRIMARY HYPERTENSION: ICD-10-CM

## 2023-08-03 DIAGNOSIS — I45.2 RBBB (RIGHT BUNDLE BRANCH BLOCK WITH LEFT ANTERIOR FASCICULAR BLOCK): ICD-10-CM

## 2023-08-03 DIAGNOSIS — M51.36 DEGENERATION OF LUMBAR INTERVERTEBRAL DISC: ICD-10-CM

## 2023-08-03 DIAGNOSIS — E78.2 MIXED HYPERLIPIDEMIA: ICD-10-CM

## 2023-08-03 DIAGNOSIS — E03.9 HYPOTHYROIDISM, UNSPECIFIED TYPE: ICD-10-CM

## 2023-08-03 DIAGNOSIS — F41.1 GENERALIZED ANXIETY DISORDER: ICD-10-CM

## 2023-08-03 DIAGNOSIS — I25.10 CORONARY ARTERIOSCLEROSIS IN NATIVE ARTERY: Primary | ICD-10-CM

## 2023-08-14 ENCOUNTER — TRANSCRIBE ORDERS (OUTPATIENT)
Dept: ADMINISTRATIVE | Facility: HOSPITAL | Age: 68
End: 2023-08-14
Payer: MEDICARE

## 2023-08-14 DIAGNOSIS — N64.4 BREAST PAIN: Primary | ICD-10-CM

## 2023-08-14 DIAGNOSIS — N64.4 PAIN IN THE BREAST: Primary | ICD-10-CM

## 2023-08-28 RX ORDER — PANTOPRAZOLE SODIUM 40 MG/1
TABLET, DELAYED RELEASE ORAL
Qty: 180 TABLET | Refills: 3 | Status: SHIPPED | OUTPATIENT
Start: 2023-08-28

## 2023-09-07 ENCOUNTER — TRANSCRIBE ORDERS (OUTPATIENT)
Dept: PHYSICAL THERAPY | Facility: HOSPITAL | Age: 68
End: 2023-09-07
Payer: MEDICARE

## 2023-09-07 DIAGNOSIS — M25.552 HIP PAIN, BILATERAL: Primary | ICD-10-CM

## 2023-09-07 DIAGNOSIS — M25.551 HIP PAIN, BILATERAL: Primary | ICD-10-CM

## 2023-09-21 ENCOUNTER — HOSPITAL ENCOUNTER (OUTPATIENT)
Dept: PHYSICAL THERAPY | Facility: HOSPITAL | Age: 68
Discharge: HOME | End: 2023-09-21
Admitting: SPECIALIST
Payer: MEDICARE

## 2023-09-21 ENCOUNTER — APPOINTMENT (OUTPATIENT)
Dept: ULTRASOUND IMAGING | Facility: HOSPITAL | Age: 68
End: 2023-09-21
Payer: MEDICARE

## 2023-09-21 ENCOUNTER — HOSPITAL ENCOUNTER (OUTPATIENT)
Dept: MAMMOGRAPHY | Facility: HOSPITAL | Age: 68
Discharge: HOME | End: 2023-09-21
Payer: MEDICARE

## 2023-09-21 ENCOUNTER — HOSPITAL ENCOUNTER (OUTPATIENT)
Dept: ULTRASOUND IMAGING | Facility: HOSPITAL | Age: 68
Discharge: HOME | End: 2023-09-21

## 2023-09-21 DIAGNOSIS — N64.4 BREAST PAIN: ICD-10-CM

## 2023-09-21 DIAGNOSIS — M25.552 BILATERAL HIP PAIN: Primary | ICD-10-CM

## 2023-09-21 DIAGNOSIS — M25.551 BILATERAL HIP PAIN: Primary | ICD-10-CM

## 2023-09-21 DIAGNOSIS — N64.4 PAIN IN THE BREAST: ICD-10-CM

## 2023-09-21 PROCEDURE — G0279 TOMOSYNTHESIS, MAMMO: HCPCS

## 2023-09-21 PROCEDURE — 77066 DX MAMMO INCL CAD BI: CPT

## 2023-09-21 PROCEDURE — 97161 PT EVAL LOW COMPLEX 20 MIN: CPT

## 2023-09-21 PROCEDURE — 76642 ULTRASOUND BREAST LIMITED: CPT

## 2023-09-21 PROCEDURE — 97110 THERAPEUTIC EXERCISES: CPT

## 2023-09-21 NOTE — THERAPY EVALUATION
Outpatient Physical Therapy Ortho Initial Evaluation  [facility]     Patient Name: Vesta Sutton  : 1955  MRN: [id number]  Today's Date: 2023      Visit Date: 2023    Patient Active Problem List   Diagnosis    Hyperlipidemia    LEATHA on auto CPAP    Psychophysiological insomnia    Circadian rhythm sleep disorder, delayed sleep phase type    Major depressive disorder    Lumbar radiculopathy    Hypothyroidism    Generalized anxiety disorder    RBBB (right bundle branch block with left anterior fascicular block)    Vitamin D deficiency    Coronary arteriosclerosis in native artery    Aneurysm of splenic artery    Chronic constipation    Degeneration of lumbar intervertebral disc    Vitamin D deficiency    Hypertension    Edema of lower extremity    Hand eczema    Impaired glucose metabolism        Past Medical History:   Diagnosis Date    Anemia     Anxiety and depression     Appendiceal carcinoid tumor     Arthritis     Asthma     Breast nodule     Cancer of appendix     Coronary artery disease     nonobstructive     COVID-19 virus infection 2021    Dizziness     Environmental allergies     Fibromuscular hyperplasia of artery     in the carotid artery with no stenosis    GERD (gastroesophageal reflux disease)     Headache     Hernia in stomach    History of anesthesia complications     lidocaine caused reaction during dental procedure per patient    Hyperlipidemia     Hypertension     Hypothyroidism     Impaired glucose metabolism     Lumbar pain     Myalgia     Nodular goiter     last US 3/21/22 stable 2.5 cm left lobe  and  a 9 mm left lobe and a 10 mm mid right lobe. Sister with thyroid cancer    LEATHA (obstructive sleep apnea) 2008    Overnight polysomnogram.  Weight 166 pounds.  Mild LEATHA with AHI 7.5 events per hour.  When supine, moderately abnormal at 15.1 events per hour.  Low oxygen saturation 78% and sleep-related hypoxia present for 24 minutes.    Palpitations     RBBB  (right bundle branch block)     Retinal detachment     left  //  when blood pressure gets high she notices flashing lights in her vision    Vertigo         Past Surgical History:   Procedure Laterality Date    APPENDECTOMY      BREAST BIOPSY Left     2017    BREAST CYST ASPIRATION Left     2019    CATARACT EXTRACTION, BILATERAL       SECTION      COLONOSCOPY N/A 2019    Procedure: COLONOSCOPY to CECUM;  Surgeon: Damien Aleman MD;  Location: [facility] ENDOSCOPY;  Service: General    CORNEA LACERATION REPAIR      ENDOSCOPY N/A 2016    Z-line regular, 40 cm from the incisors, gastritis, erythematous duodenopathy    ENDOSCOPY N/A 2018    Normal exophagus, Erythematous mucosa in the antrum, Normal examined duodenum-Dr. Devyn Stahl    ENDOSCOPY N/A 2019    Procedure: ESOPHAGOGASTRODUODENOSCOPY with BX;  Surgeon: Damien Aleman MD;  Location: [facility] ENDOSCOPY;  Service: General    ENDOSCOPY AND COLONOSCOPY N/A 2014    EGD with biopsy and colonoscopy, Mild gastritis, Normal colon, Repeat Colonoscopy in 5 years, Dr. Damien Aleman    ENDOSCOPY AND COLONOSCOPY N/A 2008    EGD with biopsy and colonoscopy-Dr. Damien Aleman    UPPER GASTROINTESTINAL ENDOSCOPY  Cannot remember    URETEROCELE REPAIR      US GUIDED FINE NEEDLE ASPIRATION  2019       Visit Dx:     ICD-10-CM ICD-9-CM   1. Bilateral hip pain  M25.551 719.45    M25.552           Patient History       Row Name 23 1000             History    Chief Complaint Pain  -MEGAN      Type of Pain Hip pain  -MEGAN      Brief Description of Current Complaint Vesta Sutton is a 67 y.o. female who presents to physical therapy today with primary compliant of B hip pain (R>L). Patient reports her R hip pain initially began after she had a stress fracture of L foot in  and compensated her movements patterns. She received injection in R hip 2-3 weeks ago and feels benefit of injection is beginning to wear off. Hx or  LBP with intermittent episodes of RLE sciatica pain. Denies any N/T at this time. 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 Vesta Sutton reports difficulty/increased pain with laying on side (R hip pain), sleep disturbances (3-4x/night), bending forward to pick something up, squatting, getting in/out of vehicle and navigating stairs. She reports her pain is greatest in PM and when attempting to fall asleep. Pain relieving factors include tylenol, topical cream, laying on side with pillow between her knees. PMH includes HTN, fluctuation in HR, anxiety, depression, and pre-diabetes. Vesta Sutton primary goal for attending skilled physical therapy is to improve sleep quality.  -MEGAN      Previous treatment for THIS PROBLEM Injections  -MEGAN      Patient/Caregiver Goals Relieve pain;Return to prior level of function;Improve mobility;Improve strength;Know what to do to help the symptoms  -MEGAN      Occupation/sports/leisure activities pharmacist at [employer]  -MEGAN      Are you or can you be pregnant No  -MEGAN         Pain     Pain Location Hip  -MEGAN      Pain at Present 0  -MEGAN      Pain at Best 0  -MEGAN      Pain at Worst 6  -MEGAN      Pain Frequency Intermittent  -MEGAN      Pain Description Dull;Aching  -MEGAN      Is your sleep disturbed? Yes  -MEGAN         Fall Risk Assessment    Any falls in the past year: No  -MEGAN         Services    Prior Rehab/Home Health Experiences No  -MEGAN         Daily Activities    Primary Language English  -MEGAN      How does patient learn best? Listening;Reading;Demonstration;Pictures/Video  -MEGAN      Barriers to learning None  -MEGAN      Pt Participated in POC and Goals Yes  -MEGAN         Safety    Are you being hurt, hit, or frightened by anyone at home or in your life? No  -MEGAN      Are you being neglected by a caregiver No  -MEGAN      Have you had any of the following issues with Anxiety;Depression  -MEGAN                User Key  (r) = Recorded By, (t) = Taken By,  (c) = Cosigned By      Initials Name Provider Type    Ginette Zambrano, PT Physical Therapist                     PT Ortho       Row Name 09/21/23 1000       Posture/Observations    Alignment Options Iliac crests  -MEGAN    Iliac crests Right:;Elevated  -MEGAN       Quarter Clearing    Quarter Clearing Lower Quarter Clearing  -MEGAN       Neural Tension Signs- Lower Quarter Clearing    SLR Bilateral:;Negative  -MEGAN       Myotomal Screen- Lower Quarter Clearing    Hip flexion (L2) Bilateral:;4 (Good)  -MEGAN    Knee extension (L3) Bilateral:;4+ (Good +)  -MEGAN    Ankle DF (L4) Right:;4+ (Good +);Left:;5 (Normal)  -MEGAN    Knee flexion (S2) Bilateral:;4 (Good)  -MEGAN       Lumbar ROM Screen- Lower Quarter Clearing    Lumbar Flexion Impaired  25% dec  -MEGAN    Lumbar Extension Impaired  75% dec  -MEGAN    Lumbar Lateral Flexion Impaired  25% dec R, 50% dec L  -MEGAN    Lumbar Rotation Impaired  25% dec B  -MEGAN       SI/Hip Screen- Lower Quarter Clearing    Lashae's/Artur's test Bilateral:;Positive  -MEGAN    Posterior thigh sheer Bilateral:;Negative  -MEGAN       Special Tests/Palpation    Special Tests/Palpation Hip;Lumbar/SI  -MEGAN       Lumbosacral Accessory Motions    Lumbosacral Accessory Motions Tested? Yes  -MEGAN    PA Glide- L3 Right:;Left:;Hypomobile;Bilateral pain  -MEGAN    PA Glide- L4 Right:;Left:;Hypomobile;Bilateral pain  -MEGNA    PA Glide- L5 Right:;Left:;Hypomobile;Bilateral pain  -MEGAN       Hip/Thigh Palpation    Hip/Thigh Palpation? Yes  -MEGAN    Gluteus Medius Bilateral:;Tender;Guarded/taut;Trigger point  -MEGAN    Gluteus Minimus Bilateral:;Tender;Guarded/taut;Trigger point  -MEGAN    Piriformis Bilateral:;Tender;Guarded/taut;Trigger point  -MEGAN       Leg Length Test    True Unequal  R 90 cm, L 89.5 cm  -MEGAN       General ROM    RT Lower Ext Rt Hip Internal Rotation  -MEGAN       Right Lower Ext    Rt Hip Internal Rotation PROM 20  -MEGAN       MMT (Manual Muscle Testing)    Rt Lower Ext Rt Hip Extension;Rt Hip ABduction;Rt Hip Internal (Medial)  Rotation;Rt Hip External (Lateral) Rotation  -MEGAN    Lt Lower Ext Lt Hip Extension;Lt Hip ABduction;Lt Hip Internal (Medial) Rotation;Lt Hip External (Lateral) Rotation  -MEGAN       MMT Right Lower Ext    Rt Hip Extension MMT, Gross Movement (4-/5) good minus  -MEGAN    Rt Hip ABduction MMT, Gross Movement (3+/5) fair plus  -MEGAN    Rt Hip Internal (Medial) Rotation MMT, Gross Movement (4-/5) good minus  -MEGAN    Rt Hip External (Lateral) Rotation MMT, Gross Movement (4-/5) good minus  -MEGAN       MMT Left Lower Ext    Lt Hip Extension MMT, Gross Movement (4-/5) good minus  -MEGAN    Lt Hip ABduction MMT, Gross Movement (3+/5) fair plus  -MEGAN    Lt Hip Internal (Medial) Rotation MMT, Gross Movement (4-/5) good minus  -MEGAN    Lt Hip External (Lateral) Rotation MMT, Gross Movement (4-/5) good minus  -MEGAN       Sensation    Sensation WNL? WNL  -MEGAN       Flexibility    Flexibility Tested? Lower Extremity  -MEGAN       Lower Extremity Flexibility    Hamstrings Bilateral:;Mildly limited;Moderately limited  -MEGAN    Hip Flexors Bilateral:;Mildly limited  -MEGAN    Hip External Rotators Bilateral:;Mildly limited;Moderately limited  -MEGAN    Hip Internal Rotators Bilateral:;Mildly limited;Moderately limited  -MEGAN              User Key  (r) = Recorded By, (t) = Taken By, (c) = Cosigned By      Initials Name Provider Type    Ginette Zambrano, PT Physical Therapist                                Therapy Education  Education Details: Educated on anatomy/pathology, evaluation findings, therapy expectations, POC and HEP Access Code [email or web address]  Given: HEP, Symptoms/condition management, Pain management, Posture/body mechanics  Program: New  How Provided: Verbal, Demonstration, Written  Provided to: Patient  Level of Understanding: Verbalized, Demonstrated  08859 - PT Self Care/Mgmt Minutes: 5      PT OP Goals       Row Name 09/21/23 1000          PT Short Term Goals    STG Date to Achieve 10/21/23  -MEGAN     STG 1 Pt will be independent with initial HEP  to improve strength/ROM and decrease pain.  -     STG 1 Progress New  -     STG 2 Patient will improve ability to sleep through the night with ,/= 2-3 sleep disturbances/night related to back pain to improve overall sleep quality and quality of life  -     STG 2 Progress New  -MEGAN     STG 3 Patient will demonstrate proper log roll mechanics with little to no cues when getting on/off treatment table to demonstrate improved mechanics and decreased strain on lumbar spine.  -     STG 3 Progress New  [facility]        Long Term Goals    LTG Date to Achieve 11/20/23  -     LTG 1 Pt will be independent with advanced HEP to maintain strength/ROM and decrease pain.  -MEGAN     LTG 1 Progress New  -     LTG 2 Pt will improve B hip strength to at least 4/5.  -     LTG 2 Progress New  -MEGAN     LTG 3 Patient will improve ability to sleep through the night without sleep disturbances related to back pain to improve overall sleep quality and quality of life  -     LTG 3 Progress New  -     LTG 4 Pt will demonstrate appropriate body mechanics for bending/lifting tasks to improve tolerance to ADLs.  -     LTG 4 Progress New  [facility]        Time Calculation    PT Goal Re-Cert Due Date 12/20/23  -               User Key  (r) = Recorded By, (t) = Taken By, (c) = Cosigned By      Initials Name Provider Type    Ginette Zambrano, PT Physical Therapist                     PT Assessment/Plan       Row Name 09/21/23 1000          PT Assessment    Functional Limitations Limitation in home management;Limitations in community activities;Performance in leisure activities;Performance in self-care ADL  -     Impairments Impaired flexibility;Joint mobility;Muscle strength;Pain;Poor body mechanics;Range of motion;Posture;Impaired muscle length;Impaired muscle power;Impaired postural alignment  -     Assessment Comments Vesta Sutton is a 67 y.o. female referred to physical therapy for B hip pain (R>L). She presents with a stable  clinical presentation, along with  comorbidities of HTN, fluctuation in HR, anxiety, depression, and pre-diabetes and personal factors of new bed mattress leading to inc discomfort  that may impact her progress in the plan of care. Pt presents today with B hip weakness, TTP over B posterolateral hip girdle muscle tissue, dec R hip IR, hypomobility of lower lumbar spinal segments. Her signs and symptoms are consistent with referring diagnosis. The previous impairments limit her ability to performing functional/work related tasks without leading to pain and sleep without multiple disturbances. Patients LEFS of 92% ability where 100% indicates no disability. Pt will benefit from skilled PT to address the previous impairments and return to PLOF.  -MEGAN     Please refer to paper survey for additional self-reported information Yes  -MEGAN     Rehab Potential Good  -MEGAN     Patient/caregiver participated in establishment of treatment plan and goals Yes  -MEGAN     Patient would benefit from skilled therapy intervention Yes  -MEGAN        PT Plan    PT Frequency 2x/week  -MEGAN     Predicted Duration of Therapy Intervention (PT) 8-10 visits  -MEGAN     Planned CPT's? PT EVAL LOW COMPLEXITY: 00309;PT RE-EVAL: 97083;PT THER PROC EA 15 MIN: 08641;PT THER ACT EA 15 MIN: 86860;PT MANUAL THERAPY EA 15 MIN: 41078;PT NEUROMUSC RE-EDUCATION EA 15 MIN: 41999;PT GAIT TRAINING EA 15 MIN: 13685;PT SELF CARE/HOME MGMT/TRAIN EA 15: 85783;PT HOT OR COLD PACK TREAT MCARE;PT ELECTRICAL STIM UNATTEND:   -MEGAN     PT Plan Comments response to eval, warm up on nustep, consider PPT, brige, clam, SL hip abd, prone hip ext, STS with TA, lateral stepping, STM to R posterolateral hip girdle as needed  -MEGAN               User Key  (r) = Recorded By, (t) = Taken By, (c) = Cosigned By      Initials Name Provider Type    Ginette Zambrano, PT Physical Therapist                       OP Exercises       Row Name 09/21/23 1000             Subjective    Subjective  Comments eval  -MEGAN         Total Minutes    29059 - PT Therapeutic Exercise Minutes 10  -MEGAN         Exercise 1    Exercise Name 1 nustep  -MEGAN      Additional Comments next visit  -MEGAN         Exercise 2    Exercise Name 2 supine piriformis stretch  -MEGAN      Cueing 2 Verbal;Tactile  -MEGAN      Reps 2 3  -MEGAN      Time 2 20s  -MEGAN         Exercise 3    Exercise Name 3 supine fig 4 piriformis stretch  -MEGAN      Cueing 3 Verbal;Tactile  -MEGAN      Reps 3 3  -MEGAN      Time 3 20s  -MEGAN         Exercise 4    Exercise Name 4 seated hamstring stretch  -MEGAN      Cueing 4 Verbal;Demo  -MEGAN      Reps 4 3  -MEGAN      Time 4 20s  -MEGAN         Exercise 5    Exercise Name 5 log roll mechanics  -MEGAN      Time 5 3 mins  -MEGAN                User Key  (r) = Recorded By, (t) = Taken By, (c) = Cosigned By      Initials Name Provider Type    Ginette Zambrano, PT Physical Therapist                                  Outcome Measure Options: Lower Extremity Functional Scale (LEFS)  Lower Extremity Functional Index  Any of your usual work, housework or school activities: No difficulty  Your usual hobbies, recreational or sporting activities: No difficulty  Getting into or out of the bath: No difficulty  Walking between rooms: No difficulty  Putting on your shoes or socks: No difficulty  Squatting: No difficulty  Lifting an object, like a bag of groceries from the floor: No difficulty  Performing light activities around your home: No difficulty  Performing heavy activities around your home: A little bit of difficulty  Getting into or out of a car: Moderate difficulty  Walking 2 blocks: No difficulty  Walking a mile: No difficulty  Going up or down 10 stairs (about 1 flight of stairs): A little bit of difficulty  Standing for 1 hour: No difficulty  Sitting for 1 hour: No difficulty  Running on even ground: No difficulty  Running on uneven ground: No difficulty  Making sharp turns while running fast: No difficulty  Hopping: No difficulty  Rolling over  in bed: Moderate difficulty  Total: 74  Lower Extremity Functional Index  Any of your usual work, housework or school activities: No difficulty  Your usual hobbies, recreational or sporting activities: No difficulty  Getting into or out of the bath: No difficulty  Walking between rooms: No difficulty  Putting on your shoes or socks: No difficulty  Squatting: No difficulty  Lifting an object, like a bag of groceries from the floor: No difficulty  Performing light activities around your home: No difficulty  Performing heavy activities around your home: A little bit of difficulty  Getting into or out of a car: Moderate difficulty  Walking 2 blocks: No difficulty  Walking a mile: No difficulty  Going up or down 10 stairs (about 1 flight of stairs): A little bit of difficulty  Standing for 1 hour: No difficulty  Sitting for 1 hour: No difficulty  Running on even ground: No difficulty  Running on uneven ground: No difficulty  Making sharp turns while running fast: No difficulty  Hopping: No difficulty  Rolling over in bed: Moderate difficulty  Total: 74      Time Calculation:     Start Time: 1048  Stop Time: 1130  Time Calculation (min): 42 min  Timed Charges  84135 - PT Therapeutic Exercise Minutes: 10  85395 - PT Self Care/Mgmt Minutes: 5  Untimed Charges  PT Eval/Re-eval Minutes: 27  Total Minutes  Timed Charges Total Minutes: 15  Untimed Charges Total Minutes: 27   Total Minutes: 42     Therapy Charges for Today       Code Description Service Date Service Provider Modifiers Qty    33989537922 HC PT THER PROC EA 15 MIN 9/21/2023 Ginette Adamson, PT  1    47267752590 HC PT EVAL LOW COMPLEXITY 2 9/21/2023 Ginette Adamson, PT  1            PT G-Codes  Outcome Measure Options: Lower Extremity Functional Scale (LEFS)  Total: 74         Ginette Adamson, PT  9/21/2023

## 2023-09-25 ENCOUNTER — TELEPHONE (OUTPATIENT)
Dept: CARDIOLOGY | Facility: CLINIC | Age: 68
End: 2023-09-25
Payer: MEDICARE

## 2023-09-25 NOTE — TELEPHONE ENCOUNTER
Caller: Vesta Sutton    Relationship to patient: Self    Best call back number: [phone]    Chief complaint: HEART RATE FLUCTUATING    Type of visit: FOLLOW UP     Requested date: ASAP     If rescheduling, when is the original appointment: 10.26.23     Additional notes: PT IS CALLING TO SEE IF SHE CAN GET A SOONER APPT BECAUSE SHE HAS BEEN HAVING HER HEART RATE GO UP AND DOWN. SHE ALSO SAID SHE'S BEEN FEELING TIRED.

## 2023-09-26 ENCOUNTER — HOSPITAL ENCOUNTER (EMERGENCY)
Facility: HOSPITAL | Age: 68
Discharge: HOME | End: 2023-09-26
Attending: EMERGENCY MEDICINE | Admitting: EMERGENCY MEDICINE
Payer: MEDICARE

## 2023-09-26 ENCOUNTER — APPOINTMENT (OUTPATIENT)
Dept: CT IMAGING | Facility: HOSPITAL | Age: 68
End: 2023-09-26
Payer: MEDICARE

## 2023-09-26 ENCOUNTER — TELEPHONE (OUTPATIENT)
Dept: GASTROENTEROLOGY | Facility: CLINIC | Age: 68
End: 2023-09-26
Payer: MEDICARE

## 2023-09-26 VITALS
WEIGHT: 177.5 LBS | OXYGEN SATURATION: 98 % | SYSTOLIC BLOOD PRESSURE: 141 MMHG | RESPIRATION RATE: 18 BRPM | HEART RATE: 56 BPM | TEMPERATURE: 99.2 F | BODY MASS INDEX: 28.53 KG/M2 | HEIGHT: 66 IN | DIASTOLIC BLOOD PRESSURE: 85 MMHG

## 2023-09-26 DIAGNOSIS — J06.9 UPPER RESPIRATORY TRACT INFECTION, UNSPECIFIED TYPE: ICD-10-CM

## 2023-09-26 DIAGNOSIS — K52.9 ENTERITIS: Primary | ICD-10-CM

## 2023-09-26 LAB
ALBUMIN SERPL-MCNC: 4.1 G/DL (ref 3.5–5.2)
ALBUMIN/GLOB SERPL: 1.4 G/DL
ALP SERPL-CCNC: 67 U/L (ref 39–117)
ALT SERPL W P-5'-P-CCNC: 25 U/L (ref 1–33)
ANION GAP SERPL CALCULATED.3IONS-SCNC: 12 MMOL/L (ref 5–15)
AST SERPL-CCNC: 21 U/L (ref 1–32)
B PARAPERT DNA SPEC QL NAA+PROBE: NOT DETECTED
B PERT DNA SPEC QL NAA+PROBE: NOT DETECTED
BASOPHILS # BLD AUTO: 0.07 10*3/MM3 (ref 0–0.2)
BASOPHILS NFR BLD AUTO: 1.1 % (ref 0–1.5)
BILIRUB SERPL-MCNC: 0.4 MG/DL (ref 0–1.2)
BILIRUB UR QL STRIP: NEGATIVE
BUN SERPL-MCNC: 9 MG/DL (ref 8–23)
BUN/CREAT SERPL: 11.5 (ref 7–25)
C PNEUM DNA NPH QL NAA+NON-PROBE: NOT DETECTED
CALCIUM SPEC-SCNC: 9.5 MG/DL (ref 8.6–10.5)
CHLORIDE SERPL-SCNC: 99 MMOL/L (ref 98–107)
CLARITY UR: CLEAR
CO2 SERPL-SCNC: 21 MMOL/L (ref 22–29)
COLOR UR: YELLOW
CREAT SERPL-MCNC: 0.78 MG/DL (ref 0.57–1)
DEPRECATED RDW RBC AUTO: 42.3 FL (ref 37–54)
EGFRCR SERPLBLD CKD-EPI 2021: 84.4 ML/MIN/1.73
EOSINOPHIL # BLD AUTO: 0.04 10*3/MM3 (ref 0–0.4)
EOSINOPHIL NFR BLD AUTO: 0.6 % (ref 0.3–6.2)
ERYTHROCYTE [DISTWIDTH] IN BLOOD BY AUTOMATED COUNT: 12.1 % (ref 12.3–15.4)
FLUAV SUBTYP SPEC NAA+PROBE: NOT DETECTED
FLUBV RNA ISLT QL NAA+PROBE: NOT DETECTED
GLOBULIN UR ELPH-MCNC: 3 GM/DL
GLUCOSE SERPL-MCNC: 104 MG/DL (ref 65–99)
GLUCOSE UR STRIP-MCNC: NEGATIVE MG/DL
HADV DNA SPEC NAA+PROBE: NOT DETECTED
HCOV 229E RNA SPEC QL NAA+PROBE: NOT DETECTED
HCOV HKU1 RNA SPEC QL NAA+PROBE: NOT DETECTED
HCOV NL63 RNA SPEC QL NAA+PROBE: NOT DETECTED
HCOV OC43 RNA SPEC QL NAA+PROBE: NOT DETECTED
HCT VFR BLD AUTO: 40.2 % (ref 34–46.6)
HGB BLD-MCNC: 13.4 G/DL (ref 12–15.9)
HGB UR QL STRIP.AUTO: NEGATIVE
HMPV RNA NPH QL NAA+NON-PROBE: NOT DETECTED
HOLD SPECIMEN: NORMAL
HOLD SPECIMEN: NORMAL
HPIV1 RNA ISLT QL NAA+PROBE: NOT DETECTED
HPIV2 RNA SPEC QL NAA+PROBE: NOT DETECTED
HPIV3 RNA NPH QL NAA+PROBE: NOT DETECTED
HPIV4 P GENE NPH QL NAA+PROBE: NOT DETECTED
IMM GRANULOCYTES # BLD AUTO: 0.01 10*3/MM3 (ref 0–0.05)
IMM GRANULOCYTES NFR BLD AUTO: 0.2 % (ref 0–0.5)
KETONES UR QL STRIP: NEGATIVE
LEUKOCYTE ESTERASE UR QL STRIP.AUTO: NEGATIVE
LIPASE SERPL-CCNC: 26 U/L (ref 13–60)
LYMPHOCYTES # BLD AUTO: 0.95 10*3/MM3 (ref 0.7–3.1)
LYMPHOCYTES NFR BLD AUTO: 15.1 % (ref 19.6–45.3)
M PNEUMO IGG SER IA-ACNC: NOT DETECTED
MCH RBC QN AUTO: 32 PG (ref 26.6–33)
MCHC RBC AUTO-ENTMCNC: 33.3 G/DL (ref 31.5–35.7)
MCV RBC AUTO: 95.9 FL (ref 79–97)
MONOCYTES # BLD AUTO: 0.61 10*3/MM3 (ref 0.1–0.9)
MONOCYTES NFR BLD AUTO: 9.7 % (ref 5–12)
NEUTROPHILS NFR BLD AUTO: 4.62 10*3/MM3 (ref 1.7–7)
NEUTROPHILS NFR BLD AUTO: 73.3 % (ref 42.7–76)
NITRITE UR QL STRIP: NEGATIVE
NRBC BLD AUTO-RTO: 0 /100 WBC (ref 0–0.2)
PH UR STRIP.AUTO: 7 [PH] (ref 5–8)
PLATELET # BLD AUTO: 205 10*3/MM3 (ref 140–450)
PMV BLD AUTO: 9.5 FL (ref 6–12)
POTASSIUM SERPL-SCNC: 4.1 MMOL/L (ref 3.5–5.2)
PROT SERPL-MCNC: 7.1 G/DL (ref 6–8.5)
PROT UR QL STRIP: NEGATIVE
QT INTERVAL: 421 MS
QTC INTERVAL: 458 MS
RBC # BLD AUTO: 4.19 10*6/MM3 (ref 3.77–5.28)
RHINOVIRUS RNA SPEC NAA+PROBE: NOT DETECTED
RSV RNA NPH QL NAA+NON-PROBE: NOT DETECTED
SARS-COV-2 RNA NPH QL NAA+NON-PROBE: NOT DETECTED
SODIUM SERPL-SCNC: 132 MMOL/L (ref 136–145)
SP GR UR STRIP: 1.01 (ref 1–1.03)
TROPONIN T SERPL HS-MCNC: <6 NG/L
UROBILINOGEN UR QL STRIP: NORMAL
WBC NRBC COR # BLD: 6.3 10*3/MM3 (ref 3.4–10.8)
WHOLE BLOOD HOLD COAG: NORMAL
WHOLE BLOOD HOLD SPECIMEN: NORMAL

## 2023-09-26 PROCEDURE — 81003 URINALYSIS AUTO W/O SCOPE: CPT

## 2023-09-26 PROCEDURE — 36415 COLL VENOUS BLD VENIPUNCTURE: CPT

## 2023-09-26 PROCEDURE — 99285 EMERGENCY DEPT VISIT HI MDM: CPT

## 2023-09-26 PROCEDURE — 80053 COMPREHEN METABOLIC PANEL: CPT

## 2023-09-26 PROCEDURE — 83690 ASSAY OF LIPASE: CPT

## 2023-09-26 PROCEDURE — 84484 ASSAY OF TROPONIN QUANT: CPT

## 2023-09-26 PROCEDURE — 0202U NFCT DS 22 TRGT SARS-COV-2: CPT | Performed by: PHYSICIAN ASSISTANT

## 2023-09-26 PROCEDURE — 93005 ELECTROCARDIOGRAM TRACING: CPT

## 2023-09-26 PROCEDURE — 25510000001 IOPAMIDOL 61 % SOLUTION: Performed by: EMERGENCY MEDICINE

## 2023-09-26 PROCEDURE — 85025 COMPLETE CBC W/AUTO DIFF WBC: CPT

## 2023-09-26 PROCEDURE — 74177 CT ABD & PELVIS W/CONTRAST: CPT

## 2023-09-26 RX ORDER — SODIUM CHLORIDE 0.9 % (FLUSH) 0.9 %
10 SYRINGE (ML) INJECTION AS NEEDED
Status: DISCONTINUED | OUTPATIENT
Start: 2023-09-26 | End: 2023-09-26 | Stop reason: HOSPADM

## 2023-09-26 RX ADMIN — IOPAMIDOL 85 ML: 612 INJECTION, SOLUTION INTRAVENOUS at 16:43

## 2023-09-26 NOTE — ED PROVIDER NOTES
EMERGENCY DEPARTMENT ENCOUNTER    Room Number:  02/02  Date of encounter:  9/26/2023  PCP: Benjamín Morocho MD  Historian: Patient  Chronic or social conditions impacting care (social determinants of health): Nothing    HPI:  Chief Complaint: Nasal congestion, abdominal pain  A complete HPI/ROS/PMH/PSH/SH/FH are unobtainable due to: Nothing    Context: Vesta Sutton is a 67 y.o. female who presents to the ED c/o multiple complaints.  Patient states her symptoms started 4 days ago.  Initially she states she had nasal congestion, postnasal drainage.  Later in the day she developed some generalized abdominal pain, nausea.  The abdominal pain has been fairly constant however improving today.  There is no radiation of pain.  No precipitating or alleviating factors.  She attributed this to a fried meal and beer.  She has had nausea since.  Denies any vomiting, diarrhea.  She denies any overt chest pain or shortness of breath.    She did go to [facility] earlier today and was referred here for further evaluation.    Review of prior external notes (non-ED):   I reviewed cardiology office visit from 8/3/2023.  Patient being followed for right bundle branch block, hypertension, coronary artery disease.    Review of prior external test results outside of this encounter:  I reviewed a CMP from 4/13/2023.  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    Major depressive disorder 12/13/2018    Lumbar radiculopathy 04/24/2019    Hypothyroidism 12/13/2018    Generalized anxiety disorder 12/13/2018    RBBB (right bundle branch block with left anterior fascicular block) 07/29/2019    Vitamin D deficiency 03/19/2020    Coronary arteriosclerosis in native artery 07/23/2020    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    Chronic constipation 10/14/2019    Degeneration of lumbar intervertebral disc 12/30/2019    Vitamin D deficiency 06/25/2020    Hypertension 06/28/2021    Edema of lower extremity 04/28/2022    Hand eczema 03/07/2022    Impaired glucose metabolism 09/28/2022     Resolved Ambulatory Problems     Diagnosis Date Noted    Hypertension 09/22/2016    Insomnia due to medical condition - LEATHA 11/06/2016    Precordial pain 11/17/2016    Palpitations 09/18/2017    RUQ abdominal pain 05/10/2018    Nausea and vomiting 05/10/2018    Solitary thyroid nodule 04/24/2019    Carcinoid, of appendix 08/28/2019    Screen for colon cancer 08/28/2019    Chronic fatigue 09/15/2019    Overweight (BMI 25.0-29.9) 04/08/2021    Chest pain 12/29/2021    Alcohol abuse 06/28/2021    Other screening mammogram 04/08/2019    Abnormal computerized axial tomography of abdomen 12/16/2021    Dyspnea 01/24/2022    Elevated hemoglobin A1c 04/08/2019    Hx of screening mammography 04/08/2019    Hypoglycemia 10/11/2021    Long-term current use of anxiolytic medication 09/02/2021    Low back pain 12/13/2018    Pain due to varicose veins of lower extremity 02/25/2021    Pain in axilla 09/25/2020    Pain of right calf 06/10/2021    Positive D-dimer 06/10/2021    Seasonal allergic rhinitis 04/08/2019    Spontaneous ecchymosis 09/02/2021    Strain of neck muscle 07/08/2019    Varicose veins of lower extremity with inflammation 02/25/2021    Viral gastroenteritis 09/25/2020    Viral upper respiratory tract infection 01/24/2022    Weight gain 12/30/2019    Carcinoid, of appendix 08/28/2019    Fatigue 06/25/2020    Insomnia 06/25/2020    Overweight with body mass index (BMI) 25.0-29.9 06/28/2021    Abdominal pain 03/02/2020    Chest pain 01/05/2022    Screening for depression 06/28/2021    Encounter for hepatitis C screening test for low risk patient 11/18/2019    Screen for colon cancer 08/28/2019    Encounter for screening for malignant  neoplasm of colon 2021    Screening for other and unspecified cardiovascular conditions 2019    Grieving 2022    Hyperglycemia 2022     Past Medical History:   Diagnosis Date    Anemia     Anxiety and depression     Appendiceal carcinoid tumor     Arthritis     Asthma     Breast nodule     Cancer of appendix     Coronary artery disease     COVID-19 virus infection 2021    Dizziness     Environmental allergies     Fibromuscular hyperplasia of artery     GERD (gastroesophageal reflux disease)     Headache     Hernia in stomach    History of anesthesia complications 2017    Lumbar pain     Myalgia     Nodular goiter     LEATHA (obstructive sleep apnea) 2008    RBBB (right bundle branch block)     Retinal detachment     Vertigo          PAST SURGICAL HISTORY  Past Surgical History:   Procedure Laterality Date    APPENDECTOMY  2001    BREAST BIOPSY Left     2017    BREAST CYST ASPIRATION Left     2019    CATARACT EXTRACTION, BILATERAL       SECTION      COLONOSCOPY N/A 2019    Procedure: COLONOSCOPY to CECUM;  Surgeon: Damien Aleman MD;  [facility];  Service: General    CORNEA LACERATION REPAIR      ENDOSCOPY N/A 2016    Z-line regular, 40 cm from the incisors, gastritis, erythematous duodenopathy    ENDOSCOPY N/A 2018    Normal exophagus, Erythematous mucosa in the antrum, Normal examined duodenum-Dr. Devyn Stahl    ENDOSCOPY N/A 2019    Procedure: ESOPHAGOGASTRODUODENOSCOPY with BX;  Surgeon: Damien Aleman MD;  Location: [facility];  Service: General    ENDOSCOPY AND COLONOSCOPY N/A 2014    EGD with biopsy and colonoscopy, Mild gastritis, Normal colon, Repeat Colonoscopy in 5 years, Dr. Damien Aleman    ENDOSCOPY AND COLONOSCOPY N/A 2008    EGD with biopsy and colonoscopy-Dr. Damien Aleman    UPPER GASTROINTESTINAL ENDOSCOPY  Cannot remember    URETEROCELE REPAIR      US GUIDED FINE NEEDLE ASPIRATION   05/29/2019         FAMILY HISTORY  Family History   Problem Relation Age of Onset    Heart disease Mother     Irritable bowel syndrome Mother     Heart failure Father     Heart disease Father     Diabetes Sister     Heart disease Brother     Colon polyps Brother     Breast cancer Neg Hx     Ovarian cancer Neg Hx          SOCIAL HISTORY  Social History     Socioeconomic History    Marital status:    Tobacco Use    Smoking status: Never     Passive exposure: Never    Smokeless tobacco: Never    Tobacco comments:     CAFFINE USE: 2 cups daily   Vaping Use    Vaping Use: Never used   Substance and Sexual Activity    Alcohol use: Yes     Alcohol/week: 5.0 standard drinks     Types: 5 Glasses of wine per week     Comment: Occasionally    Drug use: No    Sexual activity: Defer         ALLERGIES  Beta adrenergic blockers, Other, Hctz [hydrochlorothiazide], Sulfa antibiotics, Spironolactone, Sulfamethoxazole-trimethoprim, and Viibryd [vilazodone hcl]        REVIEW OF SYSTEMS  All systems reviewed and negative except for those discussed in HPI.       PHYSICAL EXAM    I have reviewed the triage vital signs and nursing notes.    ED Triage Vitals   Temp Heart Rate Resp BP SpO2   09/26/23 1341 09/26/23 1341 09/26/23 1341 09/26/23 1348 09/26/23 1341   99.2 °F (37.3 °C) 93 16 135/86 94 %      Temp src Heart Rate Source Patient Position BP Location FiO2 (%)   09/26/23 1341 09/26/23 1525 09/26/23 1348 09/26/23 1348 --   Tympanic Monitor Sitting Left arm        Physical Exam  GENERAL: Alert, oriented, nontoxic-appearing, not distressed  HENT: head atraumatic, no nuchal rigidity  EYES: no scleral icterus, EOMI  CV: regular rhythm, regular rate, no murmur  RESPIRATORY: normal effort, CTA  ABDOMEN: soft, mild generalized abdominal tenderness without guarding or rebound.  MUSCULOSKELETAL: no deformity, FROM, no calf swelling or tenderness  NEURO: alert, moves all extremities, follows commands  SKIN: warm, dry        LAB  RESULTS  Recent Results (from the past 24 hour(s))   ECG 12 Lead ED Triage Standing Order; Abdominal Pain (Upper)    Collection Time: 09/26/23  1:57 PM   Result Value Ref Range    QT Interval 421 ms    QTC Interval 458 ms   Comprehensive Metabolic Panel    Collection Time: 09/26/23  2:04 PM    Specimen: Arm, Left; Blood   Result Value Ref Range    Glucose 104 (H) 65 - 99 mg/dL    BUN 9 8 - 23 mg/dL    Creatinine 0.78 0.57 - 1.00 mg/dL    Sodium 132 (L) 136 - 145 mmol/L    Potassium 4.1 3.5 - 5.2 mmol/L    Chloride 99 98 - 107 mmol/L    CO2 21.0 (L) 22.0 - 29.0 mmol/L    Calcium 9.5 8.6 - 10.5 mg/dL    Total Protein 7.1 6.0 - 8.5 g/dL    Albumin 4.1 3.5 - 5.2 g/dL    ALT (SGPT) 25 1 - 33 U/L    AST (SGOT) 21 1 - 32 U/L    Alkaline Phosphatase 67 39 - 117 U/L    Total Bilirubin 0.4 0.0 - 1.2 mg/dL    Globulin 3.0 gm/dL    A/G Ratio 1.4 g/dL    BUN/Creatinine Ratio 11.5 7.0 - 25.0    Anion Gap 12.0 5.0 - 15.0 mmol/L    eGFR 84.4 >60.0 mL/min/1.73   Lipase    Collection Time: 09/26/23  2:04 PM    Specimen: Arm, Left; Blood   Result Value Ref Range    Lipase 26 13 - 60 U/L   Single High Sensitivity Troponin T    Collection Time: 09/26/23  2:04 PM    Specimen: Arm, Left; Blood   Result Value Ref Range    HS Troponin T <6 <10 ng/L   Green Top (Gel)    Collection Time: 09/26/23  2:04 PM   Result Value Ref Range    Extra Tube Hold for add-ons.    Lavender Top    Collection Time: 09/26/23  2:04 PM   Result Value Ref Range    Extra Tube hold for add-on    Gold Top - SST    Collection Time: 09/26/23  2:04 PM   Result Value Ref Range    Extra Tube Hold for add-ons.    Light Blue Top    Collection Time: 09/26/23  2:04 PM   Result Value Ref Range    Extra Tube Hold for add-ons.    CBC Auto Differential    Collection Time: 09/26/23  2:04 PM    Specimen: Arm, Left; Blood   Result Value Ref Range    WBC 6.30 3.40 - 10.80 10*3/mm3    RBC 4.19 3.77 - 5.28 10*6/mm3    Hemoglobin 13.4 12.0 - 15.9 g/dL    Hematocrit 40.2 34.0 - 46.6 %     MCV 95.9 79.0 - 97.0 fL    MCH 32.0 26.6 - 33.0 pg    MCHC 33.3 31.5 - 35.7 g/dL    RDW 12.1 (L) 12.3 - 15.4 %    RDW-SD 42.3 37.0 - 54.0 fl    MPV 9.5 6.0 - 12.0 fL    Platelets 205 140 - 450 10*3/mm3    Neutrophil % 73.3 42.7 - 76.0 %    Lymphocyte % 15.1 (L) 19.6 - 45.3 %    Monocyte % 9.7 5.0 - 12.0 %    Eosinophil % 0.6 0.3 - 6.2 %    Basophil % 1.1 0.0 - 1.5 %    Immature Grans % 0.2 0.0 - 0.5 %    Neutrophils, Absolute 4.62 1.70 - 7.00 10*3/mm3    Lymphocytes, Absolute 0.95 0.70 - 3.10 10*3/mm3    Monocytes, Absolute 0.61 0.10 - 0.90 10*3/mm3    Eosinophils, Absolute 0.04 0.00 - 0.40 10*3/mm3    Basophils, Absolute 0.07 0.00 - 0.20 10*3/mm3    Immature Grans, Absolute 0.01 0.00 - 0.05 10*3/mm3    nRBC 0.0 0.0 - 0.2 /100 WBC   Urinalysis With Microscopic If Indicated (No Culture) - Urine, Clean Catch    Collection Time: 09/26/23  2:59 PM    Specimen: Urine, Clean Catch   Result Value Ref Range    Color, UA Yellow Yellow, Straw    Appearance, UA Clear Clear    pH, UA 7.0 5.0 - 8.0    Specific Gravity, UA 1.006 1.005 - 1.030    Glucose, UA Negative Negative    Ketones, UA Negative Negative    Bilirubin, UA Negative Negative    Blood, UA Negative Negative    Protein, UA Negative Negative    Leuk Esterase, UA Negative Negative    Nitrite, UA Negative Negative    Urobilinogen, UA 0.2 E.U./dL 0.2 - 1.0 E.U./dL   Respiratory Panel PCR w/COVID-19(SARS-CoV-2) ROSA/ALEXANDRA/BREN/PAD/COR/MAD/YNES In-House, NP Swab in UTM/VTM, 3-4 HR TAT - Swab, Nasopharynx    Collection Time: 09/26/23  3:30 PM    Specimen: Nasopharynx; Swab   Result Value Ref Range    ADENOVIRUS, PCR Not Detected Not Detected    Coronavirus 229E Not Detected Not Detected    Coronavirus HKU1 Not Detected Not Detected    Coronavirus NL63 Not Detected Not Detected    Coronavirus OC43 Not Detected Not Detected    COVID19 Not Detected Not Detected - Ref. Range    Human Metapneumovirus Not Detected Not Detected    Human Rhinovirus/Enterovirus Not Detected Not Detected     Influenza A PCR Not Detected Not Detected    Influenza B PCR Not Detected Not Detected    Parainfluenza Virus 1 Not Detected Not Detected    Parainfluenza Virus 2 Not Detected Not Detected    Parainfluenza Virus 3 Not Detected Not Detected    Parainfluenza Virus 4 Not Detected Not Detected    RSV, PCR Not Detected Not Detected    Bordetella pertussis pcr Not Detected Not Detected    Bordetella parapertussis PCR Not Detected Not Detected    Chlamydophila pneumoniae PCR Not Detected Not Detected    Mycoplasma pneumo by PCR Not Detected Not Detected       Ordered the above labs and independently reviewed the results.        RADIOLOGY  CT Abdomen Pelvis With Contrast    Result Date: 9/26/2023  CT ABDOMEN PELVIS W CONTRAST-  Radiation dose reduction techniques were utilized, including automated exposure control and exposure modulation based on body size.  Clinical: Abdominal pain and nausea  COMPARISON 12/2/2021  FINDINGS: 1. Few prominent loops of small bowel within the left upper quadrant demonstrate mild wall and fold thickening, suggesting enteritis. The stomach is largely collapsed, overall contour and appearance within normal limits. Duodenum and colon within normal limits. Postoperative change consistent with previous appendectomy. No free air or free intraperitoneal fluid.  2. Very subtle area of diminished attenuation within the pancreatic head, identical to 2021. No pancreatic ductal dilatation, suspect anatomic variation.  3. Tiny right adrenal adenoma measuring 10 mm as before. Small splenic artery aneurysm measuring 10 mm as before.  4. The liver, gallbladder, spleen and kidneys are normal. No calculus or obstructive uropathy, the urinary bladder is within normal limits. The uterus and ovaries are normal. The remainder is unremarkable.     This report was finalized on 9/26/2023 5:02 PM by Dr. Chato Montoya M.D.       I ordered the above noted radiological studies. Reviewed by me and discussed with  radiologist.  See dictation for official radiology interpretation.      MEDICATIONS GIVEN IN ER    Medications   iopamidol (ISOVUE-300) 61 % injection 100 mL (85 mL Intravenous Given by Other 9/26/23 1643)         ADDITIONAL ORDERS CONSIDERED BUT NOT ORDERED:  Nothing      PROGRESS, DATA ANALYSIS, CONSULTS, AND MEDICAL DECISION MAKING    All labs have been independently interpreted by myself.  All radiology studies have been independently interpreted by myself and discussed with radiologist dictating the report.   EKG's independently interpreted by myself.  Discussion below represents my analysis of pertinent findings related to patient's condition, differential diagnosis, treatment plan and final disposition.    I have discussed case with Dr. Zambrano, emergency room physician.  He has performed his own bedside examination and agrees with treatment plan.    ED Course as of 09/26/23 2333   Tue Sep 26, 2023   1547 Patient presents with 4-day history of generalized abdominal pain, nausea, nasal congestion, low-grade fever.  Patient denies any overt vomiting.  Suspect likely 2 separate issues.  Likely viral URI/allergic rhinitis, as well as abdominal issue.  Differential diagnoses include not limited to colitis, gastroenteritis, diverticulitis. [EE]   1551 Leukocytes, UA: Negative [EE]   1551 Creatinine: 0.78 [EE]   1551 WBC: 6.30 [EE]   1626 EKG personally interpreted by me.  My personal interpretation is:         EKG time: 1357  Rhythm/Rate: Sinus rhythm, rate 71  P waves and MS: Normal  QRS, axis: LAD, RBBB  ST and T waves: Inverted T waves in the anterior and inferior leads    Interpreted Contemporaneously by me, independently viewed  EKG is not significantly changed compared to prior EKG done on 8/3/2023   [WH]   1642 COVID19: Not Detected [EE]   1642 Influenza B PCR: Not Detected [EE]   1642 Influenza A PCR: Not Detected [EE]   1734 CT imaging of the abdomen independently interpreted myself shows evidence of  enteritis.  Other stable findings. [EE]   1740 Updated patient on work-up.  She appears nontoxic.  She has stable vitals.  No evidence of sepsis or surgical abdomen.  We will discharge. [EE]      ED Course User Index  [EE] Nicolas Sanon PA  [WH] Brian Zambrano MD       AS OF 23:33 EDT VITALS:    BP - 141/85  HR - 56  TEMP - 99.2 °F (37.3 °C) (Tympanic)  O2 SATS - 98%        DIAGNOSIS  Final diagnoses:   Enteritis   Upper respiratory tract infection, unspecified type         DISPOSITION  Discharged      Dictated utilizing Dragon dictation     Nicolas Sanon PA  09/26/23 9827

## 2023-09-26 NOTE — ED PROVIDER NOTES
MD ATTESTATION NOTE    The AURA and I have discussed this patient's history, physical exam, and treatment plan.  I have reviewed the documentation and personally had a face to face interaction with the patient. I affirm the documentation and agree with the treatment and plan.  The attached note describes my personal findings.      I provided a substantive portion of the care of the patient.  I personally performed the physical exam in its entirety, and below are my findings.      Brief HPI: Patient complains of congestion, nasal drainage, abdominal pain, and nausea.  Denies fever, vomiting, chest pain, or shortness of breath.    PHYSICAL EXAM  ED Triage Vitals   Temp Heart Rate Resp BP SpO2   09/26/23 1341 09/26/23 1341 09/26/23 1341 09/26/23 1348 09/26/23 1341   99.2 °F (37.3 °C) 93 16 135/86 94 %      Temp src Heart Rate Source Patient Position BP Location FiO2 (%)   09/26/23 1341 09/26/23 1525 09/26/23 1348 09/26/23 1348 --   Tympanic Monitor Sitting Left arm          GENERAL: Awake, alert, oriented x3.  Well-developed, well-nourished and nontoxic-appearing elderly female.  Resting comfortably in no acute distress  HENT: nares patent  EYES: no scleral icterus  CV: regular rhythm, normal rate  RESPIRATORY: normal effort, clear to auscultation bilaterally  ABDOMEN: soft, mild epigastric tenderness without rebound or guarding  MUSCULOSKELETAL: no deformity  NEURO: alert, moves all extremities, follows commands  PSYCH:  calm, cooperative  SKIN: warm, dry    Vital signs and nursing notes reviewed.        Plan: Obtain labs, EKG, and CT abdomen/pelvis    Labs are unremarkable.  CT shows evidence of enteritis.  Patient will be discharged.    ED Course as of 10/02/23 1113   Tue Sep 26, 2023   9176 Patient presents with 4-day history of generalized abdominal pain, nausea, nasal congestion, low-grade fever.  Patient denies any overt vomiting.  Suspect likely 2 separate issues.  Likely viral URI/allergic rhinitis, as well as  abdominal issue.  Differential diagnoses include not limited to colitis, gastroenteritis, diverticulitis. [EE]   1551 Leukocytes, UA: Negative [EE]   1551 Creatinine: 0.78 [EE]   1551 WBC: 6.30 [EE]   1626 EKG personally interpreted by me.  My personal interpretation is:         EKG time: 1357  Rhythm/Rate: Sinus rhythm, rate 71  P waves and NV: Normal  QRS, axis: LAD, RBBB  ST and T waves: Inverted T waves in the anterior and inferior leads    Interpreted Contemporaneously by me, independently viewed  EKG is not significantly changed compared to prior EKG done on 8/3/2023   []   1642 COVID19: Not Detected [EE]   1642 Influenza B PCR: Not Detected [EE]   1642 Influenza A PCR: Not Detected [EE]   1734 CT imaging of the abdomen independently interpreted myself shows evidence of enteritis.  Other stable findings. [EE]   1740 Updated patient on work-up.  She appears nontoxic.  She has stable vitals.  No evidence of sepsis or surgical abdomen.  We will discharge. [EE]      ED Course User Index  [EE] Nicolas Sanon, PA  [] Brian Zambrano MD Holland, William D, MD  09/26/23 1748       Brian Zambrano MD  10/02/23 1113

## 2023-09-26 NOTE — TELEPHONE ENCOUNTER
Caller: Vesta Sutton    Relationship to patient: Self    Best call back number: [phone]    Chief complaint: STOMACH PAIN, LOST OF APPETITE, AND BLOATING.    Type of visit: OFFICE VISIT    Requested date: AS SOON AS POSSIBLE     Additional notes:PT NEEDING FOLLOW UP - PAIN IN STOMACH SINCE 9/24, BLOATING, LOSS OF APPETITE. MAY BE ALLERGIC TO GLUTEN OR WHEAT. ON SATURDAY SHE HAD FRIED RICE AND BEER AND BECAME BLOATED WITH PAIN     PLEASE CALL PT TO SCHEDULE.

## 2023-09-26 NOTE — ED NOTES
Pt c/o runny nose, congestion, generalized abd pain/bloating that started over the weekend. Pt reports receiving flu shot yesterday. Pt was seen at urgent care today for same and sent here for further evaluation

## 2023-09-26 NOTE — TELEPHONE ENCOUNTER
Returned patient's phone call. She states she is currently at a [facility] [place] and they are sending her to the ER at [facility] [place] for an evaluation.     Update to Marissa.

## 2023-09-28 ENCOUNTER — HOSPITAL ENCOUNTER (OUTPATIENT)
Dept: PHYSICAL THERAPY | Facility: HOSPITAL | Age: 68
Setting detail: THERAPIES SERIES
Discharge: HOME | End: 2023-09-28
Payer: MEDICARE

## 2023-09-28 DIAGNOSIS — M25.552 BILATERAL HIP PAIN: Primary | ICD-10-CM

## 2023-09-28 DIAGNOSIS — M25.551 BILATERAL HIP PAIN: Primary | ICD-10-CM

## 2023-09-28 PROCEDURE — 97110 THERAPEUTIC EXERCISES: CPT

## 2023-09-28 NOTE — THERAPY TREATMENT NOTE
Outpatient Physical Therapy Ortho Treatment Note  [facility]     Patient Name: Vesta Sutton  : 1955  MRN: [id number]  Today's Date: 2023      Visit Date: 2023    Visit Dx:    ICD-10-CM ICD-9-CM   1. Bilateral hip pain  M25.551 719.45    M25.552        Patient Active Problem List   Diagnosis    Hyperlipidemia    LEATHA on auto CPAP    Psychophysiological insomnia    Circadian rhythm sleep disorder, delayed sleep phase type    Major depressive disorder    Lumbar radiculopathy    Hypothyroidism    Generalized anxiety disorder    RBBB (right bundle branch block with left anterior fascicular block)    Vitamin D deficiency    Coronary arteriosclerosis in native artery    Aneurysm of splenic artery    Chronic constipation    Degeneration of lumbar intervertebral disc    Vitamin D deficiency    Hypertension    Edema of lower extremity    Hand eczema    Impaired glucose metabolism        Past Medical History:   Diagnosis Date    Anemia     Anxiety and depression     Appendiceal carcinoid tumor     Arthritis     Asthma     Breast nodule     Cancer of appendix     Coronary artery disease     nonobstructive     COVID-19 virus infection 2021    Dizziness     Environmental allergies     Fibromuscular hyperplasia of artery     in the carotid artery with no stenosis    GERD (gastroesophageal reflux disease)     Headache     Hernia in stomach    History of anesthesia complications     lidocaine caused reaction during dental procedure per patient    Hyperlipidemia     Hypertension     Hypothyroidism     Impaired glucose metabolism     Lumbar pain     Myalgia     Nodular goiter     last US 3/21/22 stable 2.5 cm left lobe  and  a 9 mm left lobe and a 10 mm mid right lobe. Sister with thyroid cancer    LEATHA (obstructive sleep apnea) 2008    Overnight polysomnogram.  Weight 166 pounds.  Mild LEATHA with AHI 7.5 events per hour.  When supine, moderately abnormal at 15.1 events per hour.  Low oxygen  saturation 78% and sleep-related hypoxia present for 24 minutes.    Palpitations     RBBB (right bundle branch block)     Retinal detachment     left  //  when blood pressure gets high she notices flashing lights in her vision    Vertigo         Past Surgical History:   Procedure Laterality Date    APPENDECTOMY      BREAST BIOPSY Left     2017    BREAST CYST ASPIRATION Left     2019    CATARACT EXTRACTION, BILATERAL       SECTION      COLONOSCOPY N/A 2019    Procedure: COLONOSCOPY to CECUM;  Surgeon: Damien Aleman MD;  Location: [facility] ENDOSCOPY;  Service: General    CORNEA LACERATION REPAIR      ENDOSCOPY N/A 2016    Z-line regular, 40 cm from the incisors, gastritis, erythematous duodenopathy    ENDOSCOPY N/A 2018    Normal exophagus, Erythematous mucosa in the antrum, Normal examined duodenum-Dr. Devyn Stahl    ENDOSCOPY N/A 2019    Procedure: ESOPHAGOGASTRODUODENOSCOPY with BX;  Surgeon: Damien Aleman MD;  Location: [facility] ENDOSCOPY;  Service: General    ENDOSCOPY AND COLONOSCOPY N/A 2014    EGD with biopsy and colonoscopy, Mild gastritis, Normal colon, Repeat Colonoscopy in 5 years, Dr. Damien Aleman    ENDOSCOPY AND COLONOSCOPY N/A 2008    EGD with biopsy and colonoscopy-Dr. Damien Aleman    UPPER GASTROINTESTINAL ENDOSCOPY  Cannot remember    URETEROCELE REPAIR      US GUIDED FINE NEEDLE ASPIRATION  2019        PT Ortho       Row Name 23 1100       Vital Signs    Pre Systolic BP Rehab 132  -MEGAN    Pre Treatment Diastolic BP 72  -MEGAN    Pretreatment Heart Rate (beats/min) 62  -MEGAN    Pre SpO2 (%) 98  -MEGAN              User Key  (r) = Recorded By, (t) = Taken By, (c) = Cosigned By      Initials Name Provider Type    Ginette Zambrano, PT Physical Therapist                                 PT Assessment/Plan       Row Name 23 1400          PT Assessment    Assessment Comments Ms. Sutton arrives to PT for first follow up for B  hip pain with reports of limited HEP compliance due to recent illness causing her to see ED assessment. She was experiencing abdominal pain that has since resolved and irregular HR/BP. Vitals WNL at start of session and encouraged patient to contact cardiologist for further work up due to cardiac medical history. Patient verbalized understanding. Reviewed initial HEP with frequent cues to ensure patient she was performing exercise/stretch correctly. Added PPT, bridge and HL clam with good tolerance. HEP not updated at this time. Ms. Sutton remains a candidate for skilled PT.  -MEGAN        PT Plan    PT Plan Comments review previously performed exercises, if good response update HEP, patient requires frequent cues to ensure she is performing correctly, consider adding SL hip abd/ext, STS with TA, lateral stepping and STM to R hip girdle  -MEGAN               User Key  (r) = Recorded By, (t) = Taken By, (c) = Cosigned By      Initials Name Provider Type    Ginette Zmabrano, PT Physical Therapist                       OP Exercises       Row Name 09/28/23 1100             Subjective    Subjective Comments I had to go to ED because I was having abdominal pain and my heart was racing. I did not do my HEP because I was not feeling well  -MEGAN         Total Minutes    25157 - PT Therapeutic Exercise Minutes 40  -MEGAN         Exercise 1    Exercise Name 1 nustep  -MEGAN      Time 1 5 mins  -MEGAN      Additional Comments L5  -MEGAN         Exercise 2    Exercise Name 2 supine piriformis stretch  -MEGAN      Cueing 2 Verbal;Tactile  -MEGAN      Reps 2 3  -MEGAN      Time 2 20s  -MEGAN         Exercise 3    Exercise Name 3 supine fig 4 piriformis stretch  -MEGAN      Cueing 3 Verbal;Tactile  -MEGAN      Reps 3 3  -MEGAN      Time 3 20s  -MEGAN         Exercise 4    Exercise Name 4 seated hamstring stretch  -MEGAN      Cueing 4 Verbal;Demo  -MEGAN      Reps 4 3  -MEGAN      Time 4 20s  -MEGAN         Exercise 5    Exercise Name 5 supine PPT  -MEGAN      Cueing 5  Verbal;Tactile  -MEGAN      Sets 5 1  -MEGAN      Reps 5 15  -MEGAN      Time 5 5s  -MEGAN         Exercise 6    Exercise Name 6 glute bridge  -MEGAN      Cueing 6 Verbal;Tactile  -MEGAN      Sets 6 2  -MEGAN      Reps 6 10  -MEGAN         Exercise 7    Exercise Name 7 HL hip abd  -MEGAN      Cueing 7 Verbal;Tactile  -MEGAN      Sets 7 1  -MEGAN      Reps 7 10e  -MEGAN      Time 7 RTB  -MEGAN      Additional Comments B, Uni  -MEGAN                User Key  (r) = Recorded By, (t) = Taken By, (c) = Cosigned By      Initials Name Provider Type    Ginette Zambrano, PT Physical Therapist                                  PT OP Goals       Row Name 09/28/23 1100          PT Short Term Goals    STG Date to Achieve 10/21/23  -MEGAN     STG 1 Pt will be independent with initial HEP to improve strength/ROM and decrease pain.  -MEGAN     STG 1 Progress Ongoing  -MEGAN     STG 2 Patient will improve ability to sleep through the night with ,/= 2-3 sleep disturbances/night related to back pain to improve overall sleep quality and quality of life  -MEGAN     STG 2 Progress Ongoing  -MEGAN     STG 3 Patient will demonstrate proper log roll mechanics with little to no cues when getting on/off treatment table to demonstrate improved mechanics and decreased strain on lumbar spine.  -MEGAN     STG 3 Progress Ongoing  -MEGAN        Long Term Goals    LTG Date to Achieve 11/20/23  -MEGAN     LTG 1 Pt will be independent with advanced HEP to maintain strength/ROM and decrease pain.  -MEGAN     LTG 1 Progress Ongoing  -MEGAN     LTG 2 Pt will improve B hip strength to at least 4/5.  -MEGAN     LTG 2 Progress Ongoing  -MEGAN     LTG 3 Patient will improve ability to sleep through the night without sleep disturbances related to back pain to improve overall sleep quality and quality of life  -MEGAN     LTG 3 Progress Ongoing  -MEGAN     LTG 4 Pt will demonstrate appropriate body mechanics for bending/lifting tasks to improve tolerance to ADLs.  -MEGAN     LTG 4 Progress Ongoing  -MEGAN               User Key  (r) =  Recorded By, (t) = Taken By, (c) = Cosigned By      Initials Name Provider Type    Ginette Zambrano, PT Physical Therapist                    Therapy Education  Given: HEP  Program: Reinforced  How Provided: Verbal, Demonstration, Written  Provided to: Patient  Level of Understanding: Verbalized, Demonstrated              Time Calculation:   Start Time: 1130  Stop Time: 1210  Time Calculation (min): 40 min  Timed Charges  89408 - PT Therapeutic Exercise Minutes: 40  Total Minutes  Timed Charges Total Minutes: 40   Total Minutes: 40  Therapy Charges for Today       Code Description Service Date Service Provider Modifiers Qty    [id number]  PT THER PROC EA 15 MIN 9/28/2023 Ginette Adamson, PT GP 3                      Ginette Adamson, PT  9/28/2023

## 2023-09-28 NOTE — TELEPHONE ENCOUNTER
PT CALLED BACK, WANTS TO DISCUSS A SOONER APPT. STILL HAVING ISSUES WITH [facility]. NO LONGER IN ED.

## 2023-10-02 ENCOUNTER — HOSPITAL ENCOUNTER (OUTPATIENT)
Dept: PHYSICAL THERAPY | Facility: HOSPITAL | Age: 68
Setting detail: THERAPIES SERIES
Discharge: HOME | End: 2023-10-02
Payer: MEDICARE

## 2023-10-02 DIAGNOSIS — M25.551 BILATERAL HIP PAIN: Primary | ICD-10-CM

## 2023-10-02 DIAGNOSIS — M25.552 BILATERAL HIP PAIN: Primary | ICD-10-CM

## 2023-10-02 PROCEDURE — 97110 THERAPEUTIC EXERCISES: CPT

## 2023-10-02 NOTE — THERAPY TREATMENT NOTE
Outpatient Physical Therapy Ortho Treatment Note  [facility]     Patient Name: Vesta Sutton  : 1955  MRN: [id number]  Today's Date: 10/2/2023      Visit Date: 10/02/2023    Visit Dx:    ICD-10-CM ICD-9-CM   1. Bilateral hip pain  M25.551 719.45    M25.552        Patient Active Problem List   Diagnosis    Hyperlipidemia    LEATHA on auto CPAP    Psychophysiological insomnia    Circadian rhythm sleep disorder, delayed sleep phase type    Major depressive disorder    Lumbar radiculopathy    Hypothyroidism    Generalized anxiety disorder    RBBB (right bundle branch block with left anterior fascicular block)    Vitamin D deficiency    Coronary arteriosclerosis in native artery    Aneurysm of splenic artery    Chronic constipation    Degeneration of lumbar intervertebral disc    Vitamin D deficiency    Hypertension    Edema of lower extremity    Hand eczema    Impaired glucose metabolism        Past Medical History:   Diagnosis Date    Anemia     Anxiety and depression     Appendiceal carcinoid tumor     Arthritis     Asthma     Breast nodule     Cancer of appendix     Coronary artery disease     nonobstructive     COVID-19 virus infection 2021    Dizziness     Environmental allergies     Fibromuscular hyperplasia of artery     in the carotid artery with no stenosis    GERD (gastroesophageal reflux disease)     Headache     Hernia in stomach    History of anesthesia complications     lidocaine caused reaction during dental procedure per patient    Hyperlipidemia     Hypertension     Hypothyroidism     Impaired glucose metabolism     Lumbar pain     Myalgia     Nodular goiter     last US 3/21/22 stable 2.5 cm left lobe  and  a 9 mm left lobe and a 10 mm mid right lobe. Sister with thyroid cancer    LEATHA (obstructive sleep apnea) 2008    Overnight polysomnogram.  Weight 166 pounds.  Mild LEATHA with AHI 7.5 events per hour.  When supine, moderately abnormal at 15.1 events per hour.  Low oxygen  saturation 78% and sleep-related hypoxia present for 24 minutes.    Palpitations     RBBB (right bundle branch block)     Retinal detachment     left  //  when blood pressure gets high she notices flashing lights in her vision    Vertigo         Past Surgical History:   Procedure Laterality Date    APPENDECTOMY      BREAST BIOPSY Left     2017    BREAST CYST ASPIRATION Left     2019    CATARACT EXTRACTION, BILATERAL       SECTION      COLONOSCOPY N/A 2019    Procedure: COLONOSCOPY to CECUM;  Surgeon: Damien Aleman MD;  Location: [facility] ENDOSCOPY;  Service: General    CORNEA LACERATION REPAIR      ENDOSCOPY N/A 2016    Z-line regular, 40 cm from the incisors, gastritis, erythematous duodenopathy    ENDOSCOPY N/A 2018    Normal exophagus, Erythematous mucosa in the antrum, Normal examined duodenum-Dr. Devyn Stahl    ENDOSCOPY N/A 2019    Procedure: ESOPHAGOGASTRODUODENOSCOPY with BX;  Surgeon: Damien Aleman MD;  Location: [facility] ENDOSCOPY;  Service: General    ENDOSCOPY AND COLONOSCOPY N/A 2014    EGD with biopsy and colonoscopy, Mild gastritis, Normal colon, Repeat Colonoscopy in 5 years, Dr. Damien Aleman    ENDOSCOPY AND COLONOSCOPY N/A 2008    EGD with biopsy and colonoscopy-Dr. Damien Aleman    UPPER GASTROINTESTINAL ENDOSCOPY  Cannot remember    URETEROCELE REPAIR      US GUIDED FINE NEEDLE ASPIRATION  2019                        PT Assessment/Plan       Row Name 10/02/23 1100          PT Assessment    Assessment Comments Pt arrives with reports of no hip pain right now. Reviewed HEP prior to beginning new exs due to pt unsure if her form is correct. Added glute sets, prone hip extension, clamshells, and side steps at todays visit. Pt demonstrated apprehension with injuring lumar region with hip extension; discussed hip strengthening to assist with preventing LBP. Updated HEP with exercises performed with good form and tolerated well. Will  reasses next visit. She will continue to benefit from skilled PT at this time.  -DR        PT Plan    PT Plan Comments assess how pt felt with adding new exs last visit + compliance with added HEP; consider adding  monster walks, STS with TA, hip add squeeze.  -DR               User Key  (r) = Recorded By, (t) = Taken By, (c) = Cosigned By      Initials Name Provider Type    Gómez Locke, PT Physical Therapist                       OP Exercises       Row Name 10/02/23 1100             Subjective    Subjective Comments I have been doing my exercises but I am not sure i am doing them right. I have no pain right now.  -DR         Total Minutes    08831 - PT Therapeutic Exercise Minutes 43  -DR         Exercise 1    Exercise Name 1 nustep  -DR      Time 1 5 mins  -DR      Additional Comments L5  -DR         Exercise 2    Exercise Name 2 supine piriformis stretch  -DR      Cueing 2 Verbal;Tactile  -DR      Reps 2 3  -DR      Time 2 20s  -DR         Exercise 3    Exercise Name 3 supine fig 4 piriformis stretch  -DR      Cueing 3 Verbal;Tactile  -DR      Reps 3 3  -DR      Time 3 20s  -DR         Exercise 4    Exercise Name 4 seated hamstring stretch  -DR      Cueing 4 Verbal;Demo  -DR      Reps 4 3  -DR      Time 4 20s  -DR         Exercise 5    Exercise Name 5 supine PPT  -DR      Cueing 5 Verbal;Tactile  -DR      Sets 5 1  -DR      Reps 5 15  -DR      Time 5 5s  -DR         Exercise 6    Exercise Name 6 glute bridge  -DR      Cueing 6 Verbal;Tactile  -DR      Sets 6 2  -DR      Reps 6 10  -DR      Additional Comments small range with PPT  -DR         Exercise 8    Exercise Name 8 glute sets  -DR      Cueing 8 Verbal;Demo  -DR      Sets 8 2  -DR      Reps 8 10  -DR      Time 8 5s  -DR         Exercise 9    Exercise Name 9 clamshells  -DR      Cueing 9 Verbal;Demo  -DR      Sets 9 2  -DR      Reps 9 10  -DR         Exercise 10    Exercise Name 10 prone hip ext  -DR      Cueing 10 Verbal;Demo  -DR      Sets 10 1   -      Reps 10 10e  -DR      Time 10 needs cueing for glute act.  -         Exercise 11    Exercise Name 11 side steps  -      Cueing 11 Verbal;Demo  -      Sets 11 3 laps  -      Reps 11 YTB  -         Exercise 12    Exercise Name 12 --  -                User Key  (r) = Recorded By, (t) = Taken By, (c) = Cosigned By      Initials Name Provider Type    Gómez Locke, PT Physical Therapist                                  PT OP Goals       Row Name 10/02/23 1100          PT Short Term Goals    STG Date to Achieve 10/21/23  -     STG 1 Pt will be independent with initial HEP to improve strength/ROM and decrease pain.  -     STG 1 Progress Ongoing  -     STG 2 Patient will improve ability to sleep through the night with ,/= 2-3 sleep disturbances/night related to back pain to improve overall sleep quality and quality of life  -DR     STG 2 Progress Ongoing  -DR     STG 3 Patient will demonstrate proper log roll mechanics with little to no cues when getting on/off treatment table to demonstrate improved mechanics and decreased strain on lumbar spine.  -     STG 3 Progress Ongoing  -DR        Long Term Goals    LTG Date to Achieve 11/20/23  -     LTG 1 Pt will be independent with advanced HEP to maintain strength/ROM and decrease pain.  -     LTG 1 Progress Ongoing  -     LTG 2 Pt will improve B hip strength to at least 4/5.  -     LTG 2 Progress Ongoing  -     LTG 3 Patient will improve ability to sleep through the night without sleep disturbances related to back pain to improve overall sleep quality and quality of life  -     LTG 3 Progress Ongoing  -     LTG 4 Pt will demonstrate appropriate body mechanics for bending/lifting tasks to improve tolerance to ADLs.  -     LTG 4 Progress Ongoing  -               User Key  (r) = Recorded By, (t) = Taken By, (c) = Cosigned By      Initials Name Provider Type    Gómez Locke, PT Physical Therapist                    Therapy  Education  Education Details: updated HEP  Given: HEP, Mobility training  Program: Reinforced, Progressed  How Provided: Verbal, Demonstration, Written  Provided to: Patient  Level of Understanding: Demonstrated, Verbalized, Teach back education performed              Time Calculation:   Start Time: 1117  Stop Time: 1200  Time Calculation (min): 43 min  Timed Charges  05441 - PT Therapeutic Exercise Minutes: 43  Total Minutes  Timed Charges Total Minutes: 43   Total Minutes: 43  Therapy Charges for Today       Code Description Service Date Service Provider Modifiers Qty    97410264335 HC PT THER PROC EA 15 MIN 10/2/2023 Gómez Walton, PT GP 3                      Gómez Walton, PT  10/2/2023

## 2023-10-05 ENCOUNTER — HOSPITAL ENCOUNTER (OUTPATIENT)
Dept: PHYSICAL THERAPY | Facility: HOSPITAL | Age: 68
Setting detail: THERAPIES SERIES
Discharge: HOME | End: 2023-10-05
Payer: MEDICARE

## 2023-10-05 ENCOUNTER — TELEPHONE (OUTPATIENT)
Dept: CARDIOLOGY | Facility: CLINIC | Age: 68
End: 2023-10-05

## 2023-10-05 ENCOUNTER — OFFICE VISIT (OUTPATIENT)
Dept: CARDIOLOGY | Facility: CLINIC | Age: 68
End: 2023-10-05
Payer: MEDICARE

## 2023-10-05 VITALS
BODY MASS INDEX: 29.09 KG/M2 | SYSTOLIC BLOOD PRESSURE: 126 MMHG | HEIGHT: 66 IN | WEIGHT: 181 LBS | DIASTOLIC BLOOD PRESSURE: 84 MMHG | HEART RATE: 57 BPM

## 2023-10-05 DIAGNOSIS — I25.10 CORONARY ARTERIOSCLEROSIS IN NATIVE ARTERY: Primary | ICD-10-CM

## 2023-10-05 DIAGNOSIS — I10 PRIMARY HYPERTENSION: ICD-10-CM

## 2023-10-05 DIAGNOSIS — E78.2 MIXED HYPERLIPIDEMIA: ICD-10-CM

## 2023-10-05 DIAGNOSIS — I45.2 RBBB (RIGHT BUNDLE BRANCH BLOCK WITH LEFT ANTERIOR FASCICULAR BLOCK): ICD-10-CM

## 2023-10-05 DIAGNOSIS — M25.551 BILATERAL HIP PAIN: Primary | ICD-10-CM

## 2023-10-05 DIAGNOSIS — M25.552 BILATERAL HIP PAIN: Primary | ICD-10-CM

## 2023-10-05 DIAGNOSIS — G47.33 OSA ON CPAP: ICD-10-CM

## 2023-10-05 PROCEDURE — 97110 THERAPEUTIC EXERCISES: CPT

## 2023-10-05 PROCEDURE — 93000 ELECTROCARDIOGRAM COMPLETE: CPT | Performed by: INTERNAL MEDICINE

## 2023-10-05 PROCEDURE — 99214 OFFICE O/P EST MOD 30 MIN: CPT | Performed by: INTERNAL MEDICINE

## 2023-10-05 PROCEDURE — 3074F SYST BP LT 130 MM HG: CPT | Performed by: INTERNAL MEDICINE

## 2023-10-05 PROCEDURE — 3079F DIAST BP 80-89 MM HG: CPT | Performed by: INTERNAL MEDICINE

## 2023-10-05 RX ORDER — FLUTICASONE PROPIONATE 50 MCG
2 SPRAY, SUSPENSION (ML) NASAL DAILY
COMMUNITY

## 2023-10-05 NOTE — PROGRESS NOTES
Date of Office Visit: 10/05/2023  Encounter Provider: Susan Paula MD  Place of Service: [facility] CARDIOLOGY  Patient Name: Vesta Sutton  :1955      Patient ID:  Vesta Sutton is a 67 y.o. female is here for  followup for hypertension        History of Present Illness    She has a history of hypertension, GERD, hyperlipidemia and coronary calcification.      She had an abnormal stress study done in  showing  an apical ischemia but had a cardiac catheterization showing very mild left anterior  descending artery stenosis.     She has a history of retinal detachment of the left eye. She has a lot of anxiety and  stress. She is treated for hypertension and hyperlipidemia.       She also has some lumbar degenerative disc disease and so does not exercise regularly and  has had some sciatic pain with that as well.       She has a history of palpitations in the past but has had a normal Holter recording.       She had 2D echocardiogram with Doppler which was done 10/2014. It was normal. Ejection  fraction 66%. She also had a carotid duplex done 10/2014 which was also normal.      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   The rest of the right leg looked okay. It looks like it was just only done on her right leg.       She was having chest pain because of the Zoloft.  She did end up stopping the Zoloft.   She is had significant GERD with venlafaxine.     She remains on Crestor at this time for her hyperlipidemia.  She had a normal stress echocardiogram done 2017.     Labs on 2022 shows normal troponin, normal CMP, normal TSH, total cholesterol 36, HDL 85, LDL 82, VLDL 9, triglycerides 40, D-dimer 1.4, normal CBC.  Venous study done 2023 shows no DVT on the left.  CTA of the chest done 2022 shows no evidence of pulmonary  embolism.  I reviewed the CTA chest images and there is dense calcification of the proximal RCA with dense calcification in the proximal LAD, distal left main and ramus intermedius, spotty calcification of the proximal circumflex.  Stress nuclear perfusion study in 12/29/2021 shows no evidence of ischemia.  24-hour Holter monitor done 12/1/2022 was normal.     She was in the ER on 9/26/2023 with nasal drainage abdominal pain and nausea.  Her COVID and influenza were negative.  CT abdomen showed enteritis, vital signs were stable and she was able to be dismissed from the hospital.  Her troponin was negative with sodium 132, otherwise normal CMP, normal lipase and CBC.  She had vascular screening done 5/15/2023 which showed less than 50% bilateral carotid artery stenosis with normal abdominal aorta and normal peripheral serial screening.  She had a treadmill stress study on 3/23/2023, exercising on a Hi protocol for 6 minutes, no ischemic ECG changes noted, did complain of chest pain.    She has occasional palpitations but no dizziness or syncope.  Her blood pressure is well controlled.  She is not exercising.  She does think that she is gluten intolerant.  She is currently on Benicar 20 twice daily.  She has no exertional chest tightness or pressure.  She has no orthopnea or PND.    Past Medical History:   Diagnosis Date    Anemia     Anxiety and depression     Appendiceal carcinoid tumor     Arthritis     Asthma     Breast nodule     Cancer of appendix     Coronary artery disease     nonobstructive     COVID-19 virus infection 06/2021    Dizziness     Environmental allergies     Fibromuscular hyperplasia of artery     in the carotid artery with no stenosis    GERD (gastroesophageal reflux disease)     Headache     Hernia in stomach    History of anesthesia complications 2017    lidocaine caused reaction during dental procedure per patient    Hyperlipidemia     Hypertension     Hypothyroidism     Impaired glucose  metabolism     Lumbar pain     Myalgia     Nodular goiter     last US 3/21/22 stable 2.5 cm left lobe  and  a 9 mm left lobe and a 10 mm mid right lobe. Sister with thyroid cancer    LEATHA (obstructive sleep apnea) 2008    Overnight polysomnogram.  Weight 166 pounds.  Mild LEATHA with AHI 7.5 events per hour.  When supine, moderately abnormal at 15.1 events per hour.  Low oxygen saturation 78% and sleep-related hypoxia present for 24 minutes.    Palpitations     RBBB (right bundle branch block)     Retinal detachment     left  //  when blood pressure gets high she notices flashing lights in her vision    Vertigo          Past Surgical History:   Procedure Laterality Date    APPENDECTOMY      BREAST BIOPSY Left     2017    BREAST CYST ASPIRATION Left     2019    CATARACT EXTRACTION, BILATERAL       SECTION      COLONOSCOPY N/A 2019    Procedure: COLONOSCOPY to CECUM;  Surgeon: Damien Aleman MD;  Location: [facility] ENDOSCOPY;  Service: General    CORNEA LACERATION REPAIR      ENDOSCOPY N/A 2016    Z-line regular, 40 cm from the incisors, gastritis, erythematous duodenopathy    ENDOSCOPY N/A 2018    Normal exophagus, Erythematous mucosa in the antrum, Normal examined duodenum-Dr. Devyn Stahl    ENDOSCOPY N/A 2019    Procedure: ESOPHAGOGASTRODUODENOSCOPY with BX;  Surgeon: Damien Aleman MD;  Location: [facility] ENDOSCOPY;  Service: General    ENDOSCOPY AND COLONOSCOPY N/A 2014    EGD with biopsy and colonoscopy, Mild gastritis, Normal colon, Repeat Colonoscopy in 5 years, Dr. Damien Aleman    ENDOSCOPY AND COLONOSCOPY N/A 2008    EGD with biopsy and colonoscopy-Dr. Damien Aleman    UPPER GASTROINTESTINAL ENDOSCOPY  Cannot remember    URETEROCELE REPAIR      US GUIDED FINE NEEDLE ASPIRATION  2019       Current Outpatient Medications on File Prior to Visit   Medication Sig Dispense Refill    albuterol sulfate  (90 Base) MCG/ACT inhaler albuterol  sulfate HFA 90 mcg/actuation aerosol inhaler   INHALE 2 PUFFS BY MOUTH 4 TIMES DAILY AS NEEDED      ALPRAZolam (XANAX) 0.5 MG tablet Take 1 tablet by mouth 4 (Four) Times a Day.      cetirizine (zyrTEC) 10 MG tablet Take 1 tablet by mouth Daily.      Cholecalciferol (VITAMIN D3) 5000 UNITS capsule capsule Take 1 capsule by mouth Daily.      clobetasol (TEMOVATE) 0.05 % cream APPLY THIN LAYER TO AFFECTED AREAS TWICE A DAY      Euthyrox 75 MCG tablet 1 tablet daily from Monday to Saturdays only. 30 tablet 6    fluticasone (FLONASE) 50 MCG/ACT nasal spray 2 sprays by Each Nare route Daily.      olmesartan (BENICAR) 40 MG tablet Take 1 tablet by mouth Every Night. (Patient taking differently: Take 0.5 tablets by mouth 2 (Two) Times a Day.) 90 tablet 3    Omega-3 Fatty Acids (OMEGA 3 PO) Take 1 tablet by mouth daily.      pantoprazole (PROTONIX) 40 MG EC tablet TAKE 1 TABLET BY MOUTH TWICE DAILY 180 tablet 3    rosuvastatin (CRESTOR) 20 MG tablet Take 1 tablet by mouth Every Night. 90 tablet 3    traZODone (DESYREL) 150 MG tablet TAKE 1 TO 1 & 1/2 (ONE TO ONE & ONE-HALF) TABLETS BY MOUTH ONCE DAILY AS NEEDED AT BEDTIME      vitamin B-12 (CYANOCOBALAMIN) 100 MCG tablet Take 0.5 tablets by mouth Daily.      levothyroxine (SYNTHROID, LEVOTHROID) 75 MCG tablet Take 1 tablet by mouth Daily. Daily except Sundays (Patient not taking: Reported on 10/5/2023) 90 tablet 3     No current facility-administered medications on file prior to visit.       Social History     Socioeconomic History    Marital status:    Tobacco Use    Smoking status: Never     Passive exposure: Never    Smokeless tobacco: Never    Tobacco comments:     CAFFINE USE: 2 cups daily   Vaping Use    Vaping Use: Never used   Substance and Sexual Activity    Alcohol use: Yes     Alcohol/week: 5.0 standard drinks     Types: 5 Glasses of wine per week     Comment: Occasionally    Drug use: No    Sexual activity: Defer           ROS    Procedures    ECG 12  "Lead    Date/Time: 10/5/2023 2:01 PM  Performed by: Susan Paula MD  Authorized by: Susan Paula MD   Comparison: compared with previous ECG   Similar to previous ECG  Rhythm: sinus rhythm  Conduction: right bundle branch block    Clinical impression: abnormal EKG            Objective:      Vitals:    10/05/23 1326   BP: 126/84   Pulse: 57   Weight: 82.1 kg (181 lb)   Height: 167.6 cm (66\")     Body mass index is 29.21 kg/m².    Vitals reviewed.   Constitutional:       General: Not in acute distress.     Appearance: Well-developed. Not diaphoretic.   Eyes:      General: No scleral icterus.     Conjunctiva/sclera: Conjunctivae normal.   HENT:      Head: Normocephalic and atraumatic.   Neck:      Thyroid: No thyromegaly.      Vascular: No carotid bruit or JVD.      Lymphadenopathy: No cervical adenopathy.   Pulmonary:      Effort: Pulmonary effort is normal. No respiratory distress.      Breath sounds: Normal breath sounds. No wheezing. No rhonchi. No rales.   Chest:      Chest wall: Not tender to palpatation.   Cardiovascular:      Normal rate. Regular rhythm.      Murmurs: There is no murmur.      No gallop.  No rub.   Pulses:     Intact distal pulses.      Carotid: 2+ bilaterally.     Radial: 2+ bilaterally.     Dorsalis pedis: 2+ bilaterally.     Posterior tibial: 2+ bilaterally.  Edema:     Peripheral edema absent.   Abdominal:      General: Bowel sounds are normal. There is no distension or abdominal bruit.      Palpations: Abdomen is soft. There is no abdominal mass.      Tenderness: There is no abdominal tenderness.   Musculoskeletal:         General: No deformity.      Extremities: No clubbing present.     Cervical back: Neck supple. Skin:     General: Skin is warm and dry. There is no cyanosis.      Coloration: Skin is not pale.      Findings: No rash.   Neurological:      Mental Status: Alert and oriented to person, place, and time.      Cranial Nerves: No cranial nerve deficit. "   Psychiatric:         Judgment: Judgment normal.       Lab Review:       Assessment:      Diagnosis Plan   1. Coronary arteriosclerosis in native artery        2. Mixed hyperlipidemia        3. Primary hypertension        4. RBBB (right bundle branch block with left anterior fascicular block)        5. LEATHA on auto CPAP            Hypertension, BP high, goal <120/80.   Chronically abnormal electrocardiogram showing a left anterior fascicular block and right bundle branch block.   Coronary artery calcification.  No anginal chest pain.  Palpitations, made worse with anxiety.  Salt intake. I advised her to stay away from salt.   Hyperlipidemia on Crestor. Well controlled.   Obstructive sleep. Use CPAP.   History of appendiceal carcinoma.   History of asthma, stable.   Anxiety.   Gastroesophageal reflux disease.   Lumbar degenerative disc disease with sciatic pain.   LEATHA on CPAP.      Plan:       Advised 64 ounces of water daily, exercise regularly and lose weight.  No medication changes, see Bita in 6 months.

## 2023-10-05 NOTE — TELEPHONE ENCOUNTER
To whom it may concern:      Vesta Sutton should be drinking 64 ounces of water daily.  She needs access to water at work.  Please assist her with this.  This is important for her cardiovascular health.    Sincerely-      Susan Paula MD

## 2023-10-05 NOTE — THERAPY TREATMENT NOTE
Outpatient Physical Therapy Ortho Treatment Note  [facility]     Patient Name: Vesta Sutton  : 1955  MRN: [id number]  Today's Date: 10/5/2023      Visit Date: 10/05/2023    Visit Dx:    ICD-10-CM ICD-9-CM   1. Bilateral hip pain  M25.551 719.45    M25.552        Patient Active Problem List   Diagnosis    Hyperlipidemia    LEATHA on auto CPAP    Psychophysiological insomnia    Circadian rhythm sleep disorder, delayed sleep phase type    Major depressive disorder    Lumbar radiculopathy    Hypothyroidism    Generalized anxiety disorder    RBBB (right bundle branch block with left anterior fascicular block)    Vitamin D deficiency    Coronary arteriosclerosis in native artery    Aneurysm of splenic artery    Chronic constipation    Degeneration of lumbar intervertebral disc    Vitamin D deficiency    Hypertension    Edema of lower extremity    Hand eczema    Impaired glucose metabolism        Past Medical History:   Diagnosis Date    Anemia     Anxiety and depression     Appendiceal carcinoid tumor     Arthritis     Asthma     Breast nodule     Cancer of appendix     Coronary artery disease     nonobstructive     COVID-19 virus infection 2021    Dizziness     Environmental allergies     Fibromuscular hyperplasia of artery     in the carotid artery with no stenosis    GERD (gastroesophageal reflux disease)     Headache     Hernia in stomach    History of anesthesia complications     lidocaine caused reaction during dental procedure per patient    Hyperlipidemia     Hypertension     Hypothyroidism     Impaired glucose metabolism     Lumbar pain     Myalgia     Nodular goiter     last US 3/21/22 stable 2.5 cm left lobe  and  a 9 mm left lobe and a 10 mm mid right lobe. Sister with thyroid cancer    LEATHA (obstructive sleep apnea) 2008    Overnight polysomnogram.  Weight 166 pounds.  Mild LEATHA with AHI 7.5 events per hour.  When supine, moderately abnormal at 15.1 events per hour.  Low oxygen  saturation 78% and sleep-related hypoxia present for 24 minutes.    Palpitations     RBBB (right bundle branch block)     Retinal detachment     left  //  when blood pressure gets high she notices flashing lights in her vision    Vertigo         Past Surgical History:   Procedure Laterality Date    APPENDECTOMY      BREAST BIOPSY Left     2017    BREAST CYST ASPIRATION Left     2019    CATARACT EXTRACTION, BILATERAL       SECTION      COLONOSCOPY N/A 2019    Procedure: COLONOSCOPY to CECUM;  Surgeon: Damien Aleman MD;  Location: [facility] ENDOSCOPY;  Service: General    CORNEA LACERATION REPAIR      ENDOSCOPY N/A 2016    Z-line regular, 40 cm from the incisors, gastritis, erythematous duodenopathy    ENDOSCOPY N/A 2018    Normal exophagus, Erythematous mucosa in the antrum, Normal examined duodenum-Dr. Devyn Stahl    ENDOSCOPY N/A 2019    Procedure: ESOPHAGOGASTRODUODENOSCOPY with BX;  Surgeon: Damien Aleman MD;  Location: [facility] ENDOSCOPY;  Service: General    ENDOSCOPY AND COLONOSCOPY N/A 2014    EGD with biopsy and colonoscopy, Mild gastritis, Normal colon, Repeat Colonoscopy in 5 years, Dr. Damien Aleman    ENDOSCOPY AND COLONOSCOPY N/A 2008    EGD with biopsy and colonoscopy-Dr. Damien Aleman    UPPER GASTROINTESTINAL ENDOSCOPY  Cannot remember    URETEROCELE REPAIR      US GUIDED FINE NEEDLE ASPIRATION  2019                        PT Assessment/Plan       Row Name 10/05/23 1200          PT Assessment    Assessment Comments Ms. Sutton arrives to PT reporting consistent compliance with daily stretches within HEP but unable to performing strengthening appropriately. Encouraged performing at desired frequency and reprinted/updated HEP. Added hip adduction to glute bridge and STS with TA with good tolerance. She continues to require frequent cues to perform exercises with correct form/intensity. Ms. Sutton remains a candidate for skilled PT.  -MEGAN         PT Plan    PT Plan Comments response to last session, consider monster, AR press  -MEGAN               User Key  (r) = Recorded By, (t) = Taken By, (c) = Cosigned By      Initials Name Provider Type    Ginette Zambrano, PT Physical Therapist                       OP Exercises       Row Name 10/05/23 1100             Subjective    Subjective Comments I am doing okay today  -MEGAN         Total Minutes    67762 - PT Therapeutic Exercise Minutes 43  -MEGAN         Exercise 1    Exercise Name 1 nustep  -MEGAN      Time 1 5 mins  -MEGAN      Additional Comments L5  -MEGAN         Exercise 2    Exercise Name 2 supine piriformis stretch  -MEGAN      Cueing 2 Verbal;Tactile  -MEGAN      Reps 2 3  -MEGAN      Time 2 20s  -MEGAN         Exercise 3    Exercise Name 3 supine fig 4 piriformis stretch  -MEGAN      Cueing 3 Verbal;Tactile  -MEGAN      Reps 3 3  -MEGAN      Time 3 20s  -MEGAN         Exercise 4    Exercise Name 4 seated hamstring stretch  -MEGAN      Cueing 4 Verbal;Demo  -MEGAN      Reps 4 3  -MEGAN      Time 4 20s  -MEGAN         Exercise 5    Exercise Name 5 supine PPT  -MEGAN      Cueing 5 Verbal;Tactile  -MEGAN      Sets 5 1  -MEGAN      Reps 5 15  -MEGAN      Time 5 5s  -MEGAN         Exercise 6    Exercise Name 6 glute bridge  -MEGAN      Cueing 6 Verbal;Tactile  -MEGAN      Sets 6 2  -MEGAN      Reps 6 10  -MEGAN      Time 6 + hip add  -MEGAN         Exercise 7    Exercise Name 7 STS + TA  -MGEAN      Cueing 7 Verbal;Demo  -MEGAN      Sets 7 1  -MEGAN      Reps 7 10  -MEGAN      Time 7 low table mat  -MEGAN         Exercise 9    Exercise Name 9 clamshells  -MEGAN      Cueing 9 Verbal;Demo  -MEGAN      Sets 9 2  -MEGAN      Reps 9 10  -MEGAN         Exercise 10    Exercise Name 10 prone hip ext  -MEGAN      Cueing 10 Verbal;Demo  -MEGAN      Sets 10 1  -MEGAN      Reps 10 10e  -MEGAN      Time 10 needs cueing for glute act.  -MEGAN         Exercise 11    Exercise Name 11 side steps  -MEGAN      Cueing 11 Verbal;Demo  -MEGAN      Sets 11 3 laps  -MEGAN      Reps 11 YTB  -MEGAN                User Key  (r) = Recorded By, (t) =  Taken By, (c) = Cosigned By      Initials Name Provider Type    Ginette Zambrano, PT Physical Therapist                                  PT OP Goals       Row Name 10/05/23 1100          PT Short Term Goals    STG Date to Achieve 10/21/23  -     STG 1 Pt will be independent with initial HEP to improve strength/ROM and decrease pain.  -MEGAN     STG 1 Progress Ongoing  -MEGAN     STG 2 Patient will improve ability to sleep through the night with ,/= 2-3 sleep disturbances/night related to back pain to improve overall sleep quality and quality of life  -MEGAN     STG 2 Progress Ongoing  -MEGAN     STG 3 Patient will demonstrate proper log roll mechanics with little to no cues when getting on/off treatment table to demonstrate improved mechanics and decreased strain on lumbar spine.  -     STG 3 Progress Ongoing  -MEGAN        Long Term Goals    LTG Date to Achieve 11/20/23  -     LTG 1 Pt will be independent with advanced HEP to maintain strength/ROM and decrease pain.  -MEGAN     LTG 1 Progress Ongoing  -MEGAN     LTG 2 Pt will improve B hip strength to at least 4/5.  -MEGAN     LTG 2 Progress Ongoing  -MEGAN     LTG 3 Patient will improve ability to sleep through the night without sleep disturbances related to back pain to improve overall sleep quality and quality of life  -MEGAN     LTG 3 Progress Ongoing  -MEGAN     LTG 4 Pt will demonstrate appropriate body mechanics for bending/lifting tasks to improve tolerance to ADLs.  -     LTG 4 Progress Ongoing  -               User Key  (r) = Recorded By, (t) = Taken By, (c) = Cosigned By      Initials Name Provider Type    Ginette Zambrano, PT Physical Therapist                    Therapy Education  Education Details: updated HEP  Given: HEP, Symptoms/condition management, Pain management, Posture/body mechanics  Program: Reinforced, Progressed  How Provided: Verbal, Demonstration, Written  Provided to: Patient  Level of Understanding: Verbalized, Demonstrated               Time Calculation:   Start Time: 1130  Stop Time: 1213  Time Calculation (min): 43 min  Timed Charges  11735 - PT Therapeutic Exercise Minutes: 43  Total Minutes  Timed Charges Total Minutes: 43   Total Minutes: 43  Therapy Charges for Today       Code Description Service Date Service Provider Modifiers Qty    [id number]  PT THER PROC EA 15 MIN 10/5/2023 Ginette Adamson, PT GP 3                      Ginette Adamson, PT  10/5/2023

## 2023-10-09 ENCOUNTER — HOSPITAL ENCOUNTER (OUTPATIENT)
Dept: PHYSICAL THERAPY | Facility: HOSPITAL | Age: 68
Setting detail: THERAPIES SERIES
Discharge: HOME | End: 2023-10-09
Payer: MEDICARE

## 2023-10-09 ENCOUNTER — TELEPHONE (OUTPATIENT)
Dept: ENDOCRINOLOGY | Age: 68
End: 2023-10-09

## 2023-10-09 DIAGNOSIS — M25.552 BILATERAL HIP PAIN: Primary | ICD-10-CM

## 2023-10-09 DIAGNOSIS — M25.551 BILATERAL HIP PAIN: Primary | ICD-10-CM

## 2023-10-09 PROCEDURE — 97110 THERAPEUTIC EXERCISES: CPT

## 2023-10-09 NOTE — TELEPHONE ENCOUNTER
Left pt a detailed message that she would need to get labs done or bring in a copy of the labs that she already got done to her appt, as they are not showing up in her chart.

## 2023-10-09 NOTE — TELEPHONE ENCOUNTER
Caller: Vesta Sutton    Relationship to patient: Self    Best call back number: [phone]    Patient is needing: PT HAD BLOODWORK DONE AT HER PCP INCLUDING HER A1C AND TSH, PT WANTS TO KNOW IF SHE NEEDS TO COME IN FOR HER LABS ON THURSDAY, BLOODWORK DONE BY LUZ ZHANG

## 2023-10-12 ENCOUNTER — HOSPITAL ENCOUNTER (OUTPATIENT)
Dept: PHYSICAL THERAPY | Facility: HOSPITAL | Age: 68
Setting detail: THERAPIES SERIES
Discharge: HOME | End: 2023-10-12
Payer: MEDICARE

## 2023-10-12 DIAGNOSIS — M25.552 BILATERAL HIP PAIN: Primary | ICD-10-CM

## 2023-10-12 DIAGNOSIS — M25.551 BILATERAL HIP PAIN: Primary | ICD-10-CM

## 2023-10-12 PROCEDURE — 97110 THERAPEUTIC EXERCISES: CPT

## 2023-10-12 NOTE — THERAPY TREATMENT NOTE
Outpatient Physical Therapy Ortho Treatment Note  [facility]     Patient Name: Vesta Sutton  : 1955  MRN: [id number]  Today's Date: 10/12/2023      Visit Date: 10/12/2023    Visit Dx:    ICD-10-CM ICD-9-CM   1. Bilateral hip pain  M25.551 719.45    M25.552        Patient Active Problem List   Diagnosis    Hyperlipidemia    LEATHA on auto CPAP    Psychophysiological insomnia    Circadian rhythm sleep disorder, delayed sleep phase type    Major depressive disorder    Lumbar radiculopathy    Hypothyroidism    Generalized anxiety disorder    RBBB (right bundle branch block with left anterior fascicular block)    Vitamin D deficiency    Coronary arteriosclerosis in native artery    Aneurysm of splenic artery    Chronic constipation    Degeneration of lumbar intervertebral disc    Vitamin D deficiency    Hypertension    Edema of lower extremity    Hand eczema    Impaired glucose metabolism        Past Medical History:   Diagnosis Date    Anemia     Anxiety and depression     Appendiceal carcinoid tumor     Arthritis     Asthma     Breast nodule     Cancer of appendix     Coronary artery disease     nonobstructive     COVID-19 virus infection 2021    Dizziness     Environmental allergies     Fibromuscular hyperplasia of artery     in the carotid artery with no stenosis    GERD (gastroesophageal reflux disease)     Headache     Hernia in stomach    History of anesthesia complications     lidocaine caused reaction during dental procedure per patient    Hyperlipidemia     Hypertension     Hypothyroidism     Impaired glucose metabolism     Lumbar pain     Myalgia     Nodular goiter     last US 3/21/22 stable 2.5 cm left lobe  and  a 9 mm left lobe and a 10 mm mid right lobe. Sister with thyroid cancer    LEATHA (obstructive sleep apnea) 2008    Overnight polysomnogram.  Weight 166 pounds.  Mild LEATHA with AHI 7.5 events per hour.  When supine, moderately abnormal at 15.1 events per hour.  Low oxygen  saturation 78% and sleep-related hypoxia present for 24 minutes.    Palpitations     RBBB (right bundle branch block)     Retinal detachment     left  //  when blood pressure gets high she notices flashing lights in her vision    Vertigo         Past Surgical History:   Procedure Laterality Date    APPENDECTOMY      BREAST BIOPSY Left     2017    BREAST CYST ASPIRATION Left     2019    CATARACT EXTRACTION, BILATERAL       SECTION      COLONOSCOPY N/A 2019    Procedure: COLONOSCOPY to CECUM;  Surgeon: Damien Aleman MD;  Location: [facility] ENDOSCOPY;  Service: General    CORNEA LACERATION REPAIR      ENDOSCOPY N/A 2016    Z-line regular, 40 cm from the incisors, gastritis, erythematous duodenopathy    ENDOSCOPY N/A 2018    Normal exophagus, Erythematous mucosa in the antrum, Normal examined duodenum-Dr. Devyn Stahl    ENDOSCOPY N/A 2019    Procedure: ESOPHAGOGASTRODUODENOSCOPY with BX;  Surgeon: Damien Aleman MD;  Location: [facility] ENDOSCOPY;  Service: General    ENDOSCOPY AND COLONOSCOPY N/A 2014    EGD with biopsy and colonoscopy, Mild gastritis, Normal colon, Repeat Colonoscopy in 5 years, Dr. Damien Aleman    ENDOSCOPY AND COLONOSCOPY N/A 2008    EGD with biopsy and colonoscopy-Dr. Damien Aleman    UPPER GASTROINTESTINAL ENDOSCOPY  Cannot remember    URETEROCELE REPAIR      US GUIDED FINE NEEDLE ASPIRATION  2019                        PT Assessment/Plan       Row Name 10/12/23 1200          PT Assessment    Assessment Comments Ms. Sutton arrives to PT reporting she has not performed her HEP since last session and feels this has lead to increased R hip pain. Encouraged compliance with gentle supine stretches at the end of the day due to patient reporting greatest pain upon waking in the night. Only able to add AR press this date due to needing to review all previously preformed exercises secondary to lack of HEP compliance. Ms. Sutton remains a  candidate for skilled PT.  -MEGAN        PT Plan    PT Plan Comments response to last session, consider alt shoulder ext  -MEGAN               User Key  (r) = Recorded By, (t) = Taken By, (c) = Cosigned By      Initials Name Provider Type    Ginette Zambrano, PT Physical Therapist                       OP Exercises       Row Name 10/12/23 1100             Subjective    Subjective Comments I did not do my exercises since last session and I think that is why I am having more pain today  -MEGAN         Total Minutes    54673 - PT Therapeutic Exercise Minutes 39  -MEGAN         Exercise 1    Exercise Name 1 nustep  -MEGAN      Cueing 1 Verbal  -MEGAN      Time 1 5 min  -MEGAN      Additional Comments L5  -MEGAN         Exercise 2    Exercise Name 2 supine piriformis stretch  -MEGAN      Cueing 2 Verbal;Tactile  -MEGAN      Reps 2 3  -MEGAN      Time 2 20s  -MEGAN      Additional Comments reviewed and encouraged pt to perform at night due to symptoms occuring in the middle of the night  -MEGAN         Exercise 3    Exercise Name 3 supine fig 4 piriformis stretch  -MEGAN      Additional Comments reviewed and encouraged pt to perform at night due to symptoms occuring in the middle of the night  -MEGAN         Exercise 4    Exercise Name 4 seated hamstring stretch  -MEGAN      Cueing 4 Verbal;Demo  -MEGAN      Reps 4 2e  -MEGAN      Time 4 20s  -MEGAN         Exercise 5    Exercise Name 5 supine PPT  -MEGAN      Additional Comments reviewed and encouraged pt to perform at night due to symptoms occuring in the middle of the night  -MEGAN         Exercise 6    Exercise Name 6 glute bridge  -MEGAN      Cueing 6 Verbal;Tactile  -MEGAN      Sets 6 2  -MEGAN      Reps 6 10  -MEGAN      Time 6 + hip add  -MEGAN         Exercise 7    Exercise Name 7 STS + TA  -MEGAN      Cueing 7 Verbal;Demo  -MEGAN      Sets 7 1  -MEGAN      Reps 7 10  -MEGAN      Time 7 low table mat  -MEGAN      Additional Comments RTB above knees  -MEGAN         Exercise 8    Exercise Name 8 AR press  -MEGAN      Cueing 8 Verbal;Demo  -MEGAN      Sets  8 2  -MEGAN      Reps 8 10e  -MEGAN      Time 8 RTB  -MEGAN         Exercise 9    Exercise Name 9 clamshells  -MEGAN      Cueing 9 Verbal;Demo  -MEGAN      Sets 9 2  -MEGAN      Reps 9 10  -MEGAN      Time 9 RTB  -MEGAN         Exercise 11    Exercise Name 11 side steps  -MEGAN      Cueing 11 Verbal;Demo  -MEGAN      Reps 11 3 laps  -MEGAN      Time 11 RTB  -MEGAN         Exercise 12    Exercise Name 12 monster walk  -MEGAN      Cueing 12 Verbal;Demo  -MEGAN      Reps 12 3 laps  -MEGAN      Time 12 RTB  -MEGAN                User Key  (r) = Recorded By, (t) = Taken By, (c) = Cosigned By      Initials Name Provider Type    Ginette Zambrano, PT Physical Therapist                                  PT OP Goals       Row Name 10/12/23 1100          PT Short Term Goals    STG Date to Achieve 10/21/23  -MEGAN     STG 1 Pt will be independent with initial HEP to improve strength/ROM and decrease pain.  -MGEAN     STG 1 Progress Ongoing  -MEGAN     STG 2 Patient will improve ability to sleep through the night with ,/= 2-3 sleep disturbances/night related to back pain to improve overall sleep quality and quality of life  -MEGAN     STG 2 Progress Ongoing  -MEGAN     STG 3 Patient will demonstrate proper log roll mechanics with little to no cues when getting on/off treatment table to demonstrate improved mechanics and decreased strain on lumbar spine.  -     STG 3 Progress Ongoing  -        Long Term Goals    LTG Date to Achieve 11/20/23  -MEGAN     LTG 1 Pt will be independent with advanced HEP to maintain strength/ROM and decrease pain.  -MEGAN     LTG 1 Progress Ongoing  -MEGAN     LTG 2 Pt will improve B hip strength to at least 4/5.  -     LTG 2 Progress Ongoing  -MEGAN     LTG 3 Patient will improve ability to sleep through the night without sleep disturbances related to back pain to improve overall sleep quality and quality of life  -     LTG 3 Progress Ongoing  -MEGAN     LTG 4 Pt will demonstrate appropriate body mechanics for bending/lifting tasks to improve tolerance to ADLs.   -MEGAN     LTG 4 Progress Ongoing  -MEGAN               User Key  (r) = Recorded By, (t) = Taken By, (c) = Cosigned By      Initials Name Provider Type    Ginette Zambrano, PT Physical Therapist                    Therapy Education  Given: HEP  Program: Reinforced  How Provided: Verbal, Demonstration  Provided to: Patient  Level of Understanding: Verbalized, Demonstrated              Time Calculation:   Start Time: 1131  Stop Time: 1210  Time Calculation (min): 39 min  Timed Charges  21571 - PT Therapeutic Exercise Minutes: 39  Total Minutes  Timed Charges Total Minutes: 39   Total Minutes: 39  Therapy Charges for Today       Code Description Service Date Service Provider Modifiers Qty    75149561799  PT THER PROC EA 15 MIN 10/12/2023 Ginette Adamson, PT GP 3                      Ginette Adamson PT  10/12/2023

## 2023-10-16 ENCOUNTER — HOSPITAL ENCOUNTER (OUTPATIENT)
Dept: PHYSICAL THERAPY | Facility: HOSPITAL | Age: 68
Setting detail: THERAPIES SERIES
Discharge: HOME | End: 2023-10-16
Payer: MEDICARE

## 2023-10-16 DIAGNOSIS — M25.551 BILATERAL HIP PAIN: Primary | ICD-10-CM

## 2023-10-16 DIAGNOSIS — M25.552 BILATERAL HIP PAIN: Primary | ICD-10-CM

## 2023-10-16 PROCEDURE — 97110 THERAPEUTIC EXERCISES: CPT

## 2023-10-16 RX ORDER — ROSUVASTATIN CALCIUM 20 MG/1
20 TABLET, COATED ORAL NIGHTLY
Qty: 90 TABLET | Refills: 3 | Status: SHIPPED | OUTPATIENT
Start: 2023-10-16

## 2023-10-16 NOTE — THERAPY TREATMENT NOTE
Outpatient Physical Therapy Ortho Treatment Note  [facility]     Patient Name: Vesta Sutton  : 1955  MRN: [id number]  Today's Date: 10/16/2023      Visit Date: 10/16/2023    Visit Dx:    ICD-10-CM ICD-9-CM   1. Bilateral hip pain  M25.551 719.45    M25.552        Patient Active Problem List   Diagnosis    Hyperlipidemia    LEATHA on auto CPAP    Psychophysiological insomnia    Circadian rhythm sleep disorder, delayed sleep phase type    Major depressive disorder    Lumbar radiculopathy    Hypothyroidism    Generalized anxiety disorder    RBBB (right bundle branch block with left anterior fascicular block)    Vitamin D deficiency    Coronary arteriosclerosis in native artery    Aneurysm of splenic artery    Chronic constipation    Degeneration of lumbar intervertebral disc    Vitamin D deficiency    Hypertension    Edema of lower extremity    Hand eczema    Impaired glucose metabolism        Past Medical History:   Diagnosis Date    Anemia     Anxiety and depression     Appendiceal carcinoid tumor     Arthritis     Asthma     Breast nodule     Cancer of appendix     Coronary artery disease     nonobstructive     COVID-19 virus infection 2021    Dizziness     Environmental allergies     Fibromuscular hyperplasia of artery     in the carotid artery with no stenosis    GERD (gastroesophageal reflux disease)     Headache     Hernia in stomach    History of anesthesia complications     lidocaine caused reaction during dental procedure per patient    Hyperlipidemia     Hypertension     Hypothyroidism     Impaired glucose metabolism     Lumbar pain     Myalgia     Nodular goiter     last US 3/21/22 stable 2.5 cm left lobe  and  a 9 mm left lobe and a 10 mm mid right lobe. Sister with thyroid cancer    LEATHA (obstructive sleep apnea) 2008    Overnight polysomnogram.  Weight 166 pounds.  Mild LEATHA with AHI 7.5 events per hour.  When supine, moderately abnormal at 15.1 events per hour.  Low oxygen  saturation 78% and sleep-related hypoxia present for 24 minutes.    Palpitations     RBBB (right bundle branch block)     Retinal detachment     left  //  when blood pressure gets high she notices flashing lights in her vision    Vertigo         Past Surgical History:   Procedure Laterality Date    APPENDECTOMY      BREAST BIOPSY Left     2017    BREAST CYST ASPIRATION Left     2019    CATARACT EXTRACTION, BILATERAL       SECTION      COLONOSCOPY N/A 2019    Procedure: COLONOSCOPY to CECUM;  Surgeon: Damien Aleman MD;  Location: [facility] ENDOSCOPY;  Service: General    CORNEA LACERATION REPAIR      ENDOSCOPY N/A 2016    Z-line regular, 40 cm from the incisors, gastritis, erythematous duodenopathy    ENDOSCOPY N/A 2018    Normal exophagus, Erythematous mucosa in the antrum, Normal examined duodenum-Dr. Devyn Stahl    ENDOSCOPY N/A 2019    Procedure: ESOPHAGOGASTRODUODENOSCOPY with BX;  Surgeon: Damien Aleman MD;  Location: [facility] ENDOSCOPY;  Service: General    ENDOSCOPY AND COLONOSCOPY N/A 2014    EGD with biopsy and colonoscopy, Mild gastritis, Normal colon, Repeat Colonoscopy in 5 years, Dr. Damien Aleman    ENDOSCOPY AND COLONOSCOPY N/A 2008    EGD with biopsy and colonoscopy-Dr. Damien Aleman    UPPER GASTROINTESTINAL ENDOSCOPY  Cannot remember    URETEROCELE REPAIR      US GUIDED FINE NEEDLE ASPIRATION  2019                        PT Assessment/Plan       Row Name 10/16/23 1100          PT Assessment    Assessment Comments Reports improving compliance with HEP with reduced cueing for initial exercises in clinic. Increased resistance on side stepping and monster walk met with expected muscular fatigue. Added SLS with demo and tactile cueing to improve pelvic alignment and glute engagement. Pt. remains good candidate for skilled PT.  -        PT Plan    PT Plan Comments update HEP; lateral step up?  -               User Key  (r) = Recorded  By, (t) = Taken By, (c) = Cosigned By      Initials Name Provider Type     Iva Wolfe, PT Physical Therapist                       OP Exercises       Row Name 10/16/23 1100             Subjective    Subjective Comments I am trying to do my exercises more  -MH         Total Minutes    21362 - PT Therapeutic Exercise Minutes 40  -MH         Exercise 1    Exercise Name 1 nustep  -MH      Cueing 1 Verbal  -MH      Time 1 5 min  -MH      Additional Comments L5  -MH         Exercise 2    Exercise Name 2 supine piriformis stretch  -MH      Cueing 2 Verbal  -MH      Reps 2 3  -MH      Time 2 20s  -MH         Exercise 3    Exercise Name 3 supine fig 4 piriformis stretch  -MH      Cueing 3 Verbal  -MH      Reps 3 3  -MH      Time 3 20s  -MH         Exercise 6    Exercise Name 6 glute bridge  -MH      Cueing 6 Verbal;Tactile  -MH      Sets 6 2  -MH      Reps 6 10  -MH      Time 6 + hip add  -MH         Exercise 8    Exercise Name 8 AR press  -MH      Cueing 8 Verbal;Demo  -MH      Sets 8 2  -MH      Reps 8 10e  -MH      Time 8 RTB  -MH         Exercise 11    Exercise Name 11 side steps  -MH      Cueing 11 Verbal;Demo  -MH      Reps 11 3 laps  -MH      Time 11 GTB  -MH         Exercise 12    Exercise Name 12 monster walk  -MH      Cueing 12 Verbal;Demo  -MH      Reps 12 3 laps  -MH      Time 12 GTB  -MH         Exercise 13    Exercise Name 13 SLS - cueng for level pelvis  -MH      Cueing 13 Verbal;Demo;Tactile  -MH      Reps 13 3e  -MH      Time 13 15s  -MH                User Key  (r) = Recorded By, (t) = Taken By, (c) = Cosigned By      Initials Name Provider Type     Iva Wolfe, PT Physical Therapist                                  PT OP Goals       Row Name 10/16/23 1100          PT Short Term Goals    STG Date to Achieve 10/21/23  -MH     STG 1 Pt will be independent with initial HEP to improve strength/ROM and decrease pain.  -MH     STG 1 Progress Ongoing  -MH     STG 2 Patient will improve ability to  sleep through the night with ,/= 2-3 sleep disturbances/night related to back pain to improve overall sleep quality and quality of life  -     STG 2 Progress Ongoing  -     STG 3 Patient will demonstrate proper log roll mechanics with little to no cues when getting on/off treatment table to demonstrate improved mechanics and decreased strain on lumbar spine.  -     STG 3 Progress Met  -     STG 3 Progress Comments observed in clinic to be able to successfully perform transfer.  -        Long Term Goals    LTG Date to Achieve 11/20/23  -     LTG 1 Pt will be independent with advanced HEP to maintain strength/ROM and decrease pain.  -     LTG 1 Progress Ongoing  -     LTG 2 Pt will improve B hip strength to at least 4/5.  -     LTG 2 Progress Ongoing  -     LTG 3 Patient will improve ability to sleep through the night without sleep disturbances related to back pain to improve overall sleep quality and quality of life  -     LTG 3 Progress Ongoing  -     LTG 4 Pt will demonstrate appropriate body mechanics for bending/lifting tasks to improve tolerance to ADLs.  -     LTG 4 Progress Ongoing  -               User Key  (r) = Recorded By, (t) = Taken By, (c) = Cosigned By      Initials Name Provider Type     Iva Wolfe, PT Physical Therapist                    Therapy Education  Education Details: reviewed HEP Access Code: [email or web address]  Given: HEP  Program: Reinforced  How Provided: Verbal, Demonstration, Written  Provided to: Patient  Level of Understanding: Verbalized              Time Calculation:   Start Time: 1115  Stop Time: 1155  Time Calculation (min): 40 min  Total Timed Code Minutes- PT: 40 minute(s)  Timed Charges  56828 - PT Therapeutic Exercise Minutes: 40  Total Minutes  Timed Charges Total Minutes: 40   Total Minutes: 40  Therapy Charges for Today       Code Description Service Date Service Provider Modifiers Qty    81759110070 HC PT THER PROC EA 15 MIN 10/16/2023 Aiden  Iva, PT GP 3                      Iva Wolfe, PT  10/16/2023

## 2023-10-26 ENCOUNTER — OFFICE VISIT (OUTPATIENT)
Dept: ENDOCRINOLOGY | Age: 68
End: 2023-10-26
Payer: MEDICARE

## 2023-10-26 ENCOUNTER — HOSPITAL ENCOUNTER (OUTPATIENT)
Dept: PHYSICAL THERAPY | Facility: HOSPITAL | Age: 68
Setting detail: THERAPIES SERIES
Discharge: HOME | End: 2023-10-26
Payer: MEDICARE

## 2023-10-26 VITALS
OXYGEN SATURATION: 98 % | WEIGHT: 181 LBS | TEMPERATURE: 98.2 F | HEIGHT: 66 IN | HEART RATE: 65 BPM | DIASTOLIC BLOOD PRESSURE: 80 MMHG | SYSTOLIC BLOOD PRESSURE: 132 MMHG | BODY MASS INDEX: 29.09 KG/M2

## 2023-10-26 DIAGNOSIS — E04.1 THYROID NODULE: ICD-10-CM

## 2023-10-26 DIAGNOSIS — M25.552 BILATERAL HIP PAIN: Primary | ICD-10-CM

## 2023-10-26 DIAGNOSIS — R73.03 PREDIABETES: ICD-10-CM

## 2023-10-26 DIAGNOSIS — M25.551 BILATERAL HIP PAIN: Primary | ICD-10-CM

## 2023-10-26 DIAGNOSIS — E03.9 HYPOTHYROIDISM, UNSPECIFIED TYPE: Primary | ICD-10-CM

## 2023-10-26 PROCEDURE — 97110 THERAPEUTIC EXERCISES: CPT

## 2023-10-26 RX ORDER — LEVOTHYROXINE SODIUM 75 MCG
75 TABLET ORAL
Qty: 90 TABLET | Refills: 1 | Status: SHIPPED | OUTPATIENT
Start: 2023-10-26 | End: 2023-10-26 | Stop reason: SDUPTHER

## 2023-10-26 RX ORDER — LEVOTHYROXINE SODIUM 75 MCG
TABLET ORAL
Qty: 90 TABLET | Refills: 1 | Status: SHIPPED | OUTPATIENT
Start: 2023-10-26

## 2023-10-26 NOTE — THERAPY PROGRESS REPORT/RE-CERT
Outpatient Physical Therapy Ortho Progress Note  [facility]     Patient Name: Vesta Sutton  : 1955  MRN: [id number]  Today's Date: 10/26/2023      Visit Date: 10/26/2023    Visit Dx:    ICD-10-CM ICD-9-CM   1. Bilateral hip pain  M25.551 719.45    M25.552        Patient Active Problem List   Diagnosis    Hyperlipidemia    LEATHA on auto CPAP    Psychophysiological insomnia    Circadian rhythm sleep disorder, delayed sleep phase type    Thyroid nodule    Major depressive disorder    Lumbar radiculopathy    Hypothyroidism    Generalized anxiety disorder    RBBB (right bundle branch block with left anterior fascicular block)    Vitamin D deficiency    Coronary arteriosclerosis in native artery    Aneurysm of splenic artery    Chronic constipation    Degeneration of lumbar intervertebral disc    Vitamin D deficiency    Hypertension    Edema of lower extremity    Hand eczema    Impaired glucose metabolism    Prediabetes        Past Medical History:   Diagnosis Date    Anemia     Anxiety and depression     Appendiceal carcinoid tumor     Arthritis     Asthma     Breast nodule     Cancer of appendix     Coronary artery disease     nonobstructive     COVID-19 virus infection 2021    Dizziness     Environmental allergies     Fibromuscular hyperplasia of artery     in the carotid artery with no stenosis    GERD (gastroesophageal reflux disease)     Headache     Hernia in stomach    History of anesthesia complications     lidocaine caused reaction during dental procedure per patient    Hyperlipidemia     Hypertension     Hypothyroidism     Impaired glucose metabolism     Lumbar pain     Myalgia     Nodular goiter     last US 3/21/22 stable 2.5 cm left lobe  and  a 9 mm left lobe and a 10 mm mid right lobe. Sister with thyroid cancer    LEATHA (obstructive sleep apnea) 2008    Overnight polysomnogram.  Weight 166 pounds.  Mild LEATHA with AHI 7.5 events per hour.  When supine, moderately abnormal at 15.1  events per hour.  Low oxygen saturation 78% and sleep-related hypoxia present for 24 minutes.    Palpitations     RBBB (right bundle branch block)     Retinal detachment     left  //  when blood pressure gets high she notices flashing lights in her vision    Vertigo         Past Surgical History:   Procedure Laterality Date    APPENDECTOMY  2001    BREAST BIOPSY Left     2017    BREAST CYST ASPIRATION Left     2019    CATARACT EXTRACTION, BILATERAL       SECTION      COLONOSCOPY N/A 2019    Procedure: COLONOSCOPY to CECUM;  Surgeon: Damien Aleman MD;  Location: [facility] ENDOSCOPY;  Service: General    CORNEA LACERATION REPAIR      ENDOSCOPY N/A 2016    Z-line regular, 40 cm from the incisors, gastritis, erythematous duodenopathy    ENDOSCOPY N/A 2018    Normal exophagus, Erythematous mucosa in the antrum, Normal examined duodenum-Dr. Devyn Stahl    ENDOSCOPY N/A 2019    Procedure: ESOPHAGOGASTRODUODENOSCOPY with BX;  Surgeon: Damien Aleman MD;  Location: [facility] ENDOSCOPY;  Service: General    ENDOSCOPY AND COLONOSCOPY N/A 2014    EGD with biopsy and colonoscopy, Mild gastritis, Normal colon, Repeat Colonoscopy in 5 years, Dr. Damien Aleman    ENDOSCOPY AND COLONOSCOPY N/A 2008    EGD with biopsy and colonoscopy-Dr. Damien Aleman    UPPER GASTROINTESTINAL ENDOSCOPY  Cannot remember    URETEROCELE REPAIR      US GUIDED FINE NEEDLE ASPIRATION  2019                        PT Assessment/Plan       Row Name 10/26/23 1200          PT Assessment    Assessment Comments Vesta Sutton has been seen for 8 physical therapy sessions for B hip/LBP. Patient reports overall 80% improvement since the start of PT. Treatment has included therapeutic exercise and patient education with home exercise program . Progress to physical therapy goals is good. Pt has met 3/3 STG and met/partially met 2/4 LTG. Patient demonstrates improved B hip strength compared to initial  evaluation and squat/lift mechanics. She does require cuing to improve form but demonstrates less strain through lumbar spine. She also reports reduction in sleep disturbances from averaging 3-4x/night to </= 1x/night. She reports performing gentle stretches once she has woken and quickly falls back asleep. Due to overall progress and patient reporting improved confidence with managing her pain independently, she wishes to discharge at this time. Finalized her HEP and reviewed in detail. All of patients questions/concerns address. She is now being discharged to independent HEP.  -MEGAN        PT Plan    PT Plan Comments trial independent management  -MEGAN               User Key  (r) = Recorded By, (t) = Taken By, (c) = Cosigned By      Initials Name Provider Type    Ginette Zambrano, PT Physical Therapist                       OP Exercises       Row Name 10/26/23 1100             Subjective    Subjective Comments I feel like my hips are 80% better  -MEGAN         Total Minutes    04574 - PT Therapeutic Exercise Minutes 40  -MEGAN         Exercise 1    Exercise Name 1 nustep  -MEGAN      Cueing 1 Verbal  -MEGAN      Time 1 5 min  -MEGAN      Additional Comments L5  -MEGAN         Exercise 6    Exercise Name 6 glute bridge  -MEGAN      Cueing 6 Verbal;Tactile  -MEGAN      Sets 6 2  -MEGAN      Reps 6 10  -MEGAN      Time 6 + hip add  -MEGAN         Exercise 7    Exercise Name 7 STS + TA  -MEGAN      Cueing 7 Verbal;Demo  -MEGAN      Sets 7 1  -MEGAN      Reps 7 10  -MEGAN      Time 7 low table mat  -MEGAN      Additional Comments RTB above knees  -MEGAN         Exercise 8    Exercise Name 8 AR press  -MEGAN      Cueing 8 Verbal;Demo  -MEGAN      Sets 8 2  -MEGAN      Reps 8 10e  -MEGAN      Time 8 RTB  -MEGAN         Exercise 9    Exercise Name 9 clamshells  -MEGAN      Cueing 9 Verbal;Demo  -MEGAN      Sets 9 2  -MEGAN      Reps 9 10  -MEGAN      Time 9 RTB  -MEGAN         Exercise 11    Exercise Name 11 side steps  -MEGAN      Cueing 11 Verbal;Demo  -MEGAN      Reps 11 3 laps  -MEGAN      Time 11  RTB  -         Exercise 12    Exercise Name 12 monster walk  -      Cueing 12 Verbal;Demo  -      Reps 12 3 laps  -      Time 12 RTB  -                User Key  (r) = Recorded By, (t) = Taken By, (c) = Cosigned By      Initials Name Provider Type    Ginette Zambrano, PT Physical Therapist                                  PT OP Goals       Row Name 10/26/23 1100          PT Short Term Goals    STG Date to Achieve 10/21/23  -     STG 1 Pt will be independent with initial HEP to improve strength/ROM and decrease pain.  -     STG 1 Progress Met  -MEGAN     STG 2 Patient will improve ability to sleep through the night with ,/= 2-3 sleep disturbances/night related to back pain to improve overall sleep quality and quality of life  -     STG 2 Progress Met  -     STG 3 Patient will demonstrate proper log roll mechanics with little to no cues when getting on/off treatment table to demonstrate improved mechanics and decreased strain on lumbar spine.  -     STG 3 Progress Met  -        Long Term Goals    LTG Date to Achieve 11/20/23  -     LTG 1 Pt will be independent with advanced HEP to maintain strength/ROM and decrease pain.  -MEGAN     LTG 1 Progress Ongoing  -     LTG 2 Pt will improve B hip strength to at least 4/5.  -     LTG 2 Progress Partially Met  -MEGAN     LTG 3 Patient will improve ability to sleep through the night without sleep disturbances related to back pain to improve overall sleep quality and quality of life  -MEGAN     LTG 3 Progress Ongoing;Progressing  -     LTG 3 Progress Comments 1x/night  -     LTG 4 Pt will demonstrate appropriate body mechanics for bending/lifting tasks to improve tolerance to ADLs.  -     LTG 4 Progress Partially Met  -     LTG 4 Progress Comments intermittnet cues  -               User Key  (r) = Recorded By, (t) = Taken By, (c) = Cosigned By      Initials Name Provider Type    Ginette Zambrano PT Physical Therapist                     Therapy Education  Education Details: finalized HEP  Given: HEP, Symptoms/condition management, Pain management, Posture/body mechanics  Program: Reinforced, Progressed  How Provided: Verbal, Demonstration, Written  Provided to: Patient  Level of Understanding: Verbalized, Demonstrated              Time Calculation:   Start Time: 1133  Stop Time: 1213  Time Calculation (min): 40 min  Timed Charges  87135 - PT Therapeutic Exercise Minutes: 40  Total Minutes  Timed Charges Total Minutes: 40   Total Minutes: 40  Therapy Charges for Today       Code Description Service Date Service Provider Modifiers Qty    [id number]  PT THER PROC EA 15 MIN 10/26/2023 Ginette Adamson, PT GP 3                      Ginette Adamson, PT  10/26/2023

## 2023-10-26 NOTE — PROGRESS NOTES
"Chief Complaint  Chief Complaint   Patient presents with    Hypothyroidism     Pt states energy levels have been low, weight is higher than expected, does have family hx of thyroid disease(sister).        Subjective          History of Present Illness    Vesta Sutton 68 y.o. presents for a follow-up evaluation of Hypothyroidism      She complains of fatigue, constipation, hair loss, dry skin and cold intolerance    Denies diarrhea, shortness of breath,chest pain, palpitations, heat intolerance, trouble swallowing or change in voice.      Weight gain of 3 lbs since last visit.      Current treatment is levothyroxine 75 mcg 1 tablet Monday through Saturday (None on Sunday, take 6 days a week)  Pt was on [place] in the past, didn't help with symptoms  Pt was on branded Euthyrox, didn't help with symptoms  Pt was on levothyroxine, didn't help with symptoms    Last labs in 10/23 showed TSH 1.140 and FT4 1.77      One of pt's sister had thyroid cancer            Thyroid Nodule(s)    She has several bilateral thyroid nodules, the most dominant of which is on the left and measures 2.5 x 1.7 x 1.4 cm.  She has had a fine needle aspiration of this nodule on May 29, 2019 and the cytology was benign.      Thereafter she has had 2 thyroid ultrasounds and they have demonstrated that the nodule(s) are unchanged.    Last US in 03/2022 showed stable mild multinodular goiter changes.     Follow up US around 03/2024            Pre-Diabetes    Pt's 2 sisters are diabetic  Pt's maternal aunt was diabetic    Current treatment includes dietary modification    Last A1C in 10/23 was 5.8            I have reviewed the patient's allergies, medicines, past medical hx, family hx and social hx.    Objective   Vital Signs:   /80   Pulse 65   Temp 98.2 °F (36.8 °C) (Oral)   Ht 167.6 cm (65.98\")   Wt 82.1 kg (181 lb)   SpO2 98%   BMI 29.23 kg/m²       Physical Exam   Physical Exam  Constitutional:       General: She is not in acute " distress.     Appearance: Normal appearance. She is not ill-appearing.   HENT:      Head: Normocephalic and atraumatic.   Eyes:      General:         Right eye: No discharge.         Left eye: No discharge.   Neck:      Thyroid: No thyroid mass, thyromegaly or thyroid tenderness.      Trachea: Trachea normal.   Cardiovascular:      Rate and Rhythm: Normal rate and regular rhythm.      Heart sounds: Normal heart sounds. No murmur heard.     No friction rub. No gallop.   Pulmonary:      Effort: Pulmonary effort is normal. No respiratory distress.      Breath sounds: Normal breath sounds. No wheezing.   Musculoskeletal:      Cervical back: Neck supple.   Skin:     General: Skin is warm and dry.   Neurological:      Mental Status: She is alert.   Psychiatric:         Mood and Affect: Mood normal.         Behavior: Behavior normal.                    Results Review:   TSH   Date Value Ref Range Status   04/13/2023 0.860 0.270 - 4.200 uIU/mL Final   11/23/2022 1.390 0.270 - 4.200 uIU/mL Final     T3, Free   Date Value Ref Range Status   07/25/2022 2.1 2.0 - 4.4 pg/mL Final     T3, Total   Date Value Ref Range Status   06/23/2022 82 71 - 180 ng/dL Final         Assessment and Plan    Diagnoses and all orders for this visit:    1. Hypothyroidism, unspecified type (Primary)  -     Discontinue: Synthroid 75 MCG tablet; Take 1 tablet by mouth Every Morning. Take on an empty stomach with just water 30 min to an hour before any other medications, food or drink  Dispense: 90 tablet; Refill: 1  -     Synthroid 75 MCG tablet; 75 mcg 1 tablet Monday through Saturday (Non on Sunday, take 6 days a week). Take on an empty stomach with just water 30 min to an hour before any other medications, food or drink  Dispense: 90 tablet; Refill: 1    Thyroid labs are good.    Continue with levothyroxine 75 mcg 1 tablet Monday through Saturday (None on Sunday, take 6 days a week)  Pt wants to change to Brand Synthroid 75 mcg 1 tablet Monday  through Saturday (None on Sunday, take 6 days a week)  Advised to take on an empty stomach, wait 30 mins to and hour before anything else.      2. Thyroid nodule    Next US due around 03/24      3. Prediabetes    A1c is 5.8%  Advised to work on dietary modification, exercise and weight loss            RTC in 6 months with me      Follow Up     Patient was given instructions and counseling regarding her condition or for health maintenance advice. Please see specific information pulled into the AVS if appropriate.              Chuyita Kincaid, DESI  10/26/23

## 2023-11-01 ENCOUNTER — TRANSCRIBE ORDERS (OUTPATIENT)
Dept: ADMINISTRATIVE | Facility: HOSPITAL | Age: 68
End: 2023-11-01
Payer: MEDICARE

## 2023-11-01 DIAGNOSIS — I72.8 ANEURYSM OF SPLENIC ARTERY: Primary | ICD-10-CM

## 2024-01-07 NOTE — PROGRESS NOTES
Chief Complaint  Hypothyroidism    Subjective          Vesta Sutton presents to [facility] ENDOCRINOLOGY  History of Present Illness  66-year-old  female from [place] wth CAD, one native artery 50% narrowing and. 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  She was last seen at this endocrine clinic on 2021.  22 At times have episodes of irritated throat.     She has multinodular thyroid with hypothyroidism.  She has no history of head or neck radiation therapy.  She had a fine-needle biopsy of the left dominant nodule in May 2019 which was benign.  She is on levothyroxine 75 mcg/day.      22Symptoms :  A. She recalls years of morning bowl movements changed to bowl movements only when has larger quantities or adds fiber or eats fruit.  B.  also noted 2 years ago when 88 mcg changed to 75  C. Hair  and eye brows are falling out  D. Sadness and grieving as mother  in 2022    Family history: Her sister has had a thyroidectomy for thyroid cancer that invaded the parathyroid. She had ROWAN therapy.     Hypothyroid Symptoms : She has hair loss, brittle nails. Last week she lost her blister pack missing 3 doses. She has intermittently constipation.  She has no significant weight change since January.     Goiter: No dysphagia or dysphonia. No anterior neck tenderness.     Current thyroid  Medication history:  22 Euthyrox 75 mcg 1 daily Mon through Saturday, none on Sundays.  2021 Euthyrox  75 mcg daily except none on Sundays [ aveg  69.64 mcg daily]  2020 Euthyrox 75 mcg daily  2019 levothyroxine 88 mcg daily.  She takes Euthyrox about 1 hr prior to meals  .  TSH done in 2021 is low at 0.347 with elevated free T4 at 1.85 ng per DL.      History of HGA1C elevation over 2 years ago. HGA1C one year ago and now is normal at HGA1C.    Family history: youngest sister has  "thyroid cancer diagnosed at 59. She had radiation iodine completion of surgery and now has thyroid hormone tablets.     Objective   Vital Signs:   /70   Pulse 71   Temp 99.1 °F (37.3 °C) (Temporal)   Ht 165.1 cm (65\")   Wt 81.1 kg (178 lb 12.8 oz)   SpO2 97%   BMI 29.75 kg/m²     Physical Exam  Vitals and nursing note reviewed.   HENT:      Head: Normocephalic.      Mouth/Throat:      Mouth: Mucous membranes are moist.   Neck:      Thyroid: Thyromegaly present. No thyroid mass.      Trachea: Trachea and phonation normal.      Comments: The thyroid is mildly generous 40-45 g none tender without thrill or bruit. No palpable nodules.  Cardiovascular:      Rate and Rhythm: Normal rate.      Pulses: Normal pulses.      Heart sounds: Normal heart sounds.   Pulmonary:      Effort: Pulmonary effort is normal.      Breath sounds: Normal breath sounds. No wheezing or rhonchi.   Abdominal:      General: Bowel sounds are normal.      Palpations: Abdomen is soft.   Musculoskeletal:         General: No swelling. Normal range of motion.      Cervical back: Normal range of motion and neck supple.   Lymphadenopathy:      Cervical: No cervical adenopathy.   Skin:     General: Skin is warm and dry.   Neurological:      Mental Status: She is alert and oriented to person, place, and time. Mental status is at baseline.   Psychiatric:         Mood and Affect: Mood normal.         Behavior: Behavior normal.         Judgment: Judgment normal.        Result Review :   The following data was reviewed by: Brisa Do MD on 02/07/2022:    Component       TSH Baseline Free T4 T3, Free   Latest Ref Rng & Units       0.450 - 4.500 uIU/mL 0.82 - 1.77 ng/dL 2.0 - 4.4 pg/mL   1/31/2022      11:56 AM 0.456     1/31/2022      11:56 AM  1.64    6/23/2022      9:15 AM 0.811     7/25/2022      8:50 AM 0.986     7/25/2022      8:50 AM  1.41    7/25/2022      8:50 AM   2.1     Component       Cortisol - AM ACTH   Latest Ref Rng & " Units       6.2 - 19.4 ug/dL 7.2 - 63.3 pg/mL   7/25/2022      8:50 AM 17.0    7/25/2022      8:50 AM  31.3     Component       T4, Total Thyroglobulin Ab   Latest Ref Rng & Units       4.5 - 12.0 ug/dL 0.0 - 0.9 IU/mL   6/23/2022      9:15 AM 8.9    6/23/2022      9:15 AM  <1.0     Component      Latest Ref Rng & Units 8/22/2019 11/11/2019 1/31/2022   Thyroid Peroxidase Antibody      0 - 34 IU/mL 11 14    Interpretation         Note     4/20/2021 thyroid ultrasound an unchanged in 2022  Dominant left-sided thyroid nodule previously biopsied [place] category 2.  No other thyroid nodules qualify for fine-needle aspiration per radiology.   Nodule 1  the left thyroid gland there is a solid predominantly  isoechoic solid nodule which measures 2.7 x 1.8 x 1.5 cm. ACR TR  Category 3. This has been previously biopsied on 05/29/2019Nodule 2  Nodule 2  Isthmus right side 14 x 11 x 5 mm  Nodule 3  Right lobe 11 x 9 x 8 mm  Nodule 4   Left lobe dcazhn21 x 7 x 5 mm    Narrative & Impression   THYROID ULTRASOUND     CLINICAL HISTORY: Multinodular goiter     COMPARISON: Thyroid ultrasound dated 04/22/2021.     Transverse and longitudinal images of both lobes of the thyroid gland  were obtained. Both lobes demonstrate somewhat heterogeneous  echogenicity and demonstrate a few solid nodules. The largest nodule is  in the middle of the left lobe. It measures 2.5 x 1.7 x 1.4 cm. It  appears unchanged since in size and echotexture since the preceding  ultrasound. A tiny 9 mm diameter solid nodule in the inferior aspect of  the left lobe is also unchanged. A 1.0 x 0.7 x 0.7 cm diameter solid  nodule in the middle of the right lobe is unchanged. There is also a  tiny nodule in the thyroid isthmus that is unchanged. The right lobe  measures 4.9 x 1.6 x 1.8 cm. The left lobe measures 4.6 x 1.9 x 2.2 cm.     IMPRESSIONS: Stable mild multinodular goiter changes.     This report was finalized on 3/21/2022 11:14 AM by Dr. Tracey  ROSEANN Wesley.       Assessment and Plan    1. Hypothyroid Stable  Continue Euthyrox 75 mcg 6 tablets per week change to daily today . I explained to patient that having normal thyroid function now is unlikely to change her hair and skin concerns.      2. Multinoduler goiter stable. TPO Ab negative.     Discussion.   Multinodular goiter FNA 2019 of dominant left thyroid nodule. Remaining 3 nodules are less than 1.4 cm without concerning features for thyroid cancer.   Recommend repeat US this year 2021    3. Cherelle , mother  in April    4. Hyperglycemia in past and family history of diabetes. Will check hgaic . Last check in .    Diagnoses and all orders for this visit:    1. Hypothyroidism, unspecified type (Primary)  -     Hemoglobin A1c; Future  -     TSH; Future  -     T4, Free; Future  -     T3, Free; Future  -     Thyroid Peroxidase Antibody; Future    2. Acquired hypothyroidism  -     Hemoglobin A1c; Future  -     TSH; Future  -     T4, Free; Future  -     T3, Free; Future  -     Thyroid Peroxidase Antibody; Future    3. Hyperglycemia  -     Hemoglobin A1c; Future  -     TSH; Future  -     T4, Free; Future  -     T3, Free; Future  -     Thyroid Peroxidase Antibody; Future      I spent 30 minutes caring for Vesta on this date of service. This time includes time spent by me in the following activities:reviewing tests, obtaining and/or reviewing a separately obtained history, performing a medically appropriate examination and/or evaluation  and counseling and educating the patient/family/caregiver  Follow Up   Return for Recheck.   Labs in 6 months.  Patient was given instructions and counseling regarding her condition or for health maintenance advice. Please see specific information pulled into the AVS if appropriate.        07-Jan-2024 03:00

## 2024-01-25 ENCOUNTER — HOSPITAL ENCOUNTER (OUTPATIENT)
Dept: GENERAL RADIOLOGY | Facility: HOSPITAL | Age: 69
Discharge: HOME | End: 2024-01-25
Admitting: NURSE PRACTITIONER
Payer: MEDICARE

## 2024-01-25 ENCOUNTER — OFFICE VISIT (OUTPATIENT)
Dept: GASTROENTEROLOGY | Facility: CLINIC | Age: 69
End: 2024-01-25
Payer: MEDICARE

## 2024-01-25 VITALS
TEMPERATURE: 97.3 F | SYSTOLIC BLOOD PRESSURE: 130 MMHG | WEIGHT: 179.5 LBS | OXYGEN SATURATION: 97 % | BODY MASS INDEX: 28.85 KG/M2 | HEIGHT: 66 IN | HEART RATE: 62 BPM | DIASTOLIC BLOOD PRESSURE: 75 MMHG

## 2024-01-25 DIAGNOSIS — K58.2 IRRITABLE BOWEL SYNDROME WITH BOTH CONSTIPATION AND DIARRHEA: Primary | ICD-10-CM

## 2024-01-25 DIAGNOSIS — Z91.09 OTHER ALLERGY STATUS, OTHER THAN TO DRUGS AND BIOLOGICAL SUBSTANCES: ICD-10-CM

## 2024-01-25 PROCEDURE — 3075F SYST BP GE 130 - 139MM HG: CPT | Performed by: NURSE PRACTITIONER

## 2024-01-25 PROCEDURE — 1160F RVW MEDS BY RX/DR IN RCRD: CPT | Performed by: NURSE PRACTITIONER

## 2024-01-25 PROCEDURE — 74019 RADEX ABDOMEN 2 VIEWS: CPT

## 2024-01-25 PROCEDURE — 3078F DIAST BP <80 MM HG: CPT | Performed by: NURSE PRACTITIONER

## 2024-01-25 PROCEDURE — 1159F MED LIST DOCD IN RCRD: CPT | Performed by: NURSE PRACTITIONER

## 2024-01-25 PROCEDURE — 99214 OFFICE O/P EST MOD 30 MIN: CPT | Performed by: NURSE PRACTITIONER

## 2024-01-25 RX ORDER — DICYCLOMINE HYDROCHLORIDE 10 MG/1
10 CAPSULE ORAL 3 TIMES DAILY PRN
Qty: 120 CAPSULE | Refills: 3 | Status: SHIPPED | OUTPATIENT
Start: 2024-01-25

## 2024-01-25 RX ORDER — MONTELUKAST SODIUM 10 MG/1
TABLET ORAL
COMMUNITY
Start: 2023-11-27

## 2024-01-25 NOTE — PROGRESS NOTES
Chief Complaint   Patient presents with    Abdominal Pain       HPI    Vesta Sutton is a  68 y.o. female here for a follow up visit for abdominal pain and diarrhea.    This patient follows with Dr. Stahl and myself.    Past medical history of anxiety, depression, appendiceal carcinoid tumor, asthma, coronary artery disease, hyperlipidemia, hypertension, thyroid disease, sleep apnea along with vertigo.    She was last seen by Dr. Stahl in May 2023 at which time she was sent for HIDA scan which was normal.    On visit today patient reports continued IBS-like symptoms alternating between diarrhea and constipation.  She can go up to 4 days without a bowel movement and then have to take over-the-counter laxatives which caused diarrhea.  She will often then take Imodium to resolve diarrhea with continuation of alternating bowel pattern in a cyclical fashion.  She cites generalized abdominal cramps that come and go.  Worse after she ate fried rice and drink beer.  She does cite sensitivity to gluten which can cause considerable gas and bloating.  No rectal bleeding or rectal pain.  Appetite is good.  Weight is stable.  She is concerned about food allergies.    Additional data reviewed:    Colonoscopy 2021 - Nonbleeding internal hemorrhoids and diverticulosis recall 10 years.    EGD 2021 -  normal findings.  Pathology negative for celiac disease.    Past Medical History:   Diagnosis Date    Anemia     Anxiety and depression     Appendiceal carcinoid tumor     Arthritis     Asthma     Breast nodule     Cancer of appendix     Coronary artery disease     nonobstructive     COVID-19 virus infection 06/2021    Dizziness     Environmental allergies     Fibromuscular hyperplasia of artery     in the carotid artery with no stenosis    GERD (gastroesophageal reflux disease)     Headache     Hernia in stomach    History of anesthesia complications 2017    lidocaine caused reaction during dental procedure per patient     Hyperlipidemia     Hypertension     Hypothyroidism     Impaired glucose metabolism     Lumbar pain     Myalgia     Nodular goiter     last US 3/21/22 stable 2.5 cm left lobe  and  a 9 mm left lobe and a 10 mm mid right lobe. Sister with thyroid cancer    LEATHA (obstructive sleep apnea) 2008    Overnight polysomnogram.  Weight 166 pounds.  Mild LEATHA with AHI 7.5 events per hour.  When supine, moderately abnormal at 15.1 events per hour.  Low oxygen saturation 78% and sleep-related hypoxia present for 24 minutes.    Palpitations     RBBB (right bundle branch block)     Retinal detachment     left  //  when blood pressure gets high she notices flashing lights in her vision    Vertigo        Past Surgical History:   Procedure Laterality Date    APPENDECTOMY  2001    BREAST BIOPSY Left     2017    BREAST CYST ASPIRATION Left     2019    CATARACT EXTRACTION, BILATERAL       SECTION      COLONOSCOPY N/A 2019    Procedure: COLONOSCOPY to CECUM;  Surgeon: Damien Aleman MD;  Location: [facility] ENDOSCOPY;  Service: General    CORNEA LACERATION REPAIR      ENDOSCOPY N/A 2016    Z-line regular, 40 cm from the incisors, gastritis, erythematous duodenopathy    ENDOSCOPY N/A 2018    Normal exophagus, Erythematous mucosa in the antrum, Normal examined duodenum-Dr. Devyn Stahl    ENDOSCOPY N/A 2019    Procedure: ESOPHAGOGASTRODUODENOSCOPY with BX;  Surgeon: Damien Aleman MD;  Location: [facility] ENDOSCOPY;  Service: General    ENDOSCOPY AND COLONOSCOPY N/A 2014    EGD with biopsy and colonoscopy, Mild gastritis, Normal colon, Repeat Colonoscopy in 5 years, Dr. Damien Aleman    ENDOSCOPY AND COLONOSCOPY N/A 2008    EGD with biopsy and colonoscopy-Dr. Damien Aleman    UPPER GASTROINTESTINAL ENDOSCOPY  Cannot remember    URETEROCELE REPAIR      US GUIDED FINE NEEDLE ASPIRATION  2019       Scheduled Meds:     Continuous Infusions: No current facility-administered medications  for this visit.      PRN Meds:     Allergies   Allergen Reactions    Beta Adrenergic Blockers Other (See Comments)     Asthma      Other Other (See Comments) and Dizziness     Septocaine    Hctz [Hydrochlorothiazide] Other (See Comments)     dehydration, stone in salivary gland, hypokalemia    Sulfa Antibiotics Hives    Spironolactone GI Intolerance    Sulfamethoxazole-Trimethoprim Unknown (See Comments)    Viibryd [Vilazodone Hcl] Anxiety and Dizziness       Social History     Socioeconomic History    Marital status:    Tobacco Use    Smoking status: Never     Passive exposure: Never    Smokeless tobacco: Never    Tobacco comments:     CAFFINE USE: 2 cups daily   Vaping Use    Vaping Use: Never used   Substance and Sexual Activity    Alcohol use: Yes     Alcohol/week: 5.0 standard drinks of alcohol     Types: 5 Glasses of wine per week     Comment: Occasionally    Drug use: No    Sexual activity: Defer       Family History   Problem Relation Age of Onset    Heart disease Mother     Irritable bowel syndrome Mother     Heart failure Father     Heart disease Father     Diabetes Sister     Heart disease Brother     Colon polyps Brother     Breast cancer Neg Hx     Ovarian cancer Neg Hx        Review of Systems   Gastrointestinal:  Positive for constipation and diarrhea.       Vitals:    01/25/24 1338   BP: 130/75   Pulse: 62   Temp: 97.3 °F (36.3 °C)   SpO2: 97%       Physical Exam  Constitutional:       Appearance: She is well-developed.   Abdominal:      General: Bowel sounds are normal. There is no distension.      Palpations: Abdomen is soft. There is no mass.      Tenderness: There is no abdominal tenderness. There is no guarding.      Hernia: No hernia is present.   Skin:     General: Skin is warm and dry.      Capillary Refill: Capillary refill takes less than 2 seconds.   Neurological:      Mental Status: She is alert and oriented to person, place, and time.   Psychiatric:         Behavior: Behavior  normal.     Assessment    Diagnoses and all orders for this visit:    1. Irritable bowel syndrome with both constipation and diarrhea (Primary)  -     CBC (No Diff)  -     Comprehensive Metabolic Panel  -     TSH  -     Food Allergy Profile  -     Alpha-Gal IgE Panel  -     XR Abdomen Flat & Upright    2. Other allergy status, other than to drugs and biological substances  -     Food Allergy Profile  -     Alpha-Gal IgE Panel    Other orders  -     dicyclomine (BENTYL) 10 MG capsule; Take 1 capsule by mouth 3 (Three) Times a Day As Needed for Abdominal Cramping.  Dispense: 120 capsule; Refill: 3       Plan    Recommend abdominal x-ray today to assess stool burden  Give Bentyl antispasmodic in the interim for relief  Labs today as above  Further recommendations and follow-up pending imaging and serology         DESI Husain  [facility]  [address] [place] [address]  Office: [phone]

## 2024-01-26 LAB
ALBUMIN SERPL-MCNC: 4.3 G/DL (ref 3.9–4.9)
ALBUMIN/GLOB SERPL: 1.9 {RATIO} (ref 1.2–2.2)
ALP SERPL-CCNC: 60 IU/L (ref 44–121)
ALT SERPL-CCNC: 23 IU/L (ref 0–32)
AST SERPL-CCNC: 19 IU/L (ref 0–40)
BILIRUB SERPL-MCNC: 0.3 MG/DL (ref 0–1.2)
BUN SERPL-MCNC: 13 MG/DL (ref 8–27)
BUN/CREAT SERPL: 16 (ref 12–28)
CALCIUM SERPL-MCNC: 9.4 MG/DL (ref 8.7–10.3)
CHLORIDE SERPL-SCNC: 101 MMOL/L (ref 96–106)
CO2 SERPL-SCNC: 25 MMOL/L (ref 20–29)
CREAT SERPL-MCNC: 0.82 MG/DL (ref 0.57–1)
EGFRCR SERPLBLD CKD-EPI 2021: 79 ML/MIN/1.73
ERYTHROCYTE [DISTWIDTH] IN BLOOD BY AUTOMATED COUNT: 12.4 % (ref 11.7–15.4)
GLOBULIN SER CALC-MCNC: 2.3 G/DL (ref 1.5–4.5)
GLUCOSE SERPL-MCNC: 116 MG/DL (ref 70–99)
HCT VFR BLD AUTO: 37.4 % (ref 34–46.6)
HGB BLD-MCNC: 12.2 G/DL (ref 11.1–15.9)
MCH RBC QN AUTO: 31.5 PG (ref 26.6–33)
MCHC RBC AUTO-ENTMCNC: 32.6 G/DL (ref 31.5–35.7)
MCV RBC AUTO: 97 FL (ref 79–97)
PLATELET # BLD AUTO: 195 X10E3/UL (ref 150–450)
POTASSIUM SERPL-SCNC: 4 MMOL/L (ref 3.5–5.2)
PROT SERPL-MCNC: 6.6 G/DL (ref 6–8.5)
RBC # BLD AUTO: 3.87 X10E6/UL (ref 3.77–5.28)
SODIUM SERPL-SCNC: 141 MMOL/L (ref 134–144)
TSH SERPL DL<=0.005 MIU/L-ACNC: 0.33 UIU/ML (ref 0.45–4.5)
WBC # BLD AUTO: 6.3 X10E3/UL (ref 3.4–10.8)

## 2024-01-26 NOTE — PROGRESS NOTES
Could you take take a look at her xray?  Reported as normal.  She is alternating between diarrhea and constipation with abdominal cramps.  Thoughts?

## 2024-01-29 ENCOUNTER — TELEPHONE (OUTPATIENT)
Dept: GASTROENTEROLOGY | Facility: CLINIC | Age: 69
End: 2024-01-29
Payer: MEDICARE

## 2024-01-29 LAB
ALPHA-GAL IGE QN: <0.1 KU/L
BEEF IGE QN: <0.1 KU/L
CLAM IGE QN: <0.1 KU/L
CODFISH IGE QN: <0.1 KU/L
CONV CLASS DESCRIPTION: NORMAL
CORN IGE QN: <0.1 KU/L
COW MILK IGE QN: <0.1 KU/L
EGG WHITE IGE QN: <0.1 KU/L
IGE SERPL-ACNC: 200 IU/ML (ref 6–495)
LAMB IGE QN: <0.1 KU/L
PEANUT IGE QN: <0.1 KU/L
PORK IGE QN: <0.1 KU/L
SCALLOP IGE QN: <0.1 KU/L
SESAME SEED IGE QN: <0.1 KU/L
SHRIMP IGE QN: <0.1 KU/L
SOYBEAN IGE QN: <0.1 KU/L
WALNUT IGE QN: <0.1 KU/L
WHEAT IGE QN: <0.1 KU/L

## 2024-01-29 NOTE — TELEPHONE ENCOUNTER
Called patient and she states she was suppose to have an allergy profile and meat profile done when she had blood work done on the 25.       After looking at the chart. It looks like it was ordered but either has not results or was not drawn. Will check with  to be sure.

## 2024-01-29 NOTE — TELEPHONE ENCOUNTER
Called patient back after speaking RO and informed her that they were drawn and as soon as we get those results we will give her the results and recommendations.

## 2024-01-29 NOTE — TELEPHONE ENCOUNTER
Hub staff attempted to follow warm transfer process and was unsuccessful     Caller: Vesta Sutton    Relationship to patient: Self    Best call back number: [phone]     Patient is needing: PT HAS QUESTIONS ABOUT LAB ORDERS PLACED FOR HER ON 1/25/24 BY EDY. PLEASE CALL TO ADVISE.

## 2024-02-01 ENCOUNTER — TELEPHONE (OUTPATIENT)
Dept: GASTROENTEROLOGY | Facility: CLINIC | Age: 69
End: 2024-02-01
Payer: MEDICARE

## 2024-02-01 NOTE — TELEPHONE ENCOUNTER
Call to patient and left voice mail for them to call office at [phone] option 1.     Results also sent via [facility]       ----- Message from DESI Husain sent at 1/31/2024  1:29 PM EST -----  Please inform the patient lab work shows no evidence of food allergies.  Normal liver function test.  Thyroid hormone is below normal would have her discuss this further with her primary care provider.  No anemia.    Abdominal x-ray shows a lot of retained stool consistent with constipation.  Dr. Stahl has reviewed as well.  We would like her to take MiraLAX bowel prep to empty her colon and then start low-dose Linzess to prevent reaccumulation.  She does not ha  ve to fast while taking MiraLAX bowel prep.  If she is agreeable I can send a prescription for Linzess to her pharmacy.  Please review MiraLAX bowel prep.  Thanks Marissa

## 2024-02-01 NOTE — TELEPHONE ENCOUNTER
Called patient and she states she saw she was not allergic to wheat so she fixed her some bread and meat. She said the inside of her mouth was swollen and so were her gums it lasted Tuesday and Wednesday night. So she does not know what is making her mouth swell. Patient states she feels better when she is gluten free.     Patient states after eating steaks and a side dish she had diarrhea. And has had a bowel movement today as well. She said she would rather take mag citrate over the miralax.

## 2024-02-01 NOTE — TELEPHONE ENCOUNTER
HUB STAFF ATTEMPTED TO FOLLOW WARM TRANSFER PROCESS BUT WAS UNSUCCESSFUL.     Caller: Vesta Sutton    Relationship: Self    Best call back number: [phone]    What is the best time to reach you: AVAILABLE BEFORE 1PM. SHE HAS APPT AT 1 PM SO SHE WILL NOT BE AVAILABLE UNTIL AFTER 2PM.     Who are you requesting to speak with (clinical staff, provider,  specific staff member): CLINICAL    Do you know the name of the person who called: LALITHA RAYGOZA     What was the call regarding: RESULTS     Is it okay if the provider responds through MyChart: NEEDS TO TALK

## 2024-02-02 NOTE — TELEPHONE ENCOUNTER
If she feels better avoiding gluten she can certainly do so.  She may be gluten sensitive.  MiraLAX will be more gentle over magnesium citrate and will more effectively empty her colon.

## 2024-02-05 NOTE — TELEPHONE ENCOUNTER
Called patient and left vm per Marissa's message. Asked patient to call back with any concerns or questions.

## 2024-02-07 ENCOUNTER — TELEPHONE (OUTPATIENT)
Dept: ENDOCRINOLOGY | Age: 69
End: 2024-02-07
Payer: MEDICARE

## 2024-02-07 DIAGNOSIS — E04.1 THYROID NODULE: Primary | ICD-10-CM

## 2024-02-07 DIAGNOSIS — R73.03 PREDIABETES: ICD-10-CM

## 2024-02-07 DIAGNOSIS — E03.9 HYPOTHYROIDISM, UNSPECIFIED TYPE: ICD-10-CM

## 2024-02-08 ENCOUNTER — LAB (OUTPATIENT)
Dept: ENDOCRINOLOGY | Age: 69
End: 2024-02-08
Payer: MEDICARE

## 2024-02-08 ENCOUNTER — TELEPHONE (OUTPATIENT)
Dept: GASTROENTEROLOGY | Facility: CLINIC | Age: 69
End: 2024-02-08
Payer: MEDICARE

## 2024-02-08 DIAGNOSIS — R73.03 PREDIABETES: ICD-10-CM

## 2024-02-08 DIAGNOSIS — E04.1 THYROID NODULE: ICD-10-CM

## 2024-02-08 LAB
ALBUMIN SERPL-MCNC: 4.5 G/DL (ref 3.5–5.2)
ALBUMIN/GLOB SERPL: 1.8 G/DL
ALP SERPL-CCNC: 65 U/L (ref 39–117)
ALT SERPL-CCNC: 25 U/L (ref 1–33)
AST SERPL-CCNC: 20 U/L (ref 1–32)
BILIRUB SERPL-MCNC: 0.4 MG/DL (ref 0–1.2)
BUN SERPL-MCNC: 14 MG/DL (ref 8–23)
BUN/CREAT SERPL: 16.1 (ref 7–25)
CALCIUM SERPL-MCNC: 9.6 MG/DL (ref 8.6–10.5)
CHLORIDE SERPL-SCNC: 101 MMOL/L (ref 98–107)
CO2 SERPL-SCNC: 28.1 MMOL/L (ref 22–29)
CREAT SERPL-MCNC: 0.87 MG/DL (ref 0.57–1)
EGFRCR SERPLBLD CKD-EPI 2021: 73.6 ML/MIN/1.73
GLOBULIN SER CALC-MCNC: 2.5 GM/DL
GLUCOSE SERPL-MCNC: 83 MG/DL (ref 65–99)
HBA1C MFR BLD: 5.3 % (ref 4.8–5.6)
POTASSIUM SERPL-SCNC: 4.5 MMOL/L (ref 3.5–5.2)
PROT SERPL-MCNC: 7 G/DL (ref 6–8.5)
SODIUM SERPL-SCNC: 139 MMOL/L (ref 136–145)
T4 FREE SERPL-MCNC: 1.6 NG/DL (ref 0.93–1.7)
TSH SERPL DL<=0.005 MIU/L-ACNC: 0.61 UIU/ML (ref 0.27–4.2)

## 2024-02-08 NOTE — TELEPHONE ENCOUNTER
PT IS RETURNING LALITHA'S CALL. SHE WOULD LIKE A CALL BACK TODAY IF POSSIBLE BECAUSE SHE CAN'T ANSWER TOMORROW.

## 2024-02-08 NOTE — TELEPHONE ENCOUNTER
Called patient and she states she can do not the cleanse right now because she works and she is celebrating her daughters birthday this weekend. She states she will try to do it at some time soon.

## 2024-02-22 ENCOUNTER — OFFICE VISIT (OUTPATIENT)
Dept: ENDOCRINOLOGY | Age: 69
End: 2024-02-22
Payer: MEDICARE

## 2024-02-22 VITALS
TEMPERATURE: 96.9 F | DIASTOLIC BLOOD PRESSURE: 84 MMHG | OXYGEN SATURATION: 97 % | BODY MASS INDEX: 29.6 KG/M2 | SYSTOLIC BLOOD PRESSURE: 140 MMHG | HEART RATE: 73 BPM | WEIGHT: 184.2 LBS | HEIGHT: 66 IN

## 2024-02-22 DIAGNOSIS — R73.03 PREDIABETES: ICD-10-CM

## 2024-02-22 DIAGNOSIS — E04.1 THYROID NODULE: ICD-10-CM

## 2024-02-22 DIAGNOSIS — E03.9 HYPOTHYROIDISM, UNSPECIFIED TYPE: Primary | ICD-10-CM

## 2024-02-22 PROCEDURE — 3079F DIAST BP 80-89 MM HG: CPT | Performed by: NURSE PRACTITIONER

## 2024-02-22 PROCEDURE — 3077F SYST BP >= 140 MM HG: CPT | Performed by: NURSE PRACTITIONER

## 2024-02-22 PROCEDURE — 99214 OFFICE O/P EST MOD 30 MIN: CPT | Performed by: NURSE PRACTITIONER

## 2024-02-22 RX ORDER — LEVOTHYROXINE SODIUM 0.07 MG/1
TABLET ORAL
Start: 2024-02-22

## 2024-02-22 NOTE — PROGRESS NOTES
"Chief Complaint  Chief Complaint   Patient presents with    Hypothyroidism     Pt states that energy levels have been good, weight is higher than expected, does have family hx of thyroid disease.        Subjective          History of Present Illness    Vesta Sutton 68 y.o.  presents for a follow-up evaluation of Hypothyroidism        Family History Thyroid Cancer: Sister      She complains of fatigue, constipation, hair loss, dry skin and cold intolerance    Denies diarrhea, shortness of breath,chest pain, palpitations, trouble swallowing or change in voice.      Weight gain of 3 lbs since last visit.        Current treatment is levothyroxine 75 mcg 1 tablet Monday through Saturday (None on Sunday, take 6 days a week)  Pt was on [place] in the past, didn't help with symptoms  Pt was on branded Euthyrox, didn't help with symptoms      Last labs in 02/24 showed TSH 0.610 and FT4 1.60            Thyroid Nodule(s)     She has several bilateral thyroid nodules, the most dominant of which is on the left and measures 2.5 x 1.7 x 1.4 cm.  She has had a fine needle aspiration of this nodule on May 29, 2019 and the cytology was benign.        Thereafter she has had 2 thyroid ultrasounds and they have demonstrated that the nodule(s) are unchanged.     Last US in 03/2022 showed stable mild multinodular goiter changes.      Follow up US around 03/2024                 Pre-Diabetes     Pt's 2 sisters are diabetic  Pt's maternal aunt was diabetic     Current treatment includes dietary modification     Last A1C in 02/24 was 5.3            I have reviewed the patient's allergies, medicines, past medical hx, family hx and social hx.    Objective   Vital Signs:   /84   Pulse 73   Temp 96.9 °F (36.1 °C) (Temporal)   Ht 167.6 cm (65.98\")   Wt 83.6 kg (184 lb 3.2 oz)   SpO2 97%   BMI 29.75 kg/m²       Physical Exam   Physical Exam  Constitutional:       General: She is not in acute distress.     Appearance: Normal " appearance. She is not ill-appearing.   HENT:      Head: Normocephalic and atraumatic.   Eyes:      General:         Right eye: No discharge.         Left eye: No discharge.   Neck:      Thyroid: No thyroid mass, thyromegaly or thyroid tenderness.      Trachea: Trachea normal.   Cardiovascular:      Rate and Rhythm: Normal rate and regular rhythm.      Heart sounds: Normal heart sounds. No murmur heard.     No friction rub. No gallop.   Pulmonary:      Effort: Pulmonary effort is normal. No respiratory distress.      Breath sounds: Normal breath sounds. No wheezing.   Musculoskeletal:      Cervical back: Neck supple.   Skin:     General: Skin is warm and dry.   Neurological:      Mental Status: She is alert.   Psychiatric:         Mood and Affect: Mood normal.         Behavior: Behavior normal.                    Results Review:   TSH   Date Value Ref Range Status   02/08/2024 0.610 0.270 - 4.200 uIU/mL Final   11/23/2022 1.390 0.270 - 4.200 uIU/mL Final     T3, Free   Date Value Ref Range Status   07/25/2022 2.1 2.0 - 4.4 pg/mL Final     T3, Total   Date Value Ref Range Status   06/23/2022 82 71 - 180 ng/dL Final         Assessment and Plan    Diagnoses and all orders for this visit:    1. Hypothyroidism, unspecified type (Primary)  -     levothyroxine (Synthroid) 75 MCG tablet; 75 mcg 1 tablet Monday through Saturday (Non on Sunday, take 6 days a week). Take on an empty stomach with just water 30 min to an hour before any other medications, food or drink  -     TSH; Future  -     T4, Free; Future    Thyroid labs are good.    Continue with levothyroxine 75 mcg 1 tablet Monday through Saturday (None on Sunday, take 6 days a week)      2. Thyroid nodule  -     US Thyroid; Future    Due for follow up thyroid US      3. Prediabetes  -     Hemoglobin A1c; Future  -     Comprehensive Metabolic Panel; Future    A1c is great  Continue with dietary modification                RTC in 6 months with me, labs prior      Follow  Up     Patient was given instructions and counseling regarding her condition or for health maintenance advice. Please see specific information pulled into the AVS if appropriate.              DESI [place]  02/22/24

## 2024-02-26 ENCOUNTER — TELEPHONE (OUTPATIENT)
Dept: GASTROENTEROLOGY | Facility: CLINIC | Age: 69
End: 2024-02-26
Payer: MEDICARE

## 2024-02-26 NOTE — TELEPHONE ENCOUNTER
Hub staff attempted to follow warm transfer process and was unsuccessful     Caller: Vesta Sutton    Relationship to patient: Self    Best call back number: [phone]; [facility] TO [facility]; ONLY FREE TODAY     Patient is needing: PATIENT CALLED WANTING TO LEAVE A MESSAGE WITH LALITHA REGARDING MIRALAX BOWEL PREP AND THAT SHE'S WILLING TO START LINZESS.

## 2024-02-29 ENCOUNTER — OFFICE VISIT (OUTPATIENT)
Dept: SLEEP MEDICINE | Facility: HOSPITAL | Age: 69
End: 2024-02-29
Payer: MEDICARE

## 2024-02-29 VITALS — WEIGHT: 179.2 LBS | HEIGHT: 66 IN | HEART RATE: 66 BPM | BODY MASS INDEX: 28.8 KG/M2 | OXYGEN SATURATION: 99 %

## 2024-02-29 DIAGNOSIS — G47.36 HYPOXEMIA ASSOCIATED WITH SLEEP: ICD-10-CM

## 2024-02-29 DIAGNOSIS — G47.21 CIRCADIAN RHYTHM SLEEP DISORDER, DELAYED SLEEP PHASE TYPE: ICD-10-CM

## 2024-02-29 DIAGNOSIS — F51.04 PSYCHOPHYSIOLOGICAL INSOMNIA: ICD-10-CM

## 2024-02-29 DIAGNOSIS — G47.33 OSA ON CPAP: Primary | ICD-10-CM

## 2024-02-29 PROCEDURE — G0463 HOSPITAL OUTPT CLINIC VISIT: HCPCS

## 2024-02-29 NOTE — PROGRESS NOTES
"Follow Up Sleep Disorders Center Note     Chief Complaint:  LEATHA     Primary Care Physician: Benjamín Morocho MD    Interval History:   The patient is a 68 y.o. female  who I last saw 1/5/2023 and that note was reviewed.  The patient reports she is about the same.  However, she is waking up around 2 or 3 AM.  She goes to bed between 10 PM and midnight and falls asleep pretty quickly.  She wakes up to use the restroom and she has sleep maintenance insomnia at times.  Additionally, she reports some stomach problems with gaseous distention.    The patient continues to take trazodone 150 mg, 1-1/2 tab nightly and as needed Xanax 0.5 mg at bedtime    Review of Systems:    A complete review of systems was done and all were negative with the exception of some nasal congestion, STANLEY and abdominal bloating, and some anxiety and depression    Social History:    Social History     Socioeconomic History    Marital status:    Tobacco Use    Smoking status: Never     Passive exposure: Never    Smokeless tobacco: Never    Tobacco comments:     CAFFINE USE: 2 cups daily   Vaping Use    Vaping Use: Never used   Substance and Sexual Activity    Alcohol use: Yes     Alcohol/week: 5.0 standard drinks of alcohol     Types: 5 Glasses of wine per week     Comment: Occasionally    Drug use: No    Sexual activity: Not Currently     Birth control/protection: Post-menopausal       Allergies:  Beta adrenergic blockers, Other, Hctz [hydrochlorothiazide], Sulfa antibiotics, Spironolactone, Sulfamethoxazole-trimethoprim, and Viibryd [vilazodone hcl]     Medication Review: Her list was reviewed.      Vital Signs:    Vitals:    02/29/24 1052   Pulse: 66   SpO2: 99%   Weight: 81.3 kg (179 lb 3.2 oz)   Height: 167.6 cm (65.98\")     Body mass index is 28.94 kg/m².    Physical Exam:    Constitutional:  Well developed 68 y.o. female that appears in no apparent distress.  Awake & oriented times 3.  Normal mood with normal recent and remote memory " and normal judgement.  Eyes:  Conjunctivae normal.  Oropharynx: Previously, moist mucous membranes without exudate and a large tongue and normal uvula and patent posterior pharyngeal opening.    Self-administered [place] Sleepiness Scale test results: 0  0-5 Lower normal daytime sleepiness  6-10 Higher normal daytime sleepiness  11-12 Mild, 13-15 Moderate, & 16-24 Severe excessive daytime sleepiness     Downloaded PAP Data Evaluated For Therapeutic Response and Compliance:  DME is [employer] and she uses a fullface mask with chinstrap.  Downloads between 12/1 and 2/28/2024 compliance 100%.  Average usage is 8 hours and 5 minutes.  Average AHI is normal with no significant leak.  Average auto CPAP pressure is 12.1 and her DreamStation 2 auto CPAP is set at 8-15    I have reviewed the above results and compared them with the patient's last downloads and reviewed with the patient.    Impression:   Mild obstructive sleep apnea, moderate when supine, with sleep-related hypoxia by overnight polysomnogram 4/3/2008, weight 166 pounds adequately treated with DreamStation 2 auto CPAP. The patient appears to be at goal with good compliance and usage. The patient has no complaints of hypersomnolence.  Psychophysiologic insomnia  Circadian rhythm sleep disorder, delayed sleep phase type    Plan:  Good sleep hygiene measures should be maintained.  Some weight loss would be beneficial in this patient who is overweight by Body mass index is 28.94 kg/m².      After evaluating the patient and assessing results available, the patient is benefiting from the treatment being provided.     The patient will continue DreamStation 2 auto CPAP.  Potential side effects of not using PAP therapy reviewed and addressed as needed.  After clinical evaluation and review of downloads, I recommend changes to the patient's pressures.  Due to complaints of gaseous distention, auto CPAP will be changed 8-12.  A new prescription will be sent to the  patient's DME.    I answered all of the patient's questions.  The patient will call the Sleep Disorder Center for any problems and will follow up in 4-6 months.      Jass Batista MD  Sleep Medicine  02/29/24  11:15 EST

## 2024-03-01 PROBLEM — G47.36 HYPOXEMIA ASSOCIATED WITH SLEEP: Status: ACTIVE | Noted: 2024-03-01

## 2024-03-14 ENCOUNTER — TRANSCRIBE ORDERS (OUTPATIENT)
Dept: PHYSICAL THERAPY | Facility: HOSPITAL | Age: 69
End: 2024-03-14
Payer: MEDICARE

## 2024-03-14 DIAGNOSIS — M54.50 LUMBAR PAIN: Primary | ICD-10-CM

## 2024-03-28 ENCOUNTER — HOSPITAL ENCOUNTER (OUTPATIENT)
Facility: HOSPITAL | Age: 69
Discharge: HOME | End: 2024-03-28
Payer: MEDICARE

## 2024-04-11 ENCOUNTER — HOSPITAL ENCOUNTER (OUTPATIENT)
Dept: ULTRASOUND IMAGING | Facility: HOSPITAL | Age: 69
Discharge: HOME | End: 2024-04-11
Payer: MEDICARE

## 2024-04-11 ENCOUNTER — OFFICE VISIT (OUTPATIENT)
Dept: CARDIOLOGY | Facility: CLINIC | Age: 69
End: 2024-04-11
Payer: MEDICARE

## 2024-04-11 ENCOUNTER — PATIENT ROUNDING (BHMG ONLY) (OUTPATIENT)
Age: 69
End: 2024-04-11
Payer: MEDICARE

## 2024-04-11 VITALS — SYSTOLIC BLOOD PRESSURE: 155 MMHG | HEART RATE: 64 BPM | DIASTOLIC BLOOD PRESSURE: 80 MMHG

## 2024-04-11 DIAGNOSIS — I25.10 CORONARY ARTERIOSCLEROSIS IN NATIVE ARTERY: Primary | ICD-10-CM

## 2024-04-11 DIAGNOSIS — I45.2 RBBB (RIGHT BUNDLE BRANCH BLOCK WITH LEFT ANTERIOR FASCICULAR BLOCK): ICD-10-CM

## 2024-04-11 DIAGNOSIS — E78.2 MIXED HYPERLIPIDEMIA: ICD-10-CM

## 2024-04-11 DIAGNOSIS — I10 PRIMARY HYPERTENSION: ICD-10-CM

## 2024-04-11 DIAGNOSIS — E04.1 THYROID NODULE: ICD-10-CM

## 2024-04-11 PROCEDURE — 1160F RVW MEDS BY RX/DR IN RCRD: CPT | Performed by: NURSE PRACTITIONER

## 2024-04-11 PROCEDURE — 1159F MED LIST DOCD IN RCRD: CPT | Performed by: NURSE PRACTITIONER

## 2024-04-11 PROCEDURE — 3079F DIAST BP 80-89 MM HG: CPT | Performed by: NURSE PRACTITIONER

## 2024-04-11 PROCEDURE — 93000 ELECTROCARDIOGRAM COMPLETE: CPT | Performed by: NURSE PRACTITIONER

## 2024-04-11 PROCEDURE — 76536 US EXAM OF HEAD AND NECK: CPT

## 2024-04-11 PROCEDURE — 3077F SYST BP >= 140 MM HG: CPT | Performed by: NURSE PRACTITIONER

## 2024-04-11 PROCEDURE — 99214 OFFICE O/P EST MOD 30 MIN: CPT | Performed by: NURSE PRACTITIONER

## 2024-04-11 RX ORDER — HYDRALAZINE HYDROCHLORIDE 25 MG/1
25 TABLET, FILM COATED ORAL AS NEEDED
Qty: 30 TABLET | Refills: 11 | Status: SHIPPED | OUTPATIENT
Start: 2024-04-11

## 2024-04-11 RX ORDER — MELOXICAM 15 MG/1
1 TABLET ORAL DAILY
COMMUNITY

## 2024-04-11 NOTE — ED NOTES
Thank you for letting us care for you in your recent visit to our [facility] urgent care center. We would love to hear about your experience with us. Was this the first time you have visited our location?    We’re always looking for ways to make our patients’ experiences even better. Do you have any recommendations on ways we may improve?     I appreciate you taking the time to respond. Please be on the lookout for a survey about your recent visit from [facility] [employer] via text or email. We would greatly appreciate if you could fill that out and turn it back in. We want your voice to be heard and we value your feedback.   Thank you for choosing [facility] for your healthcare needs.     If you have concerns or would like to speak to me in person regarding your visit please feel free to give me a call. [phone]    Hope you get well soon and thank you.    Zeyad Mendes LPN Practice Manager

## 2024-04-11 NOTE — PROGRESS NOTES
[facility] CARDIOLOGY  [address]  [facility] [address]  [facility] [address]  Phone: [phone]  Fax: [phone]  Patient Name: Vesta Sutton  :1955  Age: 68 y.o.  Primary Cardiologist: Susan Paula MD  Encounter Provider:  DESI Green    History of Present Illness     Vesta Sutton is a 68 y.o.  female whose medical history includes hypertension, GERD, hyperlipidemia, anxiety, degenerative disc disease, and retinal detachment of the left eye.  She is followed in our office by Dr. Paula for coronary artery calcification and hypertension. I have reviewed the past medical records in preparation of today's visit.     24 Follow-up:  She is here for 6-month follow-up.  Her blood pressure is a little elevated in the office today but she states that she is a bit nervous and she worked yesterday.  Typically her blood pressure at home runs 120-130/60-70.  She will have occasional episodes in which her blood pressure will be in the 150s but she has back pain with this.  She also has blood pressures that can be in the low 1 teens at times.  She notices that she does not feel well when her heart rate is below 60.  She has worsening leg swelling on days that she works.  She is not having any worsening chest pain, dyspnea with exertion, or palpitations.    Data Review     The following data was reviewed by DESI Green on 24:    Vital Signs:   /80   Pulse 64       Weight:  Wt Readings from Last 3 Encounters:   24 81.3 kg (179 lb 3.7 oz)   24 81.3 kg (179 lb 3.2 oz)   24 83.6 kg (184 lb 3.2 oz)     There is no height or weight on file to calculate BMI.    Below is a summary of pertinent cardiology findings:  She has history of intermittent chest pain but no history of MI.  She is also had a long history of palpitations but normal Holter recording.   she had abnormal stress test showing apical  ischemia.  Cardiac catheterization showed very mild LAD stenosis.  2007 she was admitted with chest pain but stress study showed no evidence of ischemia.  2009 echocardiogram was normal.  Carotid artery ultrasound at that time showed no stenosis but intimal thickening.  October 2014 echocardiogram was normal with EF 66%.  Carotid artery Doppler study at that time was normal.  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  May 2017 stress echocardiogram was normal.  December 2021 nuclear stress test showed no evidence of ischemia and 24-hour Holter monitor was normal.  November 2022 CTA chest showed no evidence of pulmonary embolism; Dr. Paula reviewed the images and saw dense calcification of the proximal RCA, dense calcification in the proximal LAD, distal left main, and ramus intermedius, and spotty calcification of the proximal left circumflex.  January 2023 venous study showed no DVT of the left leg.  March 2023 treadmill stress test showed no evidence of ischemia.  She did complain of chest pain at stage II of the Hi protocol.  May 2023 vascular screening showed <50% bilateral carotid artery stenosis, normal abdominal aorta, and normal ABIs.    Labs:  04/13/2023:  cr 0.9, K 4.3, otherwise unremarkable CMP, HgbA1c 5.6, TSH 0.860, free T4   1.68, Chol 152, HDL 81, LDL 61, Trig 41  02/08/2024:  cr 0.9, K 4.5, otherwise unremarkable CMP, hgb A1c 5.3, TSH 0.610, Free T4 1.60      ECG 12 Lead    Date/Time: 4/11/2024 11:58 AM  Performed by: Radha Ace APRN    Authorized by: Radha Ace APRN  Comparison: compared with previous ECG from 10/5/2023  Similar to previous ECG  Rhythm: sinus rhythm  Rate: normal  BPM: 64  Conduction: right bundle branch block, left anterior fascicular block and 1st degree AV block          Medications     Allergies as of 04/11/2024 - Reviewed 04/11/2024    Allergen Reaction Noted    Beta adrenergic blockers Other (See Comments) 09/12/2016    Other Other (See Comments) and Dizziness 05/28/2019    Hctz [hydrochlorothiazide] Other (See Comments) 07/29/2019    Sulfa antibiotics Hives 05/04/2017    Spironolactone GI Intolerance 01/31/2023    Sulfamethoxazole-trimethoprim Unknown (See Comments) 10/03/2019    Viibryd [vilazodone hcl] Anxiety and Dizziness 06/10/2021         Current Outpatient Medications:     albuterol sulfate  (90 Base) MCG/ACT inhaler, albuterol sulfate HFA 90 mcg/actuation aerosol inhaler  INHALE 2 PUFFS BY MOUTH 4 TIMES DAILY AS NEEDED, Disp: , Rfl:     ALPRAZolam (XANAX) 0.5 MG tablet, Take 1 tablet by mouth 4 (Four) Times a Day., Disp: , Rfl:     cetirizine (zyrTEC) 10 MG tablet, Take 1 tablet by mouth Daily., Disp: , Rfl:     Cholecalciferol (VITAMIN D3) 5000 UNITS capsule capsule, Take 1 capsule by mouth Daily., Disp: , Rfl:     cyclobenzaprine (FLEXERIL) 10 MG tablet, Take 1 tablet by mouth At Night As Needed for Muscle Spasms., Disp: 30 tablet, Rfl: 0    dicyclomine (BENTYL) 10 MG capsule, Take 1 capsule by mouth 3 (Three) Times a Day As Needed for Abdominal Cramping., Disp: 120 capsule, Rfl: 3    fluticasone (FLONASE) 50 MCG/ACT nasal spray, 2 sprays by Each Nare route Daily., Disp: , Rfl:     levothyroxine (Synthroid) 75 MCG tablet, 75 mcg 1 tablet Monday through Saturday (Non on Sunday, take 6 days a week). Take on an empty stomach with just water 30 min to an hour before any other medications, food or drink, Disp: , Rfl:     meloxicam (MOBIC) 15 MG tablet, Take 1 tablet by mouth Daily., Disp: , Rfl:     montelukast (SINGULAIR) 10 MG tablet, , Disp: , Rfl:     olmesartan (BENICAR) 40 MG tablet, Take 1 tablet by mouth Every Night. (Patient taking differently: Take 0.5 tablets by mouth 2 (Two) Times a Day.), Disp: 90 tablet, Rfl: 3    Omega-3 Fatty Acids (OMEGA 3 PO), Take 1 tablet by mouth daily., Disp: , Rfl:     pantoprazole (PROTONIX)  40 MG EC tablet, TAKE 1 TABLET BY MOUTH TWICE DAILY, Disp: 180 tablet, Rfl: 3    rosuvastatin (CRESTOR) 20 MG tablet, TAKE 1 TABLET BY MOUTH EVERY NIGHT, Disp: 90 tablet, Rfl: 3    traZODone (DESYREL) 150 MG tablet, TAKE 1 TO 1 & 1/2 (ONE TO ONE & ONE-HALF) TABLETS BY MOUTH ONCE DAILY AS NEEDED AT BEDTIME, Disp: , Rfl:     vitamin B-12 (CYANOCOBALAMIN) 100 MCG tablet, Take 0.5 tablets by mouth Daily., Disp: , Rfl:     hydrALAZINE (APRESOLINE) 25 MG tablet, Take 1 tablet by mouth As Needed (for BP > 155)., Disp: 30 tablet, Rfl: 11     Past History, Review of Systems, Exam     Past Medical History:   Diagnosis Date    Anemia     Anxiety and depression     Appendiceal carcinoid tumor     Arthritis     Asthma     Breast nodule     Cancer of appendix     Coronary artery disease     COVID-19 virus infection 2021    Dizziness     Environmental allergies     Fibromuscular hyperplasia of artery     GERD (gastroesophageal reflux disease)     Headache     Hernia in stomach    History of anesthesia complications     Hyperlipidemia     Hypertension     Hypothyroidism     Impaired glucose metabolism     Lumbar pain     Myalgia     Nodular goiter     LEATHA (obstructive sleep apnea) 2008    Palpitations     RBBB (right bundle branch block)     Retinal detachment     Vertigo     Vitamin D deficiency Cannot remember year       Past Surgical History:   has a past surgical history that includes  section; endoscopy and colonoscopy (N/A, 2014); endoscopy and colonoscopy (N/A, 2008); Ureterocele Repair; Esophagogastroduodenoscopy (N/A, 2016); Appendectomy (); Cornea laceration repair; Esophagogastroduodenoscopy (N/A, 2018); US Guided Fine Needle Aspiration (2019); Breast biopsy (Left); Breast cyst aspiration (Left); Colonoscopy (N/A, 2019); Esophagogastroduodenoscopy (N/A, 2019); Cataract extraction, bilateral; and Upper gastrointestinal endoscopy (Cannot remember).      Social History     Socioeconomic History    Marital status:    Tobacco Use    Smoking status: Never     Passive exposure: Never    Smokeless tobacco: Never    Tobacco comments:     CAFFINE USE: 2 cups daily   Vaping Use    Vaping status: Never Used   Substance and Sexual Activity    Alcohol use: Yes     Alcohol/week: 5.0 standard drinks of alcohol     Types: 5 Glasses of wine per week     Comment: Occasionally    Drug use: No    Sexual activity: Not Currently     Birth control/protection: Post-menopausal       Review of Systems   Cardiovascular:  Positive for leg swelling and palpitations. Negative for chest pain, claudication, cyanosis, dyspnea on exertion, irregular heartbeat, near-syncope, orthopnea, paroxysmal nocturnal dyspnea and syncope.   Musculoskeletal:  Positive for back pain.       Vitals reviewed.   Constitutional:       Appearance: Not in distress.   Eyes:      Conjunctiva/sclera: Conjunctivae normal.      Pupils: Pupils are equal, round, and reactive to light.   HENT:      Head: Normocephalic.      Nose: Nose normal.    Mouth/Throat:      Pharynx: Oropharynx is clear.   Neck:      Vascular: JVD normal.   Pulmonary:      Effort: Pulmonary effort is normal.      Breath sounds: Normal breath sounds. No wheezing. No rhonchi. No rales.   Cardiovascular:      Normal rate. Regular rhythm. Normal S1. Normal S2.       Murmurs: There is no murmur.   Pulses:     Intact distal pulses.   Edema:     Peripheral edema absent.   Abdominal:      General: Bowel sounds are normal. There is no distension.      Palpations: Abdomen is soft.      Tenderness: There is no abdominal tenderness.   Musculoskeletal: Normal range of motion.      Cervical back: Normal range of motion and neck supple. Skin:     General: Skin is warm and dry.   Neurological:      Mental Status: Alert and oriented to person, place and time.   Psychiatric:         Attention and Perception: Attention normal.         Mood and Affect: Mood  normal.         Speech: Speech normal.         Behavior: Behavior is cooperative.          Assessment and Plan     Assessment:  1. Coronary arteriosclerosis in native artery    2. RBBB (right bundle branch block with left anterior fascicular block)    3. Primary hypertension    4. Mixed hyperlipidemia           Coronary artery disease: She had multiple stress tests but no evidence of MI.  2006 cardiac catheterization showed mild disease of the LAD only.  November 2022 CTA chest was reviewed by Dr. Paula; she was noted to have dense calcification of the proximal RCA, proximal LAD, distal left main, ramus intermedius, and spotty calcification at the proximal left circumflex.  Her Crestor was increased to 40 mg daily which she states she cannot tolerate; she is taking 20 mg daily.  She also does not like taking aspirin.  She is also on Benicar.  Right bundle branch block: This is chronic and remains stable.  No change.  Hypertension: Remains controlled on 20 mg olmesartan twice daily.  She did not tolerate spironolactone or amlodipine.  She is tolerating as needed hydralazine 25 mg.  Hyperlipidemia: Treated with 20 mg Crestor daily; she could not tolerate 40 mg daily.  Her lipoprotein a was elevated but apolipoprotein B was normal. No recent labs to review.  Hypothyroidism: Thyroid studies at goal when checked in April 2023. No recent labs to review.  Degenerative disc disease: No recent issues.  LEATHA: She is compliant with CPAP. She is going to have mask refitted.  Anxiety: Currently treated with alprazolam as needed. No recent issues.      Ms. Sutton is a patient of Dr. Paula's with known coronary artery disease of the LAD and dense calcification of the RCA, LAD, left main coronary artery, and ramus intermedius.  Her Crestor has been increased to 40 mg daily but she was unable to tolerate this and takes 20 mg daily.  She also cannot tolerate taking aspirin.    I think overall her blood pressure is controlled.   For the most part her readings are controlled.  She will have occasional low readings, and occasional high readings.  I told her I would not make any changes to the hydralazine for fear that she would have too low blood pressure.  I am going to have her see Dr. Paula in 6 months.    Return in about 6 months (around 10/11/2024) for Follow-up, Dr. Paula.  Orders Placed This Encounter   Procedures    ECG 12 Lead    ECG Scan      New Medications Ordered This Visit   Medications    hydrALAZINE (APRESOLINE) 25 MG tablet     Sig: Take 1 tablet by mouth As Needed (for BP > 155).     Dispense:  30 tablet     Refill:  11         Thank you for the opportunity to participate in this patient's care.    DESI Castano    This office note has been dictated.

## 2024-04-17 ENCOUNTER — TELEPHONE (OUTPATIENT)
Dept: ENDOCRINOLOGY | Age: 69
End: 2024-04-17
Payer: MEDICARE

## 2024-04-17 NOTE — TELEPHONE ENCOUNTER
Left pt a message to return call.    OKAY FOR HUB AND  TO READ RESULTS.      No change in thyroid nodule that was previously biopsied on left lobe.    There are two nodules (one on right mid lobe and another on right side of isthmus) given their characteristics, size and positive family history of thyroid cancer need to be biopsied.  If she is agreeable then I will place the order for biopsy.

## 2024-04-18 ENCOUNTER — TELEPHONE (OUTPATIENT)
Dept: ENDOCRINOLOGY | Age: 69
End: 2024-04-18
Payer: MEDICARE

## 2024-04-18 DIAGNOSIS — E04.1 THYROID NODULE: Primary | ICD-10-CM

## 2024-04-18 NOTE — TELEPHONE ENCOUNTER
PT CALLED BACK IN AND WAS ADVISED OF THE RESULTS FROM HER US THYROID. PT STATED THAT SHE DOES WAS TO GO A HEAD WITH THE BIOPSY PLEASE.        CASSIE GALINDO [phone]

## 2024-04-19 NOTE — TELEPHONE ENCOUNTER
Left pt a detailed message that orders have been placed and scheduling will be reaching out to her to make that appointment. Advised to return call with any questions.

## 2024-04-23 DIAGNOSIS — E04.1 THYROID NODULE: Primary | ICD-10-CM

## 2024-05-06 ENCOUNTER — HOSPITAL ENCOUNTER (OUTPATIENT)
Dept: ULTRASOUND IMAGING | Facility: HOSPITAL | Age: 69
Discharge: HOME | End: 2024-05-06
Admitting: RADIOLOGY
Payer: MEDICARE

## 2024-05-06 VITALS
BODY MASS INDEX: 27.47 KG/M2 | OXYGEN SATURATION: 97 % | SYSTOLIC BLOOD PRESSURE: 147 MMHG | TEMPERATURE: 98 F | WEIGHT: 175 LBS | HEIGHT: 67 IN | HEART RATE: 58 BPM | DIASTOLIC BLOOD PRESSURE: 82 MMHG | RESPIRATION RATE: 16 BRPM

## 2024-05-06 DIAGNOSIS — E04.1 THYROID NODULE: ICD-10-CM

## 2024-05-06 PROCEDURE — 88305 TISSUE EXAM BY PATHOLOGIST: CPT | Performed by: NURSE PRACTITIONER

## 2024-05-06 PROCEDURE — 88173 CYTOPATH EVAL FNA REPORT: CPT | Performed by: NURSE PRACTITIONER

## 2024-05-06 PROCEDURE — 76942 ECHO GUIDE FOR BIOPSY: CPT

## 2024-05-06 PROCEDURE — 25010000002 CHLOROPROCAINE HCL (PF) 3 % SOLUTION: Performed by: RADIOLOGY

## 2024-05-06 RX ORDER — CHLOROPROCAINE HYDROCHLORIDE 30 MG/ML
20 INJECTION, SOLUTION EPIDURAL; INFILTRATION; INTRACAUDAL; PERINEURAL ONCE
Status: COMPLETED | OUTPATIENT
Start: 2024-05-06 | End: 2024-05-06

## 2024-05-06 RX ORDER — SODIUM CHLORIDE 0.9 % (FLUSH) 0.9 %
10 SYRINGE (ML) INJECTION AS NEEDED
OUTPATIENT
Start: 2024-05-06

## 2024-05-06 RX ADMIN — CHLOROPROCAINE HYDROCHLORIDE 2 ML: 30 INJECTION, SOLUTION EPIDURAL; INFILTRATION; INTRACAUDAL; PERINEURAL at 14:45

## 2024-05-07 ENCOUNTER — TELEPHONE (OUTPATIENT)
Dept: INTERVENTIONAL RADIOLOGY/VASCULAR | Facility: HOSPITAL | Age: 69
End: 2024-05-07
Payer: MEDICARE

## 2024-05-07 ENCOUNTER — TELEPHONE (OUTPATIENT)
Dept: RADIOLOGY | Facility: HOSPITAL | Age: 69
End: 2024-05-07
Payer: MEDICARE

## 2024-05-07 LAB
CYTO UR: NORMAL
LAB AP CASE REPORT: NORMAL
LAB AP NON-GYN SPECIMEN ADEQUACY: NORMAL
PATH REPORT.FINAL DX SPEC: NORMAL
PATH REPORT.GROSS SPEC: NORMAL

## 2024-05-13 DIAGNOSIS — E03.9 HYPOTHYROIDISM, UNSPECIFIED TYPE: ICD-10-CM

## 2024-05-13 RX ORDER — LEVOTHYROXINE SODIUM 0.07 MG/1
TABLET ORAL
Qty: 72 TABLET | Refills: 1 | Status: SHIPPED | OUTPATIENT
Start: 2024-05-13

## 2024-05-13 NOTE — TELEPHONE ENCOUNTER
Caller: CASSIE FREED    Relationship: SELF    Best call back number: [phone]    Requested Prescriptions:   Requested Prescriptions     Pending Prescriptions Disp Refills    levothyroxine (Synthroid) 75 MCG tablet       Si mcg 1 tablet Monday through Saturday (Non on , take 6 days a week). Take on an empty stomach with just water 30 min to an hour before any other medications, food or drink        Pharmacy where request should be sent: [id number] [address] AT [facility] [phone] [facility] [phone] FX     Last office visit with prescribing clinician: 2024   Last telemedicine visit with prescribing clinician: Visit date not found   Next office visit with prescribing clinician: 2024     Additional details provided by patient:     Does the patient have less than a 3 day supply:  [] Yes  [] No    Would you like a call back once the refill request has been completed: [] Yes [] No    If the office needs to give you a call back, can they leave a voicemail: [] Yes [] No    Margo Fuchs Rep   24 10:57 EDT

## 2024-05-30 ENCOUNTER — OFFICE VISIT (OUTPATIENT)
Dept: GASTROENTEROLOGY | Facility: CLINIC | Age: 69
End: 2024-05-30
Payer: MEDICARE

## 2024-05-30 VITALS
DIASTOLIC BLOOD PRESSURE: 79 MMHG | TEMPERATURE: 97.5 F | BODY MASS INDEX: 29.67 KG/M2 | SYSTOLIC BLOOD PRESSURE: 125 MMHG | HEIGHT: 66 IN | WEIGHT: 184.6 LBS | HEART RATE: 63 BPM

## 2024-05-30 DIAGNOSIS — R11.0 NAUSEA: ICD-10-CM

## 2024-05-30 DIAGNOSIS — K58.1 IRRITABLE BOWEL SYNDROME WITH CONSTIPATION: Primary | ICD-10-CM

## 2024-05-30 DIAGNOSIS — K21.9 GASTROESOPHAGEAL REFLUX DISEASE, UNSPECIFIED WHETHER ESOPHAGITIS PRESENT: ICD-10-CM

## 2024-05-30 PROCEDURE — 1159F MED LIST DOCD IN RCRD: CPT | Performed by: NURSE PRACTITIONER

## 2024-05-30 PROCEDURE — 3074F SYST BP LT 130 MM HG: CPT | Performed by: NURSE PRACTITIONER

## 2024-05-30 PROCEDURE — 99214 OFFICE O/P EST MOD 30 MIN: CPT | Performed by: NURSE PRACTITIONER

## 2024-05-30 PROCEDURE — 3078F DIAST BP <80 MM HG: CPT | Performed by: NURSE PRACTITIONER

## 2024-05-30 PROCEDURE — 1160F RVW MEDS BY RX/DR IN RCRD: CPT | Performed by: NURSE PRACTITIONER

## 2024-05-30 RX ORDER — ONDANSETRON 4 MG/1
4 TABLET, ORALLY DISINTEGRATING ORAL EVERY 8 HOURS PRN
Qty: 25 TABLET | Refills: 3 | Status: SHIPPED | OUTPATIENT
Start: 2024-05-30

## 2024-05-30 RX ORDER — PLECANATIDE 3 MG/1
1 TABLET ORAL DAILY
Qty: 14 TABLET | Refills: 0 | COMMUNITY
Start: 2024-05-30 | End: 2024-06-13

## 2024-05-30 RX ORDER — BACLOFEN 10 MG/1
10 TABLET ORAL 3 TIMES DAILY
COMMUNITY

## 2024-05-30 NOTE — PROGRESS NOTES
Chief Complaint   Patient presents with    Constipation       HPI    Vesta Sutton is a  68 y.o. female here for a follow up visit for irritable bowel syndrome with constipation.    This patient follows with Dr. Stahl and myself.    Past medical history of anxiety, depression, appendiceal carcinoid tumor, asthma, coronary artery disease, hyperlipidemia, hypertension, thyroid disease, sleep apnea along with vertigo.     On visit today patient reports worsening of baseline constipation.  She did try Linzess offered after last office visit but it was too expensive to continue long-term.  She is currently taking MiraLAX and stool softeners with variable improvement.  Her bowels move every 2 or 3 days with associated incomplete evacuation, gas and bloating.  No rectal bleeding or rectal pain.    She has a history of gluten and lactose intolerance.  If she eats these types of food she will have worsening of baseline GI symptoms.  She gets periodic nausea associated with gluten.  She would like a refill on Zofran.  She has a history of GERD but admits she has been taking PPI therapy as needed.  She recently took NSAIDs for foot pain which exacerbated her symptoms.  No dysphagia or odynophagia.  Her appetite is good.  Her weight is stable.    Additional data reviewed:     Colonoscopy 2021 - Nonbleeding internal hemorrhoids and diverticulosis recall 10 years.     EGD 2021 -  normal findings.  Pathology negative for celiac disease.    Past Medical History:   Diagnosis Date    Anemia     Anxiety and depression     Appendiceal carcinoid tumor     Arthritis     Asthma     Breast nodule     Cancer of appendix     Coronary artery disease     nonobstructive     COVID-19 virus infection 06/2021    Dizziness     Environmental allergies     Fibromuscular hyperplasia of artery     in the carotid artery with no stenosis    GERD (gastroesophageal reflux disease)     Headache     Hernia in stomach    History of anesthesia complications      lidocaine caused reaction during dental procedure per patient    Hyperlipidemia     Hypertension     Hypothyroidism     Impaired glucose metabolism     Lumbar pain     Myalgia     Nodular goiter     last US 3/21/22 stable 2.5 cm left lobe  and  a 9 mm left lobe and a 10 mm mid right lobe. Sister with thyroid cancer    LEATHA (obstructive sleep apnea) 2008    Overnight polysomnogram.  Weight 166 pounds.  Mild LEATHA with AHI 7.5 events per hour.  When supine, moderately abnormal at 15.1 events per hour.  Low oxygen saturation 78% and sleep-related hypoxia present for 24 minutes.    Palpitations     RBBB (right bundle branch block)     Retinal detachment     left  //  when blood pressure gets high she notices flashing lights in her vision    Vertigo     Vitamin D deficiency Cannot remember year       Past Surgical History:   Procedure Laterality Date    APPENDECTOMY      BREAST BIOPSY Left     2017    BREAST CYST ASPIRATION Left     2019    CATARACT EXTRACTION, BILATERAL       SECTION      COLONOSCOPY N/A 2019    Procedure: COLONOSCOPY to CECUM;  Surgeon: Damien Aleman MD;  Location: [facility] ENDOSCOPY;  Service: General    CORNEA LACERATION REPAIR      ENDOSCOPY N/A 2016    Z-line regular, 40 cm from the incisors, gastritis, erythematous duodenopathy    ENDOSCOPY N/A 2018    Normal exophagus, Erythematous mucosa in the antrum, Normal examined duodenum-Dr. Devyn Stahl    ENDOSCOPY N/A 2019    Procedure: ESOPHAGOGASTRODUODENOSCOPY with BX;  Surgeon: Damien Aleman MD;  Location: [facility] ENDOSCOPY;  Service: General    ENDOSCOPY AND COLONOSCOPY N/A 2014    EGD with biopsy and colonoscopy, Mild gastritis, Normal colon, Repeat Colonoscopy in 5 years, Dr. Damien Aleman    ENDOSCOPY AND COLONOSCOPY N/A 2008    EGD with biopsy and colonoscopy-Dr. Damien Aleman    UPPER GASTROINTESTINAL ENDOSCOPY  Cannot remember    URETEROCELE REPAIR      US GUIDED FINE  NEEDLE ASPIRATION  05/29/2019       Scheduled Meds:     Continuous Infusions: No current facility-administered medications for this visit.      PRN Meds:     Allergies   Allergen Reactions    Beta Adrenergic Blockers Other (See Comments)     Asthma      Other Other (See Comments) and Dizziness     Septocaine    Hctz [Hydrochlorothiazide] Other (See Comments)     dehydration, stone in salivary gland, hypokalemia    Sulfa Antibiotics Hives    Spironolactone GI Intolerance    Sulfamethoxazole-Trimethoprim Unknown (See Comments)    Viibryd [Vilazodone Hcl] Anxiety and Dizziness       Social History     Socioeconomic History    Marital status:    Tobacco Use    Smoking status: Never     Passive exposure: Never    Smokeless tobacco: Never    Tobacco comments:     CAFFINE USE: 2 cups daily   Vaping Use    Vaping status: Never Used   Substance and Sexual Activity    Alcohol use: Yes     Alcohol/week: 5.0 standard drinks of alcohol     Types: 5 Glasses of wine per week     Comment: Occasionally    Drug use: No    Sexual activity: Not Currently     Birth control/protection: Post-menopausal       Family History   Problem Relation Age of Onset    Heart disease Mother     Irritable bowel syndrome Mother     Heart failure Father     Heart disease Father     Diabetes Sister     Cancer Sister         Thyroid    Hypertension Sister     Obesity Sister     Thyroid disease Sister     Heart disease Brother     Colon polyps Brother     Diabetes Sister     Thyroid disease Sister     Hypertension Maternal Aunt     Breast cancer Neg Hx     Ovarian cancer Neg Hx        Review of Systems   Gastrointestinal:  Positive for constipation and nausea.       Vitals:    05/30/24 1341   BP: 125/79   Pulse: 63   Temp: 97.5 °F (36.4 °C)       Physical Exam  Constitutional:       Appearance: She is well-developed.   Abdominal:      General: Bowel sounds are normal. There is no distension.      Palpations: Abdomen is soft. There is no mass.       Tenderness: There is no abdominal tenderness. There is no guarding.      Hernia: No hernia is present.   Skin:     General: Skin is warm and dry.      Capillary Refill: Capillary refill takes less than 2 seconds.   Neurological:      Mental Status: She is alert and oriented to person, place, and time.   Psychiatric:         Behavior: Behavior normal.       Assessment    Diagnoses and all orders for this visit:    1. Irritable bowel syndrome with constipation (Primary)    2. Gastroesophageal reflux disease, unspecified whether esophagitis present    3. Nausea    Other orders  -     ondansetron ODT (ZOFRAN-ODT) 4 MG disintegrating tablet; Place 1 tablet on the tongue Every 8 (Eight) Hours As Needed for Nausea or Vomiting.  Dispense: 25 tablet; Refill: 3  -     Plecanatide (Trulance) 3 MG tablet; Take 1 tablet by mouth Daily for 14 days.  Dispense: 14 tablet; Refill: 0    Plan    Recommend trial of Trulance to improve bowel pattern samples with dosing instructions provided on visit today  Avoid dietary triggers such as lactose and gluten  Continue as needed Zofran but encourage minimization as it can cause constipation  Discussed resumption of present taupe resolve 40 mg once daily with minimization of NSAIDs  Patient to update us on symptom progress in 1 to 2 weeks         DESI Husain  [facility]  [address] [place] [address]  Office: [phone]

## 2024-06-12 ENCOUNTER — TELEPHONE (OUTPATIENT)
Dept: SLEEP MEDICINE | Facility: HOSPITAL | Age: 69
End: 2024-06-12
Payer: MEDICARE

## 2024-06-12 NOTE — TELEPHONE ENCOUNTER
Pressure lowered to 7-11cm and sent to [facility] per Dr. Batista due to complaints of too much pressure.

## 2024-06-17 ENCOUNTER — TELEPHONE (OUTPATIENT)
Dept: GASTROENTEROLOGY | Facility: CLINIC | Age: 69
End: 2024-06-17
Payer: MEDICARE

## 2024-06-17 NOTE — TELEPHONE ENCOUNTER
Hub staff attempted to follow warm transfer process and was unsuccessful     Caller: Vesta Sutton    Relationship to patient: Self    Best call back number: [phone]    Patient is needing: PT SAW [facility] ON 5/30/2024 AND IS CURRENTLY TAKING TRULANCE FOR CONSTIPATION BUT IT DOES NOT SEEM TO BE HELPING. PT SAID SHE'S HAVING A HARD TIME DIGESTING HER FOOD AND NEEDS TO KNOW IF SHE NEEDS AN APPOINTMENT AGAIN WITH [facility]. PT STATED SHE'S LEAVING FRIDAY AND IS NEEDING TO KNOW WHAT SHE SHOULD DO. IT'S OK TO [facility].

## 2024-06-17 NOTE — TELEPHONE ENCOUNTER
Patient called. Advised as per Dr. Stahl's note. She requested samples prior to prescribing. Four boxes of Linzess 72 mcg left at  for .

## 2024-07-22 ENCOUNTER — TELEPHONE (OUTPATIENT)
Dept: GASTROENTEROLOGY | Facility: CLINIC | Age: 69
End: 2024-07-22
Payer: MEDICARE

## 2024-07-22 NOTE — TELEPHONE ENCOUNTER
Hub staff attempted to follow warm transfer process and was unsuccessful     Caller: Vesta Sutton    Relationship to patient: Self    Best call back number: [phone]    Patient is needing: PT IS REQUESTING TO SPEAK TO A NURSE, PT IS HAVING ISSUES STILL WITH HER STOMACH. PT HAS AN APPOINTMENT IN AUGUST TO SEE [facility] AND DOESN'T THINK SHE CAN WAIT TILL THEN. PT IS ASKING IF [facility] CAN CALL HER SOMETHING IN UNTIL SHE CAN BE SEEN. PLEASE CALL AND ADVISE. IT'S OK TO [facility].

## 2024-07-22 NOTE — TELEPHONE ENCOUNTER
Returned patient's phone call.   She states she tries to avoid gluten and she having issues with abdominal pain and multiple stools through out the day.   She questions if she needs Creon to help with her digestion. Advised Marissa is out of the office till next week and once she returns she will review her symptoms and make a decision at that time. Patient verb understanding and is in agreement with the plan.

## 2024-08-29 ENCOUNTER — OFFICE VISIT (OUTPATIENT)
Dept: GASTROENTEROLOGY | Facility: CLINIC | Age: 69
End: 2024-08-29
Payer: MEDICARE

## 2024-08-29 ENCOUNTER — LAB (OUTPATIENT)
Dept: ENDOCRINOLOGY | Age: 69
End: 2024-08-29
Payer: MEDICARE

## 2024-08-29 ENCOUNTER — OFFICE VISIT (OUTPATIENT)
Dept: SLEEP MEDICINE | Facility: HOSPITAL | Age: 69
End: 2024-08-29
Payer: MEDICARE

## 2024-08-29 ENCOUNTER — TELEPHONE (OUTPATIENT)
Dept: GASTROENTEROLOGY | Facility: CLINIC | Age: 69
End: 2024-08-29
Payer: MEDICARE

## 2024-08-29 VITALS
BODY MASS INDEX: 29.02 KG/M2 | TEMPERATURE: 98.2 F | DIASTOLIC BLOOD PRESSURE: 81 MMHG | SYSTOLIC BLOOD PRESSURE: 129 MMHG | HEART RATE: 72 BPM | WEIGHT: 180.6 LBS | HEIGHT: 66 IN

## 2024-08-29 VITALS — OXYGEN SATURATION: 97 % | HEIGHT: 66 IN | WEIGHT: 179 LBS | HEART RATE: 83 BPM | BODY MASS INDEX: 28.77 KG/M2

## 2024-08-29 DIAGNOSIS — K59.00 CONSTIPATION, UNSPECIFIED CONSTIPATION TYPE: ICD-10-CM

## 2024-08-29 DIAGNOSIS — G47.21 CIRCADIAN RHYTHM SLEEP DISORDER, DELAYED SLEEP PHASE TYPE: ICD-10-CM

## 2024-08-29 DIAGNOSIS — K21.9 GASTROESOPHAGEAL REFLUX DISEASE, UNSPECIFIED WHETHER ESOPHAGITIS PRESENT: ICD-10-CM

## 2024-08-29 DIAGNOSIS — F51.04 PSYCHOPHYSIOLOGICAL INSOMNIA: ICD-10-CM

## 2024-08-29 DIAGNOSIS — G47.33 OSA ON CPAP: ICD-10-CM

## 2024-08-29 DIAGNOSIS — G47.36 HYPOXEMIA ASSOCIATED WITH SLEEP: Primary | ICD-10-CM

## 2024-08-29 DIAGNOSIS — R10.30 LOWER ABDOMINAL PAIN: Primary | ICD-10-CM

## 2024-08-29 DIAGNOSIS — E04.1 THYROID NODULE: ICD-10-CM

## 2024-08-29 PROCEDURE — 3074F SYST BP LT 130 MM HG: CPT | Performed by: NURSE PRACTITIONER

## 2024-08-29 PROCEDURE — 99214 OFFICE O/P EST MOD 30 MIN: CPT | Performed by: NURSE PRACTITIONER

## 2024-08-29 PROCEDURE — 1159F MED LIST DOCD IN RCRD: CPT | Performed by: NURSE PRACTITIONER

## 2024-08-29 PROCEDURE — 3079F DIAST BP 80-89 MM HG: CPT | Performed by: NURSE PRACTITIONER

## 2024-08-29 PROCEDURE — G0463 HOSPITAL OUTPT CLINIC VISIT: HCPCS

## 2024-08-29 PROCEDURE — 1160F RVW MEDS BY RX/DR IN RCRD: CPT | Performed by: NURSE PRACTITIONER

## 2024-08-29 RX ORDER — TENAPANOR HYDROCHLORIDE 53.2 MG/1
50 TABLET ORAL DAILY
Qty: 10 TABLET | Refills: 0 | COMMUNITY
Start: 2024-08-29 | End: 2024-09-08

## 2024-08-29 RX ORDER — BUPROPION HYDROBROMIDE 174 MG/1
TABLET, EXTENDED RELEASE ORAL
COMMUNITY

## 2024-08-29 NOTE — TELEPHONE ENCOUNTER
----- Message from Marissa Chan sent at 8/29/2024  2:28 PM EDT -----  Needs a SIBO breath test kit to her home

## 2024-08-29 NOTE — PROGRESS NOTES
"Follow Up [facility] Note     Chief Complaint:  LEATHA     Primary Care Physician: Benjamín Morocho MD    Interval History:   The patient is a 68 y.o. female who I last saw 2/29/2024 and that note was reviewed.  The patient reports she is unchanged.  She goes to bed between 10 PM or midnight gets out of bed at 6:15 AM or 9:30 AM.  Timing depends if she works or not.  She does wake up due to to her mask or to use the restroom.    She continues to take Xanax 0.5 mg 2 or 3 times a day, occasionally at bedtime.  Bupropion was changed to bupropion DM.    She reports having Achilles tendinitis.    Review of Systems:    A complete review of systems was done and all were negative with the exception of some nasal congestion, abdominal bloating, and anxiety and depression    Social History:    Social History     Socioeconomic History    Marital status:    Tobacco Use    Smoking status: Never     Passive exposure: Never    Smokeless tobacco: Never    Tobacco comments:     CAFFINE USE: 2 cups daily   Vaping Use    Vaping status: Never Used   Substance and Sexual Activity    Alcohol use: Yes     Alcohol/week: 5.0 standard drinks of alcohol     Types: 5 Glasses of wine per week     Comment: Occasionally    Drug use: No    Sexual activity: Not Currently     Birth control/protection: Post-menopausal       Allergies:  Beta adrenergic blockers, Other, Hctz [hydrochlorothiazide], Sulfa antibiotics, Spironolactone, Sulfamethoxazole-trimethoprim, and Viibryd [vilazodone hcl]     Medication Review: Her list was reviewed.      Vital Signs:    Vitals:    08/29/24 1100   Pulse: 83   SpO2: 97%   Weight: 81.2 kg (179 lb)   Height: 167.6 cm (66\")     Body mass index is 28.89 kg/m².    Physical Exam:    Constitutional:  Well developed 68 y.o. female that appears in no apparent distress.  Awake & oriented times 3.  Normal mood with normal recent and remote memory and normal judgement.  Eyes:  Conjunctivae normal.  Oropharynx: " Previously, moist mucous membranes without exudate and a large tongue and normal uvula and patent posterior pharyngeal opening.    Self-administered [place] Sleepiness Scale test results: 0  0-5 Lower normal daytime sleepiness  6-10 Higher normal daytime sleepiness  11-12 Mild, 13-15 Moderate, & 16-24 Severe excessive daytime sleepiness     Downloaded PAP Data Evaluated For Therapeutic Response and Compliance:  DME is [facility] and the patient uses an AirTouch medium fullface mask with chinstrap.  Downloads between 5/30 and 8/27/2024 compliance 100%.  Average usage is 7 hours and 40 minutes.  Average AHI is mildly abnormal at 9.2 but all subsets are normal and the patient does have a large leak at times.  Average auto CPAP pressure is 11 and her DreamStation 2 auto CPAP is 7-11    I have reviewed the above results and compared them with the patient's last downloads and reviewed with the patient.    Impression:   Mild obstructive sleep apnea, moderate when supine, with sleep-related hypoxia by overnight polysomnogram 4/3/2008, weight 166 pounds adequately treated with DreamStation 2 auto CPAP. The patient appears to be at goal with good compliance and usage. The patient has no complaints of hypersomnolence.  Psychophysiologic insomnia  Circadian rhythm sleep disorder, delayed sleep phase type    Plan:  Good sleep hygiene measures should be maintained.  Some weight loss would be beneficial in this patient who is overweight by Body mass index is 28.89 kg/m².      After evaluating the patient and assessing results available, the patient is benefiting from the treatment being provided.     The patient will continue DreamStation 2 auto CPAP.  Potential side effects of not using PAP therapy reviewed and addressed as needed.  After clinical evaluation and review of downloads, I recommend no changes to the patient's pressures.  A new prescription will be sent to the patient's DME.    I answered all of the patient's questions.   The patient will call the Sleep Disorder Center for any problems and will follow up in 8 months.      Jass Batista MD  Sleep Medicine  08/29/24  11:26 EDT

## 2024-08-29 NOTE — PROGRESS NOTES
Chief Complaint   Patient presents with    Constipation       HPI    Vesta Sutton is a  68 y.o. female here for a follow up visit for  irritable bowel syndrome with constipation.     This patient follows with Dr. Stahl and myself.     Past medical history of anxiety, depression, appendiceal carcinoid tumor, asthma, coronary artery disease, hyperlipidemia, hypertension, thyroid disease, sleep apnea along with vertigo.     Patient reports continued constipation on visit today with lower abdominal pain that can be generalized in nature.  Pain comes and goes.  She feels better if she defecates.  Associated gas and bloating.  She tried Linzess that caused considerable diarrhea even at the lowest dose.  Trulance seemed to help to some degree but she did not feel like it was strong enough.  Denies rectal bleeding or rectal pain.  She continues to complain of gluten as a potential trigger.  We have checked her for celiac disease in the past but will obtain a celiac comprehensive panel today.    Occasional heartburn with dietary triggers otherwise no dysphagia, odynophagia, vomiting, poor appetite or weight loss.  She takes Protonix 40 mg once daily to good effect.    Additional data reviewed:     Colonoscopy 2021 - Nonbleeding internal hemorrhoids and diverticulosis recall 10 years.     EGD 2021 - normal findings.  Pathology negative for celiac disease.    Past Medical History:   Diagnosis Date    Anemia     Anxiety and depression     Appendiceal carcinoid tumor     Arthritis     Asthma     Breast nodule     Cancer of appendix     Coronary artery disease     nonobstructive     COVID-19 virus infection 06/2021    Dizziness     Environmental allergies     Fibromuscular hyperplasia of artery     in the carotid artery with no stenosis    GERD (gastroesophageal reflux disease)     Headache     Hernia in stomach    History of anesthesia complications 2017    lidocaine caused reaction during dental procedure per patient     Hyperlipidemia     Hypertension     Hypothyroidism     Impaired glucose metabolism     Lumbar pain     Myalgia     Nodular goiter     last US 3/21/22 stable 2.5 cm left lobe  and  a 9 mm left lobe and a 10 mm mid right lobe. Sister with thyroid cancer    LEATHA (obstructive sleep apnea) 2008    Overnight polysomnogram.  Weight 166 pounds.  Mild LEATHA with AHI 7.5 events per hour.  When supine, moderately abnormal at 15.1 events per hour.  Low oxygen saturation 78% and sleep-related hypoxia present for 24 minutes.    Palpitations     RBBB (right bundle branch block)     Retinal detachment     left  //  when blood pressure gets high she notices flashing lights in her vision    Vertigo     Vitamin D deficiency Cannot remember year       Past Surgical History:   Procedure Laterality Date    APPENDECTOMY  2001    BREAST BIOPSY Left     2017    BREAST CYST ASPIRATION Left     2019    CATARACT EXTRACTION, BILATERAL       SECTION      COLONOSCOPY N/A 2019    Procedure: COLONOSCOPY to CECUM;  Surgeon: Damien Aleman MD;  Location: [facility] ENDOSCOPY;  Service: General    CORNEA LACERATION REPAIR      ENDOSCOPY N/A 2016    Z-line regular, 40 cm from the incisors, gastritis, erythematous duodenopathy    ENDOSCOPY N/A 2018    Normal exophagus, Erythematous mucosa in the antrum, Normal examined duodenum-Dr. Devyn Stahl    ENDOSCOPY N/A 2019    Procedure: ESOPHAGOGASTRODUODENOSCOPY with BX;  Surgeon: Damien Aleman MD;  Location: [facility] ENDOSCOPY;  Service: General    ENDOSCOPY AND COLONOSCOPY N/A 2014    EGD with biopsy and colonoscopy, Mild gastritis, Normal colon, Repeat Colonoscopy in 5 years, Dr. Damien Aleman    ENDOSCOPY AND COLONOSCOPY N/A 2008    EGD with biopsy and colonoscopy-Dr. Damien Aleman    UPPER GASTROINTESTINAL ENDOSCOPY  Cannot remember    URETEROCELE REPAIR      US GUIDED FINE NEEDLE ASPIRATION  2019       Scheduled Meds:     Continuous Infusions:  No current facility-administered medications for this visit.      PRN Meds:     Allergies   Allergen Reactions    Beta Adrenergic Blockers Other (See Comments)     Asthma      Other Other (See Comments) and Dizziness     Septocaine    Hctz [Hydrochlorothiazide] Other (See Comments)     dehydration, stone in salivary gland, hypokalemia    Sulfa Antibiotics Hives    Spironolactone GI Intolerance    Sulfamethoxazole-Trimethoprim Unknown (See Comments)    Viibryd [Vilazodone Hcl] Anxiety and Dizziness       Social History     Socioeconomic History    Marital status:    Tobacco Use    Smoking status: Never     Passive exposure: Never    Smokeless tobacco: Never    Tobacco comments:     CAFFINE USE: 2 cups daily   Vaping Use    Vaping status: Never Used   Substance and Sexual Activity    Alcohol use: Yes     Alcohol/week: 5.0 standard drinks of alcohol     Types: 5 Glasses of wine per week     Comment: Occasionally    Drug use: No    Sexual activity: Not Currently     Birth control/protection: Post-menopausal       Family History   Problem Relation Age of Onset    Heart disease Mother     Irritable bowel syndrome Mother     Heart failure Father     Heart disease Father     Diabetes Sister     Cancer Sister         Thyroid    Hypertension Sister     Obesity Sister     Thyroid disease Sister     Heart disease Brother     Colon polyps Brother     Diabetes Sister     Thyroid disease Sister     Hypertension Maternal Aunt     Breast cancer Neg Hx     Ovarian cancer Neg Hx        Review of Systems   Gastrointestinal:  Positive for abdominal pain and constipation.       Vitals:    08/29/24 1355   BP: 129/81   Pulse: 72   Temp: 98.2 °F (36.8 °C)       Physical Exam  Constitutional:       Appearance: She is well-developed.   Abdominal:      General: Bowel sounds are normal. There is no distension.      Palpations: Abdomen is soft. There is no mass.      Tenderness: There is abdominal tenderness. There is no guarding.       Hernia: No hernia is present.   Skin:     General: Skin is warm and dry.      Capillary Refill: Capillary refill takes less than 2 seconds.   Neurological:      Mental Status: She is alert and oriented to person, place, and time.   Psychiatric:         Behavior: Behavior normal.     Assessment    Diagnoses and all orders for this visit:    1. Lower abdominal pain (Primary)  -     CBC (No Diff)  -     Comprehensive Metabolic Panel  -     Celiac Comprehensive Panel  -     TSH  -     CT Abdomen Pelvis With Contrast; Future    2. Constipation, unspecified constipation type  -     CBC (No Diff)  -     Comprehensive Metabolic Panel  -     Celiac Comprehensive Panel  -     TSH  -     CT Abdomen Pelvis With Contrast; Future    3. Gastroesophageal reflux disease, unspecified whether esophagitis present    Other orders  -     Tenapanor HCl (Ibsrela) 50 MG tablet; Take 1 tablet by mouth Daily for 10 days.  Dispense: 10 tablet; Refill: 0       Plan    Proceed to CT of the abdomen and pelvis with contrast  Labs today as above  Trial Ibsrela to improve bowel pattern  Continue PPI therapy  Follow antireflux diet  Trial low FODMAP diet  Further recommendations and follow-up pending workup         DESI Husain  [facility], [facility]  [address] [place] [address]  Office: [phone]

## 2024-08-30 LAB
ALBUMIN SERPL-MCNC: 4.3 G/DL (ref 3.5–5.2)
ALBUMIN/GLOB SERPL: 1.6 G/DL
ALP SERPL-CCNC: 63 U/L (ref 39–117)
ALT SERPL-CCNC: 30 U/L (ref 1–33)
AST SERPL-CCNC: 20 U/L (ref 1–32)
BILIRUB SERPL-MCNC: 0.3 MG/DL (ref 0–1.2)
BUN SERPL-MCNC: 13 MG/DL (ref 8–23)
BUN/CREAT SERPL: 16 (ref 7–25)
CALCIUM SERPL-MCNC: 9.5 MG/DL (ref 8.6–10.5)
CHLORIDE SERPL-SCNC: 96 MMOL/L (ref 98–107)
CO2 SERPL-SCNC: 26.6 MMOL/L (ref 22–29)
CREAT SERPL-MCNC: 0.81 MG/DL (ref 0.57–1)
EGFRCR SERPLBLD CKD-EPI 2021: 80.2 ML/MIN/1.73
ENDOMYSIUM IGA SER QL: NEGATIVE
ERYTHROCYTE [DISTWIDTH] IN BLOOD BY AUTOMATED COUNT: 11.8 % (ref 12.3–15.4)
GLIADIN PEPTIDE IGA SER-ACNC: 35 UNITS (ref 0–19)
GLIADIN PEPTIDE IGG SER-ACNC: 2 UNITS (ref 0–19)
GLOBULIN SER CALC-MCNC: 2.7 GM/DL
GLUCOSE SERPL-MCNC: 88 MG/DL (ref 65–99)
HCT VFR BLD AUTO: 38 % (ref 34–46.6)
HGB BLD-MCNC: 12.5 G/DL (ref 12–15.9)
IGA SERPL-MCNC: 207 MG/DL (ref 87–352)
MCH RBC QN AUTO: 31.6 PG (ref 26.6–33)
MCHC RBC AUTO-ENTMCNC: 32.9 G/DL (ref 31.5–35.7)
MCV RBC AUTO: 96 FL (ref 79–97)
PLATELET # BLD AUTO: 229 10*3/MM3 (ref 140–450)
POTASSIUM SERPL-SCNC: 4.3 MMOL/L (ref 3.5–5.2)
PROT SERPL-MCNC: 7 G/DL (ref 6–8.5)
RBC # BLD AUTO: 3.96 10*6/MM3 (ref 3.77–5.28)
SODIUM SERPL-SCNC: 137 MMOL/L (ref 136–145)
TSH SERPL DL<=0.005 MIU/L-ACNC: 0.43 UIU/ML (ref 0.27–4.2)
TTG IGA SER-ACNC: <2 U/ML (ref 0–3)
TTG IGG SER-ACNC: 2 U/ML (ref 0–5)
WBC # BLD AUTO: 6.68 10*3/MM3 (ref 3.4–10.8)

## 2024-09-05 ENCOUNTER — OFFICE VISIT (OUTPATIENT)
Dept: ENDOCRINOLOGY | Age: 69
End: 2024-09-05
Payer: MEDICARE

## 2024-09-05 VITALS
WEIGHT: 178.8 LBS | BODY MASS INDEX: 28.73 KG/M2 | SYSTOLIC BLOOD PRESSURE: 132 MMHG | DIASTOLIC BLOOD PRESSURE: 80 MMHG | OXYGEN SATURATION: 97 % | TEMPERATURE: 98.4 F | HEART RATE: 65 BPM | HEIGHT: 66 IN

## 2024-09-05 DIAGNOSIS — R73.03 PREDIABETES: ICD-10-CM

## 2024-09-05 DIAGNOSIS — E03.9 HYPOTHYROIDISM, UNSPECIFIED TYPE: Primary | ICD-10-CM

## 2024-09-05 DIAGNOSIS — E04.1 THYROID NODULE: ICD-10-CM

## 2024-09-05 RX ORDER — FOLIC ACID 1 MG/1
1 TABLET ORAL DAILY
COMMUNITY

## 2024-09-05 RX ORDER — LEVOTHYROXINE SODIUM 75 UG/1
TABLET ORAL
Qty: 72 TABLET | Refills: 1 | Status: SHIPPED | OUTPATIENT
Start: 2024-09-05

## 2024-09-05 NOTE — PROGRESS NOTES
Chief Complaint  Chief Complaint   Patient presents with    Hypothyroidism       Subjective          History of Present Illness    Vesta Sutton 68 y.o. presents for a follow-up evaluation of Hypothyroidism        Family History Thyroid Cancer: Sister        She complains of fatigue, constipation/diarrhea (possible IBS per GI), weight loss, tremors (started when started new medication for depression) hair loss, dry skin and cold intolerance      Weight loss of 6 lbs since last visit.      Current treatment is levothyroxine 75 mcg 1 tablet Monday through Saturday (None on Sunday, take 6 days a week)  [place] and Branded Euthyrox didn't help with symptoms    Last labs in 08/24 showed TSH 0.429            Thyroid Nodule(s)     She has several bilateral thyroid nodules, the most dominant of which is on the left and measures 2.5 x 1.7 x 1.4 cm.  She has had a fine needle aspiration of this nodule on May 29, 2019 and the cytology was benign.        Thereafter she has had 2 thyroid ultrasounds and they have demonstrated that the nodule(s) are unchanged.     US in 03/2022 showed stable mild multinodular goiter changes.      US in 04/24 showed Stable well-circumscribed ovoid wider than tall 1 cm slightly hypoechoic nodule in the mid right lobe of the thyroid gland with some internal  echogenic foci (radiology Ti-RADS 5). Similar-appearing ovoid wider than tall slightly  hypoechoic 1 cm nodule in the right side of the thyroid isthmus.  Similar-appearing previously biopsied large 2.6 cm solid heterogeneous nodule in the left lobe. Additional subcentimeter nodules in the right and left lobes of the thyroid gland    FNA in 05/24 showed  Final Diagnosis   Thyroid, Right, Fine Needle Aspiration (FNA):   A. Follicular nodular disease (BETHESDA II).     2. Thyroid, Isthmus Nodule, Fine Needle Aspiration (FNA):                A. Follicular nodular disease with cystic change (BETHESDA II).                    Pre-Diabetes     Pt's 2  "sisters are diabetic  Pt's maternal aunt was diabetic     Current treatment includes dietary modification     Last A1C in 08/24 was 5.8            I have reviewed the patient's allergies, medicines, past medical hx, family hx and social hx.    Objective   Vital Signs:   /80   Pulse 65   Temp 98.4 °F (36.9 °C) (Oral)   Ht 167.6 cm (65.98\")   Wt 81.1 kg (178 lb 12.8 oz)   LMP  (LMP Unknown)   SpO2 97%   BMI 28.87 kg/m²       Physical Exam   Physical Exam  Constitutional:       General: She is not in acute distress.     Appearance: Normal appearance. She is not diaphoretic.   HENT:      Head: Normocephalic and atraumatic.   Eyes:      General:         Right eye: No discharge.         Left eye: No discharge.   Skin:     General: Skin is warm and dry.   Neurological:      Mental Status: She is alert.   Psychiatric:         Mood and Affect: Mood normal.         Behavior: Behavior normal.                    Results Review:   TSH   Date Value Ref Range Status   08/29/2024 0.429 0.270 - 4.200 uIU/mL Final   11/23/2022 1.390 0.270 - 4.200 uIU/mL Final     T3, Free   Date Value Ref Range Status   07/25/2022 2.1 2.0 - 4.4 pg/mL Final     T3, Total   Date Value Ref Range Status   06/23/2022 82 71 - 180 ng/dL Final         Assessment and Plan    Diagnoses and all orders for this visit:    1. Hypothyroidism, unspecified type (Primary)  -     levothyroxine (Synthroid) 75 MCG tablet; 75 mcg 1 tablet Monday through Saturday (Non on Sunday, take 6 days a week). Take on an empty stomach with just water 30 min to an hour before any other medications, food or drink  Dispense: 72 tablet; Refill: 1  -     TSH; Future  -     T4, Free; Future    Thyroid labs are good.    Continue with current dose of levothyroxine      2. Thyroid nodule  -     TSH; Future  -     T4, Free; Future    Biopsies were negative  Will follow with US - next due in 1-2 years from last US      3. Prediabetes  -     Comprehensive Metabolic Panel; Future  -  "    Hemoglobin A1c; Future    A1c is slightly higher, but still within pre-diabetes range at 5.8%  Pt admits to dietary non-compliance  Continue working with dietary modification      Refills sent to pharmacy      RTC in 6 months with me, labs prior      Follow Up     Patient was given instructions and counseling regarding her condition or for health maintenance advice. Please see specific information pulled into the AVS if appropriate.              Chuyita Kincaid, DESI  09/05/24

## 2024-09-09 ENCOUNTER — TELEPHONE (OUTPATIENT)
Dept: GASTROENTEROLOGY | Facility: CLINIC | Age: 69
End: 2024-09-09
Payer: MEDICARE

## 2024-09-09 ENCOUNTER — HOSPITAL ENCOUNTER (OUTPATIENT)
Facility: HOSPITAL | Age: 69
Discharge: HOME | End: 2024-09-09
Admitting: NURSE PRACTITIONER
Payer: MEDICARE

## 2024-09-09 DIAGNOSIS — R10.30 LOWER ABDOMINAL PAIN: ICD-10-CM

## 2024-09-09 DIAGNOSIS — K59.00 CONSTIPATION, UNSPECIFIED CONSTIPATION TYPE: ICD-10-CM

## 2024-09-09 PROCEDURE — 74177 CT ABD & PELVIS W/CONTRAST: CPT

## 2024-09-09 PROCEDURE — 25510000001 IOPAMIDOL 61 % SOLUTION: Performed by: NURSE PRACTITIONER

## 2024-09-09 RX ORDER — IOPAMIDOL 612 MG/ML
100 INJECTION, SOLUTION INTRAVASCULAR
Status: COMPLETED | OUTPATIENT
Start: 2024-09-09 | End: 2024-09-09

## 2024-09-09 RX ADMIN — IOPAMIDOL 85 ML: 612 INJECTION, SOLUTION INTRAVENOUS at 13:23

## 2024-09-09 NOTE — TELEPHONE ENCOUNTER
Caller: Vesta Sutton    Relationship to patient: Self    Best call back number: [phone]     Patient is needing: PATIENT STATES THAT SHE HAD HER CT Abdomen Pelvis With Contrast DONE TODAY AND SHE NOTICED IN HER IN LABS THAT SHE IS ANEMIC. SHE IS CONCERNED ABOUT THIS AND WHAT LIKE TO KNOW WHAT TO DO. SHE STARTED TAKING AN IRON SUPPLEMENT THIS WEEKEND, 28 MG. [facility] REPORTS FEELING FATIGUED, PLEASE CALL BACK TO ADVISE OR LET PATIENT KNOW IF SHE SHOULD COME IN TO BE SEEN. IF SHE NEEDS TO BE SEEN SHE CAN ONLY COME IN ON MONDAY OR THURSDAY, BUT CAN TAKE OFF ANY MORNING IF EMERGENT.

## 2024-09-11 NOTE — PROGRESS NOTES
Please inform the patient CT shows an area on the pancreas that is difficult to completely visualize but seems stable compared to prior exams.  Would offer MRCP for further evaluation.  Stable right adrenal nodule.  Mild atherosclerosis (follow-up with PCP for that.  Diverticulosis without evidence of diverticulitis.  Some mild pericolonic changes that could be of no clinical significance and are nonspecific in nature.  Radiology recommends a repeat CT in 3 months for reassessment with both IV and oral contrast.    For now would offer MRCP for further evaluation if she is agreeable.

## 2024-09-12 ENCOUNTER — TELEPHONE (OUTPATIENT)
Dept: SLEEP MEDICINE | Facility: HOSPITAL | Age: 69
End: 2024-09-12
Payer: MEDICARE

## 2024-09-12 ENCOUNTER — TELEPHONE (OUTPATIENT)
Dept: GASTROENTEROLOGY | Facility: CLINIC | Age: 69
End: 2024-09-12
Payer: MEDICARE

## 2024-09-12 DIAGNOSIS — K21.9 GASTROESOPHAGEAL REFLUX DISEASE, UNSPECIFIED WHETHER ESOPHAGITIS PRESENT: ICD-10-CM

## 2024-09-12 DIAGNOSIS — K58.1 IRRITABLE BOWEL SYNDROME WITH CONSTIPATION: ICD-10-CM

## 2024-09-12 DIAGNOSIS — G47.33 OSA ON CPAP: Primary | ICD-10-CM

## 2024-09-12 DIAGNOSIS — R10.30 LOWER ABDOMINAL PAIN: Primary | ICD-10-CM

## 2024-09-12 DIAGNOSIS — F51.04 PSYCHOPHYSIOLOGICAL INSOMNIA: ICD-10-CM

## 2024-09-12 DIAGNOSIS — R11.0 NAUSEA: ICD-10-CM

## 2024-09-12 NOTE — TELEPHONE ENCOUNTER
Pt called requesting an order for a new cpap machine. Pt stated she is having problems with her recalled machine and that she was recently diagnosed with a pleural effusion and needed to know how to proceed. She is asking for a new cpap order for a new machine. Lester pt, given to  for review

## 2024-09-12 NOTE — PROGRESS NOTES
In Dr. Batista's absence I have ordered this patient a new CPAP machine as hers has failed, I recalled [employer] machine.

## 2024-09-12 NOTE — TELEPHONE ENCOUNTER
Called pt and advised of Marissa MIRELES's note.  Pt verbalized understanding.    Pt is agreeable to MRCP.  Order placed, sent to ALINE Ann to ricky.     Order for CT for 3 months placed, sent to ALINE Ann to lindsayign

## 2024-09-12 NOTE — TELEPHONE ENCOUNTER
----- Message from Marissa Chan sent at 9/11/2024 11:44 AM EDT -----  Please inform the patient CT shows an area on the pancreas that is difficult to completely visualize but seems stable compared to prior exams.  Would offer MRCP for further evaluation.  Stable right adrenal nodule.  Mild atherosclerosis (follow-up wi  th PCP for that.  Diverticulosis without evidence of diverticulitis.  Some mild pericolonic changes that could be of no clinical significance and are nonspecific in nature.  Radiology recommends a repeat CT in 3 months for reassessment with both IV a  nd oral contrast.    For now would offer MRCP for further evaluation if she is agreeable.

## 2024-09-12 NOTE — TELEPHONE ENCOUNTER
Caller: Vesta Sutton    Relationship to patient: Self    Best call back number: [phone]    Patient is needing: PATIENT IS NEEDING TO SPEAK WITH EDY ALEJANDRA ABOUT RESULTS AND HAS NOT HEARD BACK.  PATIENT IS HAVING DISCOMFORT ON RIGHT SIDE THAT IS GOING FROM FRONT TO BACK. PATIENT IS CONCERNED AND WOULD LIKE TO HEAR BACK.  THANK YOU

## 2024-09-17 ENCOUNTER — HOSPITAL ENCOUNTER (OUTPATIENT)
Dept: MRI IMAGING | Facility: HOSPITAL | Age: 69
Discharge: HOME | End: 2024-09-17
Admitting: NURSE PRACTITIONER
Payer: MEDICARE

## 2024-09-17 DIAGNOSIS — R11.0 NAUSEA: ICD-10-CM

## 2024-09-17 DIAGNOSIS — K21.9 GASTROESOPHAGEAL REFLUX DISEASE, UNSPECIFIED WHETHER ESOPHAGITIS PRESENT: ICD-10-CM

## 2024-09-17 DIAGNOSIS — R10.30 LOWER ABDOMINAL PAIN: ICD-10-CM

## 2024-09-17 DIAGNOSIS — K58.1 IRRITABLE BOWEL SYNDROME WITH CONSTIPATION: ICD-10-CM

## 2024-09-17 PROCEDURE — 0 GADOBENATE DIMEGLUMINE 529 MG/ML SOLUTION: Performed by: NURSE PRACTITIONER

## 2024-09-17 PROCEDURE — A9577 INJ MULTIHANCE: HCPCS | Performed by: NURSE PRACTITIONER

## 2024-09-17 PROCEDURE — 74183 MRI ABD W/O CNTR FLWD CNTR: CPT

## 2024-09-17 RX ADMIN — GADOBENATE DIMEGLUMINE 16 ML: 529 INJECTION, SOLUTION INTRAVENOUS at 14:26

## 2024-09-23 ENCOUNTER — APPOINTMENT (OUTPATIENT)
Dept: WOMENS IMAGING | Facility: HOSPITAL | Age: 69
End: 2024-09-23
Payer: MEDICARE

## 2024-09-23 PROCEDURE — 77067 SCR MAMMO BI INCL CAD: CPT | Performed by: RADIOLOGY

## 2024-09-23 PROCEDURE — 77063 BREAST TOMOSYNTHESIS BI: CPT | Performed by: RADIOLOGY

## 2024-10-03 ENCOUNTER — TELEPHONE (OUTPATIENT)
Dept: CARDIOLOGY | Facility: CLINIC | Age: 69
End: 2024-10-03
Payer: MEDICARE

## 2024-10-03 DIAGNOSIS — E78.2 MIXED HYPERLIPIDEMIA: Primary | ICD-10-CM

## 2024-10-03 NOTE — TELEPHONE ENCOUNTER
Caller: Vesta Sutton    Relationship: Self    Best call back number: [phone]    What orders are you requesting (i.e. lab or imaging): LABS FOR CHOLESTEROL     In what timeframe would the patient need to come in: PRIOR TO 10.24.24    Where will you receive your lab/imaging services: [facility]    Additional notes: PATIENT WANTS TO GET LABS PRIOR TO HER APPOINTMENT WITH DR FITCH ON 10.24.24 PLEASE ADVISE.

## 2024-10-04 ENCOUNTER — TELEPHONE (OUTPATIENT)
Dept: GASTROENTEROLOGY | Facility: CLINIC | Age: 69
End: 2024-10-04
Payer: MEDICARE

## 2024-10-04 NOTE — TELEPHONE ENCOUNTER
Pt reviewed results via [employer].     Sent pt [employer] msg advising of results and recommendations. Advised to call if any questions.

## 2024-10-04 NOTE — TELEPHONE ENCOUNTER
----- Message from Marissa Chan sent at 9/26/2024  1:09 PM EDT -----  Please inform the patient MRI looks good.  No evidence of gallstones or ductal dilation.  Findings stable dating back to 2014 which is reassuring.

## 2024-10-16 ENCOUNTER — TELEPHONE (OUTPATIENT)
Dept: CARDIOLOGY | Facility: CLINIC | Age: 69
End: 2024-10-16
Payer: MEDICARE

## 2024-10-16 NOTE — TELEPHONE ENCOUNTER
Patient called into the office today and would like to know if she needs to fast for her lab work that you ordered.

## 2024-10-16 NOTE — TELEPHONE ENCOUNTER
Caller: Vesta Sutton    Relationship: Self    Best call back number: [phone]    What is the best time to reach you: ANY    Who are you requesting to speak with (clinical staff, provider,  specific staff member): CLINICAL        What was the call regarding: PATIENT STATED THAT SHE NEEDS SOMEONE TO CALL HER BACK TO ADVISE HER ON LABS. PATIENT STATED THAT SHE NEEDS TO KNOW WHERE TO GO TO GET LABS DONE AND IF SHE NEEDS TO BE FASTING FOR THESE LABS. PLEASE CONTACT PATIENT TO ADVISE. THANK YOU.     Is it okay if the provider responds through MyChart: CALL

## 2024-10-21 ENCOUNTER — TELEPHONE (OUTPATIENT)
Dept: CARDIOLOGY | Facility: CLINIC | Age: 69
End: 2024-10-21
Payer: MEDICARE

## 2024-10-21 ENCOUNTER — LAB (OUTPATIENT)
Dept: LAB | Facility: HOSPITAL | Age: 69
End: 2024-10-21
Payer: MEDICARE

## 2024-10-21 DIAGNOSIS — E78.2 MIXED HYPERLIPIDEMIA: ICD-10-CM

## 2024-10-21 LAB
CHOLEST SERPL-MCNC: 157 MG/DL (ref 0–200)
HDLC SERPL-MCNC: 76 MG/DL (ref 40–60)
LDLC SERPL CALC-MCNC: 73 MG/DL (ref 0–100)
LDLC/HDLC SERPL: 0.98 {RATIO}
TRIGL SERPL-MCNC: 33 MG/DL (ref 0–150)
VLDLC SERPL-MCNC: 8 MG/DL (ref 5–40)

## 2024-10-21 PROCEDURE — 36415 COLL VENOUS BLD VENIPUNCTURE: CPT

## 2024-10-21 PROCEDURE — 80061 LIPID PANEL: CPT

## 2024-10-21 NOTE — TELEPHONE ENCOUNTER
I spoke with Vesta Sutton and updated pt on results from provider.  Pt verbalized understanding and has no further questions at this time.    Thank you,    Chhaya NATH, RN  Triage [facility]  10/21/24 12:44 EDT

## 2024-10-24 ENCOUNTER — OFFICE VISIT (OUTPATIENT)
Dept: GASTROENTEROLOGY | Facility: CLINIC | Age: 69
End: 2024-10-24
Payer: MEDICARE

## 2024-10-24 ENCOUNTER — OFFICE VISIT (OUTPATIENT)
Dept: CARDIOLOGY | Facility: CLINIC | Age: 69
End: 2024-10-24
Payer: MEDICARE

## 2024-10-24 VITALS
DIASTOLIC BLOOD PRESSURE: 78 MMHG | OXYGEN SATURATION: 96 % | HEART RATE: 81 BPM | BODY MASS INDEX: 28.48 KG/M2 | TEMPERATURE: 98.5 F | HEIGHT: 66 IN | WEIGHT: 177.2 LBS | SYSTOLIC BLOOD PRESSURE: 118 MMHG

## 2024-10-24 VITALS
HEIGHT: 67 IN | WEIGHT: 178 LBS | DIASTOLIC BLOOD PRESSURE: 82 MMHG | HEART RATE: 68 BPM | BODY MASS INDEX: 27.94 KG/M2 | SYSTOLIC BLOOD PRESSURE: 146 MMHG

## 2024-10-24 DIAGNOSIS — R93.3 ABNORMAL CT SCAN, GASTROINTESTINAL TRACT: ICD-10-CM

## 2024-10-24 DIAGNOSIS — I25.10 CORONARY ARTERIOSCLEROSIS IN NATIVE ARTERY: ICD-10-CM

## 2024-10-24 DIAGNOSIS — I10 PRIMARY HYPERTENSION: Primary | ICD-10-CM

## 2024-10-24 DIAGNOSIS — E78.2 MIXED HYPERLIPIDEMIA: ICD-10-CM

## 2024-10-24 DIAGNOSIS — E78.41 ELEVATED LIPOPROTEIN(A): ICD-10-CM

## 2024-10-24 DIAGNOSIS — G47.33 OSA ON CPAP: ICD-10-CM

## 2024-10-24 DIAGNOSIS — E73.9 LACTOSE INTOLERANCE: ICD-10-CM

## 2024-10-24 DIAGNOSIS — K58.1 IRRITABLE BOWEL SYNDROME WITH CONSTIPATION: Primary | ICD-10-CM

## 2024-10-24 DIAGNOSIS — K90.41 NON-CELIAC GLUTEN SENSITIVITY: ICD-10-CM

## 2024-10-24 DIAGNOSIS — R94.31 ABNORMAL ELECTROCARDIOGRAM (ECG) (EKG): ICD-10-CM

## 2024-10-24 DIAGNOSIS — I45.2 RBBB (RIGHT BUNDLE BRANCH BLOCK WITH LEFT ANTERIOR FASCICULAR BLOCK): ICD-10-CM

## 2024-10-24 DIAGNOSIS — K21.9 GASTROESOPHAGEAL REFLUX DISEASE, UNSPECIFIED WHETHER ESOPHAGITIS PRESENT: ICD-10-CM

## 2024-10-24 PROCEDURE — 93000 ELECTROCARDIOGRAM COMPLETE: CPT | Performed by: INTERNAL MEDICINE

## 2024-10-24 PROCEDURE — 3078F DIAST BP <80 MM HG: CPT | Performed by: NURSE PRACTITIONER

## 2024-10-24 PROCEDURE — 3079F DIAST BP 80-89 MM HG: CPT | Performed by: INTERNAL MEDICINE

## 2024-10-24 PROCEDURE — 99214 OFFICE O/P EST MOD 30 MIN: CPT | Performed by: NURSE PRACTITIONER

## 2024-10-24 PROCEDURE — 99214 OFFICE O/P EST MOD 30 MIN: CPT | Performed by: INTERNAL MEDICINE

## 2024-10-24 PROCEDURE — 1159F MED LIST DOCD IN RCRD: CPT | Performed by: NURSE PRACTITIONER

## 2024-10-24 PROCEDURE — 3077F SYST BP >= 140 MM HG: CPT | Performed by: INTERNAL MEDICINE

## 2024-10-24 PROCEDURE — 1160F RVW MEDS BY RX/DR IN RCRD: CPT | Performed by: NURSE PRACTITIONER

## 2024-10-24 PROCEDURE — 3074F SYST BP LT 130 MM HG: CPT | Performed by: NURSE PRACTITIONER

## 2024-10-24 RX ORDER — DILTIAZEM HYDROCHLORIDE 120 MG/1
120 CAPSULE, EXTENDED RELEASE ORAL NIGHTLY
Qty: 90 CAPSULE | Refills: 3 | Status: SHIPPED | OUTPATIENT
Start: 2024-10-24

## 2024-10-24 RX ORDER — FAMOTIDINE 40 MG/1
40 TABLET, FILM COATED ORAL DAILY
Qty: 90 TABLET | Refills: 3 | Status: SHIPPED | OUTPATIENT
Start: 2024-10-24

## 2024-10-24 NOTE — PROGRESS NOTES
Date of Office Visit: 10/24/2024  Encounter Provider: Susan Paula MD  Place of Service: [facility] CARDIOLOGY  Patient Name: Vesta Sutton  :1955      Patient ID:  Vesta Sutton is a 69 y.o. female is here for  followup for hypertension        History of Present Illness    She has a history of hypertension, GERD, hyperlipidemia, anxiety, depression, history of left eye retinal detachment, lumbar degenerative disc disease, venous varicosities of the legs, and coronary calcification.      She had an abnormal stress study done in  showing  an apical ischemia but had a cardiac catheterization showing very mild left anterior  descending artery stenosis.       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   The rest of the right leg looked okay. It looks like it was just only done on her right leg.       She was having chest pain because of the Zoloft.  She did end up stopping the Zoloft.   She is had significant GERD with venlafaxine.    Venous study done 2023 shows no DVT on the left.  CTA of the chest done 2022 shows no evidence of pulmonary embolism.  I reviewed the CTA chest images and there is dense calcification of the proximal RCA with dense calcification in the proximal LAD, distal left main and ramus intermedius, spotty calcification of the proximal circumflex.  Stress nuclear perfusion study in 2021 shows no evidence of ischemia.  24-hour Holter monitor done 2022 was normal.     She was in the ER on 2023 with nasal drainage abdominal pain and nausea.  Her COVID and influenza were negative.  CT abdomen showed enteritis, vital signs were stable and she was able to be dismissed from the hospital.  Her troponin was negative with sodium 132, otherwise normal CMP, normal lipase and CBC.  She had vascular screening  done 5/15/2023 which showed <50% bilateral carotid artery stenosis with normal abdominal aorta and normal peripheral serial screening.  She had a treadmill stress study on 3/23/2023, exercising on a Hi protocol for 6 minutes, no ischemic ECG changes noted, did complain of chest pain.     Labs on 8/29/2024 show normal CMP, hemoglobin A1c 5.8%, normal TSH, normal CBC.  Lipids on 10/21/2024 show total cholesterol 157, HDL 76, LDL 73, VLDL 8, triglycerides 33.  Her apolipoprotein B done 1/90/23 was 68 with an elevation in LP(a) at 272.    She is using her CPAP machine.  She is taking her medications as directed.  Her blood pressure has been labile but in questioning her, at the times when her blood pressure is very high, she has had a lot of salt.  She has no orthopnea or PND.  She has fleeting left-sided chest pain.  It is described as sharp and lasting seconds.  She has had no dizziness, syncope or falls.  Her energy level stable.  She is taking her medications as directed.  Notices in the morning that her heart rate is high up to 100.    Past Medical History:   Diagnosis Date    Anemia     Anxiety and depression     Appendiceal carcinoid tumor     Arthritis     Asthma     Breast nodule     Cancer of appendix     Coronary artery disease     nonobstructive     COVID-19 virus infection 06/2021    Dizziness     Environmental allergies     Fibromuscular hyperplasia of artery     in the carotid artery with no stenosis    GERD (gastroesophageal reflux disease)     Headache     Hernia in stomach    History of anesthesia complications 2017    lidocaine caused reaction during dental procedure per patient    Hyperlipidemia     Hypertension     Hypothyroidism     Impaired glucose metabolism     Lumbar pain     Myalgia     Nodular goiter     last US 3/21/22 stable 2.5 cm left lobe  and  a 9 mm left lobe and a 10 mm mid right lobe. Sister with thyroid cancer    LEATHA (obstructive sleep apnea) 04/03/2008    Overnight  polysomnogram.  Weight 166 pounds.  Mild LEATHA with AHI 7.5 events per hour.  When supine, moderately abnormal at 15.1 events per hour.  Low oxygen saturation 78% and sleep-related hypoxia present for 24 minutes.    Palpitations     RBBB (right bundle branch block)     Retinal detachment     left  //  when blood pressure gets high she notices flashing lights in her vision    Vertigo     Vitamin D deficiency Cannot remember year         Past Surgical History:   Procedure Laterality Date    APPENDECTOMY      BREAST BIOPSY Left     2017    BREAST CYST ASPIRATION Left     2019    CATARACT EXTRACTION, BILATERAL       SECTION      COLONOSCOPY N/A 2019    Procedure: COLONOSCOPY to CECUM;  Surgeon: Damien Aleman MD;  Location: [facility] ENDOSCOPY;  Service: General    CORNEA LACERATION REPAIR      ENDOSCOPY N/A 2016    Z-line regular, 40 cm from the incisors, gastritis, erythematous duodenopathy    ENDOSCOPY N/A 2018    Normal exophagus, Erythematous mucosa in the antrum, Normal examined duodenum-Dr. Devyn Stahl    ENDOSCOPY N/A 2019    Procedure: ESOPHAGOGASTRODUODENOSCOPY with BX;  Surgeon: Damien Aleman MD;  Location: [facility] ENDOSCOPY;  Service: General    ENDOSCOPY AND COLONOSCOPY N/A 2014    EGD with biopsy and colonoscopy, Mild gastritis, Normal colon, Repeat Colonoscopy in 5 years, Dr. Damien Aleman    ENDOSCOPY AND COLONOSCOPY N/A 2008    EGD with biopsy and colonoscopy-Dr. Damien Aleman    UPPER GASTROINTESTINAL ENDOSCOPY  Cannot remember    URETEROCELE REPAIR      US GUIDED FINE NEEDLE ASPIRATION  2019       Current Outpatient Medications on File Prior to Visit   Medication Sig Dispense Refill    albuterol sulfate  (90 Base) MCG/ACT inhaler Inhale 2 puffs Every 6 (Six) Hours As Needed for Wheezing or Shortness of Air for up to 30 days. 6.7 g 0    ALPRAZolam (XANAX) 0.5 MG tablet Take 1 tablet by mouth 4 (Four) Times a Day.      baclofen  (LIORESAL) 10 MG tablet Take 1 tablet by mouth 3 (Three) Times a Day As Needed.      cetirizine (zyrTEC) 10 MG tablet Take 1 tablet by mouth Daily.      Cholecalciferol (VITAMIN D3) 5000 UNITS capsule capsule Take 1 capsule by mouth Daily.      cyclobenzaprine (FLEXERIL) 10 MG tablet Take 1 tablet by mouth At Night As Needed for Muscle Spasms. 30 tablet 0    dicyclomine (BENTYL) 10 MG capsule Take 1 capsule by mouth 3 (Three) Times a Day As Needed for Abdominal Cramping. 120 capsule 3    fluticasone (FLONASE) 50 MCG/ACT nasal spray Administer 2 sprays into the nostril(s) as directed by provider Daily for 30 days. 16 g 0    folic acid (FOLVITE) 1 MG tablet Take 1 tablet by mouth Daily.      hydrALAZINE (APRESOLINE) 25 MG tablet Take 1 tablet by mouth As Needed (for BP > 155). 30 tablet 11    levothyroxine (Synthroid) 75 MCG tablet 75 mcg 1 tablet Monday through Saturday (Non on Sunday, take 6 days a week). Take on an empty stomach with just water 30 min to an hour before any other medications, food or drink 72 tablet 1    meloxicam (MOBIC) 15 MG tablet Take 1 tablet by mouth Daily.      olmesartan (BENICAR) 40 MG tablet Take 1 tablet by mouth Every Night. 90 tablet 3    Omega-3 Fatty Acids (OMEGA 3 PO) Take 1 tablet by mouth daily.      ondansetron ODT (ZOFRAN-ODT) 4 MG disintegrating tablet Place 1 tablet on the tongue Every 8 (Eight) Hours As Needed for Nausea or Vomiting. 25 tablet 3    rosuvastatin (CRESTOR) 20 MG tablet TAKE 1 TABLET BY MOUTH EVERY NIGHT 90 tablet 3    traZODone (DESYREL) 150 MG tablet TAKE 1 TO 1 & 1/2 (ONE TO ONE & ONE-HALF) TABLETS BY MOUTH ONCE DAILY AS NEEDED AT BEDTIME      vitamin B-12 (CYANOCOBALAMIN) 100 MCG tablet Take 0.5 tablets by mouth Daily.      buPROPion HBr ER (Aplenzin) 174 MG tablet sustained-release 24 hour Take  by mouth. (Patient not taking: Reported on 10/24/2024)      montelukast (SINGULAIR) 10 MG tablet  (Patient not taking: Reported on 10/24/2024)      pantoprazole  "(PROTONIX) 40 MG EC tablet TAKE 1 TABLET BY MOUTH TWICE DAILY (Patient not taking: Reported on 10/24/2024) 180 tablet 3    [DISCONTINUED] azithromycin (Zithromax Z-Liam) 250 MG tablet Take 2 tablets by mouth on day 1, then 1 tablet daily on days 2-5 (Patient not taking: Reported on 10/24/2024) 6 tablet 0     No current facility-administered medications on file prior to visit.       Social History     Socioeconomic History    Marital status:    Tobacco Use    Smoking status: Never     Passive exposure: Never    Smokeless tobacco: Never    Tobacco comments:     CAFFINE USE: 2 cups daily   Vaping Use    Vaping status: Never Used   Substance and Sexual Activity    Alcohol use: Yes     Alcohol/week: 5.0 standard drinks of alcohol     Types: 5 Glasses of wine per week     Comment: Occasionally    Drug use: No    Sexual activity: Not Currently     Birth control/protection: Post-menopausal             Procedures    ECG 12 Lead    Date/Time: 10/24/2024 2:28 PM  Performed by: Susan Paula MD    Authorized by: Susan Paula MD  Comparison: compared with previous ECG   Similar to previous ECG  Rhythm: sinus rhythm  Conduction: right bundle branch block and left anterior fascicular block    Clinical impression: abnormal EKG              Objective:      Vitals:    10/24/24 1405   BP: 146/82   Pulse: 68   Weight: 80.7 kg (178 lb)   Height: 168.9 cm (66.5\")     Body mass index is 28.3 kg/m².    Vitals reviewed.   Constitutional:       General: Not in acute distress.     Appearance: Not diaphoretic.   Neck:      Vascular: No carotid bruit or JVD.   Pulmonary:      Effort: Pulmonary effort is normal.      Breath sounds: Normal breath sounds.   Cardiovascular:      Normal rate. Regular rhythm.      Murmurs: There is no murmur.      No gallop.  No rub.   Pulses:     Intact distal pulses.      Carotid: 2+ bilaterally.     Radial: 2+ bilaterally.     Dorsalis pedis: 2+ bilaterally.     Posterior tibial: 2+ " bilaterally.  Edema:     Peripheral edema absent.   Neurological:      Cranial Nerves: No cranial nerve deficit.       Lab Review:       Assessment:      Diagnosis Plan   1. Primary hypertension  Adult Transthoracic Echo Complete W/ Cont if Necessary Per Protocol    CT Cardiac Calcium Score Without Dye      2. Mixed hyperlipidemia  CT Cardiac Calcium Score Without Dye      3. LEATHA on auto CPAP        4. Coronary arteriosclerosis in native artery        5. RBBB (right bundle branch block with left anterior fascicular block)        6. Abnormal electrocardiogram (ECG) (EKG)  Adult Transthoracic Echo Complete W/ Cont if Necessary Per Protocol      7. Elevated lipoprotein(a)  CT Cardiac Calcium Score Without Dye        Hypertension, BP high, goal <120/80.  Blood pressure is high here today but she has had a lot of salt.  Chronically abnormal electrocardiogram showing a left anterior fascicular block and right bundle branch block.   Coronary artery calcification.  I have calcium score  Palpitations, made worse with anxiety.  Salt intake. I advised her to stay away from salt.   Hyperlipidemia elevated LP(a) on Crestor. Well controlled.   Obstructive sleep. Use CPAP.   History of appendiceal carcinoma.   History of asthma, stable.   Anxiety.   Gastroesophageal reflux disease.   Lumbar degenerative disc disease with sciatic pain.   LEATHA on CPAP.     Plan:       See Elvira in 3 months to see how she is doing on diltiazem.  Advised that she continue low-sodium diet.  When she eats salt, her blood pressure goes up.  Add diltiazem 120 mg nightly to her regimen for blood pressure and heart rate.  She has noticed high heart rates in the morning.  Set up echocardiogram and calcium score.

## 2024-10-24 NOTE — PROGRESS NOTES
Chief Complaint   Patient presents with    Irritable Bowel Syndrome       HPI    Vesta Sutton is a  69 y.o. female here for a follow up visit for IBS-C.     This is a former patient of Dr. Stahl's that is known to me.    Past medical history of anxiety, depression, appendiceal carcinoid tumor, asthma, coronary artery disease, hyperlipidemia, hypertension, thyroid disease, sleep apnea along with vertigo.     CT A/P 9/2024 prompted for complaints of lower abdominal pain: areas of mild pericolonic and mesenteric fat stranding, which could potentially reflect a nonspecific gastroenteritis in the appropriate clinical setting. Given the nonspecific nature of these findings, recommend follow-up CT in 3 months to reassess, which should be performed with both IV and oral contrast.  Diverticulosis.  Proximal pancreatic hypoattenuation similar to prior but poorly assessed.    MRCP 9/2024: No biliary ductal dilation.  No gallstones.  Stable fat deposition within the pancreatic head since 2014.    On visit today she reports she stopped taking a new antidepressant recently as she felt like it was causing abdominal pain.  After discontinuation of this medication her abdominal pain completely resolved.  She still gets episodes of constipation however.  If she eats plenty of vegetables every day her bowels move daily but this is often a struggle.  She has tried MiraLAX in the past with minimal relief.  We had her on Linzess at 1 point which she used briefly as constipation slowly improved with dietary changes.  She still reports intolerance to gluten and lactose both of which she tries to avoid.  No rectal bleeding or rectal pain.    She still gets heartburn with certain dietary triggers or eating close to bedtime.  Denies dysphagia, odynophagia, nausea, vomiting, poor appetite or weight loss.  She has a SIBO breath test kit at home and plans to completed soon.    Additional data reviewed:     Colonoscopy 2021 - Nonbleeding  internal hemorrhoids and diverticulosis recall 10 years.     EGD  - normal findings.  Pathology negative for celiac disease.    Past Medical History:   Diagnosis Date    Anemia     Anxiety and depression     Appendiceal carcinoid tumor     Arthritis     Asthma     Breast nodule     Cancer of appendix     Coronary artery disease     nonobstructive     COVID-19 virus infection 2021    Dizziness     Environmental allergies     Fibromuscular hyperplasia of artery     in the carotid artery with no stenosis    GERD (gastroesophageal reflux disease)     Headache     Hernia in stomach    History of anesthesia complications 2017    lidocaine caused reaction during dental procedure per patient    Hyperlipidemia     Hypertension     Hypothyroidism     Impaired glucose metabolism     Lumbar pain     Myalgia     Nodular goiter     last US 3/21/22 stable 2.5 cm left lobe  and  a 9 mm left lobe and a 10 mm mid right lobe. Sister with thyroid cancer    LEATHA (obstructive sleep apnea) 2008    Overnight polysomnogram.  Weight 166 pounds.  Mild LEATHA with AHI 7.5 events per hour.  When supine, moderately abnormal at 15.1 events per hour.  Low oxygen saturation 78% and sleep-related hypoxia present for 24 minutes.    Palpitations     RBBB (right bundle branch block)     Retinal detachment     left  //  when blood pressure gets high she notices flashing lights in her vision    Vertigo     Vitamin D deficiency Cannot remember year       Past Surgical History:   Procedure Laterality Date    APPENDECTOMY  2001    BREAST BIOPSY Left     2017    BREAST CYST ASPIRATION Left     2019    CATARACT EXTRACTION, BILATERAL       SECTION      COLONOSCOPY N/A 2019    Procedure: COLONOSCOPY to CECUM;  Surgeon: Damien Aleman MD;  Location: [facility] ENDOSCOPY;  Service: General    CORNEA LACERATION REPAIR      ENDOSCOPY N/A 2016    Z-line regular, 40 cm from the incisors, gastritis, erythematous duodenopathy    ENDOSCOPY  N/A 05/21/2018    Normal exophagus, Erythematous mucosa in the antrum, Normal examined duodenum-Dr. Devyn Stahl    ENDOSCOPY N/A 11/20/2019    Procedure: ESOPHAGOGASTRODUODENOSCOPY with BX;  Surgeon: Damien Aleman MD;  Location: [facility] ENDOSCOPY;  Service: General    ENDOSCOPY AND COLONOSCOPY N/A 09/03/2014    EGD with biopsy and colonoscopy, Mild gastritis, Normal colon, Repeat Colonoscopy in 5 years, Dr. Damien Aleman    ENDOSCOPY AND COLONOSCOPY N/A 07/24/2008    EGD with biopsy and colonoscopy-Dr. Damien Aleman    UPPER GASTROINTESTINAL ENDOSCOPY  Cannot remember    URETEROCELE REPAIR      US GUIDED FINE NEEDLE ASPIRATION  05/29/2019       Scheduled Meds:     Continuous Infusions: No current facility-administered medications for this visit.      PRN Meds:     Allergies   Allergen Reactions    Beta Adrenergic Blockers Other (See Comments)     Asthma      Other Other (See Comments) and Dizziness     Septocaine    Hctz [Hydrochlorothiazide] Other (See Comments)     dehydration, stone in salivary gland, hypokalemia    Sulfa Antibiotics Hives    Spironolactone GI Intolerance    Sulfamethoxazole-Trimethoprim GI Intolerance    Viibryd [Vilazodone Hcl] Anxiety and Dizziness       Social History     Socioeconomic History    Marital status:    Tobacco Use    Smoking status: Never     Passive exposure: Never    Smokeless tobacco: Never    Tobacco comments:     CAFFINE USE: 2 cups daily   Vaping Use    Vaping status: Never Used   Substance and Sexual Activity    Alcohol use: Yes     Alcohol/week: 5.0 standard drinks of alcohol     Types: 5 Glasses of wine per week     Comment: Occasionally    Drug use: No    Sexual activity: Not Currently     Birth control/protection: Post-menopausal       Family History   Problem Relation Age of Onset    Heart disease Mother     Irritable bowel syndrome Mother     Heart failure Father     Heart disease Father     Diabetes Sister     Cancer Sister         Thyroid     Hypertension Sister     Obesity Sister     Thyroid disease Sister     Heart disease Brother     Colon polyps Brother     Diabetes Sister     Thyroid disease Sister     Hypertension Maternal Aunt     Breast cancer Neg Hx     Ovarian cancer Neg Hx        Review of Systems   HENT:  Negative for trouble swallowing.    Gastrointestinal: Negative.        Vitals:    10/24/24 1307   BP: 118/78   Pulse: 81   Temp: 98.5 °F (36.9 °C)   SpO2: 96%       Physical Exam  Constitutional:       Appearance: She is well-developed.   Abdominal:      General: Bowel sounds are normal. There is no distension.      Palpations: Abdomen is soft. There is no mass.      Tenderness: There is no abdominal tenderness. There is no guarding.      Hernia: No hernia is present.   Skin:     General: Skin is warm and dry.      Capillary Refill: Capillary refill takes less than 2 seconds.   Neurological:      Mental Status: She is alert and oriented to person, place, and time.   Psychiatric:         Behavior: Behavior normal.     Assessment    Diagnoses and all orders for this visit:    1. Irritable bowel syndrome with constipation (Primary)    2. Gastroesophageal reflux disease, unspecified whether esophagitis present    3. Abnormal CT scan, gastrointestinal tract  -     CT Abdomen Pelvis With Contrast; Future    4. Lactose intolerance    5. Non-celiac gluten sensitivity    Other orders  -     linaclotide (Linzess) 145 MCG capsule capsule; Take 1 capsule by mouth Every Morning Before Breakfast for 14 days.  Dispense: 14 capsule; Refill: 0  -     famotidine (Pepcid) 40 MG tablet; Take 1 tablet by mouth Daily.  Dispense: 90 tablet; Refill: 3    Plan    Vesta seems to be doing quite well at the moment.  We talked about using Pepcid as needed for occasional heartburn.  Avoid dietary triggers which include lactose and gluten.  I gave her samples of medium strength Linzess to use as needed for IBS-C.  Encouraged high-fiber diet and plenty of vegetable  consumption as this does seem to help her.  Repeat CT abdomen and pelvis January 2025 for reassessment.  Complete SIBO breath test as ordered.  Follow-up as needed.         DESI Husain  [facility]  [address] [place] [address]  Office: [phone]    I spent 30 minutes caring for Vesta on this date of service. This time includes time spent by me in the following activities: preparing for the visit, reviewing tests, obtaining and/or reviewing a separately obtained history, performing a medically appropriate examination and/or evaluation , counseling and educating the patient/family/caregiver, ordering medications, tests, or procedures, documenting information in the medical record, independently interpreting results and communicating that information with the patient/family/caregiver and care coordination.

## 2024-10-28 ENCOUNTER — TELEPHONE (OUTPATIENT)
Dept: CARDIOLOGY | Facility: CLINIC | Age: 69
End: 2024-10-28
Payer: MEDICARE

## 2024-10-28 NOTE — TELEPHONE ENCOUNTER
"Vesta MYLES Sutton called to report symptoms.    She reports she recently started taking diltiazem  mg every night (started on 10/25).  She also takes olmesartan 40 mg every morning.  Patient reports over the last two days she has felt a tiredness in her chest.  She admits this could be \"gluten intolerance\" but states that gluten intolerance does not usually effect her two days in a row.  Patient is asking if she can take half the dose of olmesartan?    /78  /74    Please let me know how you would like to proceed.    Thank you,  Ginette DODD RN  Triage Nurse [facility]  10/28/24  16:09 EDT    "

## 2024-10-29 NOTE — TELEPHONE ENCOUNTER
Vesta SHAR Sutton returned call.    Reviewed recommendations with her and she verbalized understanding of recommendations.    Thank you,  Ginette DODD RN  Triage Nurse [facility]  10/29/24 10:29 EDT

## 2024-10-29 NOTE — TELEPHONE ENCOUNTER
Left voicemail for Vesta Sutton requesting callback.    HUB: please transfer to Triage if patient returns call    Thank you,  Ginette DODD RN  Triage Nurse [facility]  10/29/24   09:28 EDT

## 2024-10-31 NOTE — TELEPHONE ENCOUNTER
"Pt called back in to triage concerned about BP/medications.  Pt states that she did not decrease olmesartan as recommended on 10/29, she states that she thinks that the fatigue is due to her gluten intolerance.  Pt states that BP had been ok until today, but this morning (before meds) it was 163/87, down to 149/83 about 2 hours after meds.  Pt denies extra salt in her diet, but does admit to eating out yesterday.  Pt also states that she has been having \"extra beats\" and \"tachycardia\" also today, which is not usual for her.  She is unable to to give me any HR's, and states that it's only for a few beats.  She also admits to having anxiety about her BP.  Pt denies any CP/SOA or other related symptoms at this time.      I encouraged pt to check BP 1-2 hours after taking medications, and to watch the salt in her diet.  Pt verbalized understanding.  Further recommendations?    Thanks so much,  Lindsay Martínez, RN  Triage RN  10/31/24 12:03 EDT    "

## 2024-11-01 ENCOUNTER — TELEPHONE (OUTPATIENT)
Dept: CARDIOLOGY | Facility: CLINIC | Age: 69
End: 2024-11-01
Payer: MEDICARE

## 2024-11-04 ENCOUNTER — TELEPHONE (OUTPATIENT)
Dept: CARDIOLOGY | Facility: CLINIC | Age: 69
End: 2024-11-04
Payer: MEDICARE

## 2024-11-04 ENCOUNTER — HOSPITAL ENCOUNTER (OUTPATIENT)
Dept: CARDIOLOGY | Facility: HOSPITAL | Age: 69
Discharge: HOME | End: 2024-11-04
Admitting: INTERNAL MEDICINE
Payer: MEDICARE

## 2024-11-04 VITALS
BODY MASS INDEX: 28.61 KG/M2 | OXYGEN SATURATION: 99 % | WEIGHT: 178 LBS | HEIGHT: 66 IN | SYSTOLIC BLOOD PRESSURE: 130 MMHG | DIASTOLIC BLOOD PRESSURE: 82 MMHG | HEART RATE: 59 BPM

## 2024-11-04 DIAGNOSIS — I10 PRIMARY HYPERTENSION: ICD-10-CM

## 2024-11-04 DIAGNOSIS — R94.31 ABNORMAL ELECTROCARDIOGRAM (ECG) (EKG): ICD-10-CM

## 2024-11-04 LAB
AORTIC ARCH: 2.5 CM
AORTIC DIMENSIONLESS INDEX: 0.79 (DI)
ASCENDING AORTA: 3.1 CM
BH CV ECHO MEAS - ACS: 1.84 CM
BH CV ECHO MEAS - AI P1/2T: 705 MSEC
BH CV ECHO MEAS - AO MAX PG: 9.9 MMHG
BH CV ECHO MEAS - AO MEAN PG: 5 MMHG
BH CV ECHO MEAS - AO ROOT AREA (BSA CORRECTED): 1.7 CM2
BH CV ECHO MEAS - AO ROOT DIAM: 3.3 CM
BH CV ECHO MEAS - AO V2 MAX: 157 CM/SEC
BH CV ECHO MEAS - AO V2 VTI: 35.4 CM
BH CV ECHO MEAS - AVA(I,D): 2.35 CM2
BH CV ECHO MEAS - EDV(CUBED): 91.1 ML
BH CV ECHO MEAS - EDV(MOD-SP2): 66 ML
BH CV ECHO MEAS - EDV(MOD-SP4): 71 ML
BH CV ECHO MEAS - EF(MOD-BP): 62.9 %
BH CV ECHO MEAS - EF(MOD-SP2): 60.6 %
BH CV ECHO MEAS - EF(MOD-SP4): 70.4 %
BH CV ECHO MEAS - ESV(CUBED): 18.2 ML
BH CV ECHO MEAS - ESV(MOD-SP2): 26 ML
BH CV ECHO MEAS - ESV(MOD-SP4): 21 ML
BH CV ECHO MEAS - FS: 41.5 %
BH CV ECHO MEAS - IVS/LVPW: 1.11 CM
BH CV ECHO MEAS - IVSD: 1 CM
BH CV ECHO MEAS - LAT PEAK E' VEL: 9.8 CM/SEC
BH CV ECHO MEAS - LV DIASTOLIC VOL/BSA (35-75): 37.3 CM2
BH CV ECHO MEAS - LV MASS(C)D: 142.9 GRAMS
BH CV ECHO MEAS - LV MAX PG: 7.4 MMHG
BH CV ECHO MEAS - LV MEAN PG: 3.5 MMHG
BH CV ECHO MEAS - LV SYSTOLIC VOL/BSA (12-30): 11 CM2
BH CV ECHO MEAS - LV V1 MAX: 135.9 CM/SEC
BH CV ECHO MEAS - LV V1 VTI: 28 CM
BH CV ECHO MEAS - LVIDD: 4.5 CM
BH CV ECHO MEAS - LVIDS: 2.6 CM
BH CV ECHO MEAS - LVOT AREA: 3 CM2
BH CV ECHO MEAS - LVOT DIAM: 1.95 CM
BH CV ECHO MEAS - LVPWD: 0.9 CM
BH CV ECHO MEAS - MED PEAK E' VEL: 7.7 CM/SEC
BH CV ECHO MEAS - MV A DUR: 0.09 SEC
BH CV ECHO MEAS - MV A MAX VEL: 61.2 CM/SEC
BH CV ECHO MEAS - MV DEC SLOPE: 329.3 CM/SEC2
BH CV ECHO MEAS - MV DEC TIME: 0.22 SEC
BH CV ECHO MEAS - MV E MAX VEL: 65.5 CM/SEC
BH CV ECHO MEAS - MV E/A: 1.07
BH CV ECHO MEAS - MV MAX PG: 3.1 MMHG
BH CV ECHO MEAS - MV MEAN PG: 1.43 MMHG
BH CV ECHO MEAS - MV P1/2T: 78.6 MSEC
BH CV ECHO MEAS - MV V2 VTI: 33.9 CM
BH CV ECHO MEAS - MVA(P1/2T): 2.8 CM2
BH CV ECHO MEAS - MVA(VTI): 2.46 CM2
BH CV ECHO MEAS - PA ACC TIME: 0.1 SEC
BH CV ECHO MEAS - PA V2 MAX: 93.7 CM/SEC
BH CV ECHO MEAS - PULM A REVS DUR: 0.12 SEC
BH CV ECHO MEAS - PULM A REVS VEL: 40.1 CM/SEC
BH CV ECHO MEAS - PULM DIAS VEL: 43.7 CM/SEC
BH CV ECHO MEAS - PULM S/D: 1.34
BH CV ECHO MEAS - PULM SYS VEL: 58.6 CM/SEC
BH CV ECHO MEAS - QP/QS: 0.75
BH CV ECHO MEAS - RAP SYSTOLE: 3 MMHG
BH CV ECHO MEAS - RV MAX PG: 2.02 MMHG
BH CV ECHO MEAS - RV V1 MAX: 71 CM/SEC
BH CV ECHO MEAS - RV V1 VTI: 16.6 CM
BH CV ECHO MEAS - RVOT DIAM: 2.2 CM
BH CV ECHO MEAS - RVSP: 22.7 MMHG
BH CV ECHO MEAS - SUP REN AO DIAM: 1.7 CM
BH CV ECHO MEAS - SV(LVOT): 83.3 ML
BH CV ECHO MEAS - SV(MOD-SP2): 40 ML
BH CV ECHO MEAS - SV(MOD-SP4): 50 ML
BH CV ECHO MEAS - SV(RVOT): 62.7 ML
BH CV ECHO MEAS - SVI(LVOT): 43.8 ML/M2
BH CV ECHO MEAS - SVI(MOD-SP2): 21 ML/M2
BH CV ECHO MEAS - SVI(MOD-SP4): 26.3 ML/M2
BH CV ECHO MEAS - TAPSE (>1.6): 2.5 CM
BH CV ECHO MEAS - TR MAX PG: 19.7 MMHG
BH CV ECHO MEAS - TR MAX VEL: 222.2 CM/SEC
BH CV ECHO MEASUREMENTS AVERAGE E/E' RATIO: 7.49
BH CV XLRA - RV BASE: 3.3 CM
BH CV XLRA - RV LENGTH: 8.5 CM
BH CV XLRA - RV MID: 2.35 CM
BH CV XLRA - TDI S': 12.6 CM/SEC
LEFT ATRIUM VOLUME INDEX: 24.4 ML/M2
SINUS: 2.8 CM
STJ: 2.27 CM

## 2024-11-04 PROCEDURE — 93306 TTE W/DOPPLER COMPLETE: CPT

## 2024-11-04 PROCEDURE — 93306 TTE W/DOPPLER COMPLETE: CPT | Performed by: INTERNAL MEDICINE

## 2024-11-05 NOTE — TELEPHONE ENCOUNTER
Called and left VM, will continue to try to reach pt.    HUB- please put patient straight through to triage    Lindsay Martínez, RN  Triage RN  11/05/24 09:43 EST

## 2024-11-06 NOTE — TELEPHONE ENCOUNTER
Called and left VM, will continue to try to reach pt.    HUB- please put patient straight through to triage    Lindsay Martínez, RN  Triage RN  11/06/24 09:07 EST

## 2024-11-07 NOTE — TELEPHONE ENCOUNTER
Pt called back in to triage.  Results reviewed with pt.  Instructed to call with any further questions or concerns.  Verbalized understanding.    Lindsay Martínez RN  Triage Nurse, [facility]  11/07/24 14:29 EST

## 2024-11-07 NOTE — TELEPHONE ENCOUNTER
Called and left VM, will continue to try to reach pt.    HUB- please put patient straight through to triage    Lindsay Martínez, RN  Triage RN  11/07/24 09:10 EST

## 2024-11-13 RX ORDER — ROSUVASTATIN CALCIUM 20 MG/1
20 TABLET, COATED ORAL NIGHTLY
Qty: 90 TABLET | Refills: 3 | Status: SHIPPED | OUTPATIENT
Start: 2024-11-13

## 2024-11-17 ENCOUNTER — HOSPITAL ENCOUNTER (OUTPATIENT)
Facility: HOSPITAL | Age: 69
Discharge: HOME | End: 2024-11-17
Admitting: INTERNAL MEDICINE

## 2024-11-17 DIAGNOSIS — I10 PRIMARY HYPERTENSION: ICD-10-CM

## 2024-11-17 DIAGNOSIS — E78.2 MIXED HYPERLIPIDEMIA: ICD-10-CM

## 2024-11-17 DIAGNOSIS — E78.41 ELEVATED LIPOPROTEIN(A): ICD-10-CM

## 2024-11-17 PROCEDURE — 75571 CT HRT W/O DYE W/CA TEST: CPT

## 2024-11-21 ENCOUNTER — OFFICE VISIT (OUTPATIENT)
Dept: SLEEP MEDICINE | Facility: HOSPITAL | Age: 69
End: 2024-11-21
Payer: MEDICARE

## 2024-11-21 VITALS — HEIGHT: 66 IN | OXYGEN SATURATION: 97 % | BODY MASS INDEX: 29.09 KG/M2 | WEIGHT: 181 LBS | HEART RATE: 73 BPM

## 2024-11-21 DIAGNOSIS — G47.33 OSA ON CPAP: ICD-10-CM

## 2024-11-21 DIAGNOSIS — G47.36 HYPOXEMIA ASSOCIATED WITH SLEEP: Primary | ICD-10-CM

## 2024-11-21 DIAGNOSIS — F51.04 PSYCHOPHYSIOLOGICAL INSOMNIA: ICD-10-CM

## 2024-11-21 DIAGNOSIS — G47.21 CIRCADIAN RHYTHM SLEEP DISORDER, DELAYED SLEEP PHASE TYPE: ICD-10-CM

## 2024-11-21 PROCEDURE — G0463 HOSPITAL OUTPT CLINIC VISIT: HCPCS

## 2024-11-21 NOTE — PROGRESS NOTES
"Follow Up [facility] Note     Chief Complaint:  LEATHA     Primary Care Physician: Benjamín Morocho MD    Interval History:   The patient is a 69 y.o. female who I last saw 8/29/2024 and that note was reviewed.  After that visit, the patient had problems with her auto CPAP and a new auto CPAP ordered.  The patient is here today for follow-up.  She states she is unchanged for the most part.  At times, she can be very dry and she is awakened.  She also will use the restroom.  The patient reports having bronchitis with a lingering cough that started in mid October.    She goes to bed between 10 PM and 12:30 AM and gets out of bed between 6:15 AM or 9:30 AM.    Review of Systems:    A complete review of systems was done and all were negative with the exception of some nasal congestion and postnasal drip, STANLEY and anxiety and depression    Social History:    Social History     Socioeconomic History    Marital status:    Tobacco Use    Smoking status: Never     Passive exposure: Never    Smokeless tobacco: Never    Tobacco comments:     CAFFINE USE: 2 cups daily   Vaping Use    Vaping status: Never Used   Substance and Sexual Activity    Alcohol use: Yes     Alcohol/week: 5.0 standard drinks of alcohol     Types: 5 Glasses of wine per week     Comment: Occasionally    Drug use: No    Sexual activity: Not Currently     Birth control/protection: Post-menopausal       Allergies:  Beta adrenergic blockers, Other, Hctz [hydrochlorothiazide], Sulfa antibiotics, Spironolactone, Sulfamethoxazole-trimethoprim, and Viibryd [vilazodone hcl]     Medication Review: Her list was reviewed.      Vital Signs:    Vitals:    11/21/24 1143   Pulse: 73   SpO2: 97%   Weight: 82.1 kg (181 lb)   Height: 167.6 cm (66\")     Body mass index is 29.21 kg/m².    Physical Exam:    Constitutional:  Well developed 69 y.o. female that appears in no apparent distress.  Awake & oriented times 3.  Normal mood with normal recent and remote " memory and normal judgement.  Eyes:  Conjunctivae normal.  Oropharynx: Previously, moist mucous membranes without exudate and a large tongue and normal uvula and patent posterior pharyngeal opening.    Self-administered [employer] Sleepiness Scale test results: 0  0-5 Lower normal daytime sleepiness  6-10 Higher normal daytime sleepiness  11-12 Mild, 13-15 Moderate, & 16-24 Severe excessive daytime sleepiness     Downloaded PAP Data Evaluated For Therapeutic Response and Compliance:  DME is [facility] and the patient uses a fullface mask.  Downloads between 9/19 and 11/19/2024 compliance 100%.  Average usage is 7 hours and 37 minutes.  Average AHI is normal at 5.5 with a leak.  Average auto CPAP pressure is 12.2 and her ResMed auto CPAP is 9-15.    I have reviewed the above results and compared them with the patient's last downloads and reviewed with the patient.    Impression:   Mild obstructive sleep apnea, moderate when supine, with sleep-related hypoxia by overnight polysomnogram 4/3/2008, weight 166 pounds adequately treated with ResMed auto CPAP. The patient appears to be at goal with good compliance and usage. The patient has no complaints of hypersomnolence.  Psychophysiologic insomnia  Circadian rhythm sleep disorder, delayed sleep phase type    Plan:  Good sleep hygiene measures should be maintained.  Weight loss would be beneficial in this patient who is nearly obese by Body mass index is 29.21 kg/m².      After evaluating the patient and assessing results available, the patient is benefiting from the treatment being provided.     The patient will continue ResMed auto CPAP.  Potential side effects of not using PAP therapy reviewed and addressed as needed.  After clinical evaluation and review of downloads, I recommend no changes to the patient's pressures.  A new prescription will be sent to the patient's DME as needed.    I answered all of the patient's questions.  The patient will call the Sleep Disorder  [place] for any problems and will follow up in August 2025.      Jass Batista MD  Sleep Medicine  11/21/24  12:05 EST

## 2024-11-22 ENCOUNTER — TELEPHONE (OUTPATIENT)
Dept: CARDIOLOGY | Facility: CLINIC | Age: 69
End: 2024-11-22
Payer: MEDICARE

## 2024-11-22 DIAGNOSIS — E03.9 HYPOTHYROIDISM, UNSPECIFIED TYPE: ICD-10-CM

## 2024-11-22 RX ORDER — LEVOTHYROXINE SODIUM 75 UG/1
TABLET ORAL
Qty: 72 TABLET | Refills: 1 | Status: SHIPPED | OUTPATIENT
Start: 2024-11-22

## 2024-11-22 NOTE — TELEPHONE ENCOUNTER
Rx Refill Note  Requested Prescriptions     Pending Prescriptions Disp Refills    levothyroxine (Synthroid) 75 MCG tablet 72 tablet 1     Si mcg 1 tablet Monday through Saturday (Non on , take 6 days a week). Take on an empty stomach with just water 30 min to an hour before any other medications, food or drink      Last office visit with prescribing clinician: 2024   Last telemedicine visit with prescribing clinician: Visit date not found   Next office visit with prescribing clinician: 3/6/2025                         Would you like a call back once the refill request has been completed: [] Yes [] No    If the office needs to give you a call back, can they leave a voicemail: [] Yes [] No    Marissa Moralez  24, 13:07 EST

## 2024-11-22 NOTE — TELEPHONE ENCOUNTER
Caller: Herman Vesta SHAR    Relationship: Self    Best call back number: [phone]     Requested Prescriptions:   Requested Prescriptions     Pending Prescriptions Disp Refills    levothyroxine (Synthroid) 75 MCG tablet 72 tablet 1     Si mcg 1 tablet Monday through Saturday (Non on , take 6 days a week). Take on an empty stomach with just water 30 min to an hour before any other medications, food or drink        Pharmacy where request should be sent: [id number] [address] AT [facility] [phone] [facility] [phone] FX     Last office visit with prescribing clinician: 2024   Last telemedicine visit with prescribing clinician: Visit date not found   Next office visit with prescribing clinician: 3/6/2025     Additional details provided by patient: PATIENT IS GOING OUT OF TOWN AND NEEDS REFILL SENT ASAP    Does the patient have less than a 3 day supply:  [x] Yes  [] No    Would you like a call back once the refill request has been completed: [x] Yes [] No    If the office needs to give you a call back, can they leave a voicemail: [] Yes [x] No    Emani Silveira   24 12:22 EST

## 2024-11-22 NOTE — TELEPHONE ENCOUNTER
Dr. Paula, patient returned your call and hopes you can call her back Monday. I told her that I reviewed her score which is elevated but that the plan will be made by you. She will be off work Monday and able to speak with you for plan regarding high calcium score.

## 2024-11-25 DIAGNOSIS — I10 PRIMARY HYPERTENSION: ICD-10-CM

## 2024-11-25 DIAGNOSIS — E78.41 ELEVATED LIPOPROTEIN(A): ICD-10-CM

## 2024-11-25 DIAGNOSIS — E78.2 MIXED HYPERLIPIDEMIA: ICD-10-CM

## 2024-11-25 DIAGNOSIS — I25.10 CORONARY ARTERIOSCLEROSIS IN NATIVE ARTERY: Primary | ICD-10-CM

## 2024-11-25 RX ORDER — ASPIRIN 81 MG/1
81 TABLET ORAL DAILY
Qty: 90 TABLET | Refills: 3 | Status: SHIPPED | OUTPATIENT
Start: 2024-11-25

## 2024-11-25 RX ORDER — ROSUVASTATIN CALCIUM 40 MG/1
40 TABLET, COATED ORAL NIGHTLY
Qty: 90 TABLET | Refills: 3 | Status: SHIPPED | OUTPATIENT
Start: 2024-11-25

## 2024-11-25 NOTE — TELEPHONE ENCOUNTER
I called and gave her results of calcium score - increasing rosuvastatin to 40mg nightly, start asa 81mg daily. Ordered stress test.

## 2024-11-25 NOTE — TELEPHONE ENCOUNTER
"Patient called back again about this. She also wanted me to let you all know that she has been having some \"stabbing heart pain\" that has increased in frequency and she is leaving town Wednesday for the holiday.     Janice Ac RN  Triage [facility] [place]   "

## 2024-11-27 ENCOUNTER — TELEPHONE (OUTPATIENT)
Dept: CARDIOLOGY | Facility: CLINIC | Age: 69
End: 2024-11-27

## 2024-11-27 RX ORDER — HYDRALAZINE HYDROCHLORIDE 25 MG/1
25 TABLET, FILM COATED ORAL 3 TIMES DAILY PRN
Qty: 30 TABLET | Refills: 0 | Status: SHIPPED | OUTPATIENT
Start: 2024-11-27

## 2024-11-27 NOTE — TELEPHONE ENCOUNTER
Caller: Vesta Sutton     Relationship: SELF    Best call back number: [phone]    What is your medical concern? WAKING UP WITH HIGH BLOOD PRESSURE    How long has this issue been going on? FEW DAYS    Is your provider already aware of this issue? YES    Have you been treated for this issue? PRESCRIBED DILTIAZEM, BUT STILL WAKING UP WITH HIGH BLOOD PRESSURE. PLEASE ADVISE.    PATIENT READING  11.27.24  150/83

## 2024-11-27 NOTE — TELEPHONE ENCOUNTER
Caller: Vesta Sutton    Relationship to patient: Self    Best call back number: [phone]    Chief complaint: NA    Type of visit: STRESS TEST    Requested date: TBD     If rescheduling, when is the original appointment: 12.04.24     Additional notes:PATIENT CALLED TO RESCHEDULE STRESS TEST, HUB ATTEMPTED WT, NO ANSWER. PLEASE ADVISE.

## 2024-11-27 NOTE — TELEPHONE ENCOUNTER
150/83  68  152/83  72  131/85  136/79  153/90  177/75      Patient called and stated that when she gets up her BP is high and she wanted to know if that was normal?  She stated that she takes the following BP meds:  Olmesartan 40 mg daily  Diltiazem xr 120 mg in the evening - about 7-8pm  Hydralazine 25 mg as needed    Please advise.    CB: [phone]    Thanks,  Song

## 2024-11-27 NOTE — TELEPHONE ENCOUNTER
Could you gather a little more information?  Are these blood pressure readings taken before she takes her morning medicines?      Is she feeling unwell?

## 2024-11-27 NOTE — TELEPHONE ENCOUNTER
I spoke with pt.  She states that she is driving to [facility] right now for the holiday's, and is unable to give me exact times BP was taken.  It sounds like many were taken before meds.  Pt complains of HA when SBP>150, otherwise denies any CP/SOA or other related symptoms.  She states that she takes her olmesartan in AM and diltiazem in PM.  She denies taking the hydralazine at all, and states that she actually forgot about that prescription.  She is requesting that we send a prescription of this to a [facility] pharmacy in [facility], as she is on her way there for the holiday weekend right now, and she does not have any with her.  I spoke with Elvira, and was given a verbal order to send a short term supply of hydralazine 25mg TID PRN to this pharmacy.  Prescription sent, and pt notified.  I also instructed pt to check back in on Monday if HA symptoms continue, pt verbalized understanding.    Lindsay Martínez, JEWEL  Triage RN  11/27/24 15:16 EST

## 2024-12-07 ENCOUNTER — DOCUMENTATION (OUTPATIENT)
Age: 69
End: 2024-12-07
Payer: MEDICARE

## 2024-12-07 NOTE — PROGRESS NOTES
Pt  called office to report ongoing tachycardia in the morning. HR is in the 100-110s in the morning. She was put on dilt for this two weeks ago.     BP has been better, she takes a PRN hydralazine in the afternoon for readings in the 140s.      She is having stress test next week.     She does have a sleep apnea machine.    Suggested  we increases her dilt but she wants to wait till after stress test.

## 2024-12-09 ENCOUNTER — TELEPHONE (OUTPATIENT)
Dept: CARDIOLOGY | Facility: CLINIC | Age: 69
End: 2024-12-09
Payer: MEDICARE

## 2024-12-09 NOTE — TELEPHONE ENCOUNTER
Reviewed recommendations with Vesta Sutton and the patient verbalized understanding of the recommendations.  Patient again asked if she can drink water.  Reviewed with her that she is ok to drink water and she verbalized understanding.      Thank you,  Ginette DODD RN  Triage Nurse [facility] [place]  12/09/24  14:14 EST

## 2024-12-09 NOTE — TELEPHONE ENCOUNTER
Vesta Sutton called with questions about upcoming PET stress test.  Reviewed the following information with her:      Patient verbalized understanding with repeat back.  Patient asked multiple times if she was ok to drink water and notified her that per the instructions, she is ok to drink water.  She verbalized understanding.  Patient also wanted to make sure she is ok to take her levothyroxine, diltiazem, hydralazine and olmesartan prior to the test.  Reviewed with patient, per testing instructions, she is ok to take all medications and that the only medication she cannot take is theophylline, which she does not take anyway.  Patient verbalized understanding however is requesting confirmation from the provider.    Please let me know how you would like to proceed.    Thank you,  Ginette DODD RN  Triage Nurse [facility]  12/09/24  10:40 EST

## 2024-12-10 ENCOUNTER — HOSPITAL ENCOUNTER (OUTPATIENT)
Dept: CARDIOLOGY | Facility: HOSPITAL | Age: 69
Discharge: HOME | End: 2024-12-10
Payer: MEDICARE

## 2024-12-10 ENCOUNTER — TELEPHONE (OUTPATIENT)
Dept: CARDIOLOGY | Facility: CLINIC | Age: 69
End: 2024-12-10
Payer: MEDICARE

## 2024-12-10 VITALS — BODY MASS INDEX: 29.09 KG/M2 | WEIGHT: 181 LBS | HEIGHT: 66 IN

## 2024-12-10 DIAGNOSIS — E78.41 ELEVATED LIPOPROTEIN(A): ICD-10-CM

## 2024-12-10 DIAGNOSIS — E78.2 MIXED HYPERLIPIDEMIA: ICD-10-CM

## 2024-12-10 DIAGNOSIS — I10 PRIMARY HYPERTENSION: ICD-10-CM

## 2024-12-10 DIAGNOSIS — I25.10 CORONARY ARTERIOSCLEROSIS IN NATIVE ARTERY: ICD-10-CM

## 2024-12-10 LAB
BH CV NUCLEAR PRIOR STUDY: 1
BH CV REST NUCLEAR ISOTOPE DOSE: 21.2 MCI
BH CV STRESS BP STAGE 1: NORMAL
BH CV STRESS COMMENTS STAGE 1: NORMAL
BH CV STRESS DOSE REGADENOSON STAGE 1: 0.4
BH CV STRESS DURATION MIN STAGE 1: 0
BH CV STRESS DURATION SEC STAGE 1: 10
BH CV STRESS HR STAGE 1: 97
BH CV STRESS NUCLEAR ISOTOPE DOSE: 21.2 MCI
BH CV STRESS PROTOCOL 1: NORMAL
BH CV STRESS RECOVERY BP: NORMAL MMHG
BH CV STRESS RECOVERY HR: 74 BPM
BH CV STRESS STAGE 1: 1
LV EF NUC BP: 74 %
MAXIMAL PREDICTED HEART RATE: 153 BPM
PERCENT MAX PREDICTED HR: 63.4 %
STRESS BASELINE BP: NORMAL MMHG
STRESS BASELINE HR: 56 BPM
STRESS PERCENT HR: 75 %
STRESS POST EXERCISE DUR SEC: 10 SEC
STRESS POST PEAK BP: NORMAL MMHG
STRESS POST PEAK HR: 97 BPM
STRESS TARGET HR: 130 BPM

## 2024-12-10 PROCEDURE — 78492 MYOCRD IMG PET MLT RST&STRS: CPT

## 2024-12-10 PROCEDURE — 25010000002 REGADENOSON 0.4 MG/5ML SOLUTION: Performed by: INTERNAL MEDICINE

## 2024-12-10 PROCEDURE — 34310000005 RUBIDIUM CHLORIDE: Performed by: INTERNAL MEDICINE

## 2024-12-10 PROCEDURE — 93017 CV STRESS TEST TRACING ONLY: CPT

## 2024-12-10 PROCEDURE — A9555 RB82 RUBIDIUM: HCPCS | Performed by: INTERNAL MEDICINE

## 2024-12-10 RX ORDER — DILTIAZEM HYDROCHLORIDE 180 MG/1
180 CAPSULE, EXTENDED RELEASE ORAL DAILY
Qty: 90 CAPSULE | Refills: 3 | Status: SHIPPED | OUTPATIENT
Start: 2024-12-10

## 2024-12-10 RX ORDER — REGADENOSON 0.08 MG/ML
0.4 INJECTION, SOLUTION INTRAVENOUS
Status: COMPLETED | OUTPATIENT
Start: 2024-12-10 | End: 2024-12-10

## 2024-12-10 RX ADMIN — REGADENOSON 0.4 MG: 0.08 INJECTION, SOLUTION INTRAVENOUS at 11:59

## 2024-12-10 NOTE — TELEPHONE ENCOUNTER
Notified patient of results/recommendations. Patient verbalized understanding.    Janice Ac RN  [place] [facility] [place]

## 2024-12-10 NOTE — TELEPHONE ENCOUNTER
Script sent to pharmacy. Notified pt of orders and recommendations. She verbalized understanding.    Thank you,    Jillian Triplett, RN  Triage [facility]  12/10/24 15:57 EST

## 2024-12-10 NOTE — TELEPHONE ENCOUNTER
Dr. Paula,    Pt called back. She said her HR has been elevated intermittently throughout the day. When she wakes up it's 110s-120s. Then, she was in the grocery store today, felt her heart racing and it was 110. She is taking her diltiazem.    Any recommendations?    Thank you,    Jillian Triplett, RN  [place] [facility] [place] [facility] [place]  12/10/24 14:59 EST

## 2024-12-12 ENCOUNTER — OFFICE VISIT (OUTPATIENT)
Age: 69
End: 2024-12-12
Payer: MEDICARE

## 2024-12-12 VITALS
DIASTOLIC BLOOD PRESSURE: 73 MMHG | WEIGHT: 181.2 LBS | SYSTOLIC BLOOD PRESSURE: 122 MMHG | BODY MASS INDEX: 29.12 KG/M2 | HEART RATE: 73 BPM | HEIGHT: 66 IN

## 2024-12-12 DIAGNOSIS — I72.8 ANEURYSM OF SPLENIC ARTERY: Primary | ICD-10-CM

## 2024-12-12 DIAGNOSIS — I87.8 VENOUS STASIS: ICD-10-CM

## 2024-12-12 DIAGNOSIS — I87.323 CHRONIC VENOUS HYPERTENSION WITH INFLAMMATION INVOLVING BOTH SIDES: ICD-10-CM

## 2024-12-12 DIAGNOSIS — I87.2 VENOUS (PERIPHERAL) INSUFFICIENCY: ICD-10-CM

## 2024-12-12 PROBLEM — I87.329 CHRONIC VENOUS HYPERTENSION WITH INFLAMMATION: Status: ACTIVE | Noted: 2024-12-12

## 2024-12-12 NOTE — PROGRESS NOTES
Patient Name: Vesta Sutton    MRN: [id number] Encounter Date: 12/12/2024      Consulting Service: Vascular Surgery    Referring Provider: Benjamín Morocho MD       CHIEF COMPLAINT:  Chief Complaint   Patient presents with    Aneurysm of splenic artery       Subjective    HPI: Vesta Sutton is a 69 y.o. female is being seen for evaluation/management of known aortic aneurysmal disease.  The patient has a known splenic artery aneurysm that is being followed.  Prior treatment has not yet been medically indicated.  They remain aware of the genetic component of aneurysm disease and the need to avoid smoking, uncontrolled hypertension and possibly the fluoroquinolone family of antibiotics as independent risk factors for aneurysm growth and rupture.  Patient follows up today for review of their current imaging studies including CT angiogram.  Today it appears the aneurysm remains stable relative to last studies.  Based on these findings we have recommended continued long-term follow-up.    She is also followed for venous insufficiency which has been overall stable but she did have a spontaneous venous bleed on her left lateral calf recently.  Organ to get her back into compression stockings and see if these help control things.  Currently her evidence for reflux is still fairly mild and would not pursue intervention at this time.    PAST MEDICAL HISTORY:   Past Medical History:   Diagnosis Date    Anemia     Anxiety and depression     Appendiceal carcinoid tumor     Arthritis     Asthma     Breast nodule     Cancer of appendix     Coronary artery disease     nonobstructive     COVID-19 virus infection 06/2021    Dizziness     Environmental allergies     Fibromuscular hyperplasia of artery     in the carotid artery with no stenosis    GERD (gastroesophageal reflux disease)     Headache     Hernia in stomach    History of anesthesia complications 2017    lidocaine caused reaction during dental procedure per patient     Hyperlipidemia     Hypertension     Hypothyroidism     Impaired glucose metabolism     Lumbar pain     Myalgia     Nodular goiter     last US 3/21/22 stable 2.5 cm left lobe  and  a 9 mm left lobe and a 10 mm mid right lobe. Sister with thyroid cancer    LEATHA (obstructive sleep apnea) 2008    Overnight polysomnogram.  Weight 166 pounds.  Mild LEATHA with AHI 7.5 events per hour.  When supine, moderately abnormal at 15.1 events per hour.  Low oxygen saturation 78% and sleep-related hypoxia present for 24 minutes.    Palpitations     RBBB (right bundle branch block)     Retinal detachment     left  //  when blood pressure gets high she notices flashing lights in her vision    Vertigo     Vitamin D deficiency Cannot remember year      PAST SURGICAL HISTORY:   Past Surgical History:   Procedure Laterality Date    APPENDECTOMY      BREAST BIOPSY Left     2017    BREAST CYST ASPIRATION Left     2019    CATARACT EXTRACTION, BILATERAL       SECTION      COLONOSCOPY N/A 2019    Procedure: COLONOSCOPY to CECUM;  Surgeon: Damien Aleman MD;  Location: [facility] ENDOSCOPY;  Service: General    CORNEA LACERATION REPAIR      ENDOSCOPY N/A 2016    Z-line regular, 40 cm from the incisors, gastritis, erythematous duodenopathy    ENDOSCOPY N/A 2018    Normal exophagus, Erythematous mucosa in the antrum, Normal examined duodenum-Dr. Devyn Stahl    ENDOSCOPY N/A 2019    Procedure: ESOPHAGOGASTRODUODENOSCOPY with BX;  Surgeon: Damien Aleman MD;  Location: [facility] ENDOSCOPY;  Service: General    ENDOSCOPY AND COLONOSCOPY N/A 2014    EGD with biopsy and colonoscopy, Mild gastritis, Normal colon, Repeat Colonoscopy in 5 years, Dr. Damien Aelman    ENDOSCOPY AND COLONOSCOPY N/A 2008    EGD with biopsy and colonoscopy-Dr. Damien Aleman    UPPER GASTROINTESTINAL ENDOSCOPY  Cannot remember    URETEROCELE REPAIR      US GUIDED FINE NEEDLE ASPIRATION  2019      FAMILY HISTORY:    Family History   Problem Relation Age of Onset    Heart disease Mother     Irritable bowel syndrome Mother     Heart failure Father     Heart disease Father     Diabetes Sister     Cancer Sister         Thyroid    Hypertension Sister     Obesity Sister     Thyroid disease Sister     Heart disease Brother     Colon polyps Brother     Diabetes Sister     Thyroid disease Sister     Hypertension Maternal Aunt     Breast cancer Neg Hx     Ovarian cancer Neg Hx       SOCIAL HISTORY:   Social History     Tobacco Use    Smoking status: Never     Passive exposure: Never    Smokeless tobacco: Never    Tobacco comments:     CAFFINE USE: 2 cups daily   Vaping Use    Vaping status: Never Used   Substance Use Topics    Alcohol use: Yes     Alcohol/week: 5.0 standard drinks of alcohol     Types: 5 Glasses of wine per week     Comment: Occasionally    Drug use: No      MEDICATIONS:   Current Outpatient Medications on File Prior to Visit   Medication Sig Dispense Refill    ALPRAZolam (XANAX) 0.5 MG tablet Take 1 tablet by mouth 4 (Four) Times a Day.      aspirin 81 MG EC tablet Take 1 tablet by mouth Daily. 90 tablet 3    baclofen (LIORESAL) 10 MG tablet Take 1 tablet by mouth 3 (Three) Times a Day As Needed.      cetirizine (zyrTEC) 10 MG tablet Take 1 tablet by mouth Daily.      Cholecalciferol (VITAMIN D3) 5000 UNITS capsule capsule Take 1 capsule by mouth Daily.      cyclobenzaprine (FLEXERIL) 10 MG tablet Take 1 tablet by mouth At Night As Needed for Muscle Spasms. 30 tablet 0    dicyclomine (BENTYL) 10 MG capsule Take 1 capsule by mouth 3 (Three) Times a Day As Needed for Abdominal Cramping. 120 capsule 3    dilTIAZem XR (DILACOR XR) 180 MG 24 hr capsule Take 1 capsule by mouth Daily. 90 capsule 3    famotidine (Pepcid) 40 MG tablet Take 1 tablet by mouth Daily. 90 tablet 3    folic acid (FOLVITE) 1 MG tablet Take 1 tablet by mouth Daily.      hydrALAZINE (APRESOLINE) 25 MG tablet Take 1 tablet by mouth 3 (Three) Times a Day  "As Needed (for SBP>155). 30 tablet 0    levothyroxine (Synthroid) 75 MCG tablet 75 mcg 1 tablet Monday through Saturday (Non on Sunday, take 6 days a week). Take on an empty stomach with just water 30 min to an hour before any other medications, food or drink 72 tablet 1    meloxicam (MOBIC) 15 MG tablet Take 1 tablet by mouth Daily.      olmesartan (BENICAR) 40 MG tablet Take 1 tablet by mouth Every Night. 90 tablet 3    Omega-3 Fatty Acids (OMEGA 3 PO) Take 1 tablet by mouth daily.      ondansetron ODT (ZOFRAN-ODT) 4 MG disintegrating tablet Place 1 tablet on the tongue Every 8 (Eight) Hours As Needed for Nausea or Vomiting. 25 tablet 3    rosuvastatin (CRESTOR) 40 MG tablet Take 1 tablet by mouth Every Night. 90 tablet 3    traZODone (DESYREL) 150 MG tablet TAKE 1 TO 1 & 1/2 (ONE TO ONE & ONE-HALF) TABLETS BY MOUTH ONCE DAILY AS NEEDED AT BEDTIME      vitamin B-12 (CYANOCOBALAMIN) 100 MCG tablet Take 0.5 tablets by mouth Daily.      fluticasone (FLONASE) 50 MCG/ACT nasal spray Administer 2 sprays into the nostril(s) as directed by provider Daily for 30 days. 16 g 0    hydrALAZINE (APRESOLINE) 25 MG tablet Take 1 tablet by mouth As Needed (for BP > 155). (Patient not taking: Reported on 12/12/2024) 30 tablet 11     No current facility-administered medications on file prior to visit.       ALLERGIES: Beta adrenergic blockers, Other, Hctz [hydrochlorothiazide], Sulfa antibiotics, Spironolactone, Sulfamethoxazole-trimethoprim, and Viibryd [vilazodone hcl]       Objective   Vitals:    12/12/24 0915   BP: 122/73   Pulse: 73   Weight: 82.2 kg (181 lb 3.2 oz)   Height: 167.6 cm (65.98\")     Body mass index is 29.26 kg/m².          PHYSICAL EXAM:   Physical Exam  Constitutional:       Appearance: Normal appearance.   HENT:      Head: Normocephalic and atraumatic.      Nose: Nose normal.   Eyes:      Extraocular Movements: Extraocular movements intact.      Pupils: Pupils are equal, round, and reactive to light. "   Cardiovascular:      Rate and Rhythm: Normal rate.      Pulses: Normal pulses.      Heart sounds: Normal heart sounds.   Pulmonary:      Effort: Pulmonary effort is normal.      Breath sounds: Normal breath sounds.   Abdominal:      General: Abdomen is flat. Bowel sounds are normal.      Palpations: Abdomen is soft.   Musculoskeletal:         General: Normal range of motion.      Cervical back: Normal range of motion and neck supple.      Right lower leg: Edema present.      Left lower leg: Edema present.   Skin:     General: Skin is warm and dry.   Neurological:      General: No focal deficit present.      Mental Status: She is alert and oriented to person, place, and time. Mental status is at baseline.   Psychiatric:         Mood and Affect: Mood normal.         Thought Content: Thought content normal.          Result Review   LABS:    CBC          1/25/2024    14:19 8/29/2024    14:34   CBC   WBC 6.3  6.68    RBC 3.87  3.96    Hemoglobin 12.2  12.5    Hematocrit 37.4  38.0    MCV 97  96.0    MCH 31.5  31.6    MCHC 32.6  32.9    RDW 12.4  11.8    Platelets 195  229      BMP          1/25/2024    14:19 2/8/2024    10:24 8/29/2024    12:22 8/29/2024    14:34   BMP   BUN 13  14  14  13    Creatinine 0.82  0.87  0.90  0.81    Sodium 141  139  137  137    Potassium 4.0  4.5  4.6  4.3    Chloride 101  101  98  96    CO2 25  28.1  27.4  26.6    Calcium 9.4  9.6  9.0  9.5      Lipid Panel          10/21/2024    09:34   Lipid Panel   Total Cholesterol 157    Triglycerides 33    HDL Cholesterol 76    VLDL Cholesterol 8    LDL Cholesterol  73    LDL/HDL Ratio 0.98          A1C Last 3 Results          2/8/2024    10:24 8/29/2024    12:22   HGBA1C Last 3 Results   Hemoglobin A1C 5.30  5.80         Results Review:       I reviewed the patient's new clinical results.    The following radiologic or non-invasive studies have been reviewed by me: CTA reviewed  No results found for this or any previous visit from the past 365  days.     CT Cardiac Calcium Score Without Dye    Result Date: 11/21/2024  Total coronary artery Agatston calcification score is 921.  These findings represent a large identifiable coronary atherosclerotic burden.  A score of < 10 represents only a minimal coronary atherosclerotic burden with a negative predictive value of 90-95%.  A score of  represents a mild coronary atherosclerotic burden with a relative risk of death of 1.6 as compared to individuals with a score of < 10.  The risk is higher if this score is > 75% for this age group or if there is > 1 calcified vessel.  A score of 101-400 represents a moderate coronary atherosclerotic burden with a relative risk of death of 1.7 as compared to individuals with a score of < 10.  The risk is higher if this score is > 75% for this age group or if there is > 1 calcified vessel.  A score of 401-1000 represents a large coronary atherosclerotic burden with a relative risk of death of 2.5 as compared to individuals with a score of < 10.  The risk is higher if this score is > 75% for this age group or if there is > 1 calcified vessel.  A score of > 1000 represents an extensive coronary atherosclerotic burden with a relative risk of death of 4 as compared to individuals with a score of < 10.  The risk is higher if this score is > 75% for this age group or if there is > 1 calcified vessel.   There are no airspace opacities, effusions, or lymphadenopathy within the visualized chest.    Dr. Brittany Larkin M.D reviewed the images and diagnostic data and performed the interpretation.   This report was finalized on 11/21/2024 5:04 PM by Dr. Brittany Larkin M.D on Workstation: [id number]                   ASSESSMENT/PLAN:   Diagnoses and all orders for this visit:    1. Aneurysm of splenic artery (Primary)  -     Duplex Aorta IVC Iliac Graft Limited CAR; Future    2. Venous stasis    3. Venous (peripheral) insufficiency    4. Chronic venous hypertension with inflammation  involving both sides       69 y.o. female with known splenic artery aneurysm that is now at 1.3 cm and will be starting to follow this on a yearly basis.  Our next attempt will be with duplex scan in a year which should be n.p.o. and we will try to see you to avoid a repeat CAT scan.  In the meantime I looked at her CAT scan imaging I think she does have some pelvic varicosities which she also reports the rest of her veins in her lower legs I think are controllable with compression and she will wear these.  Depending on whether things were to progress in her legs we could pursue class 1 mapping's over time or even a pelvic duplex if needed.  Will see how she does with the compression over the next year I will see her back for splenic artery aneurysm duplex in the year.    I discussed the plan with the patient who is agreeable to the plan of care at this point. Thank you for this consult.   Follow Up  Return in about 1 year (around 12/12/2025).    Edson Rasmussen MD   12/12/24

## 2024-12-13 ENCOUNTER — TELEPHONE (OUTPATIENT)
Dept: CARDIOLOGY | Facility: CLINIC | Age: 69
End: 2024-12-13
Payer: MEDICARE

## 2024-12-13 NOTE — TELEPHONE ENCOUNTER
Caller: Vesta Sutton    Relationship to patient: Self    Best call back number: [phone]     Patient is needing: PT HAVING TACHYCARDIA ARRYTHIMIAS, HAVING COMPLICATIONS WITH MEDICATION, PT DENIED TRIAGE, SHE IS REQUESTING SOONER APPT THAN JAN. PLS CALL AND ADVISE

## 2024-12-13 NOTE — TELEPHONE ENCOUNTER
"Spoke to patient. We had just spoken to her about this 3 days ago. I told her that she needs to give the new medication dose a full week before we go and make another change. She then told me that she has only taken 1 dose of the increased dose which was last night. I once again explained that taking one dose is not enough to see a difference. She then went on to say \"but my HR is 58 and it has never been that low.\" I told her that she is saying 2 different things. She then went on to say that \"my heart rate just goes up and down, up and down.\" I told her that HRs vary and a lot of different things can affect it. She denies being symptomatic when her HR was 58 and it did not stay there long. I assured her that that is good. She then went on to say she can only sleep 3 hours a night. I encouraged her to discuss that with her PCP because I do not think that is cardiac. She was agreeable to give the new med change a full week, but she said \"if I get a funny feeling in my chest I am calling back.\"    Janice Ac RN  Triage [facility]   "

## 2024-12-16 NOTE — TELEPHONE ENCOUNTER
"Patient called again about her Diltiazem.  She stated that with the increased dose of Diltiazem, 180 mg, her \"heart goes crazy\".  She stated that this has happened several times.  She said that she was not able to handle it and she went to the lower dose of 120 mg.  She said that she is still having some tachycardia but not as bad as it was with the increased dose.  She explained that about an hour after taking it, it wakes her up out of her sleep.  She wanted to let you know that she is taking the lower dose of Diltiazem.      She also stated at the end of the conversation that she is no longer taking the ASA 81 mg.  She stated that she bruises easily and with the ASA she had a \"major bruise\".  She did not want to continue to get bruises.  Please advise.    I have attempted to call the patient to get more information, but no answer.  Left message to call back.    CB: [phone]    Song  "

## 2024-12-17 NOTE — TELEPHONE ENCOUNTER
Called and left a message on voicemail with the above.  Advised to call the office back if she has any further questions.    Song

## 2024-12-19 NOTE — TELEPHONE ENCOUNTER
Received message today regarding pt wanting a sooner appt. Spoke with pt and stated she is still having tachycardia on the Diltiazem 120mg. Pt stated she just really wants to be seen sooner.    Deondre,  Will you please contact pt and get her scheduled for a sooner appt with DESI Felix?    Thanks,  CHRIS Palomo

## 2024-12-19 NOTE — TELEPHONE ENCOUNTER
Patient scheduled to 1/16/25. Patient did not want to go to [place] office for opening on 1/6/25.   Please follow up tomorrow with MD. yes

## 2025-01-15 NOTE — Clinical Note
Needs a SIBO breath test kit to her home soft/nontender/nondistended/normal active bowel sounds details…

## 2025-01-16 ENCOUNTER — OFFICE VISIT (OUTPATIENT)
Dept: CARDIOLOGY | Facility: CLINIC | Age: 70
End: 2025-01-16
Payer: MEDICARE

## 2025-01-16 VITALS
DIASTOLIC BLOOD PRESSURE: 73 MMHG | OXYGEN SATURATION: 98 % | HEART RATE: 60 BPM | SYSTOLIC BLOOD PRESSURE: 133 MMHG | WEIGHT: 180 LBS | BODY MASS INDEX: 29.99 KG/M2 | HEIGHT: 65 IN

## 2025-01-16 DIAGNOSIS — I45.2 RBBB (RIGHT BUNDLE BRANCH BLOCK WITH LEFT ANTERIOR FASCICULAR BLOCK): ICD-10-CM

## 2025-01-16 DIAGNOSIS — I25.10 CORONARY ARTERY CALCIFICATION: Primary | ICD-10-CM

## 2025-01-16 DIAGNOSIS — I10 PRIMARY HYPERTENSION: ICD-10-CM

## 2025-01-16 DIAGNOSIS — E78.2 MIXED HYPERLIPIDEMIA: ICD-10-CM

## 2025-01-16 DIAGNOSIS — R00.2 PALPITATIONS: ICD-10-CM

## 2025-01-16 PROCEDURE — 3075F SYST BP GE 130 - 139MM HG: CPT | Performed by: FAMILY MEDICINE

## 2025-01-16 PROCEDURE — 1159F MED LIST DOCD IN RCRD: CPT | Performed by: FAMILY MEDICINE

## 2025-01-16 PROCEDURE — 3078F DIAST BP <80 MM HG: CPT | Performed by: FAMILY MEDICINE

## 2025-01-16 PROCEDURE — 1160F RVW MEDS BY RX/DR IN RCRD: CPT | Performed by: FAMILY MEDICINE

## 2025-01-16 PROCEDURE — 99214 OFFICE O/P EST MOD 30 MIN: CPT | Performed by: FAMILY MEDICINE

## 2025-01-16 RX ORDER — ROSUVASTATIN CALCIUM 40 MG/1
40 TABLET, COATED ORAL NIGHTLY
Qty: 90 TABLET | Refills: 3 | Status: SHIPPED | OUTPATIENT
Start: 2025-01-16

## 2025-01-16 NOTE — PROGRESS NOTES
Date of Office Visit: 2025  Encounter Provider: DESI Rogers  Place of Service: [facility] CARDIOLOGY  Established cardiologist: Susan Paula MD  Patient Name: Vesta Sutton  :1955      Patient ID:  Vesta Sutton is a 69 y.o. female is here for  followup    With a pertinent medical history of hypertension, GERD, hyperlipidemia, anxiety, depression, left eye retinal detachment, lumbar DDD, venous varicosities, coronary calcification.    History of Present Illness   had abnormal stress study done showing apical ischemia, but cardiac catheterization following showed very mild LAD stenosis.    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  It was only done on the right.    She has a history of chest pain on sertraline.  She has had significant GERD on venlafaxine.    Stress nuclear study 2021 with no evidence of ischemia.  24-hour Holter monitor was unremarkable at that time.    CTA chest 2022 showed no evidence of PE.  Dr. Paula did review those images which showed dense calcification of the proximal RCA, dense calcification proximal LAD, distal left main and ramus intermedius, spotty calcification of the proximal circumflex.      2023 LP(a) was elevated at 272. Treadmill stress study in 2023 exercised on Hi protocol for 6 minutes and no ischemic changes noted she did complain of chest pain during.    Vascular screening May 2023 showed less than 50% bilateral ICA stenosis with normal abdominal aorta and normal peripheral artery screening.    In 2023 she was in the emergency room with abdominal pain and nausea.  CT abdomen showed enteritis.      Obstructive sleep apnea and she wears a CPAP.    She saw Dr. Paula in the office 2024 and had been noticing heart racing in the morning.   Diltiazem 120 mg nightly was added.  Echocardiogram and calcium score were ordered.  She completed echo 11/4/2024 which showed an ejection fraction of 62.9%, mildly calcified aortic valve with mild aortic regurgitation, mild tricuspid regurgitation, normal RVSP.  CT cardiac calcium score done 11/17/2024 showed a total calcium score of 921, this is distributed 54 left main, 283 LAD, 48 circumflex, 444 RCA, 88 ramus.  Because of the elevated calcium score her rosuvastatin was increased and she was started on 81 mg of aspirin daily.  November 25, 2024 Vesta reported stabbing heart pain and a stress test was ordered.  PET stress study was done 12/10/2024 with no evidence of ischemia.  Compared to prior study in December 2021 this revealed no changes.    Today Vesta tells me occasionally in the mornings she has experienced some heart racing.  Sometimes there is lightheadedness.  The last time this occurred she did have 2 glasses of wine and a CBD gummy the night before.  She does have sleep apnea but has been having difficulty wearing the mask and will be following up with sleep medicine for this.  She works as a pharmacist.There is no chest pain or pressure, soa, marquez, pnd, lightheadedness, dizziness, presyncope/syncope, leg swelling, heart racing, or palpitations.       Current Outpatient Medications on File Prior to Visit   Medication Sig Dispense Refill    ALPRAZolam (XANAX) 0.5 MG tablet Take 1 tablet by mouth 4 (Four) Times a Day.      baclofen (LIORESAL) 10 MG tablet Take 1 tablet by mouth 3 (Three) Times a Day As Needed.      cetirizine (zyrTEC) 10 MG tablet Take 1 tablet by mouth Daily.      cyclobenzaprine (FLEXERIL) 10 MG tablet Take 1 tablet by mouth At Night As Needed for Muscle Spasms. 30 tablet 0    dicyclomine (BENTYL) 10 MG capsule Take 1 capsule by mouth 3 (Three) Times a Day As Needed for Abdominal Cramping. 120 capsule 3    dilTIAZem XR (DILACOR XR) 180 MG 24 hr capsule Take 1 capsule by mouth Daily.  "90 capsule 3    famotidine (Pepcid) 40 MG tablet Take 1 tablet by mouth Daily. 90 tablet 3    folic acid (FOLVITE) 1 MG tablet Take 1 tablet by mouth Daily.      hydrALAZINE (APRESOLINE) 25 MG tablet Take 1 tablet by mouth As Needed (for BP > 155). 30 tablet 11    hydrALAZINE (APRESOLINE) 25 MG tablet Take 1 tablet by mouth 3 (Three) Times a Day As Needed (for SBP>155). 30 tablet 0    levothyroxine (Synthroid) 75 MCG tablet 75 mcg 1 tablet Monday through Saturday (Non on Sunday, take 6 days a week). Take on an empty stomach with just water 30 min to an hour before any other medications, food or drink 72 tablet 1    olmesartan (BENICAR) 40 MG tablet Take 1 tablet by mouth Every Night. 90 tablet 3    Omega-3 Fatty Acids (OMEGA 3 PO) Take 1 tablet by mouth daily.      ondansetron ODT (ZOFRAN-ODT) 4 MG disintegrating tablet Place 1 tablet on the tongue Every 8 (Eight) Hours As Needed for Nausea or Vomiting. 25 tablet 3    traZODone (DESYREL) 150 MG tablet TAKE 1 TO 1 & 1/2 (ONE TO ONE & ONE-HALF) TABLETS BY MOUTH ONCE DAILY AS NEEDED AT BEDTIME      vitamin B-12 (CYANOCOBALAMIN) 100 MCG tablet Take 0.5 tablets by mouth Daily.      aspirin 81 MG EC tablet Take 1 tablet by mouth Daily. 90 tablet 3    fluticasone (FLONASE) 50 MCG/ACT nasal spray Administer 2 sprays into the nostril(s) as directed by provider Daily for 30 days. 16 g 0     No current facility-administered medications on file prior to visit.         Procedures    ECG 12 Lead    Date/Time: 1/17/2025 1:06 PM  Performed by: Elvira Conway APRN    Authorized by: Elvira Conway APRN  Comparison: compared with previous ECG from 10/24/2024  Rhythm: sinus rhythm  BPM: 60  Conduction: right bundle branch block and left anterior fascicular block    Clinical impression: abnormal EKG              Objective:      Vitals:    01/16/25 1133   BP: 133/73   Pulse: 60   SpO2: 98%   Weight: 81.6 kg (180 lb)   Height: 165.1 cm (65\")     Body mass index is 29.95 " kg/m².  Wt Readings from Last 3 Encounters:   01/16/25 81.6 kg (180 lb)   12/12/24 82.2 kg (181 lb 3.2 oz)   12/10/24 82.1 kg (181 lb)         Constitutional:       Appearance: Healthy appearance. Not in distress.   Eyes:      Pupils: Pupils are equal, round, and reactive to light.   Pulmonary:      Effort: Pulmonary effort is normal.      Breath sounds: Normal breath sounds.   Cardiovascular:      Normal rate. Regular rhythm.      Murmurs: There is no murmur.   Pulses:     Radial: 2+ bilaterally.     Dorsalis pedis: 2+ bilaterally.     Posterior tibial: 2+ bilaterally.  Edema:     Peripheral edema absent.   Musculoskeletal:      Cervical back: Normal range of motion. Skin:     General: Skin is warm and dry.   Neurological:      Mental Status: Alert and oriented to person, place and time.       Lab Review:      Lab Results   Component Value Date    TSH 0.429 08/29/2024       Lab Results   Component Value Date    CHOL 157 10/21/2024    CHOL 176 11/23/2022    CHLPL 152 04/13/2023    CHLPL 181 01/19/2023    CHLPL 157 01/31/2022     Lab Results   Component Value Date    TRIG 33 10/21/2024    TRIG 41 04/13/2023    TRIG 51 01/19/2023     Lab Results   Component Value Date    HDL 76 (H) 10/21/2024    HDL 81 (H) 04/13/2023    HDL 79 (H) 01/19/2023     Lab Results   Component Value Date    LDL 73 10/21/2024    LDL 61 04/13/2023    LDL 92 01/19/2023       Lab Results   Component Value Date    WBC 6.68 08/29/2024    HGB 12.5 08/29/2024    HCT 38.0 08/29/2024    MCV 96.0 08/29/2024     08/29/2024       Lab Results   Component Value Date    GLUCOSE 88 08/29/2024    BUN 13 08/29/2024    CREATININE 0.81 08/29/2024    EGFRRESULT 80.2 08/29/2024    EGFR 84.4 09/26/2023    BCR 16.0 08/29/2024    K 4.3 08/29/2024    CO2 26.6 08/29/2024    CALCIUM 9.5 08/29/2024    PROTENTOTREF 7.0 08/29/2024    ALBUMIN 4.3 08/29/2024    BILITOT 0.3 08/29/2024    AST 20 08/29/2024    ALT 30 08/29/2024       Lab Results   Component Value Date     HGBA1C 5.80 (H) 08/29/2024       Assessment:     1. Coronary artery calcification    2. Palpitations    3. Primary hypertension    4. Mixed hyperlipidemia    5. RBBB (right bundle branch block with left anterior fascicular block)    Coronary artery calcification, elevated calcium score.  Nonischemic stress test November 2024.  Continue 81 mg aspirin and 40 mg rosuvastatin daily.  Palpitations, these have been worse with anxiety in the past.  Also associated with wine and CBD gummy.  Holter monitor was ordered for her today.  He continues on diltiazem 180 mg daily.  Hypertension, fairly well-controlled.  She is sensitive to salt intake.  She continues on 40 mg olmesartan nightly, 25 mg hydralazine as needed for elevated systolic blood pressure greater than 160.   Hyperlipidemia on statin therapy  Right bundle branch block and left anterior fascicular block on ECG this is a chronically abnormal finding for her.  LEATHA, having difficulty with CPAP to follow with sleep medicine for this  History of appendiceal carcinoma.  History of asthma  Anxiety  Hypothyroidism  GERD  Lumbar DDD with sciatic pain      Plan:   No medication changes were made  Holter monitor was ordered today.    Return in about 1 month (around 2/16/2025) for Elvira Conway.      Thank you for allowing me to participate in this patient's care. Please call with any questions or concerns.          Dragon dictation device was utilized in this note.

## 2025-01-17 PROCEDURE — 93000 ELECTROCARDIOGRAM COMPLETE: CPT | Performed by: FAMILY MEDICINE

## 2025-01-20 PROBLEM — R00.2 PALPITATIONS: Status: ACTIVE | Noted: 2025-01-20

## 2025-01-21 NOTE — THERAPY TREATMENT NOTE
Outpatient Physical Therapy Ortho Treatment Note  [facility]     Patient Name: Vesta Sutton  : 1955  MRN: [id number]  Today's Date: 10/9/2023      Visit Date: 10/09/2023    Visit Dx:    ICD-10-CM ICD-9-CM   1. Bilateral hip pain  M25.551 719.45    M25.552        Patient Active Problem List   Diagnosis    Hyperlipidemia    LEATHA on auto CPAP    Psychophysiological insomnia    Circadian rhythm sleep disorder, delayed sleep phase type    Major depressive disorder    Lumbar radiculopathy    Hypothyroidism    Generalized anxiety disorder    RBBB (right bundle branch block with left anterior fascicular block)    Vitamin D deficiency    Coronary arteriosclerosis in native artery    Aneurysm of splenic artery    Chronic constipation    Degeneration of lumbar intervertebral disc    Vitamin D deficiency    Hypertension    Edema of lower extremity    Hand eczema    Impaired glucose metabolism        Past Medical History:   Diagnosis Date    Anemia     Anxiety and depression     Appendiceal carcinoid tumor     Arthritis     Asthma     Breast nodule     Cancer of appendix     Coronary artery disease     nonobstructive     COVID-19 virus infection 2021    Dizziness     Environmental allergies     Fibromuscular hyperplasia of artery     in the carotid artery with no stenosis    GERD (gastroesophageal reflux disease)     Headache     Hernia in stomach    History of anesthesia complications     lidocaine caused reaction during dental procedure per patient    Hyperlipidemia     Hypertension     Hypothyroidism     Impaired glucose metabolism     Lumbar pain     Myalgia     Nodular goiter     last US 3/21/22 stable 2.5 cm left lobe  and  a 9 mm left lobe and a 10 mm mid right lobe. Sister with thyroid cancer    LEATHA (obstructive sleep apnea) 2008    Overnight polysomnogram.  Weight 166 pounds.  Mild LEATHA with AHI 7.5 events per hour.  When supine, moderately abnormal at 15.1 events per hour.  Low oxygen  saturation 78% and sleep-related hypoxia present for 24 minutes.    Palpitations     RBBB (right bundle branch block)     Retinal detachment     left  //  when blood pressure gets high she notices flashing lights in her vision    Vertigo         Past Surgical History:   Procedure Laterality Date    APPENDECTOMY      BREAST BIOPSY Left     2017    BREAST CYST ASPIRATION Left     2019    CATARACT EXTRACTION, BILATERAL       SECTION      COLONOSCOPY N/A 2019    Procedure: COLONOSCOPY to CECUM;  Surgeon: Damien Aleman MD;  Location: [facility] ENDOSCOPY;  Service: General    CORNEA LACERATION REPAIR      ENDOSCOPY N/A 2016    Z-line regular, 40 cm from the incisors, gastritis, erythematous duodenopathy    ENDOSCOPY N/A 2018    Normal exophagus, Erythematous mucosa in the antrum, Normal examined duodenum-Dr. Devyn Stahl    ENDOSCOPY N/A 2019    Procedure: ESOPHAGOGASTRODUODENOSCOPY with BX;  Surgeon: Damien Aleman MD;  Location: [facility] ENDOSCOPY;  Service: General    ENDOSCOPY AND COLONOSCOPY N/A 2014    EGD with biopsy and colonoscopy, Mild gastritis, Normal colon, Repeat Colonoscopy in 5 years, Dr. Damien Aleman    ENDOSCOPY AND COLONOSCOPY N/A 2008    EGD with biopsy and colonoscopy-Dr. Damien Aleman    UPPER GASTROINTESTINAL ENDOSCOPY  Cannot remember    URETEROCELE REPAIR      US GUIDED FINE NEEDLE ASPIRATION  2019        PT Ortho       Row Name 10/09/23 1100       Vital Signs    Pre Systolic BP Rehab 138  -MH    Pre Treatment Diastolic BP 80  -MH    Pretreatment Heart Rate (beats/min) 64  -MH    Pre SpO2 (%) 97  -MH              User Key  (r) = Recorded By, (t) = Taken By, (c) = Cosigned By      Initials Name Provider Type    Iva Cabrales, PT Physical Therapist                                 PT Assessment/Plan       Row Name 10/09/23 1100          PT Assessment    Assessment Comments Ms. Sutton presents with no new complaints. Reports  compliance with HEP but ed surface is not firm enough so she bought a yoga mat and performing ther ex on floor, educated on proper floor transfer as to not aggravte hip or back pain. Vitals WNL to begin session, reports she has seen cardiology who cleared her. Increased resistance on side steps and added monster walk with good tolerance. Pt. remains good candidate for skilled PT.  -        PT Plan    PT Plan Comments AR press?  -MH               User Key  (r) = Recorded By, (t) = Taken By, (c) = Cosigned By      Initials Name Provider Type     Iva Wolfe, PT Physical Therapist                       OP Exercises       Row Name 10/09/23 1100             Subjective    Subjective Comments my bed is too soft so I got a yoga mat and I think I could do them on the floor and just put a towel or pillow behind the small  of my back  -MH         Total Minutes    70581 - PT Therapeutic Exercise Minutes 39  -MH         Exercise 1    Exercise Name 1 nustep  -MH      Cueing 1 Verbal  -MH      Time 1 5 min  -MH      Additional Comments L5  -MH         Exercise 2    Exercise Name 2 supine piriformis stretch  -MH      Cueing 2 Verbal;Tactile  -MH      Reps 2 3  -MH      Time 2 20s  -MH         Exercise 4    Exercise Name 4 seated hamstring stretch  -MH      Cueing 4 Verbal;Demo  -MH      Reps 4 2e  -MH      Time 4 20s  -MH         Exercise 5    Exercise Name 5 supine PPT  -MH      Cueing 5 Verbal;Tactile  -MH      Sets 5 1  -MH      Reps 5 15  -MH      Time 5 5s  -MH         Exercise 7    Exercise Name 7 STS + TA  -MH      Cueing 7 Verbal;Demo  -MH      Sets 7 1  -MH      Reps 7 10  -MH      Time 7 low table mat  -MH         Exercise 9    Exercise Name 9 clamshells  -MH      Cueing 9 Verbal;Demo  -MH      Sets 9 2  -MH      Reps 9 10  -MH         Exercise 10    Exercise Name 10 prone hip ext  -MH      Cueing 10 Verbal;Demo  -MH      Sets 10 1  -MH      Reps 10 10e  -MH      Time 10 needs cueing for glute act.  -MH          Exercise 11    Exercise Name 11 side steps  -      Cueing 11 Verbal;[facility]  -      Reps 11 3 laps  -      Time 11 RTB  -         Exercise 12    Exercise Name 12 monster walk  -      Cueing 12 Verbal;[facility]      Time 12 3 laps  -      Additional Comments RT  -                User Key  (r) = Recorded By, (t) = Taken By, (c) = Cosigned By      Initials Name Provider Type     Iva Wolfe, PT Physical Therapist                                  PT OP Goals       Row Name 10/09/23 1100          PT Short Term Goals    STG Date to Achieve 10/21/23  -     STG 1 Pt will be independent with initial HEP to improve strength/ROM and decrease pain.  -     STG 1 Progress Ongoing  -     STG 2 Patient will improve ability to sleep through the night with ,/= 2-3 sleep disturbances/night related to back pain to improve overall sleep quality and quality of life  -     STG 2 Progress Ongoing  -     STG 3 Patient will demonstrate proper log roll mechanics with little to no cues when getting on/off treatment table to demonstrate improved mechanics and decreased strain on lumbar spine.  -     STG 3 Progress Ongoing  -        Long Term Goals    LTG Date to Achieve 11/20/23  -     LTG 1 Pt will be independent with advanced HEP to maintain strength/ROM and decrease pain.  -     LTG 1 Progress Ongoing  -     LTG 2 Pt will improve B hip strength to at least 4/5.  -     LTG 2 Progress Ongoing  [facility]     LTG 3 Patient will improve ability to sleep through the night without sleep disturbances related to back pain to improve overall sleep quality and quality of life  -     LTG 3 Progress Ongoing  [facility]     LTG 4 Pt will demonstrate appropriate body mechanics for bending/lifting tasks to improve tolerance to ADLs.  -     LTG 4 Progress Ongoing  [facility]               User Key  (r) = Recorded By, (t) = Taken By, (c) = Cosigned By      Initials Name Provider Type     Iva Wolfe, PT Physical Therapist                     Therapy Education  Given: Symptoms/condition management  Program: Reinforced  How Provided: Verbal  Provided to: Patient  Level of Understanding: Verbalized              Time Calculation:   Start Time: 1115  Stop Time: 1154  Time Calculation (min): 39 min  Total Timed Code Minutes- PT: 39 minute(s)  Timed Charges  46652 - PT Therapeutic Exercise Minutes: 39  Total Minutes  Timed Charges Total Minutes: 39   Total Minutes: 39  Therapy Charges for Today       Code Description Service Date Service Provider Modifiers Qty    [id number] HC PT THER PROC EA 15 MIN 10/9/2023 Iva Wolfe, PT GP 3                      Iva Wolfe, PT  10/9/2023      supervision/verbal cues

## 2025-02-11 ENCOUNTER — TELEPHONE (OUTPATIENT)
Dept: CARDIOLOGY | Facility: CLINIC | Age: 70
End: 2025-02-11
Payer: MEDICARE

## 2025-02-11 NOTE — TELEPHONE ENCOUNTER
Vesta T Herman returned call.  Reviewed results and recommendations with her and she verbalized understanding of results and recommendations.    Thank you,  Ginette DODD RN  Triage Nurse TARAN  02/11/25 10:51 EST

## 2025-02-11 NOTE — TELEPHONE ENCOUNTER
Called and left VM. Will continue to try to reach patient. HUB transfer call to triage.     Janice Ac RN  [place] [facility]

## 2025-02-11 NOTE — TELEPHONE ENCOUNTER
Overall benign monitor study  We can review further when she comes in for follow-up later this month  Thanks

## 2025-02-27 ENCOUNTER — LAB (OUTPATIENT)
Dept: ENDOCRINOLOGY | Age: 70
End: 2025-02-27
Payer: MEDICARE

## 2025-02-27 DIAGNOSIS — E04.1 THYROID NODULE: ICD-10-CM

## 2025-02-27 DIAGNOSIS — E03.9 HYPOTHYROIDISM, UNSPECIFIED TYPE: ICD-10-CM

## 2025-02-27 DIAGNOSIS — R73.03 PREDIABETES: ICD-10-CM

## 2025-02-27 LAB
ALBUMIN SERPL-MCNC: 4 G/DL (ref 3.5–5.2)
ALBUMIN/GLOB SERPL: 1.7 G/DL
ALP SERPL-CCNC: 70 U/L (ref 39–117)
ALT SERPL-CCNC: 22 U/L (ref 1–33)
AST SERPL-CCNC: 19 U/L (ref 1–32)
BILIRUB SERPL-MCNC: 0.3 MG/DL (ref 0–1.2)
BUN SERPL-MCNC: 11 MG/DL (ref 8–23)
BUN/CREAT SERPL: 12.6 (ref 7–25)
CALCIUM SERPL-MCNC: 9.2 MG/DL (ref 8.6–10.5)
CHLORIDE SERPL-SCNC: 102 MMOL/L (ref 98–107)
CO2 SERPL-SCNC: 24.6 MMOL/L (ref 22–29)
CREAT SERPL-MCNC: 0.87 MG/DL (ref 0.57–1)
EGFRCR SERPLBLD CKD-EPI 2021: 73.1 ML/MIN/1.73
GLOBULIN SER CALC-MCNC: 2.4 GM/DL
GLUCOSE SERPL-MCNC: 113 MG/DL (ref 65–99)
HBA1C MFR BLD: 5.8 % (ref 4.8–5.6)
POTASSIUM SERPL-SCNC: 4 MMOL/L (ref 3.5–5.2)
PROT SERPL-MCNC: 6.4 G/DL (ref 6–8.5)
SODIUM SERPL-SCNC: 136 MMOL/L (ref 136–145)
T4 FREE SERPL-MCNC: 1.56 NG/DL (ref 0.92–1.68)
TSH SERPL DL<=0.005 MIU/L-ACNC: 0.62 UIU/ML (ref 0.27–4.2)

## 2025-03-11 ENCOUNTER — OFFICE VISIT (OUTPATIENT)
Dept: CARDIOLOGY | Facility: CLINIC | Age: 70
End: 2025-03-11
Payer: MEDICARE

## 2025-03-11 VITALS
HEIGHT: 66 IN | WEIGHT: 179 LBS | SYSTOLIC BLOOD PRESSURE: 125 MMHG | BODY MASS INDEX: 28.77 KG/M2 | DIASTOLIC BLOOD PRESSURE: 75 MMHG | HEART RATE: 56 BPM

## 2025-03-11 DIAGNOSIS — E78.2 MIXED HYPERLIPIDEMIA: ICD-10-CM

## 2025-03-11 DIAGNOSIS — I10 PRIMARY HYPERTENSION: ICD-10-CM

## 2025-03-11 DIAGNOSIS — I25.10 CORONARY ARTERY CALCIFICATION: Primary | ICD-10-CM

## 2025-03-11 DIAGNOSIS — R00.2 PALPITATIONS: ICD-10-CM

## 2025-03-11 PROCEDURE — 3078F DIAST BP <80 MM HG: CPT | Performed by: FAMILY MEDICINE

## 2025-03-11 PROCEDURE — 3074F SYST BP LT 130 MM HG: CPT | Performed by: FAMILY MEDICINE

## 2025-03-11 PROCEDURE — 93000 ELECTROCARDIOGRAM COMPLETE: CPT | Performed by: FAMILY MEDICINE

## 2025-03-11 PROCEDURE — 99214 OFFICE O/P EST MOD 30 MIN: CPT | Performed by: FAMILY MEDICINE

## 2025-03-11 RX ORDER — FEXOFENADINE HCL 180 MG/1
90 TABLET ORAL DAILY
COMMUNITY

## 2025-03-11 NOTE — PROGRESS NOTES
Date of Office Visit: 2025  Encounter Provider: DESI Rogers  Place of Service: [facility] CARDIOLOGY  Established cardiologist: Susan Paula MD  Patient Name: Vesta Sutton  :1955      Patient ID:  Vesta Sutton is a 69 y.o. female is here for  followup    With a pertinent medical history of hypertension, GERD, hyperlipidemia, anxiety, depression, left eye retinal detachment, lumbar DDD, venous varicosities, coronary calcification.     History of Present Illness   had abnormal stress study done showing apical ischemia, but cardiac catheterization following showed very mild LAD stenosis.     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  It was only done on the right.     She has a history of chest pain on sertraline.  She has had significant GERD on venlafaxine.     Stress nuclear study 2021 with no evidence of ischemia.  24-hour Holter monitor was unremarkable at that time.     CTA chest 2022 showed no evidence of PE.  Dr. Paula did review those images which showed dense calcification of the proximal RCA, dense calcification proximal LAD, distal left main and ramus intermedius, spotty calcification of the proximal circumflex.       2023 LP(a) was elevated at 272. Treadmill stress study in 2023 exercised on Hi protocol for 6 minutes and no ischemic changes noted she did complain of chest pain during.     Vascular screening May 2023 showed less than 50% bilateral ICA stenosis with normal abdominal aorta and normal peripheral artery screening.     In 2023 she was in the emergency room with abdominal pain and nausea.  CT abdomen showed enteritis.       Obstructive sleep apnea and she wears a CPAP.     She saw Dr. Paula in the office 2024 and had been noticing heart racing in the  morning.  Diltiazem 120 mg nightly was added.  Echocardiogram and calcium score were ordered.  She completed echo 11/4/2024 which showed an ejection fraction of 62.9%, mildly calcified aortic valve with mild aortic regurgitation, mild tricuspid regurgitation, normal RVSP.  CT cardiac calcium score done 11/17/2024 showed a total calcium score of 921, this is distributed 54 left main, 283 LAD, 48 circumflex, 444 RCA, 88 ramus.  Because of the elevated calcium score her rosuvastatin was increased and she was started on 81 mg of aspirin daily.  November 25, 2024 Vesta reported stabbing heart pain and a stress test was ordered.  PET stress study was done 12/10/2024 with no evidence of ischemia.  Compared to prior study in December 2021 this revealed no changes.    I saw Vesta in the office 1/16/2025.  She was experiencing some a.m. heart racing and lightheadedness that had occurred after drinking 2 glasses of wine and having a CBD gummy.  14-day Holter monitor February 2025 showed patient reported palpitations were mostly associated with sinus rhythm.  There were rare PVCs and short runs of SVT.  Overall this was a relatively benign study.     Today Vesta tells me she has continued to have AM episodes of palpitations. There are no other associated symptoms with this, but she does not like feeling them. She has been having some difficulty with her CPAP machine and she wonders if it is not working properly and maybe this is why her palpitations are worse in the morning.  She usually takes her diltiazem in the afternoon.  She continues to work as a pharmacist.  She wears a smart watch and follows her heart rate on average it is 58 bpm.  We looked together at her readings over the past 1 week her highest heart rates were up to 109 bpm.  She does not have any chest pain or pressure.  There is no shortness of breath, DRAPER, PND, lightheadedness, dizziness, presyncope/syncope.     Current Outpatient Medications on File Prior  to Visit   Medication Sig Dispense Refill    albuterol sulfate  (90 Base) MCG/ACT inhaler Inhale 2 puffs Every 4 (Four) Hours As Needed for Wheezing. 18 g 0    ALPRAZolam (XANAX) 0.5 MG tablet Take 1 tablet by mouth 4 (Four) Times a Day.      aspirin 81 MG EC tablet Take 1 tablet by mouth Daily. 90 tablet 3    azithromycin (Zithromax Z-Liam) 250 MG tablet Take 2 tablets by mouth on day 1, then 1 tablet daily on days 2-5 6 tablet 0    baclofen (LIORESAL) 10 MG tablet Take 1 tablet by mouth 3 (Three) Times a Day As Needed.      benzonatate (TESSALON) 200 MG capsule Take 1 capsule by mouth 3 (Three) Times a Day As Needed for Cough. 18 capsule 0    cyclobenzaprine (FLEXERIL) 10 MG tablet Take 1 tablet by mouth At Night As Needed for Muscle Spasms. 30 tablet 0    dicyclomine (BENTYL) 10 MG capsule Take 1 capsule by mouth 3 (Three) Times a Day As Needed for Abdominal Cramping. 120 capsule 3    dilTIAZem XR (DILACOR XR) 180 MG 24 hr capsule Take 1 capsule by mouth Daily. 90 capsule 3    famotidine (Pepcid) 40 MG tablet Take 1 tablet by mouth Daily. 90 tablet 3    folic acid (FOLVITE) 1 MG tablet Take 1 tablet by mouth Daily.      hydrALAZINE (APRESOLINE) 25 MG tablet Take 1 tablet by mouth As Needed (for BP > 155). 30 tablet 11    hydrALAZINE (APRESOLINE) 25 MG tablet Take 1 tablet by mouth 3 (Three) Times a Day As Needed (for SBP>155). 30 tablet 0    levothyroxine (Synthroid) 75 MCG tablet 75 mcg 1 tablet Monday through Saturday (Non on Sunday, take 6 days a week). Take on an empty stomach with just water 30 min to an hour before any other medications, food or drink 72 tablet 1    meclizine (ANTIVERT) 25 MG tablet Take 1 tablet by mouth 3 (Three) Times a Day As Needed for Dizziness. 6 tablet 0    olmesartan (BENICAR) 40 MG tablet Take 1 tablet by mouth Every Night. 90 tablet 3    Omega-3 Fatty Acids (OMEGA 3 PO) Take 1 tablet by mouth daily.      ondansetron ODT (ZOFRAN-ODT) 4 MG disintegrating tablet Place 1 tablet  "on the tongue Every 8 (Eight) Hours As Needed for Nausea or Vomiting. 25 tablet 3    oseltamivir (Tamiflu) 75 MG capsule Take 1 capsule by mouth 2 (Two) Times a Day. 10 capsule 0    rosuvastatin (CRESTOR) 40 MG tablet Take 1 tablet by mouth Every Night. 90 tablet 3    traZODone (DESYREL) 150 MG tablet TAKE 1 TO 1 & 1/2 (ONE TO ONE & ONE-HALF) TABLETS BY MOUTH ONCE DAILY AS NEEDED AT BEDTIME      vitamin B-12 (CYANOCOBALAMIN) 100 MCG tablet Take 0.5 tablets by mouth Daily.      [DISCONTINUED] cetirizine (zyrTEC) 10 MG tablet Take 1 tablet by mouth Daily.      fexofenadine (ALLEGRA) 180 MG tablet Take 0.5 tablets by mouth Daily.      fluticasone (FLONASE) 50 MCG/ACT nasal spray Administer 2 sprays into the nostril(s) as directed by provider Daily for 30 days. 16 g 0     No current facility-administered medications on file prior to visit.         Procedures    ECG 12 Lead    Date/Time: 3/11/2025 12:27 PM  Performed by: Elvira Conway APRN    Authorized by: Elvira Conway APRN  Comparison: compared with previous ECG from 1/16/2025  Similar to previous ECG  Comparison to previous ECG: RBBB and LAFB are again seen.  BPM: 56    Clinical impression: normal ECG            Objective:      Vitals:    03/11/25 1152   BP: 125/75   Pulse: 56   Weight: 81.2 kg (179 lb)   Height: 167.6 cm (66\")     Body mass index is 28.89 kg/m².  Wt Readings from Last 3 Encounters:   03/11/25 81.2 kg (179 lb)   03/04/25 90.7 kg (200 lb)   02/10/25 82.1 kg (181 lb)     Constitutional:       Appearance: Healthy appearance. Not in distress.   Eyes:      Pupils: Pupils are equal, round, and reactive to light.   Pulmonary:      Effort: Pulmonary effort is normal.      Breath sounds: Normal breath sounds.   Cardiovascular:      Normal rate. Regular rhythm.      Murmurs: There is no murmur.   Pulses:     Radial: 2+ bilaterally.     Dorsalis pedis: 2+ bilaterally.     Posterior tibial: 2+ bilaterally.  Edema:     Peripheral edema absent. "   Musculoskeletal:      Cervical back: Normal range of motion. Skin:     General: Skin is warm and dry.   Neurological:      Mental Status: Alert and oriented to person, place and time.       Lab Review:   2/27/2025 blood glucose 113 otherwise unremarkable CMP.  Hemoglobin A1c 5.80.  Total cholesterol 157 TG 33 HDL 76 LDL 73.  TSH 0.623.    Assessment:     1. Coronary artery calcification    2. Primary hypertension    3. Palpitations    4. Mixed hyperlipidemia      Coronary artery calcification, elevated calcium score.  Nonischemic stress test November 2024.  Continue 81 mg aspirin and 40 mg rosuvastatin daily.  Palpitations, these have been worse with anxiety in the past.  Also associated with wine and CBD gummy.  Holter monitor was ordered for her today.  He continues on diltiazem 180 mg daily.  Hypertension, fairly well-controlled.  She is sensitive to salt intake.  She continues on 40 mg olmesartan nightly, 25 mg hydralazine as needed for elevated systolic blood pressure greater than 160.   Hyperlipidemia on statin therapy  Right bundle branch block and left anterior fascicular block on ECG this is a chronically abnormal finding for her.  LEATHA, having difficulty with CPAP to follow with sleep medicine for this  History of appendiceal carcinoma.  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  Low sodium intake but this is difficult for her.    Plan:   No medication changes were made  Change diltiazem to nighttime  Try and nightly magnesium supplement or liquid IVs/electrolyte drink before bed  Have her CPAP evaluated to make sure it is functioning properly  She will see me in 3 months        Thank you for allowing me to participate in this patient's care. Please call with any questions or concerns.          Dragon dictation device was utilized in this note.

## 2025-03-13 ENCOUNTER — TELEPHONE (OUTPATIENT)
Dept: SLEEP MEDICINE | Facility: HOSPITAL | Age: 70
End: 2025-03-13
Payer: MEDICARE

## 2025-03-13 NOTE — TELEPHONE ENCOUNTER
Pt called stating that she has felt tired pulled report for dr to review , reccommended mask fitting . She will schedule with DME

## 2025-03-17 ENCOUNTER — OFFICE VISIT (OUTPATIENT)
Dept: ENDOCRINOLOGY | Age: 70
End: 2025-03-17
Payer: MEDICARE

## 2025-03-17 VITALS
HEIGHT: 66 IN | HEART RATE: 83 BPM | SYSTOLIC BLOOD PRESSURE: 126 MMHG | WEIGHT: 179.6 LBS | OXYGEN SATURATION: 96 % | BODY MASS INDEX: 28.87 KG/M2 | DIASTOLIC BLOOD PRESSURE: 82 MMHG | TEMPERATURE: 97.9 F

## 2025-03-17 DIAGNOSIS — E03.9 HYPOTHYROIDISM, UNSPECIFIED TYPE: Primary | ICD-10-CM

## 2025-03-17 DIAGNOSIS — R73.03 PREDIABETES: ICD-10-CM

## 2025-03-17 DIAGNOSIS — E04.1 THYROID NODULE: ICD-10-CM

## 2025-03-17 PROCEDURE — 3079F DIAST BP 80-89 MM HG: CPT | Performed by: NURSE PRACTITIONER

## 2025-03-17 PROCEDURE — 99214 OFFICE O/P EST MOD 30 MIN: CPT | Performed by: NURSE PRACTITIONER

## 2025-03-17 PROCEDURE — 3074F SYST BP LT 130 MM HG: CPT | Performed by: NURSE PRACTITIONER

## 2025-03-17 PROCEDURE — G2211 COMPLEX E/M VISIT ADD ON: HCPCS | Performed by: NURSE PRACTITIONER

## 2025-03-17 RX ORDER — LEVOTHYROXINE SODIUM 75 UG/1
TABLET ORAL
Qty: 72 TABLET | Refills: 1 | Status: SHIPPED | OUTPATIENT
Start: 2025-03-17

## 2025-03-17 NOTE — PROGRESS NOTES
Chief Complaint  Chief Complaint   Patient presents with    Hypothyroidism     Pt states that energy levels have been fluctuating, weight is lower than expected, does have family hx of thyroid disease.       Subjective          History of Present Illness    Vesta Sutton 69 y.o. presents for a follow-up evaluation of Hypothyroidism        Family History Thyroid Cancer: Sister          She complains of fatigue, dry skin, thinning nails and cold intolerance      Weight stable since last visit.      Current treatment is levothyroxine 75 mcg 1 tablet Monday through Saturday (None on Sunday, take 6 days a week)  [place] and Branded Euthyrox didn't help with symptoms      Last labs in 02/25 showed TSH 0.623 and FT4 1.56                Thyroid Nodule(s)     She has several bilateral thyroid nodules, the most dominant of which is on the left and measures 2.5 x 1.7 x 1.4 cm.  She has had a fine needle aspiration of this nodule on May 29, 2019 and the cytology was benign.        Thereafter she has had 2 thyroid ultrasounds and they have demonstrated that the nodule(s) are unchanged.     US in 03/2022 showed stable mild multinodular goiter changes.      US in 04/24 showed Stable well-circumscribed ovoid wider than tall 1 cm slightly hypoechoic nodule in the mid right lobe of the thyroid gland with some internal  echogenic foci (radiology Ti-RADS 5). Similar-appearing ovoid wider than tall slightly  hypoechoic 1 cm nodule in the right side of the thyroid isthmus.  Similar-appearing previously biopsied large 2.6 cm solid heterogeneous nodule in the left lobe. Additional subcentimeter nodules in the right and left lobes of the thyroid gland    FNA in 05/24 showed  Final Diagnosis   Thyroid, Right, Fine Needle Aspiration (FNA):   A. Follicular nodular disease (BETHESDA II).     2. Thyroid, Isthmus Nodule, Fine Needle Aspiration (FNA):                A. Follicular nodular disease with cystic change (BETHESDA II).     "            Pre-Diabetes     Family history of diabetes: two sisters and aunt     Current treatment includes dietary modification     Last A1C in 02/25 was 5.8            I have reviewed the patient's allergies, medicines, past medical hx, family hx and social hx.    Objective   Vital Signs:   /82   Pulse 83   Temp 97.9 °F (36.6 °C) (Oral)   Ht 167.6 cm (65.98\")   Wt 81.5 kg (179 lb 9.6 oz)   LMP  (LMP Unknown)   SpO2 96%   BMI 29.00 kg/m²       Physical Exam   Physical Exam  Constitutional:       General: She is not in acute distress.     Appearance: Normal appearance. She is not diaphoretic.   HENT:      Head: Normocephalic and atraumatic.   Eyes:      General:         Right eye: No discharge.         Left eye: No discharge.   Skin:     General: Skin is warm and dry.   Neurological:      Mental Status: She is alert.   Psychiatric:         Mood and Affect: Mood normal.         Behavior: Behavior normal.                    Results Review:   TSH   Date Value Ref Range Status   02/27/2025 0.623 0.270 - 4.200 uIU/mL Final   11/23/2022 1.390 0.270 - 4.200 uIU/mL Final     T3, Free   Date Value Ref Range Status   07/25/2022 2.1 2.0 - 4.4 pg/mL Final     T3, Total   Date Value Ref Range Status   06/23/2022 82 71 - 180 ng/dL Final         Assessment and Plan    Diagnoses and all orders for this visit:    1. Hypothyroidism, unspecified type (Primary)  -     levothyroxine (Synthroid) 75 MCG tablet; 75 mcg 1 tablet Monday through Saturday (Non on Sunday, take 6 days a week). Take on an empty stomach with just water 30 min to an hour before any other medications, food or drink  Dispense: 72 tablet; Refill: 1  -     TSH Rfx On Abnormal To Free T4; Future    Thyroid labs are good.    Continue with current dose of levothyroxine      2. Thyroid nodule  -     TSH Rfx On Abnormal To Free T4; Future    Biopsies were negative  Will follow with US - next due in 1-2 years from last US      3. Prediabetes  -     " Comprehensive Metabolic Panel; Future  -     Hemoglobin A1c; Future    A1c remains within pre-diabetic range at 5.8%  Continue working with dietary modification        Refills sent to pharmacy      RTC in 6 months with me, labs prior       Follow Up     Patient was given instructions and counseling regarding her condition or for health maintenance advice. Please see specific information pulled into the AVS if appropriate.              Chuyita Kincaid, DESI  03/17/25

## 2025-03-18 ENCOUNTER — TELEPHONE (OUTPATIENT)
Dept: SLEEP MEDICINE | Facility: HOSPITAL | Age: 70
End: 2025-03-18
Payer: MEDICARE

## 2025-03-20 ENCOUNTER — HOSPITAL ENCOUNTER (OUTPATIENT)
Dept: CT IMAGING | Facility: HOSPITAL | Age: 70
Discharge: HOME | End: 2025-03-20
Payer: MEDICARE

## 2025-03-20 DIAGNOSIS — R93.3 ABNORMAL CT SCAN, GASTROINTESTINAL TRACT: ICD-10-CM

## 2025-03-20 PROCEDURE — 25510000002 DIATRIZOATE MEGLUMINE & SODIUM PER 1 ML: Performed by: NURSE PRACTITIONER

## 2025-03-20 PROCEDURE — 74177 CT ABD & PELVIS W/CONTRAST: CPT

## 2025-03-20 PROCEDURE — 25510000001 IOPAMIDOL 61 % SOLUTION: Performed by: NURSE PRACTITIONER

## 2025-03-20 RX ORDER — IOPAMIDOL 612 MG/ML
100 INJECTION, SOLUTION INTRAVASCULAR
Status: COMPLETED | OUTPATIENT
Start: 2025-03-20 | End: 2025-03-20

## 2025-03-20 RX ORDER — DIATRIZOATE MEGLUMINE AND DIATRIZOATE SODIUM 660; 100 MG/ML; MG/ML
30 SOLUTION ORAL; RECTAL
Status: COMPLETED | OUTPATIENT
Start: 2025-03-20 | End: 2025-03-20

## 2025-03-20 RX ADMIN — IOPAMIDOL 85 ML: 612 INJECTION, SOLUTION INTRAVENOUS at 18:19

## 2025-03-20 RX ADMIN — DIATRIZOATE MEGLUMINE AND DIATRIZOATE SODIUM 30 ML: 660; 100 LIQUID ORAL; RECTAL at 18:22

## 2025-03-27 NOTE — PROGRESS NOTES
Chief Complaint  Hypothyroidism (Pt states that energy levels have been low, weight is lower than expected, does have family hx of thyroid disease. )    Subjective      Interval history is negative for any new symptoms, ER visits or hospitalizations.    Patient presents for follow-up for hypothyroidism.  She is on levothyroxine 75 mcg daily at 7 tablets/week.  She has miscellaneous nonspecific symptoms.  She had labs drawn on 2022:  TSH 0.570 uIU/mL (0.45-4.5)  Free t3 2.6 pg/mL (2- 4.4)  Free t4 1.60 ng/dLTPO <8     Fasting lipid panel 156/45/71/75    HbA1 5.9%    CMP random glucose 93 MG/DL with gfr 80        Vesta Sutton presents to [facility] ENDOCRINOLOGY  Hypothyroidism      66-year-old  female from [place] wth CAD, one native artery 50% narrowing and. 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  She was last seen at this endocrine clinic on 2021.  22 At times have episodes of irritated throat.     She has multinodular thyroid with hypothyroidism.  She has no history of head or neck radiation therapy.  She had a fine-needle biopsy of the left dominant nodule in May 2019 which was benign.  She is on levothyroxine 75 mcg/day.      22Symptoms :  A. She recalls years of morning bowl movements changed to bowl movements only when has larger quantities or adds fiber or eats fruit.  B.  also noted 2 years ago when 88 mcg changed to 75  C. Hair  and eye brows are falling out  D. Sadness and grieving as mother  in 2022    Family history: Her sister has had a thyroidectomy for thyroid cancer that invaded the parathyroid. She had ROWAN therapy.     Hypothyroid Symptoms : She has hair loss, brittle nails. Last week she lost her blister pack missing 3 doses. She has intermittently constipation.  She has no significant weight change since January.     Goiter: No dysphagia or dysphonia. No  "anterior neck tenderness.     Current thyroid  Medication history:  2/7/22 Euthyrox 75 mcg 1 daily Mon through Saturday, none on Sundays.  7/27/2021 Euthyrox  75 mcg daily except none on Sundays [ aveg  69.64 mcg daily]  7/20/2020 Euthyrox 75 mcg daily  8/24/2019 levothyroxine 88 mcg daily.  She takes Euthyrox about 1 hr prior to meals  2020.  TSH done in July 2021 is low at 0.347 with elevated free T4 at 1.85 ng per DL.      History of HGA1C elevation over 2 years ago. HGA1C one year ago and now is normal at HGA1C.    Family history: youngest sister has thyroid cancer diagnosed at 59. She had radiation iodine completion of surgery and now has thyroid hormone tablets.     Objective   Vital Signs:   /82   Pulse 78   Temp 97.5 °F (36.4 °C) (Temporal)   Ht 165.1 cm (65\")   Wt 80.5 kg (177 lb 6.4 oz)   SpO2 96%   BMI 29.52 kg/m²     Physical Exam  Vitals and nursing note reviewed.   HENT:      Head: Normocephalic.      Mouth/Throat:      Mouth: Mucous membranes are moist.   Neck:      Thyroid: Thyromegaly present. No thyroid mass.      Trachea: Trachea and phonation normal.      Comments: The thyroid is mildly generous 40-45 g none tender without thrill or bruit. No palpable nodules.  Cardiovascular:      Rate and Rhythm: Normal rate.      Pulses: Normal pulses.      Heart sounds: Normal heart sounds.   Pulmonary:      Effort: Pulmonary effort is normal.      Breath sounds: Normal breath sounds. No wheezing or rhonchi.   Abdominal:      General: Bowel sounds are normal.      Palpations: Abdomen is soft.   Musculoskeletal:         General: No swelling. Normal range of motion.      Cervical back: Normal range of motion and neck supple.   Lymphadenopathy:      Cervical: No cervical adenopathy.   Skin:     General: Skin is warm and dry.   Neurological:      Mental Status: She is alert and oriented to person, place, and time. Mental status is at baseline.   Psychiatric:         Mood and Affect: Mood normal.       " Hospital Medicine update and PMD communication/Event Note   Behavior: Behavior normal.         Judgment: Judgment normal.        Result Review :   The following data was reviewed by: Tori Goins MD on 02/07/2022:    Component       TSH Baseline Free T4 T3, Free   Latest Ref Rng & Units       0.450 - 4.500 uIU/mL 0.82 - 1.77 ng/dL 2.0 - 4.4 pg/mL   1/31/2022      11:56 AM 0.456     1/31/2022      11:56 AM  1.64    6/23/2022      9:15 AM 0.811     7/25/2022      8:50 AM 0.986     7/25/2022      8:50 AM  1.41    7/25/2022      8:50 AM   2.1     Component       Cortisol - AM ACTH   Latest Ref Rng & Units       6.2 - 19.4 ug/dL 7.2 - 63.3 pg/mL   7/25/2022      8:50 AM 17.0    7/25/2022      8:50 AM  31.3     Component       T4, Total Thyroglobulin Ab   Latest Ref Rng & Units       4.5 - 12.0 ug/dL 0.0 - 0.9 IU/mL   6/23/2022      9:15 AM 8.9    6/23/2022      9:15 AM  <1.0     Component      Latest Ref Rng & Units 8/22/2019 11/11/2019 1/31/2022   Thyroid Peroxidase Antibody      0 - 34 IU/mL 11 14    Interpretation         Note     4/20/2021 thyroid ultrasound an unchanged in 2022  Dominant left-sided thyroid nodule previously biopsied [place] category 2.  No other thyroid nodules qualify for fine-needle aspiration per radiology.   Nodule 1  the left thyroid gland there is a solid predominantly  isoechoic solid nodule which measures 2.7 x 1.8 x 1.5 cm. ACR TR  Category 3. This has been previously biopsied on 05/29/2019Nodule 2  Nodule 2  Isthmus right side 14 x 11 x 5 mm  Nodule 3  Right lobe 11 x 9 x 8 mm  Nodule 4   Left lobe [id number] x 7 x 5 mm    Narrative & Impression   THYROID ULTRASOUND     CLINICAL HISTORY: Multinodular goiter     COMPARISON: Thyroid ultrasound dated 04/22/2021.     Transverse and longitudinal images of both lobes of the thyroid gland  were obtained. Both lobes demonstrate somewhat heterogeneous  echogenicity and demonstrate a few solid nodules. The largest nodule is  in the middle of the left lobe. It measures 2.5 x 1.7 x 1.4 cm. It  appears  unchanged since in size and echotexture since the preceding  ultrasound. A tiny 9 mm diameter solid nodule in the inferior aspect of  the left lobe is also unchanged. A 1.0 x 0.7 x 0.7 cm diameter solid  nodule in the middle of the right lobe is unchanged. There is also a  tiny nodule in the thyroid isthmus that is unchanged. The right lobe  measures 4.9 x 1.6 x 1.8 cm. The left lobe measures 4.6 x 1.9 x 2.2 cm.     IMPRESSIONS: Stable mild multinodular goiter changes.     This report was finalized on 3/21/2022 11:14 AM by Dr. Alexy Wesley M.D.       Assessment and Plan    1. Hypothyroid Stable    Continue Euthyrox 75 mcg tablets, but reduce to 6 tablets per week i.e. 62 mcg average daily dose. I explained to patient that having normal thyroid function now is unlikely to change her hair and skin concerns.  TSH goal is2-4 uIU/mL.     2. Multinoduler goiter stable. TPO Ab negative.     3.  Prediabetes   Discussed in detail lifestyle changes i.e. time restricted eating with restricted carb intake as well as initiating an exercise routine of 30 minutes or longer.  She is to lose 10% of her current body weight with lifestyle changes in order to reduce the risk/delay the onset of diabetes.    4. Multinodular goiter FNA 5/2019 of dominant left thyroid nodule. Remaining 3 nodules are less than 1.4 cm without concerning features for thyroid cancer.   Recommend repeat US at 1 to 2 years intervals.      Diagnoses and all orders for this visit:    1. Hypothyroidism, unspecified type (Primary)  -     TSH; Future  -     T4, Free; Future    2. Prediabetes  -     Hemoglobin A1c; Future  -     Comprehensive Metabolic Panel; Future    3. Nontoxic multinodular goiter    4. Vitamin D deficiency  -     Vitamin D 25 Hydroxy; Future    5. LEATHA on CPAP    6. Mixed hyperlipidemia  -     Comprehensive Metabolic Panel; Future  -     Lipid Panel; Future      I spent 30 minutes caring for Vesta on this date of service. This time includes  time spent by me in the following activities:reviewing tests, obtaining and/or reviewing a separately obtained history, performing a medically appropriate examination and/or evaluation  and counseling and educating the patient/family/caregiver  Follow Up   No follow-ups on file.   Labs in 3 months.  Patient was given instructions and counseling regarding her condition or for health maintenance advice. Please see specific information pulled into the AVS if appropriate.

## 2025-04-08 ENCOUNTER — TELEPHONE (OUTPATIENT)
Dept: CARDIOLOGY | Age: 70
End: 2025-04-08
Payer: MEDICARE

## 2025-04-08 ENCOUNTER — TELEPHONE (OUTPATIENT)
Dept: CARDIOLOGY | Age: 70
End: 2025-04-08

## 2025-04-08 NOTE — TELEPHONE ENCOUNTER
Pt called and states that she still c/o tachycardia and her BP is low.    I tried to call the back but no answer. L/m for pt to call the office.      Thanks  Bita MYLES

## 2025-04-08 NOTE — TELEPHONE ENCOUNTER
Caller: Vesta Sutton    Relationship: Self    Best call back number: [phone]    Who are you requesting to speak with (clinical staff, provider,  specific staff member): ANGÉLICA PITTMAN    Do you know the name of the person who called: ANGÉLICA PITTMAN    What was the call regarding: PATIENT MISSED CALL FROM ANGÉLICA PITTMAN, HUB HAS ATTEMTPED WT, NO NASWER PLEASE CALL AND ADVISE.

## 2025-04-10 ENCOUNTER — TELEPHONE (OUTPATIENT)
Dept: CARDIOLOGY | Age: 70
End: 2025-04-10
Payer: MEDICARE

## 2025-04-10 RX ORDER — DILTIAZEM HYDROCHLORIDE 120 MG/1
120 CAPSULE, EXTENDED RELEASE ORAL NIGHTLY
Start: 2025-04-10

## 2025-04-10 NOTE — TELEPHONE ENCOUNTER
"I called the pt and she said the feeling that she is having is \"not normal\" she stated her BP this morning was 118/75. Pt also stated her Tachardia has gotten better     She asked if you could give her a call when you are available    "

## 2025-04-10 NOTE — TELEPHONE ENCOUNTER
Yes can go to the 120mg of diltiazem as she feels well on this.    She is scheduled to see me in June can you have her see me this month instead?     Thank you

## 2025-04-10 NOTE — TELEPHONE ENCOUNTER
Notified patient of recommendations. Patient verbalized understanding. Fu scheduled.     Janice Ac RN  Triage [facility]

## 2025-04-10 NOTE — TELEPHONE ENCOUNTER
"Spoke to patient at length and honestly, I was unable to get much information other than \"I don't feel good.\" She can't tell me her symptoms. On her own, she decreased her diltiazem from 180mg to 120mg nightly because she felt like her HR and BP were low. The readings she is giving me are 118-120s/70s. HR 59-67. I told her those all sound good and normal. Then she said \"but I am still having tachycardia.\" I told her that she just told me her HR was running low, she said yes but occasionally according to her apple watch her HR will get to 120 or 108. I asked if this is when she is exerting herself because that can be normal. She said she is unsure what time her HR is getting that high. She then went on to tell me that taking 180mg of diltiazem makes her \"chest feel weird.\" However, she can't elaborate on that symptom either. She also admits to occasionally just taking 0.5 tablet of olmesartan sometimes. I could not get a clear explanation as to why she is doing this. I told her that she really needs to stop adjusting her medications on her own because it makes it hard for us to provide care to her and understand what is going on. The end of the conversation ended with her wanting me to ask you if she can go back to 120mg of diltiazem nightly?    Janice Ac RN  Triage [facility]   "

## 2025-04-10 NOTE — TELEPHONE ENCOUNTER
Patient called and stated that she is not feeling well and would like a call back about how she is feeling.  Please call her and find out more about how she is feeling.    NOV-06/12/25-EE  LOV-03/11/25-EE    Coronary artery calcification, elevated calcium score.  Nonischemic stress test November 2024.  Continue 81 mg aspirin and 40 mg rosuvastatin daily.  Palpitations, these have been worse with anxiety in the past.  Also associated with wine and CBD gummy.  Holter monitor was ordered for her today.  He continues on diltiazem 180 mg daily.  Hypertension, fairly well-controlled.  She is sensitive to salt intake.  She continues on 40 mg olmesartan nightly, 25 mg hydralazine as needed for elevated systolic blood pressure greater than 160.   Hyperlipidemia on statin therapy  Right bundle branch block and left anterior fascicular block on ECG this is a chronically abnormal finding for her.  LEATHA, having difficulty with CPAP to follow with sleep medicine for this  History of appendiceal carcinoma.  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  Low sodium intake but this is difficult for her.     Plan:   No medication changes were made  Change diltiazem to nighttime  Try and nightly magnesium supplement or liquid IVs/electrolyte drink before bed  Have her CPAP evaluated to make sure it is functioning properly  She will see me in 3 months

## 2025-04-17 ENCOUNTER — OFFICE VISIT (OUTPATIENT)
Dept: CARDIOLOGY | Age: 70
End: 2025-04-17
Payer: MEDICARE

## 2025-04-17 VITALS
WEIGHT: 176 LBS | DIASTOLIC BLOOD PRESSURE: 70 MMHG | HEIGHT: 65 IN | BODY MASS INDEX: 29.32 KG/M2 | SYSTOLIC BLOOD PRESSURE: 133 MMHG | HEART RATE: 71 BPM | OXYGEN SATURATION: 97 %

## 2025-04-17 DIAGNOSIS — I45.2 RBBB (RIGHT BUNDLE BRANCH BLOCK WITH LEFT ANTERIOR FASCICULAR BLOCK): ICD-10-CM

## 2025-04-17 DIAGNOSIS — I25.10 CORONARY ARTERY CALCIFICATION: Primary | ICD-10-CM

## 2025-04-17 DIAGNOSIS — R00.2 PALPITATIONS: ICD-10-CM

## 2025-04-17 DIAGNOSIS — E78.2 MIXED HYPERLIPIDEMIA: ICD-10-CM

## 2025-04-17 DIAGNOSIS — I10 PRIMARY HYPERTENSION: ICD-10-CM

## 2025-04-17 PROCEDURE — 99214 OFFICE O/P EST MOD 30 MIN: CPT | Performed by: FAMILY MEDICINE

## 2025-04-17 PROCEDURE — 93000 ELECTROCARDIOGRAM COMPLETE: CPT | Performed by: FAMILY MEDICINE

## 2025-04-17 PROCEDURE — 3075F SYST BP GE 130 - 139MM HG: CPT | Performed by: FAMILY MEDICINE

## 2025-04-17 PROCEDURE — 3078F DIAST BP <80 MM HG: CPT | Performed by: FAMILY MEDICINE

## 2025-04-17 RX ORDER — ASPIRIN 81 MG/1
81 TABLET ORAL DAILY
Qty: 90 TABLET | Refills: 3 | Status: SHIPPED | OUTPATIENT
Start: 2025-04-17

## 2025-04-17 RX ORDER — DILTIAZEM HYDROCHLORIDE 90 MG/1
90 CAPSULE, EXTENDED RELEASE ORAL NIGHTLY
Qty: 30 CAPSULE | Refills: 2 | Status: SHIPPED | OUTPATIENT
Start: 2025-04-17

## 2025-04-17 NOTE — PROGRESS NOTES
Date of Office Visit: 2025  Encounter Provider: DESI Rogers  Place of Service: [facility] CARDIOLOGY  Established cardiologist: Susan Paula MD  Patient Name: Vesta Sutton  :1955      Patient ID:  Vesta Sutton is a 69 y.o. female is here for  followup    With a pertinent medical history of hypertension, GERD, hyperlipidemia, anxiety, depression, left eye retinal detachment, lumbar DDD, venous varicosities, coronary calcification.     History of Present Illness   had abnormal stress study done showing apical ischemia, but cardiac catheterization following showed very mild LAD stenosis.     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  It was only done on the right.     She has a history of chest pain on sertraline.  She has had significant GERD on venlafaxine.     Stress nuclear study 2021 with no evidence of ischemia.  24-hour Holter monitor was unremarkable at that time.     CTA chest 2022 showed no evidence of PE.  Dr. Paula did review those images which showed dense calcification of the proximal RCA, dense calcification proximal LAD, distal left main and ramus intermedius, spotty calcification of the proximal circumflex.       2023 LP(a) was elevated at 272. Treadmill stress study in 2023 exercised on Hi protocol for 6 minutes and no ischemic changes noted she did complain of chest pain during.     Vascular screening May 2023 showed less than 50% bilateral ICA stenosis with normal abdominal aorta and normal peripheral artery screening.     In 2023 she was in the emergency room with abdominal pain and nausea.  CT abdomen showed enteritis.       Obstructive sleep apnea and she wears a CPAP.     She saw Dr. Paula in the office 2024 and had been noticing heart racing in the  morning.  Diltiazem 120 mg nightly was added.  Echocardiogram and calcium score were ordered.  She completed echo 11/4/2024 which showed an ejection fraction of 62.9%, mildly calcified aortic valve with mild aortic regurgitation, mild tricuspid regurgitation, normal RVSP.  CT cardiac calcium score done 11/17/2024 showed a total calcium score of 921, this is distributed 54 left main, 283 LAD, 48 circumflex, 444 RCA, 88 ramus.  Because of the elevated calcium score her rosuvastatin was increased and she was started on 81 mg of aspirin daily.  November 25, 2024 Vesta reported stabbing heart pain and a stress test was ordered.  PET stress study was done 12/10/2024 with no evidence of ischemia.  Compared to prior study in December 2021 this revealed no changes.     I saw Vesta in the office 1/16/2025.  She was experiencing some a.m. heart racing and lightheadedness that had occurred after drinking 2 glasses of wine and having a CBD gummy.  14-day Holter monitor February 2025 showed patient reported palpitations were mostly associated with sinus rhythm.  There were rare PVCs and short runs of SVT.  Overall this was a relatively benign study.     I saw Vesta 3/11/2025.  She continued to have palpitations in the morning without any associated symptoms.  Diltiazem was changed to nightly.  4/10/2025 she called the office to report feeling unwell.  She reported blood pressures she felt were low but these were normal readings 118-120/70 and heart rate 59-67.  She was taking medicines at different times each day and sometimes cutting them in half when she felt like they were making her feel worse.  It was difficult to ascertain what symptoms she was having.     Today Vesta presents to review recent phone call and her concerns, she tells me she was concerned these recent blood pressure readings were too low.  For few days she took only half tablet of her olmesartan and she held her diltiazem.  She then had some heart racing  that was mild she did record her heart rate with her smart watch and it was about 110.  Really no symptoms with this.There is no chest pain or pressure, soa, marquez, pnd, lightheadedness, dizziness, presyncope/syncope, leg swelling.     Current Outpatient Medications on File Prior to Visit   Medication Sig Dispense Refill    albuterol sulfate  (90 Base) MCG/ACT inhaler Inhale 2 puffs Every 4 (Four) Hours As Needed for Wheezing. 18 g 0    ALPRAZolam (XANAX) 0.5 MG tablet Take 1 tablet by mouth 4 (Four) Times a Day.      dicyclomine (BENTYL) 10 MG capsule Take 1 capsule by mouth 3 (Three) Times a Day As Needed for Abdominal Cramping. 120 capsule 3    dilTIAZem XR (DILACOR XR) 120 MG 24 hr capsule Take 1 capsule by mouth Every Night.      famotidine (Pepcid) 40 MG tablet Take 1 tablet by mouth Daily. 90 tablet 3    fexofenadine (ALLEGRA) 180 MG tablet Take 0.5 tablets by mouth Daily.      fluticasone (FLONASE) 50 MCG/ACT nasal spray Administer 2 sprays into the nostril(s) as directed by provider Daily for 30 days. 16 g 0    levothyroxine (Synthroid) 75 MCG tablet 75 mcg 1 tablet Monday through Saturday (Non on Sunday, take 6 days a week). Take on an empty stomach with just water 30 min to an hour before any other medications, food or drink 72 tablet 1    MAGNESIUM GLYCINATE PO Take  by mouth.      meclizine (ANTIVERT) 25 MG tablet Take 1 tablet by mouth 3 (Three) Times a Day As Needed for Dizziness. 6 tablet 0    olmesartan (BENICAR) 40 MG tablet Take 1 tablet by mouth Every Night. 90 tablet 3    Omega-3 Fatty Acids (OMEGA 3 PO) Take 1 tablet by mouth daily.      ondansetron ODT (ZOFRAN-ODT) 4 MG disintegrating tablet Place 1 tablet on the tongue Every 8 (Eight) Hours As Needed for Nausea or Vomiting. 25 tablet 3    rosuvastatin (CRESTOR) 40 MG tablet Take 1 tablet by mouth Every Night. 90 tablet 3    traZODone (DESYREL) 150 MG tablet TAKE 1 TO 1 & 1/2 (ONE TO ONE & ONE-HALF) TABLETS BY MOUTH ONCE DAILY AS NEEDED  "AT BEDTIME       No current facility-administered medications on file prior to visit.         Procedures    ECG 12 Lead    Date/Time: 4/17/2025 10:14 AM  Performed by: Elvira Conway APRN    Authorized by: Elvira Conway APRN  Comparison: compared with previous ECG from 3/11/2025  Comparison to previous ECG: Anteroseptal T wave inversion is again seen  Rhythm: sinus rhythm  BPM: 62  Conduction: right bundle branch block and left anterior fascicular block              Objective:      Vitals:    04/17/25 0932   Weight: 79.8 kg (176 lb)   Height: 165.1 cm (65\")     Body mass index is 29.29 kg/m².  Wt Readings from Last 3 Encounters:   04/17/25 79.8 kg (176 lb)   03/17/25 81.5 kg (179 lb 9.6 oz)   03/11/25 81.2 kg (179 lb)     Constitutional:       Appearance: Healthy appearance. Not in distress.   Eyes:      Pupils: Pupils are equal, round, and reactive to light.   Pulmonary:      Effort: Pulmonary effort is normal.      Breath sounds: Normal breath sounds.   Cardiovascular:      Normal rate. Regular rhythm.      Murmurs: There is no murmur.   Pulses:     Radial: 2+ bilaterally.     Dorsalis pedis: 2+ bilaterally.     Posterior tibial: 2+ bilaterally.  Edema:     Peripheral edema absent.   Musculoskeletal:      Cervical back: Normal range of motion. Skin:     General: Skin is warm and dry.   Neurological:      Mental Status: Alert and oriented to person, place and time.    Lab Review:   CMP 2/27/2025  otherwise unremarkable.  TSH 0.623.  Free T41.56.  Hemoglobin A1c 5.80.  10/21/2024 total cholesterol 157 TG 33 HDL 76 LDL 73  Assessment:     1. Coronary artery calcification    2. Primary hypertension    3. Mixed hyperlipidemia    4. Palpitations    5. RBBB (right bundle branch block with left anterior fascicular block)      Coronary artery calcification, elevated calcium score.  Nonischemic stress test 12/10/2024.  Continue 81 mg aspirin and 40 mg rosuvastatin daily.  Palpitations, these have been worse " with anxiety in the past.  Also associated with wine and CBD gummy.  Holter monitor 1/2025 was benign.  Continues on diltiazem.   Hypertension, fairly well-controlled.  She is sensitive to salt intake.  She continues on 40 mg olmesartan nightly, 25 mg hydralazine as needed for elevated systolic blood pressure greater than 160.   Hyperlipidemia on statin therapy  Right bundle branch block and left anterior fascicular block on ECG this is a chronically abnormal finding for her.  LEATHA on CPAP  History of appendiceal carcinoma.  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  Low sodium intake but this is difficult for her.    Plan:   We reviewed parameters that is systolic anywhere from 100-140 over a diastolic of 50-85 is acceptable for her and her recent blood pressures have been very well-controlled with an average systolic around 110.   Reviewed heart rate parameters  is acceptable for her and with activity or even after caffeine and a short duration of 100 to 120 bpm would be acceptable with no associated symptoms.   She has been off diltiazem for several days restarting this back at a lower dose and she is happy with that  See me in 3 months and keep appointment with Dr. Paula next year        Thank you for allowing me to participate in this patient's care. Please call with any questions or concerns.          Dragon dictation device was utilized in this note.

## 2025-05-05 ENCOUNTER — TELEPHONE (OUTPATIENT)
Dept: CARDIOLOGY | Age: 70
End: 2025-05-05
Payer: MEDICARE

## 2025-05-05 NOTE — TELEPHONE ENCOUNTER
Pt called and left VM stating her BP Friday was 80/64, Saturday her BP was 112/74 she stated she quit taking her Ditiazem 90 mg because she did not like the way it felt.     Called pt and she said she was feeling fatigue and BP went into the normal range. She had a alcohol drink on Friday night and thinks this cause her BP to  be lower. 128/79, was BP on Sunday    She took her Olmesartan 40 mg this morning.

## 2025-07-21 ENCOUNTER — TELEPHONE (OUTPATIENT)
Dept: GASTROENTEROLOGY | Facility: CLINIC | Age: 70
End: 2025-07-21

## 2025-07-22 ENCOUNTER — TELEPHONE (OUTPATIENT)
Dept: GASTROENTEROLOGY | Facility: CLINIC | Age: 70
End: 2025-07-22
Payer: MEDICARE

## 2025-07-22 ENCOUNTER — OFFICE VISIT (OUTPATIENT)
Dept: GASTROENTEROLOGY | Facility: CLINIC | Age: 70
End: 2025-07-22
Payer: MEDICARE

## 2025-07-22 ENCOUNTER — OFFICE VISIT (OUTPATIENT)
Dept: CARDIOLOGY | Age: 70
End: 2025-07-22
Payer: MEDICARE

## 2025-07-22 VITALS
TEMPERATURE: 96.9 F | SYSTOLIC BLOOD PRESSURE: 128 MMHG | DIASTOLIC BLOOD PRESSURE: 83 MMHG | HEART RATE: 60 BPM | WEIGHT: 181.2 LBS | BODY MASS INDEX: 30.19 KG/M2 | HEIGHT: 65 IN

## 2025-07-22 VITALS
SYSTOLIC BLOOD PRESSURE: 138 MMHG | HEART RATE: 59 BPM | BODY MASS INDEX: 29.95 KG/M2 | DIASTOLIC BLOOD PRESSURE: 80 MMHG | OXYGEN SATURATION: 97 % | WEIGHT: 180 LBS

## 2025-07-22 DIAGNOSIS — I10 PRIMARY HYPERTENSION: ICD-10-CM

## 2025-07-22 DIAGNOSIS — R11.0 NAUSEA: ICD-10-CM

## 2025-07-22 DIAGNOSIS — R10.9 RIGHT SIDED ABDOMINAL PAIN: Primary | ICD-10-CM

## 2025-07-22 DIAGNOSIS — I45.2 RBBB (RIGHT BUNDLE BRANCH BLOCK WITH LEFT ANTERIOR FASCICULAR BLOCK): ICD-10-CM

## 2025-07-22 DIAGNOSIS — E78.2 MIXED HYPERLIPIDEMIA: ICD-10-CM

## 2025-07-22 DIAGNOSIS — I25.10 CORONARY ARTERY CALCIFICATION: Primary | ICD-10-CM

## 2025-07-22 DIAGNOSIS — R00.2 PALPITATIONS: ICD-10-CM

## 2025-07-22 DIAGNOSIS — R19.8 ALTERNATING CONSTIPATION AND DIARRHEA: ICD-10-CM

## 2025-07-22 DIAGNOSIS — R10.13 DYSPEPSIA: ICD-10-CM

## 2025-07-22 PROBLEM — R19.7 DIARRHEA: Status: ACTIVE | Noted: 2025-07-22

## 2025-07-22 LAB
ALBUMIN SERPL-MCNC: 4.3 G/DL (ref 3.5–5.2)
ALBUMIN/GLOB SERPL: 1.7 G/DL
ALP SERPL-CCNC: 67 U/L (ref 39–117)
ALT SERPL-CCNC: 21 U/L (ref 1–33)
AST SERPL-CCNC: 20 U/L (ref 1–32)
BILIRUB SERPL-MCNC: 0.4 MG/DL (ref 0–1.2)
BUN SERPL-MCNC: 13 MG/DL (ref 8–23)
BUN/CREAT SERPL: 14 (ref 7–25)
CALCIUM SERPL-MCNC: 9.5 MG/DL (ref 8.6–10.5)
CHLORIDE SERPL-SCNC: 101 MMOL/L (ref 98–107)
CO2 SERPL-SCNC: 25.4 MMOL/L (ref 22–29)
CREAT SERPL-MCNC: 0.93 MG/DL (ref 0.57–1)
EGFRCR SERPLBLD CKD-EPI 2021: 67.5 ML/MIN/1.73
ERYTHROCYTE [DISTWIDTH] IN BLOOD BY AUTOMATED COUNT: 12 % (ref 12.3–15.4)
GLOBULIN SER CALC-MCNC: 2.5 GM/DL
GLUCOSE SERPL-MCNC: 88 MG/DL (ref 65–99)
HCT VFR BLD AUTO: 37.6 % (ref 34–46.6)
HGB BLD-MCNC: 12.3 G/DL (ref 12–15.9)
MCH RBC QN AUTO: 31.8 PG (ref 26.6–33)
MCHC RBC AUTO-ENTMCNC: 32.7 G/DL (ref 31.5–35.7)
MCV RBC AUTO: 97.2 FL (ref 79–97)
PLATELET # BLD AUTO: 203 10*3/MM3 (ref 140–450)
POTASSIUM SERPL-SCNC: 4.3 MMOL/L (ref 3.5–5.2)
PROT SERPL-MCNC: 6.8 G/DL (ref 6–8.5)
RBC # BLD AUTO: 3.87 10*6/MM3 (ref 3.77–5.28)
SODIUM SERPL-SCNC: 139 MMOL/L (ref 136–145)
TSH SERPL DL<=0.005 MIU/L-ACNC: 1.09 UIU/ML (ref 0.27–4.2)
WBC # BLD AUTO: 5.34 10*3/MM3 (ref 3.4–10.8)

## 2025-07-22 PROCEDURE — 3079F DIAST BP 80-89 MM HG: CPT | Performed by: FAMILY MEDICINE

## 2025-07-22 PROCEDURE — 93000 ELECTROCARDIOGRAM COMPLETE: CPT | Performed by: FAMILY MEDICINE

## 2025-07-22 PROCEDURE — 99214 OFFICE O/P EST MOD 30 MIN: CPT | Performed by: FAMILY MEDICINE

## 2025-07-22 PROCEDURE — 1160F RVW MEDS BY RX/DR IN RCRD: CPT | Performed by: NURSE PRACTITIONER

## 2025-07-22 PROCEDURE — 1159F MED LIST DOCD IN RCRD: CPT | Performed by: NURSE PRACTITIONER

## 2025-07-22 PROCEDURE — 3075F SYST BP GE 130 - 139MM HG: CPT | Performed by: FAMILY MEDICINE

## 2025-07-22 PROCEDURE — 99214 OFFICE O/P EST MOD 30 MIN: CPT | Performed by: NURSE PRACTITIONER

## 2025-07-22 PROCEDURE — 3079F DIAST BP 80-89 MM HG: CPT | Performed by: NURSE PRACTITIONER

## 2025-07-22 PROCEDURE — 3074F SYST BP LT 130 MM HG: CPT | Performed by: NURSE PRACTITIONER

## 2025-07-22 RX ORDER — AZELASTINE 1 MG/ML
2 SPRAY, METERED NASAL
COMMUNITY
Start: 2025-06-30

## 2025-07-22 RX ORDER — CYCLOBENZAPRINE HCL 10 MG
10 TABLET ORAL 3 TIMES DAILY PRN
COMMUNITY

## 2025-07-22 NOTE — PROGRESS NOTES
Date of Office Visit: 2025  Encounter Provider: DESI Rogers  Place of Service: [facility] CARDIOLOGY  Established cardiologist: Susan Paula MD  Patient Name: Vesta Sutton  :1955      Patient ID:  Vesta Sutton is a 69 y.o. female is here for  followup    With a pertinent medical history of hypertension, GERD, hyperlipidemia, anxiety, depression, left eye retinal detachment, lumbar DDD, venous varicosities, coronary calcification.        History of Present Illness   had abnormal stress study done showing apical ischemia, but cardiac catheterization following showed very mild LAD stenosis.     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  It was only done on the right.     She has a history of chest pain on sertraline.  She has had significant GERD on venlafaxine.     Stress nuclear study 2021 with no evidence of ischemia.  24-hour Holter monitor was unremarkable at that time.     CTA chest 2022 showed no evidence of PE.  Dr. Paula did review those images which showed dense calcification of the proximal RCA, dense calcification proximal LAD, distal left main and ramus intermedius, spotty calcification of the proximal circumflex.       2023 LP(a) was elevated at 272. Treadmill stress study in 2023 exercised on Hi protocol for 6 minutes and no ischemic changes noted she did complain of chest pain during.    Vascular screening May 2023 showed less than 50% bilateral ICA stenosis with normal abdominal aorta and normal peripheral artery screening.     In 2023 she was in the emergency room with abdominal pain and nausea.  CT abdomen showed enteritis.       Obstructive sleep apnea and she wears a CPAP.    She saw Dr. Paula in the office 2024 and had been noticing heart racing in the  morning. Diltiazem 120 mg nightly was added. Echocardiogram and calcium score were ordered. She completed echo 11/4/2024 which showed an ejection fraction of 62.9%, mildly calcified aortic valve with mild aortic regurgitation, mild tricuspid regurgitation, normal RVSP. CT cardiac calcium score done 11/17/2024 showed a total calcium score of 921, this is distributed 54 left main, 283 LAD, 48 circumflex, 444 RCA, 88 ramus.     Because of the elevated calcium score her rosuvastatin was increased and she was started on 81 mg of aspirin daily. November 25, 2024 Vesta reported stabbing heart pain and a stress test was ordered. PET stress study was done 12/10/2024 with no evidence of ischemia. Compared to prior study in December 2021 this revealed no changes.     She called our office 5/5/2025 to report low blood pressures.  She held her diltiazem because of this.    Today Vesta presents for follow up. She is feeling well. She has remained off diltiazem and BP has been stable and she has had no sustained elevated heart rates. She wears a fit bit and we reviewed her heart rate data together today.  Which shows an average of 60 to 80 bpm.  Rarely she will have brief tachycardia up to 100-120 that is not sustained in we think this is occurring during activity which would be just fine.  When she sleeps her heart rate is typically 40-60.  She is wearing her CPAP nightly.  There is no SOA, DRAPER, PND, chest pain or pressure, presyncope/syncope, palpitations or edema.       Current Outpatient Medications on File Prior to Visit   Medication Sig Dispense Refill    albuterol sulfate  (90 Base) MCG/ACT inhaler Inhale 2 puffs Every 4 (Four) Hours As Needed for Wheezing. 18 g 0    ALPRAZolam (XANAX) 0.5 MG tablet Take 1 tablet by mouth 4 (Four) Times a Day.      aspirin 81 MG EC tablet Take 1 tablet by mouth Daily. 90 tablet 3    dicyclomine (BENTYL) 10 MG capsule Take 1 capsule by mouth 3 (Three) Times a Day As Needed for  Abdominal Cramping. 120 capsule 3    famotidine (Pepcid) 40 MG tablet Take 1 tablet by mouth Daily. 90 tablet 3    fexofenadine (ALLEGRA) 180 MG tablet Take 0.5 tablets by mouth Daily.      levothyroxine (Synthroid) 75 MCG tablet 75 mcg 1 tablet Monday through Saturday (Non on Sunday, take 6 days a week). Take on an empty stomach with just water 30 min to an hour before any other medications, food or drink 72 tablet 1    MAGNESIUM GLYCINATE PO Take  by mouth.      meclizine (ANTIVERT) 25 MG tablet Take 1 tablet by mouth 3 (Three) Times a Day As Needed for Dizziness. 6 tablet 0    olmesartan (BENICAR) 40 MG tablet Take 1 tablet by mouth Every Night. 90 tablet 3    Omega-3 Fatty Acids (OMEGA 3 PO) Take 1 tablet by mouth daily.      ondansetron ODT (ZOFRAN-ODT) 4 MG disintegrating tablet Place 1 tablet on the tongue Every 8 (Eight) Hours As Needed for Nausea or Vomiting. 25 tablet 3    rosuvastatin (CRESTOR) 40 MG tablet Take 1 tablet by mouth Every Night. 90 tablet 3    traZODone (DESYREL) 150 MG tablet TAKE 1 TO 1 & 1/2 (ONE TO ONE & ONE-HALF) TABLETS BY MOUTH ONCE DAILY AS NEEDED AT BEDTIME      fluticasone (FLONASE) 50 MCG/ACT nasal spray Administer 2 sprays into the nostril(s) as directed by provider Daily for 30 days. 16 g 0    [DISCONTINUED] dilTIAZem SR (CARDIZEM SR) 90 MG 12 hr capsule Take 1 capsule by mouth Every Night. 30 capsule 2     No current facility-administered medications on file prior to visit.         Procedures    ECG 12 Lead    Date/Time: 7/22/2025 3:19 PM  Performed by: Elvira Conway APRN    Authorized by: Elvira Conway APRN  Comparison: compared with previous ECG from 4/17/2025  Rhythm: sinus rhythm  BPM: 59  Conduction: right bundle branch block and left anterior fascicular block            Objective:      Vitals:    07/22/25 1443   BP: 138/80   Pulse: 59   SpO2: 97%   Weight: 81.6 kg (180 lb)     Body mass index is 29.95 kg/m².  Wt Readings from Last 3 Encounters:   07/22/25 81.6  kg (180 lb)   07/22/25 82.2 kg (181 lb 3.2 oz)   04/17/25 79.8 kg (176 lb)         Constitutional:       Appearance: Not in distress.   Eyes:      Pupils: Pupils are equal, round, and reactive to light.   Neck:      Vascular: No JVD.   Pulmonary:      Effort: Pulmonary effort is normal.      Breath sounds: Normal breath sounds.   Cardiovascular:      Normal rate. Regular rhythm.      Murmurs: There is no murmur.   Pulses:     Intact distal pulses.   Edema:     Peripheral edema absent.   Musculoskeletal:      Cervical back: Normal range of motion. Skin:     General: Skin is warm and dry.   Neurological:      Mental Status: Alert and oriented to person, place and time.   Psychiatric:         Speech: Speech normal.       Lab Review:   Recent labs drawn today these are pending    Assessment:     1. Coronary artery calcification    2. Palpitations    3. Primary hypertension    4. Mixed hyperlipidemia    5. RBBB (right bundle branch block with left anterior fascicular block)      Coronary artery calcification, elevated calcium score.  Nonischemic stress test 12/10/2024.  Continue 81 mg aspirin and 40 mg rosuvastatin daily. She has no angina.   Palpitations, these have been worse with anxiety in the past.  Also associated with wine and CBD gummy.  Holter monitor 1/2025 was benign.  These are improved and have been stable off diltiazem.   Hypertension, fairly well-controlled.  She is sensitive to salt intake.  She continues on 40 mg olmesartan nightly, 25 mg hydralazine as needed for elevated systolic blood pressure greater than 160.   Hyperlipidemia on statin therapy  Right bundle branch block and left anterior fascicular block on ECG this is a chronically abnormal finding for her.  LEATHA on CPAP  History of appendiceal carcinoma.  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  Low sodium intake but this is difficult for  her.    Plan:   No medication changes were made  Has remained stable from a cardiac standpoint  See Dr. Paula in 6 months          Thank you for allowing me to participate in this patient's care. Please call with any questions or concerns.          Dragon dictation device was utilized in this note.

## 2025-07-22 NOTE — PROGRESS NOTES
Chief Complaint   Patient presents with    Abdominal Pain       HPI    Vesta Sutton is a  69 y.o. female here for a follow up visit for abdominal pain.    This patient is known to me and will also follow with Dr. Lloyd.    Past medical history of anxiety, depression, appendiceal carcinoid tumor, asthma, coronary artery disease, hyperlipidemia, hypertension, thyroid disease, sleep apnea along with vertigo.     On visit today patient reports several weeks of right-sided abdominal pain comes and goes describes a cramping aching sensation.  Symptoms associated with intermittent diarrhea.  In between she is passing formed stool every 2 days.  Bowel movements mainly postprandial.  Associated gas and bloating.  She has a history of irritable bowel syndrome constipation predominant and takes Linzess as needed.  She also takes Bentyl as needed which she finds helpful.  She follows a strict gluten-free diet as she has reported gluten intolerance.    She goes on to complain of nausea that comes and goes associate with dry heaves.  No vomiting.  She has occasional heartburn with dietary triggers such as spicy greasy foods.  She takes Pepcid when needed.  Denies dysphagia or odynophagia.  Her appetite waxes and wanes.  Weight has been stable.    CT Abdomen Pelvis With Contrast (03/20/2025 18:19) - diverticulosis without diverticulitis.  Suspected constipation.  Previous appendectomy.  Fat deposition on the head of the pancreas.  Stable 11 mm right adrenal nodule.    She works part-time as a pharmacist.    Additional data reviewed:     Colonoscopy 2021 - Nonbleeding internal hemorrhoids and diverticulosis.     EGD 2021 - normal findings.  Pathology negative for celiac disease.    Past Medical History:   Diagnosis Date    Anemia     Anxiety and depression     Appendiceal carcinoid tumor     Arthritis     Asthma     Breast nodule     Cancer of appendix     Coronary artery disease     nonobstructive     COVID-19 virus  infection 2021    Dizziness     Environmental allergies     Fibromuscular hyperplasia of artery     in the carotid artery with no stenosis    GERD (gastroesophageal reflux disease)     Headache     Hernia in stomach    History of anesthesia complications 2017    lidocaine caused reaction during dental procedure per patient    Hyperlipidemia     Hypertension     Hypothyroidism     Impaired glucose metabolism     Lactose intolerance     Lumbar pain     Myalgia     Nodular goiter     last US 3/21/22 stable 2.5 cm left lobe  and  a 9 mm left lobe and a 10 mm mid right lobe. Sister with thyroid cancer    LEATHA (obstructive sleep apnea) 2008    Overnight polysomnogram.  Weight 166 pounds.  Mild LEATHA with AHI 7.5 events per hour.  When supine, moderately abnormal at 15.1 events per hour.  Low oxygen saturation 78% and sleep-related hypoxia present for 24 minutes.    Palpitations     RBBB (right bundle branch block)     Retinal detachment     left  //  when blood pressure gets high she notices flashing lights in her vision    Vertigo     Vitamin D deficiency Cannot remember year       Past Surgical History:   Procedure Laterality Date    APPENDECTOMY  2001    BREAST BIOPSY Left     2017    BREAST CYST ASPIRATION Left     2019    CATARACT EXTRACTION, BILATERAL       SECTION      COLONOSCOPY N/A 2019    Procedure: COLONOSCOPY to CECUM;  Surgeon: Damien Aleman MD;  Location: [facility] ENDOSCOPY;  Service: General    CORNEA LACERATION REPAIR      ENDOSCOPY N/A 2016    Z-line regular, 40 cm from the incisors, gastritis, erythematous duodenopathy    ENDOSCOPY N/A 2018    Normal exophagus, Erythematous mucosa in the antrum, Normal examined duodenum-Dr. Devyn Stahl    ENDOSCOPY N/A 2019    Procedure: ESOPHAGOGASTRODUODENOSCOPY with BX;  Surgeon: Damien Aleman MD;  Location: [facility] ENDOSCOPY;  Service: General    ENDOSCOPY AND COLONOSCOPY N/A 2014    EGD with biopsy and  colonoscopy, Mild gastritis, Normal colon, Repeat Colonoscopy in 5 years, Dr. Damien Aleman    ENDOSCOPY AND COLONOSCOPY N/A 2008    EGD with biopsy and colonoscopy-Dr. Damien Aleman    UPPER GASTROINTESTINAL ENDOSCOPY  Cannot remember    URETEROCELE REPAIR      US GUIDED FINE NEEDLE ASPIRATION  2019       Scheduled Meds:     Continuous Infusions: No current facility-administered medications for this visit.      PRN Meds:     Allergies   Allergen Reactions    Beta Adrenergic Blockers Other (See Comments)     Asthma      Other Other (See Comments) and Dizziness     Septocaine    Hctz [Hydrochlorothiazide] Other (See Comments)     dehydration, stone in salivary gland, hypokalemia    Sulfa Antibiotics Hives    Spironolactone GI Intolerance    Sulfamethoxazole-Trimethoprim GI Intolerance    Viibryd [Vilazodone Hcl] Anxiety and Dizziness       Social History     Socioeconomic History    Marital status:    Tobacco Use    Smoking status: Never     Passive exposure: Never    Smokeless tobacco: Never    Tobacco comments:     CAFFINE USE: 2 cups daily   Vaping Use    Vaping status: Never Used   Substance and Sexual Activity    Alcohol use: Yes     Alcohol/week: 5.0 standard drinks of alcohol     Types: 5 Glasses of wine per week     Comment: Occasionally    Drug use: No    Sexual activity: Not Currently     Birth control/protection: Post-menopausal       Family History   Problem Relation Age of Onset    Heart disease Mother          of Heart Attack    Irritable bowel syndrome Mother     Obesity Mother     Heart failure Father     Heart disease Father          of Heart Attack    Diabetes Sister     Cancer Sister         Thyroid    Hypertension Sister     Obesity Sister     Thyroid disease Sister     Heart disease Sister     Sleep apnea Sister     Heart disease Brother     Colon polyps Brother     Diabetes Sister     Thyroid disease Sister     Hypertension Maternal Aunt     Thyroid disease Sister      Breast cancer Neg Hx     Ovarian cancer Neg Hx      Vitals:    07/22/25 0944   BP: 128/83   Pulse: 60   Temp: 96.9 °F (36.1 °C)       Physical Exam  Constitutional:       Appearance: She is well-developed.   Abdominal:      General: Bowel sounds are normal. There is no distension.      Palpations: Abdomen is soft. There is no mass.      Tenderness: There is no abdominal tenderness. There is no guarding.      Hernia: No hernia is present.   Skin:     General: Skin is warm and dry.      Capillary Refill: Capillary refill takes less than 2 seconds.   Neurological:      Mental Status: She is alert and oriented to person, place, and time.   Psychiatric:         Behavior: Behavior normal.     Assessment    Diagnoses and all orders for this visit:    1. Right sided abdominal pain (Primary)  -     Case Request; Standing  -     Implement Anesthesia orders day of procedure.; Standing  -     Follow Anesthesia Guidelines / Protocol; Future  -     Verify bowel prep was successful; Standing  -     Give tap water enema if bowel prep was insufficient; Standing  -     Obtain Informed Consent; Standing  -     Case Request  -     CBC (No Diff)  -     Comprehensive Metabolic Panel  -     TSH  -     Celiac Comprehensive Panel    2. Nausea  -     Case Request; Standing  -     Implement Anesthesia orders day of procedure.; Standing  -     Follow Anesthesia Guidelines / Protocol; Future  -     Verify bowel prep was successful; Standing  -     Give tap water enema if bowel prep was insufficient; Standing  -     Obtain Informed Consent; Standing  -     Case Request  -     CBC (No Diff)  -     Comprehensive Metabolic Panel  -     TSH  -     Celiac Comprehensive Panel    3. Dyspepsia  -     Case Request; Standing  -     Implement Anesthesia orders day of procedure.; Standing  -     Follow Anesthesia Guidelines / Protocol; Future  -     Verify bowel prep was successful; Standing  -     Give tap water enema if bowel prep was insufficient;  Standing  -     Obtain Informed Consent; Standing  -     Case Request  -     CBC (No Diff)  -     Comprehensive Metabolic Panel  -     TSH  -     Celiac Comprehensive Panel    4. Alternating constipation and diarrhea  -     Case Request; Standing  -     Implement Anesthesia orders day of procedure.; Standing  -     Follow Anesthesia Guidelines / Protocol; Future  -     Verify bowel prep was successful; Standing  -     Give tap water enema if bowel prep was insufficient; Standing  -     Obtain Informed Consent; Standing  -     Case Request  -     CBC (No Diff)  -     Comprehensive Metabolic Panel  -     TSH  -     Celiac Comprehensive Panel    Plan    Recommend bidirectional endoscopic evaluation with Dr. Lloyd  Risk-benefit of procedure reviewed with the patient  Take Pepcid daily and continue as needed Linzess  Labs today as above  Further recommendations and follow-up pending endoscopic evaluation         DESI Husain  [facility]  [address] [place] [address]  Office: [phone]

## 2025-07-22 NOTE — TELEPHONE ENCOUNTER
NORMA montelongo  for COLONOSCOPY on  09/09 arrive at 8am   . mailed prep instructions to verified address on file....miralax OK FOR THE HUB TO RELAY

## 2025-07-23 LAB
ENDOMYSIUM IGA SER QL: NEGATIVE
GLIADIN PEPTIDE IGA SER-ACNC: 33 UNITS (ref 0–19)
GLIADIN PEPTIDE IGG SER-ACNC: 2 UNITS (ref 0–19)
IGA SERPL-MCNC: 207 MG/DL (ref 87–352)
TTG IGA SER-ACNC: <2 U/ML (ref 0–3)
TTG IGG SER-ACNC: <2 U/ML (ref 0–5)

## 2025-07-25 ENCOUNTER — TELEPHONE (OUTPATIENT)
Dept: GASTROENTEROLOGY | Facility: CLINIC | Age: 70
End: 2025-07-25
Payer: MEDICARE

## 2025-07-25 NOTE — TELEPHONE ENCOUNTER
Caller: Vesta Sutton    Relationship to patient: Self    Best call back number: [phone]    Chief complaint: R/S PROCEDURE    Type of visit: C-SCOPE    Requested date: PT SAID  MENTIONED 2 OTHER DATES AND SHE WANTED TO KNOW IF THEY ARE STILL AVAILABILE.   BUT SHE IS ONLY AVAILABLE TO SPEAK ON HER LUNCH BREAK AT 12:05 TODAY.  PLEASE TRY TO CALL HER THEN.     If rescheduling, when is the original appointment: 09/09/25

## 2025-08-01 ENCOUNTER — PATIENT ROUNDING (BHMG ONLY) (OUTPATIENT)
Dept: ORTHOPEDIC SURGERY | Facility: CLINIC | Age: 70
End: 2025-08-01
Payer: MEDICARE

## 2025-08-01 NOTE — ED NOTES
Thank you for letting us care for you in your recent visit to our [facility] urgent care center. We would love to hear about your experience with us. Was this the first time you have visited our location?         We’re always looking for ways to make our patients’ experiences even better. Do you have any recommendations on ways we may improve?         I appreciate you taking the time to respond. Please be on the lookout for a survey about your recent visit from [employer] via text or email. We would greatly appreciate if you could fill that out and turn it back in. We want your voice to be heard and we value your feedback.    Thank you for choosing [facility] for your healthcare needs.         If you have concerns or would like to speak to me in person regarding your visit please feel free to give me a call. [phone]         Hope you get well soon and thank you.         Samantha Youssef  Practice Manager

## 2025-08-07 ENCOUNTER — OFFICE VISIT (OUTPATIENT)
Dept: SLEEP MEDICINE | Facility: HOSPITAL | Age: 70
End: 2025-08-07
Payer: MEDICARE

## 2025-08-07 VITALS — BODY MASS INDEX: 29.99 KG/M2 | HEART RATE: 68 BPM | OXYGEN SATURATION: 96 % | HEIGHT: 65 IN | WEIGHT: 180 LBS

## 2025-08-07 DIAGNOSIS — G47.33 OSA ON CPAP: ICD-10-CM

## 2025-08-07 DIAGNOSIS — G47.36 HYPOXEMIA ASSOCIATED WITH SLEEP: ICD-10-CM

## 2025-08-07 DIAGNOSIS — G47.21 CIRCADIAN RHYTHM SLEEP DISORDER, DELAYED SLEEP PHASE TYPE: ICD-10-CM

## 2025-08-07 DIAGNOSIS — F51.04 PSYCHOPHYSIOLOGICAL INSOMNIA: Primary | ICD-10-CM

## 2025-08-07 PROCEDURE — 99213 OFFICE O/P EST LOW 20 MIN: CPT | Performed by: INTERNAL MEDICINE

## 2025-08-07 PROCEDURE — G0463 HOSPITAL OUTPT CLINIC VISIT: HCPCS

## 2025-08-07 PROCEDURE — 1160F RVW MEDS BY RX/DR IN RCRD: CPT | Performed by: INTERNAL MEDICINE

## 2025-08-07 PROCEDURE — 1159F MED LIST DOCD IN RCRD: CPT | Performed by: INTERNAL MEDICINE

## 2025-08-25 ENCOUNTER — TELEPHONE (OUTPATIENT)
Dept: GASTROENTEROLOGY | Facility: CLINIC | Age: 70
End: 2025-08-25
Payer: MEDICARE

## (undated) DEVICE — SENSR O2 OXIMAX FNGR A/ 18IN NONSTR

## (undated) DEVICE — CANN NASL CO2 TRULINK W/O2 A/

## (undated) DEVICE — TBG 02 CRUSH RESIST LF CLR 7FT

## (undated) DEVICE — Device: Brand: DEFENDO AIR/WATER/SUCTION AND BIOPSY VALVE

## (undated) DEVICE — SINGLE-USE BIOPSY FORCEPS: Brand: RADIAL JAW 4

## (undated) DEVICE — BITEBLOCK OMNI BLOC

## (undated) DEVICE — KT VLV BIOGUARD SXN BIOP AIR/H20 CONN 4PC DISP

## (undated) DEVICE — TUBING, SUCTION, 1/4" X 10', STRAIGHT: Brand: MEDLINE

## (undated) DEVICE — FRCP BX RADJAW4 NDL 2.8 240CM LG OG BX40

## (undated) DEVICE — THE TORRENT IRRIGATION SCOPE CONNECTOR IS USED WITH THE TORRENT IRRIGATION TUBING TO PROVIDE IRRIGATION FLUIDS SUCH AS STERILE WATER DURING GASTROINTESTINAL ENDOSCOPIC PROCEDURES WHEN USED IN CONJUNCTION WITH AN IRRIGATION PUMP (OR ELECTROSURGICAL UNIT).: Brand: TORRENT